# Patient Record
Sex: FEMALE | Race: WHITE | Employment: UNEMPLOYED | URBAN - METROPOLITAN AREA
[De-identification: names, ages, dates, MRNs, and addresses within clinical notes are randomized per-mention and may not be internally consistent; named-entity substitution may affect disease eponyms.]

---

## 2018-08-23 ENCOUNTER — HOSPITAL ENCOUNTER (INPATIENT)
Age: 33
LOS: 1 days | Discharge: PSYCHIATRIC OR SUBSTANCE USE FACILITY | DRG: 773 | End: 2018-08-24
Attending: EMERGENCY MEDICINE | Admitting: INTERNAL MEDICINE
Payer: MEDICAID

## 2018-08-23 DIAGNOSIS — R45.851 SUICIDAL IDEATION: ICD-10-CM

## 2018-08-23 DIAGNOSIS — F10.20 ALCOHOLISM (HCC): ICD-10-CM

## 2018-08-23 DIAGNOSIS — F10.929 ACUTE ALCOHOLIC INTOXICATION WITH COMPLICATION (HCC): Primary | ICD-10-CM

## 2018-08-23 LAB
ACETAMINOPHEN LEVEL: <5 MCG/ML (ref 10–30)
ALBUMIN SERPL-MCNC: 4.4 G/DL (ref 3.5–5.2)
ALP BLD-CCNC: 128 U/L (ref 35–104)
ALT SERPL-CCNC: 76 U/L (ref 0–32)
AMPHETAMINE SCREEN, URINE: NOT DETECTED
ANION GAP SERPL CALCULATED.3IONS-SCNC: 17 MMOL/L (ref 7–16)
AST SERPL-CCNC: 167 U/L (ref 0–31)
BARBITURATE SCREEN URINE: NOT DETECTED
BASOPHILS ABSOLUTE: 0.07 E9/L (ref 0–0.2)
BASOPHILS RELATIVE PERCENT: 1.1 % (ref 0–2)
BENZODIAZEPINE SCREEN, URINE: NOT DETECTED
BILIRUB SERPL-MCNC: 0.4 MG/DL (ref 0–1.2)
BILIRUBIN URINE: NEGATIVE
BLOOD, URINE: NEGATIVE
BUN BLDV-MCNC: 2 MG/DL (ref 6–20)
CALCIUM SERPL-MCNC: 8.5 MG/DL (ref 8.6–10.2)
CANNABINOID SCREEN URINE: POSITIVE
CHLORIDE BLD-SCNC: 103 MMOL/L (ref 98–107)
CLARITY: CLEAR
CO2: 26 MMOL/L (ref 22–29)
COCAINE METABOLITE SCREEN URINE: NOT DETECTED
COLOR: YELLOW
CREAT SERPL-MCNC: 0.5 MG/DL (ref 0.5–1)
EKG ATRIAL RATE: 111 BPM
EKG P AXIS: 75 DEGREES
EKG P-R INTERVAL: 136 MS
EKG Q-T INTERVAL: 346 MS
EKG QRS DURATION: 70 MS
EKG QTC CALCULATION (BAZETT): 470 MS
EKG R AXIS: 86 DEGREES
EKG T AXIS: 72 DEGREES
EKG VENTRICULAR RATE: 111 BPM
EOSINOPHILS ABSOLUTE: 0.12 E9/L (ref 0.05–0.5)
EOSINOPHILS RELATIVE PERCENT: 1.8 % (ref 0–6)
ETHANOL: 388 MG/DL (ref 0–0.08)
GFR AFRICAN AMERICAN: >60
GFR NON-AFRICAN AMERICAN: >60 ML/MIN/1.73
GLUCOSE BLD-MCNC: 112 MG/DL (ref 74–109)
GLUCOSE URINE: NEGATIVE MG/DL
HCT VFR BLD CALC: 45.6 % (ref 34–48)
HEMOGLOBIN: 15.9 G/DL (ref 11.5–15.5)
IMMATURE GRANULOCYTES #: 0.03 E9/L
IMMATURE GRANULOCYTES %: 0.5 % (ref 0–5)
KETONES, URINE: NEGATIVE MG/DL
LEUKOCYTE ESTERASE, URINE: NEGATIVE
LYMPHOCYTES ABSOLUTE: 2.61 E9/L (ref 1.5–4)
LYMPHOCYTES RELATIVE PERCENT: 39.7 % (ref 20–42)
MCH RBC QN AUTO: 36.3 PG (ref 26–35)
MCHC RBC AUTO-ENTMCNC: 34.9 % (ref 32–34.5)
MCV RBC AUTO: 104.1 FL (ref 80–99.9)
METHADONE SCREEN, URINE: NOT DETECTED
MONOCYTES ABSOLUTE: 0.68 E9/L (ref 0.1–0.95)
MONOCYTES RELATIVE PERCENT: 10.4 % (ref 2–12)
NEUTROPHILS ABSOLUTE: 3.06 E9/L (ref 1.8–7.3)
NEUTROPHILS RELATIVE PERCENT: 46.5 % (ref 43–80)
NITRITE, URINE: NEGATIVE
OPIATE SCREEN URINE: NOT DETECTED
PDW BLD-RTO: 11.6 FL (ref 11.5–15)
PH UA: 5.5 (ref 5–9)
PHENCYCLIDINE SCREEN URINE: NOT DETECTED
PLATELET # BLD: 187 E9/L (ref 130–450)
PMV BLD AUTO: 9.8 FL (ref 7–12)
POTASSIUM SERPL-SCNC: 3.5 MMOL/L (ref 3.5–5)
PROPOXYPHENE SCREEN: NOT DETECTED
PROTEIN UA: NEGATIVE MG/DL
RBC # BLD: 4.38 E12/L (ref 3.5–5.5)
SALICYLATE, SERUM: <0.3 MG/DL (ref 0–30)
SODIUM BLD-SCNC: 146 MMOL/L (ref 132–146)
SPECIFIC GRAVITY UA: 1.01 (ref 1–1.03)
T4 FREE: 0.84 NG/DL (ref 0.93–1.7)
TOTAL PROTEIN: 7.9 G/DL (ref 6.4–8.3)
TRICYCLIC ANTIDEPRESSANTS SCREEN SERUM: NEGATIVE NG/ML
TSH SERPL DL<=0.05 MIU/L-ACNC: 1.08 UIU/ML (ref 0.27–4.2)
UROBILINOGEN, URINE: 0.2 E.U./DL
WBC # BLD: 6.6 E9/L (ref 4.5–11.5)

## 2018-08-23 PROCEDURE — 2060000000 HC ICU INTERMEDIATE R&B

## 2018-08-23 PROCEDURE — 6360000002 HC RX W HCPCS: Performed by: INTERNAL MEDICINE

## 2018-08-23 PROCEDURE — 2580000003 HC RX 258: Performed by: INTERNAL MEDICINE

## 2018-08-23 PROCEDURE — 6370000000 HC RX 637 (ALT 250 FOR IP): Performed by: INTERNAL MEDICINE

## 2018-08-23 PROCEDURE — 6360000002 HC RX W HCPCS: Performed by: EMERGENCY MEDICINE

## 2018-08-23 PROCEDURE — 84443 ASSAY THYROID STIM HORMONE: CPT

## 2018-08-23 PROCEDURE — 93005 ELECTROCARDIOGRAM TRACING: CPT | Performed by: STUDENT IN AN ORGANIZED HEALTH CARE EDUCATION/TRAINING PROGRAM

## 2018-08-23 PROCEDURE — 2500000003 HC RX 250 WO HCPCS: Performed by: INTERNAL MEDICINE

## 2018-08-23 PROCEDURE — 81003 URINALYSIS AUTO W/O SCOPE: CPT

## 2018-08-23 PROCEDURE — 36415 COLL VENOUS BLD VENIPUNCTURE: CPT

## 2018-08-23 PROCEDURE — 85025 COMPLETE CBC W/AUTO DIFF WBC: CPT

## 2018-08-23 PROCEDURE — G0480 DRUG TEST DEF 1-7 CLASSES: HCPCS

## 2018-08-23 PROCEDURE — 80307 DRUG TEST PRSMV CHEM ANLYZR: CPT

## 2018-08-23 PROCEDURE — 84439 ASSAY OF FREE THYROXINE: CPT

## 2018-08-23 PROCEDURE — 80053 COMPREHEN METABOLIC PANEL: CPT

## 2018-08-23 PROCEDURE — 99285 EMERGENCY DEPT VISIT HI MDM: CPT

## 2018-08-23 PROCEDURE — 96374 THER/PROPH/DIAG INJ IV PUSH: CPT

## 2018-08-23 PROCEDURE — 2580000003 HC RX 258: Performed by: STUDENT IN AN ORGANIZED HEALTH CARE EDUCATION/TRAINING PROGRAM

## 2018-08-23 RX ORDER — LORAZEPAM 1 MG/1
4 TABLET ORAL
Status: DISCONTINUED | OUTPATIENT
Start: 2018-08-23 | End: 2018-08-24 | Stop reason: HOSPADM

## 2018-08-23 RX ORDER — LORAZEPAM 1 MG/1
2 TABLET ORAL
Status: DISCONTINUED | OUTPATIENT
Start: 2018-08-23 | End: 2018-08-24 | Stop reason: HOSPADM

## 2018-08-23 RX ORDER — LORAZEPAM 1 MG/1
1 TABLET ORAL
Status: DISCONTINUED | OUTPATIENT
Start: 2018-08-23 | End: 2018-08-24

## 2018-08-23 RX ORDER — LORAZEPAM 2 MG/ML
4 INJECTION INTRAMUSCULAR
Status: DISCONTINUED | OUTPATIENT
Start: 2018-08-23 | End: 2018-08-24 | Stop reason: HOSPADM

## 2018-08-23 RX ORDER — SODIUM CHLORIDE 0.9 % (FLUSH) 0.9 %
10 SYRINGE (ML) INJECTION PRN
Status: DISCONTINUED | OUTPATIENT
Start: 2018-08-23 | End: 2018-08-24

## 2018-08-23 RX ORDER — LORAZEPAM 2 MG/ML
1 INJECTION INTRAMUSCULAR
Status: DISCONTINUED | OUTPATIENT
Start: 2018-08-23 | End: 2018-08-24 | Stop reason: HOSPADM

## 2018-08-23 RX ORDER — SODIUM CHLORIDE 0.9 % (FLUSH) 0.9 %
10 SYRINGE (ML) INJECTION EVERY 12 HOURS SCHEDULED
Status: DISCONTINUED | OUTPATIENT
Start: 2018-08-23 | End: 2018-08-24 | Stop reason: HOSPADM

## 2018-08-23 RX ORDER — ONDANSETRON 2 MG/ML
4 INJECTION INTRAMUSCULAR; INTRAVENOUS EVERY 6 HOURS PRN
Status: DISCONTINUED | OUTPATIENT
Start: 2018-08-23 | End: 2018-08-24 | Stop reason: HOSPADM

## 2018-08-23 RX ORDER — FOLIC ACID 1 MG/1
1 TABLET ORAL DAILY
Status: DISCONTINUED | OUTPATIENT
Start: 2018-08-24 | End: 2018-08-24 | Stop reason: HOSPADM

## 2018-08-23 RX ORDER — LORAZEPAM 1 MG/1
2 TABLET ORAL
Status: DISCONTINUED | OUTPATIENT
Start: 2018-08-23 | End: 2018-08-24

## 2018-08-23 RX ORDER — LORAZEPAM 2 MG/ML
2 INJECTION INTRAMUSCULAR
Status: DISCONTINUED | OUTPATIENT
Start: 2018-08-23 | End: 2018-08-24

## 2018-08-23 RX ORDER — LORAZEPAM 2 MG/ML
3 INJECTION INTRAMUSCULAR
Status: DISCONTINUED | OUTPATIENT
Start: 2018-08-23 | End: 2018-08-24 | Stop reason: HOSPADM

## 2018-08-23 RX ORDER — LORAZEPAM 1 MG/1
3 TABLET ORAL
Status: DISCONTINUED | OUTPATIENT
Start: 2018-08-23 | End: 2018-08-24 | Stop reason: HOSPADM

## 2018-08-23 RX ORDER — LORAZEPAM 2 MG/ML
3 INJECTION INTRAMUSCULAR
Status: DISCONTINUED | OUTPATIENT
Start: 2018-08-23 | End: 2018-08-24

## 2018-08-23 RX ORDER — THIAMINE MONONITRATE (VIT B1) 100 MG
100 TABLET ORAL DAILY
Status: DISCONTINUED | OUTPATIENT
Start: 2018-08-24 | End: 2018-08-24 | Stop reason: HOSPADM

## 2018-08-23 RX ORDER — SODIUM CHLORIDE 0.9 % (FLUSH) 0.9 %
10 SYRINGE (ML) INJECTION PRN
Status: DISCONTINUED | OUTPATIENT
Start: 2018-08-23 | End: 2018-08-24 | Stop reason: HOSPADM

## 2018-08-23 RX ORDER — LORAZEPAM 2 MG/ML
2 INJECTION INTRAMUSCULAR
Status: DISCONTINUED | OUTPATIENT
Start: 2018-08-23 | End: 2018-08-24 | Stop reason: HOSPADM

## 2018-08-23 RX ORDER — LORAZEPAM 1 MG/1
4 TABLET ORAL
Status: DISCONTINUED | OUTPATIENT
Start: 2018-08-23 | End: 2018-08-24

## 2018-08-23 RX ORDER — SODIUM CHLORIDE 0.9 % (FLUSH) 0.9 %
10 SYRINGE (ML) INJECTION EVERY 12 HOURS SCHEDULED
Status: DISCONTINUED | OUTPATIENT
Start: 2018-08-23 | End: 2018-08-24

## 2018-08-23 RX ORDER — LORAZEPAM 2 MG/ML
1 INJECTION INTRAMUSCULAR ONCE
Status: DISCONTINUED | OUTPATIENT
Start: 2018-08-23 | End: 2018-08-23

## 2018-08-23 RX ORDER — LORAZEPAM 1 MG/1
1 TABLET ORAL
Status: DISCONTINUED | OUTPATIENT
Start: 2018-08-23 | End: 2018-08-24 | Stop reason: HOSPADM

## 2018-08-23 RX ORDER — 0.9 % SODIUM CHLORIDE 0.9 %
1000 INTRAVENOUS SOLUTION INTRAVENOUS ONCE
Status: COMPLETED | OUTPATIENT
Start: 2018-08-23 | End: 2018-08-23

## 2018-08-23 RX ORDER — LORAZEPAM 2 MG/ML
1 INJECTION INTRAMUSCULAR
Status: DISCONTINUED | OUTPATIENT
Start: 2018-08-23 | End: 2018-08-24

## 2018-08-23 RX ORDER — LORAZEPAM 1 MG/1
3 TABLET ORAL
Status: DISCONTINUED | OUTPATIENT
Start: 2018-08-23 | End: 2018-08-24

## 2018-08-23 RX ORDER — LORAZEPAM 2 MG/ML
4 INJECTION INTRAMUSCULAR
Status: DISCONTINUED | OUTPATIENT
Start: 2018-08-23 | End: 2018-08-24

## 2018-08-23 RX ADMIN — SODIUM CHLORIDE 1000 ML: 9 INJECTION, SOLUTION INTRAVENOUS at 15:17

## 2018-08-23 RX ADMIN — LORAZEPAM 4 MG: 1 TABLET ORAL at 18:51

## 2018-08-23 RX ADMIN — Medication 10 ML: at 19:27

## 2018-08-23 RX ADMIN — LORAZEPAM 4 MG: 1 TABLET ORAL at 22:09

## 2018-08-23 RX ADMIN — LORAZEPAM 2 MG: 2 INJECTION INTRAMUSCULAR; INTRAVENOUS at 16:31

## 2018-08-23 RX ADMIN — FOLIC ACID: 5 INJECTION, SOLUTION INTRAMUSCULAR; INTRAVENOUS; SUBCUTANEOUS at 19:27

## 2018-08-23 RX ADMIN — ENOXAPARIN SODIUM 40 MG: 100 INJECTION SUBCUTANEOUS at 18:51

## 2018-08-23 ASSESSMENT — ENCOUNTER SYMPTOMS
COLOR CHANGE: 0
ABDOMINAL PAIN: 0
BACK PAIN: 0
NAUSEA: 0
COUGH: 0
VOMITING: 0
DIARRHEA: 0
SORE THROAT: 0
SHORTNESS OF BREATH: 0

## 2018-08-23 ASSESSMENT — PAIN SCALES - GENERAL: PAINLEVEL_OUTOF10: 0

## 2018-08-23 NOTE — ED PROVIDER NOTES
Negative for immunocompromised state. Neurological: Negative for dizziness, syncope, weakness, light-headedness, numbness and headaches. Hematological: Negative for adenopathy. Does not bruise/bleed easily. Psychiatric/Behavioral: Negative. Physical Exam   Constitutional: She is oriented to person, place, and time. She appears well-developed. No distress. Appears undernourished   HENT:   Head: Normocephalic and atraumatic. Right Ear: External ear normal.   Left Ear: External ear normal.   Nose: Nose normal.   Mouth/Throat: Oropharynx is clear and moist. No oropharyngeal exudate. Eyes: Pupils are equal, round, and reactive to light. Conjunctivae and EOM are normal. Right eye exhibits no discharge. Left eye exhibits no discharge. No scleral icterus. Neck: Normal range of motion. Neck supple. No JVD present. No tracheal deviation present. No thyromegaly present. Cardiovascular: Regular rhythm, S1 normal, S2 normal, normal heart sounds and intact distal pulses. Tachycardia present. Exam reveals no gallop, no S3, no S4, no distant heart sounds and no friction rub. No murmur heard. Pulses:       Radial pulses are 2+ on the right side, and 2+ on the left side. Pulmonary/Chest: Effort normal and breath sounds normal. No stridor. No respiratory distress. She has no wheezes. She has no rales. She exhibits no tenderness. Abdominal: Soft. Bowel sounds are normal. She exhibits no distension and no mass. There is no tenderness. There is no rebound and no guarding. Musculoskeletal: Normal range of motion. She exhibits no edema, tenderness or deformity. Lymphadenopathy:     She has no cervical adenopathy. Neurological: She is alert and oriented to person, place, and time. Skin: Skin is warm and dry. No rash noted. She is not diaphoretic. No erythema. No pallor. Psychiatric: She has a normal mood and affect.  Her behavior is normal. Judgment and thought content normal.   Nursing note and 8.3 g/dL    Alb 4.4 3.5 - 5.2 g/dL    Total Bilirubin 0.4 0.0 - 1.2 mg/dL    Alkaline Phosphatase 128 (H) 35 - 104 U/L    ALT 76 (H) 0 - 32 U/L     (H) 0 - 31 U/L   Serum Drug Screen   Result Value Ref Range    Ethanol Lvl 388 (HH) mg/dL    Acetaminophen Level <5.0 (L) 10.0 - 83.4 mcg/mL    Salicylate, Serum <7.9 0.0 - 30.0 mg/dL    TCA Scrn NEGATIVE Cutoff:300 ng/mL   Urine Drug Screen   Result Value Ref Range    Amphetamine Screen, Urine NOT DETECTED Negative <1000 ng/mL    Barbiturate Screen, Ur NOT DETECTED Negative < 200 ng/mL    Benzodiazepine Screen, Urine NOT DETECTED Negative < 200 ng/mL    Cannabinoid Scrn, Ur POSITIVE (A) Negative < 50ng/mL    Cocaine Metabolite Screen, Urine NOT DETECTED Negative < 300 ng/mL    Opiate Scrn, Ur NOT DETECTED Negative < 300ng/mL    PCP Screen, Urine NOT DETECTED Negative < 25 ng/mL    Methadone Screen, Urine NOT DETECTED Negative <300 ng/mL    Propoxyphene Scrn, Ur NOT DETECTED Negative <300 ng/mL   TSH without Reflex   Result Value Ref Range    TSH 1.080 0.270 - 4.200 uIU/mL   T4, FREE   Result Value Ref Range    T4 Free 0.84 (L) 0.93 - 1.70 ng/dL   Urinalysis   Result Value Ref Range    Color, UA Yellow Straw/Yellow    Clarity, UA Clear Clear    Glucose, Ur Negative Negative mg/dL    Bilirubin Urine Negative Negative    Ketones, Urine Negative Negative mg/dL    Specific Gravity, UA 1.010 1.005 - 1.030    Blood, Urine Negative Negative    pH, UA 5.5 5.0 - 9.0    Protein, UA Negative Negative mg/dL    Urobilinogen, Urine 0.2 <2.0 E.U./dL    Nitrite, Urine Negative Negative    Leukocyte Esterase, Urine Negative Negative       RADIOLOGY:  No orders to display           ------------------------- NURSING NOTES AND VITALS REVIEWED ---------------------------  Date / Time Roomed:  8/23/2018  2:06 PM  ED Bed Assignment:  GABBIE F/HF    The nursing notes within the ED encounter and vital signs as below have been reviewed.      Patient Vitals for the past 24 hrs:   BP Temp Temp

## 2018-08-23 NOTE — ED NOTES
[facility] CONFIRMS PATIENT ON CARDIAC MONITOR     [facility]  08/23/18 [id number]

## 2018-08-24 ENCOUNTER — HOSPITAL ENCOUNTER (INPATIENT)
Age: 33
LOS: 4 days | Discharge: HOME | DRG: 751 | End: 2018-08-28
Attending: PSYCHIATRY & NEUROLOGY | Admitting: PSYCHIATRY & NEUROLOGY
Payer: MEDICAID

## 2018-08-24 VITALS
BODY MASS INDEX: 15.95 KG/M2 | TEMPERATURE: 97.7 F | HEIGHT: 63 IN | DIASTOLIC BLOOD PRESSURE: 80 MMHG | OXYGEN SATURATION: 97 % | RESPIRATION RATE: 16 BRPM | WEIGHT: 90 LBS | HEART RATE: 98 BPM | SYSTOLIC BLOOD PRESSURE: 120 MMHG

## 2018-08-24 DIAGNOSIS — F33.2 SEVERE EPISODE OF RECURRENT MAJOR DEPRESSIVE DISORDER, WITHOUT PSYCHOTIC FEATURES (HCC): Primary | ICD-10-CM

## 2018-08-24 PROBLEM — R45.851 DEPRESSION WITH SUICIDAL IDEATION: Status: ACTIVE | Noted: 2018-08-24

## 2018-08-24 PROBLEM — F32.A DEPRESSION WITH SUICIDAL IDEATION: Status: ACTIVE | Noted: 2018-08-24

## 2018-08-24 PROBLEM — F10.10 ALCOHOL ABUSE: Chronic | Status: ACTIVE | Noted: 2018-08-24

## 2018-08-24 PROBLEM — E43 PROTEIN-CALORIE MALNUTRITION, SEVERE (HCC): Chronic | Status: ACTIVE | Noted: 2018-08-24

## 2018-08-24 LAB
ALBUMIN SERPL-MCNC: 3.3 G/DL (ref 3.5–5.2)
ALP BLD-CCNC: 97 U/L (ref 35–104)
ALT SERPL-CCNC: 57 U/L (ref 0–32)
ANION GAP SERPL CALCULATED.3IONS-SCNC: 17 MMOL/L (ref 7–16)
AST SERPL-CCNC: 170 U/L (ref 0–31)
BASOPHILS ABSOLUTE: 0.05 E9/L (ref 0–0.2)
BASOPHILS RELATIVE PERCENT: 0.6 % (ref 0–2)
BILIRUB SERPL-MCNC: 0.6 MG/DL (ref 0–1.2)
BUN BLDV-MCNC: <2 MG/DL (ref 6–20)
CALCIUM SERPL-MCNC: 7.8 MG/DL (ref 8.6–10.2)
CHLORIDE BLD-SCNC: 101 MMOL/L (ref 98–107)
CO2: 22 MMOL/L (ref 22–29)
CREAT SERPL-MCNC: 0.4 MG/DL (ref 0.5–1)
EOSINOPHILS ABSOLUTE: 0.09 E9/L (ref 0.05–0.5)
EOSINOPHILS RELATIVE PERCENT: 1.1 % (ref 0–6)
ETHANOL: <10 MG/DL (ref 0–0.08)
GFR AFRICAN AMERICAN: >60
GFR NON-AFRICAN AMERICAN: >60 ML/MIN/1.73
GLUCOSE BLD-MCNC: 89 MG/DL (ref 74–109)
HCT VFR BLD CALC: 37.4 % (ref 34–48)
HEMOGLOBIN: 13 G/DL (ref 11.5–15.5)
IMMATURE GRANULOCYTES #: 0.02 E9/L
IMMATURE GRANULOCYTES %: 0.3 % (ref 0–5)
LYMPHOCYTES ABSOLUTE: 2.56 E9/L (ref 1.5–4)
LYMPHOCYTES RELATIVE PERCENT: 32.5 % (ref 20–42)
MAGNESIUM: 2.1 MG/DL (ref 1.6–2.6)
MCH RBC QN AUTO: 36 PG (ref 26–35)
MCHC RBC AUTO-ENTMCNC: 34.8 % (ref 32–34.5)
MCV RBC AUTO: 103.6 FL (ref 80–99.9)
MONOCYTES ABSOLUTE: 0.96 E9/L (ref 0.1–0.95)
MONOCYTES RELATIVE PERCENT: 12.2 % (ref 2–12)
NEUTROPHILS ABSOLUTE: 4.19 E9/L (ref 1.8–7.3)
NEUTROPHILS RELATIVE PERCENT: 53.3 % (ref 43–80)
PDW BLD-RTO: 11.3 FL (ref 11.5–15)
PLATELET # BLD: 142 E9/L (ref 130–450)
PMV BLD AUTO: 9.9 FL (ref 7–12)
POTASSIUM SERPL-SCNC: 3.6 MMOL/L (ref 3.5–5)
RBC # BLD: 3.61 E12/L (ref 3.5–5.5)
SODIUM BLD-SCNC: 140 MMOL/L (ref 132–146)
TOTAL PROTEIN: 6.2 G/DL (ref 6.4–8.3)
WBC # BLD: 7.9 E9/L (ref 4.5–11.5)

## 2018-08-24 PROCEDURE — 1240000000 HC EMOTIONAL WELLNESS R&B

## 2018-08-24 PROCEDURE — 6370000000 HC RX 637 (ALT 250 FOR IP): Performed by: INTERNAL MEDICINE

## 2018-08-24 PROCEDURE — 99253 IP/OBS CNSLTJ NEW/EST LOW 45: CPT | Performed by: PSYCHIATRY & NEUROLOGY

## 2018-08-24 PROCEDURE — 80053 COMPREHEN METABOLIC PANEL: CPT

## 2018-08-24 PROCEDURE — 83735 ASSAY OF MAGNESIUM: CPT

## 2018-08-24 PROCEDURE — 6360000002 HC RX W HCPCS: Performed by: INTERNAL MEDICINE

## 2018-08-24 PROCEDURE — 2580000003 HC RX 258: Performed by: INTERNAL MEDICINE

## 2018-08-24 PROCEDURE — 6370000000 HC RX 637 (ALT 250 FOR IP): Performed by: EMERGENCY MEDICINE

## 2018-08-24 PROCEDURE — 2580000003 HC RX 258: Performed by: EMERGENCY MEDICINE

## 2018-08-24 PROCEDURE — 85025 COMPLETE CBC W/AUTO DIFF WBC: CPT

## 2018-08-24 PROCEDURE — 36415 COLL VENOUS BLD VENIPUNCTURE: CPT

## 2018-08-24 PROCEDURE — 6360000002 HC RX W HCPCS: Performed by: EMERGENCY MEDICINE

## 2018-08-24 PROCEDURE — G0480 DRUG TEST DEF 1-7 CLASSES: HCPCS

## 2018-08-24 RX ORDER — SODIUM CHLORIDE 0.9 % (FLUSH) 0.9 %
10 SYRINGE (ML) INJECTION PRN
Status: CANCELLED | OUTPATIENT
Start: 2018-08-24

## 2018-08-24 RX ORDER — LORAZEPAM 1 MG/1
3 TABLET ORAL
Status: CANCELLED | OUTPATIENT
Start: 2018-08-24

## 2018-08-24 RX ORDER — LORAZEPAM 2 MG/ML
2 INJECTION INTRAMUSCULAR
Status: CANCELLED | OUTPATIENT
Start: 2018-08-24

## 2018-08-24 RX ORDER — KETOROLAC TROMETHAMINE 30 MG/ML
15 INJECTION, SOLUTION INTRAMUSCULAR; INTRAVENOUS ONCE
Status: COMPLETED | OUTPATIENT
Start: 2018-08-24 | End: 2018-08-24

## 2018-08-24 RX ORDER — LORAZEPAM 2 MG/ML
1 INJECTION INTRAMUSCULAR
Status: CANCELLED | OUTPATIENT
Start: 2018-08-24

## 2018-08-24 RX ORDER — SODIUM CHLORIDE 0.9 % (FLUSH) 0.9 %
10 SYRINGE (ML) INJECTION EVERY 12 HOURS SCHEDULED
Status: CANCELLED | OUTPATIENT
Start: 2018-08-24

## 2018-08-24 RX ORDER — IBUPROFEN 400 MG/1
400 TABLET ORAL EVERY 6 HOURS PRN
Status: CANCELLED | OUTPATIENT
Start: 2018-08-24

## 2018-08-24 RX ORDER — SUCRALFATE 1 G/1
1 TABLET ORAL EVERY 6 HOURS SCHEDULED
Status: CANCELLED | OUTPATIENT
Start: 2018-08-24

## 2018-08-24 RX ORDER — TRAZODONE HYDROCHLORIDE 50 MG/1
25 TABLET ORAL NIGHTLY
Status: DISCONTINUED | OUTPATIENT
Start: 2018-08-24 | End: 2018-08-24 | Stop reason: HOSPADM

## 2018-08-24 RX ORDER — LORAZEPAM 2 MG/ML
1 INJECTION INTRAMUSCULAR
Status: DISCONTINUED | OUTPATIENT
Start: 2018-08-24 | End: 2018-08-27

## 2018-08-24 RX ORDER — ONDANSETRON 4 MG/1
4 TABLET, ORALLY DISINTEGRATING ORAL EVERY 8 HOURS PRN
Status: DISCONTINUED | OUTPATIENT
Start: 2018-08-24 | End: 2018-08-28 | Stop reason: HOSPADM

## 2018-08-24 RX ORDER — LORAZEPAM 2 MG/ML
3 INJECTION INTRAMUSCULAR
Status: DISCONTINUED | OUTPATIENT
Start: 2018-08-24 | End: 2018-08-27

## 2018-08-24 RX ORDER — DEXTROSE AND SODIUM CHLORIDE 5; .9 G/100ML; G/100ML
INJECTION, SOLUTION INTRAVENOUS CONTINUOUS
Status: DISCONTINUED | OUTPATIENT
Start: 2018-08-24 | End: 2018-08-28 | Stop reason: HOSPADM

## 2018-08-24 RX ORDER — LORAZEPAM 1 MG/1
3 TABLET ORAL
Status: DISCONTINUED | OUTPATIENT
Start: 2018-08-24 | End: 2018-08-27

## 2018-08-24 RX ORDER — TRAZODONE HYDROCHLORIDE 50 MG/1
25 TABLET ORAL NIGHTLY
Status: CANCELLED | OUTPATIENT
Start: 2018-08-24

## 2018-08-24 RX ORDER — IBUPROFEN 400 MG/1
400 TABLET ORAL EVERY 6 HOURS PRN
Status: DISCONTINUED | OUTPATIENT
Start: 2018-08-24 | End: 2018-08-24 | Stop reason: HOSPADM

## 2018-08-24 RX ORDER — LORAZEPAM 2 MG/ML
3 INJECTION INTRAMUSCULAR
Status: CANCELLED | OUTPATIENT
Start: 2018-08-24

## 2018-08-24 RX ORDER — LORAZEPAM 2 MG/ML
4 INJECTION INTRAMUSCULAR
Status: CANCELLED | OUTPATIENT
Start: 2018-08-24

## 2018-08-24 RX ORDER — LORAZEPAM 1 MG/1
2 TABLET ORAL
Status: DISCONTINUED | OUTPATIENT
Start: 2018-08-24 | End: 2018-08-27

## 2018-08-24 RX ORDER — ONDANSETRON 4 MG/1
4 TABLET, ORALLY DISINTEGRATING ORAL EVERY 8 HOURS PRN
Status: DISCONTINUED | OUTPATIENT
Start: 2018-08-24 | End: 2018-08-24 | Stop reason: HOSPADM

## 2018-08-24 RX ORDER — PANTOPRAZOLE SODIUM 40 MG/1
40 TABLET, DELAYED RELEASE ORAL
Status: DISCONTINUED | OUTPATIENT
Start: 2018-08-25 | End: 2018-08-24 | Stop reason: HOSPADM

## 2018-08-24 RX ORDER — LORAZEPAM 1 MG/1
2 TABLET ORAL
Status: CANCELLED | OUTPATIENT
Start: 2018-08-24

## 2018-08-24 RX ORDER — ONDANSETRON 4 MG/1
4 TABLET, ORALLY DISINTEGRATING ORAL EVERY 8 HOURS PRN
Status: CANCELLED | OUTPATIENT
Start: 2018-08-24

## 2018-08-24 RX ORDER — NICOTINE 21 MG/24HR
1 PATCH, TRANSDERMAL 24 HOURS TRANSDERMAL DAILY
Status: CANCELLED | OUTPATIENT
Start: 2018-08-24

## 2018-08-24 RX ORDER — PANTOPRAZOLE SODIUM 40 MG/1
40 TABLET, DELAYED RELEASE ORAL
Status: DISCONTINUED | OUTPATIENT
Start: 2018-08-25 | End: 2018-08-28 | Stop reason: HOSPADM

## 2018-08-24 RX ORDER — LORAZEPAM 1 MG/1
4 TABLET ORAL
Status: DISCONTINUED | OUTPATIENT
Start: 2018-08-24 | End: 2018-08-27

## 2018-08-24 RX ORDER — FOLIC ACID 1 MG/1
1 TABLET ORAL DAILY
Status: CANCELLED | OUTPATIENT
Start: 2018-08-25

## 2018-08-24 RX ORDER — PANTOPRAZOLE SODIUM 40 MG/1
40 TABLET, DELAYED RELEASE ORAL
Status: CANCELLED | OUTPATIENT
Start: 2018-08-25

## 2018-08-24 RX ORDER — SODIUM CHLORIDE 0.9 % (FLUSH) 0.9 %
10 SYRINGE (ML) INJECTION PRN
Status: DISCONTINUED | OUTPATIENT
Start: 2018-08-24 | End: 2018-08-28 | Stop reason: HOSPADM

## 2018-08-24 RX ORDER — SODIUM CHLORIDE 0.9 % (FLUSH) 0.9 %
10 SYRINGE (ML) INJECTION EVERY 12 HOURS SCHEDULED
Status: DISCONTINUED | OUTPATIENT
Start: 2018-08-24 | End: 2018-08-28 | Stop reason: HOSPADM

## 2018-08-24 RX ORDER — LORAZEPAM 1 MG/1
1 TABLET ORAL
Status: DISCONTINUED | OUTPATIENT
Start: 2018-08-24 | End: 2018-08-27

## 2018-08-24 RX ORDER — SUCRALFATE 1 G/1
1 TABLET ORAL EVERY 6 HOURS SCHEDULED
Status: DISCONTINUED | OUTPATIENT
Start: 2018-08-25 | End: 2018-08-28 | Stop reason: HOSPADM

## 2018-08-24 RX ORDER — NICOTINE 21 MG/24HR
1 PATCH, TRANSDERMAL 24 HOURS TRANSDERMAL DAILY
Status: DISCONTINUED | OUTPATIENT
Start: 2018-08-25 | End: 2018-08-28 | Stop reason: HOSPADM

## 2018-08-24 RX ORDER — SUCRALFATE 1 G/1
1 TABLET ORAL EVERY 6 HOURS SCHEDULED
Status: DISCONTINUED | OUTPATIENT
Start: 2018-08-24 | End: 2018-08-24 | Stop reason: HOSPADM

## 2018-08-24 RX ORDER — LORAZEPAM 1 MG/1
1 TABLET ORAL
Status: CANCELLED | OUTPATIENT
Start: 2018-08-24

## 2018-08-24 RX ORDER — FOLIC ACID 1 MG/1
1 TABLET ORAL DAILY
Status: DISCONTINUED | OUTPATIENT
Start: 2018-08-25 | End: 2018-08-28 | Stop reason: HOSPADM

## 2018-08-24 RX ORDER — DEXTROSE AND SODIUM CHLORIDE 5; .9 G/100ML; G/100ML
INJECTION, SOLUTION INTRAVENOUS CONTINUOUS
Status: CANCELLED | OUTPATIENT
Start: 2018-08-24

## 2018-08-24 RX ORDER — NICOTINE 21 MG/24HR
1 PATCH, TRANSDERMAL 24 HOURS TRANSDERMAL DAILY
Status: DISCONTINUED | OUTPATIENT
Start: 2018-08-24 | End: 2018-08-24 | Stop reason: HOSPADM

## 2018-08-24 RX ORDER — LORAZEPAM 1 MG/1
4 TABLET ORAL
Status: CANCELLED | OUTPATIENT
Start: 2018-08-24

## 2018-08-24 RX ORDER — THIAMINE MONONITRATE (VIT B1) 100 MG
100 TABLET ORAL DAILY
Status: CANCELLED | OUTPATIENT
Start: 2018-08-25

## 2018-08-24 RX ORDER — IBUPROFEN 400 MG/1
400 TABLET ORAL EVERY 6 HOURS PRN
Status: DISCONTINUED | OUTPATIENT
Start: 2018-08-24 | End: 2018-08-28 | Stop reason: HOSPADM

## 2018-08-24 RX ORDER — LORAZEPAM 2 MG/ML
2 INJECTION INTRAMUSCULAR
Status: DISCONTINUED | OUTPATIENT
Start: 2018-08-24 | End: 2018-08-27

## 2018-08-24 RX ORDER — DEXTROSE AND SODIUM CHLORIDE 5; .9 G/100ML; G/100ML
INJECTION, SOLUTION INTRAVENOUS CONTINUOUS
Status: DISCONTINUED | OUTPATIENT
Start: 2018-08-24 | End: 2018-08-24 | Stop reason: HOSPADM

## 2018-08-24 RX ORDER — LORAZEPAM 2 MG/ML
4 INJECTION INTRAMUSCULAR
Status: DISCONTINUED | OUTPATIENT
Start: 2018-08-24 | End: 2018-08-27

## 2018-08-24 RX ORDER — TRAZODONE HYDROCHLORIDE 50 MG/1
25 TABLET ORAL NIGHTLY
Status: DISCONTINUED | OUTPATIENT
Start: 2018-08-24 | End: 2018-08-28 | Stop reason: HOSPADM

## 2018-08-24 RX ORDER — THIAMINE MONONITRATE (VIT B1) 100 MG
100 TABLET ORAL DAILY
Status: DISCONTINUED | OUTPATIENT
Start: 2018-08-25 | End: 2018-08-28 | Stop reason: HOSPADM

## 2018-08-24 RX ADMIN — ONDANSETRON 4 MG: 2 SOLUTION INTRAMUSCULAR; INTRAVENOUS at 07:54

## 2018-08-24 RX ADMIN — LORAZEPAM 2 MG: 1 TABLET ORAL at 16:26

## 2018-08-24 RX ADMIN — LORAZEPAM 3 MG: 1 TABLET ORAL at 00:32

## 2018-08-24 RX ADMIN — LORAZEPAM 3 MG: 2 INJECTION INTRAMUSCULAR; INTRAVENOUS at 12:40

## 2018-08-24 RX ADMIN — ENOXAPARIN SODIUM 40 MG: 100 INJECTION SUBCUTANEOUS at 08:44

## 2018-08-24 RX ADMIN — LORAZEPAM 2 MG: 1 TABLET ORAL at 07:54

## 2018-08-24 RX ADMIN — KETOROLAC TROMETHAMINE 15 MG: 30 INJECTION, SOLUTION INTRAMUSCULAR at 10:34

## 2018-08-24 RX ADMIN — IBUPROFEN 400 MG: 400 TABLET, FILM COATED ORAL at 21:44

## 2018-08-24 RX ADMIN — SUCRALFATE 1 G: 1 TABLET ORAL at 18:26

## 2018-08-24 RX ADMIN — ONDANSETRON 4 MG: 2 SOLUTION INTRAMUSCULAR; INTRAVENOUS at 12:40

## 2018-08-24 RX ADMIN — Medication 100 MG: at 08:44

## 2018-08-24 RX ADMIN — Medication 10 ML: at 07:54

## 2018-08-24 RX ADMIN — TRAZODONE HYDROCHLORIDE 25 MG: 50 TABLET ORAL at 21:45

## 2018-08-24 RX ADMIN — SUCRALFATE 1 G: 1 TABLET ORAL at 12:31

## 2018-08-24 RX ADMIN — LORAZEPAM 1 MG: 1 TABLET ORAL at 21:45

## 2018-08-24 RX ADMIN — LORAZEPAM 2 MG: 1 TABLET ORAL at 14:21

## 2018-08-24 RX ADMIN — LORAZEPAM 2 MG: 1 TABLET ORAL at 17:38

## 2018-08-24 RX ADMIN — FOLIC ACID 1 MG: 1 TABLET ORAL at 08:44

## 2018-08-24 RX ADMIN — DEXTROSE AND SODIUM CHLORIDE: 5; 900 INJECTION, SOLUTION INTRAVENOUS at 10:43

## 2018-08-24 RX ADMIN — Medication 10 ML: at 10:35

## 2018-08-24 RX ADMIN — Medication 10 ML: at 08:44

## 2018-08-24 ASSESSMENT — PAIN SCALES - GENERAL
PAINLEVEL_OUTOF10: 0
PAINLEVEL_OUTOF10: 9
PAINLEVEL_OUTOF10: 9

## 2018-08-24 ASSESSMENT — PAIN DESCRIPTION - FREQUENCY
FREQUENCY: CONTINUOUS
FREQUENCY: CONTINUOUS

## 2018-08-24 ASSESSMENT — PAIN DESCRIPTION - PAIN TYPE
TYPE: CHRONIC PAIN
TYPE: ACUTE PAIN

## 2018-08-24 ASSESSMENT — SLEEP AND FATIGUE QUESTIONNAIRES
RESTFUL SLEEP: NO
SLEEP PATTERN: DIFFICULTY ARISING;RESTLESSNESS
DO YOU USE A SLEEP AID: YES
DIFFICULTY STAYING ASLEEP: NO
DIFFICULTY ARISING: NO
DIFFICULTY FALLING ASLEEP: NO
AVERAGE NUMBER OF SLEEP HOURS: 3
DO YOU HAVE DIFFICULTY SLEEPING: YES

## 2018-08-24 ASSESSMENT — PAIN DESCRIPTION - PROGRESSION: CLINICAL_PROGRESSION: NOT CHANGED

## 2018-08-24 ASSESSMENT — LIFESTYLE VARIABLES: HISTORY_ALCOHOL_USE: YES

## 2018-08-24 ASSESSMENT — PAIN DESCRIPTION - LOCATION
LOCATION: HEAD
LOCATION: BACK

## 2018-08-24 ASSESSMENT — PAIN DESCRIPTION - DESCRIPTORS
DESCRIPTORS: ACHING
DESCRIPTORS: ACHING;DISCOMFORT

## 2018-08-24 ASSESSMENT — PAIN DESCRIPTION - ONSET: ONSET: ON-GOING

## 2018-08-24 ASSESSMENT — PAIN DESCRIPTION - ORIENTATION: ORIENTATION: LOWER

## 2018-08-24 NOTE — PLAN OF CARE
Problem: Falls - Risk of:  Goal: Will remain free from falls  Will remain free from falls   Outcome: Met This Shift      Problem: Violence - Risk of, Self/Other-Directed:  Goal: Knowledge of developmental care interventions  Absence of violence   Outcome: Not Met This Shift      Problem: Suicide risk  Goal: Provide patient with safe environment  Provide patient with safe environment   Outcome: Met This Shift      Problem: Risk of Harm:  Goal: Ability to remain free from injury will improve  Ability to remain free from injury will improve   Outcome: Met This Shift

## 2018-08-24 NOTE — ED NOTES
Per [facility] AN AFFILIATE OF [facility] NAE Corona, patient is accepted to 7[facility] by Dr. Frank Gonzalez. Waiting for room assignment. Faxed voluntary form to [facility]. ELOY will contact unit with room number & when to call N2N.      KO Valenzuela, LSW  08/24/18 [id number]

## 2018-08-24 NOTE — H&P
History:   Non contributory for this illness. REVIEW OF SYSTEMS:  As above in the HPI, otherwise negative    PHYSICAL EXAM:    Vitals:  BP (!) 122/90   Pulse 85   Temp 98.3 °F (36.8 °C) (Oral)   Resp 16   Ht 5' 3\" (1.6 m)   Wt 90 lb (40.8 kg)   SpO2 97%   BMI 15.94 kg/m²     General:  Awake, alert, oriented X 3. Well developed, thin and under nourished, well groomed. No apparent distress. HEENT:  Normocephalic, atraumatic. No scleral icterus. No conjunctival injection. Normal lips, teeth, and gums. No nasal discharge. No pharyngeal erythema or exudate. Neck:  Supple  Heart:  RRR, no murmurs, gallops, or rubs  Lungs:  CTA bilaterally, bilat symmetrical expansion, no wheeze, rales, or rhonchi  Abdomen:   Bowel sounds present, soft, non distended, mild mid abdominal discomfort on palpation, no masses, no organomegaly, no peritoneal signs  Extremities:  No clubbing, cyanosis, or edema  Skin:  Warm and dry, no open lesions or rash  Neuro:  Cranial nerves 2-12 intact, no focal deficits  Breast: deferred  Rectal: deferred  Genitalia:  deferred    LABS:  CBC:   Lab Results   Component Value Date    WBC 7.9 08/24/2018    RBC 3.61 08/24/2018    HGB 13.0 08/24/2018    HCT 37.4 08/24/2018    .6 08/24/2018    MCH 36.0 08/24/2018    MCHC 34.8 08/24/2018    RDW 11.3 08/24/2018     08/24/2018    MPV 9.9 08/24/2018     BMP:    Lab Results   Component Value Date     08/24/2018    K 3.6 08/24/2018     08/24/2018    CO2 22 08/24/2018    BUN <2 08/24/2018    LABALBU 3.3 08/24/2018    CREATININE 0.4 08/24/2018    CALCIUM 7.8 08/24/2018    GFRAA >60 08/24/2018    LABGLOM >60 08/24/2018    GLUCOSE 89 08/24/2018     Hepatic Function Panel:    Lab Results   Component Value Date    ALKPHOS 97 08/24/2018    ALT 57 08/24/2018     08/24/2018    PROT 6.2 08/24/2018    BILITOT 0.6 08/24/2018    LABALBU 3.3 08/24/2018     U/A:    Lab Results   Component Value Date    COLORU Yellow 08/23/2018 PROTEINU Negative 08/23/2018    PHUR 5.5 08/23/2018    CLARITYU Clear 08/23/2018    SPECGRAV 1.010 08/23/2018    LEUKOCYTESUR Negative 08/23/2018    UROBILINOGEN 0.2 08/23/2018    BILIRUBINUR Negative 08/23/2018    BLOODU Negative 08/23/2018    GLUCOSEU Negative 08/23/2018       Urine drug screen positive for marijuana only  Monitor-sinus      ASSESSMENT:      Patient Active Problem List   Diagnosis    Major depressive disorder, recurrent episode, severe     Alcohol abuse, continuous-no present signs for withdrawal    Tetrahydrocannabinol (THC) use disorder, mild, abuse    Tobacco abuse, current    Generalized abdominal pain-suspect alcohol induced gastritis    Protein-calorie malnutrition, severe         PLAN:    1) admit to monitored bed  2) hydrate with IV fluids  3) CIWA protocol to prevent withdrawals  4) thiamine/folic acid supplementation  5) dietary nutritional supplements  6) PPI and carafate to treat gastritisi  7) zofran prn nausea  8) nicotine patch for tobacco cessation  9) trazodone for sleep  10) psychiatry consult  11) ok to discharge to psychiatry once bed available/approved  12) the patient is expected to stay 2 or more midnights to evaluate and treat her alcohol abuse      Please note that over 50 minutes was spent in evaluating the patient, review of records and results, discussion with staff/family, etc.    Sophia Shirley MD  10:30 AM  8/24/2018

## 2018-08-25 PROCEDURE — 1240000000 HC EMOTIONAL WELLNESS R&B

## 2018-08-25 PROCEDURE — 99221 1ST HOSP IP/OBS SF/LOW 40: CPT | Performed by: PSYCHIATRY & NEUROLOGY

## 2018-08-25 PROCEDURE — 6370000000 HC RX 637 (ALT 250 FOR IP): Performed by: INTERNAL MEDICINE

## 2018-08-25 PROCEDURE — 6370000000 HC RX 637 (ALT 250 FOR IP): Performed by: NURSE PRACTITIONER

## 2018-08-25 RX ORDER — PAROXETINE 10 MG/1
10 TABLET, FILM COATED ORAL DAILY
Status: DISCONTINUED | OUTPATIENT
Start: 2018-08-25 | End: 2018-08-27

## 2018-08-25 RX ADMIN — Medication 100 MG: at 09:13

## 2018-08-25 RX ADMIN — SUCRALFATE 1 G: 1 TABLET ORAL at 17:33

## 2018-08-25 RX ADMIN — SUCRALFATE 1 G: 1 TABLET ORAL at 12:55

## 2018-08-25 RX ADMIN — PAROXETINE HYDROCHLORIDE HEMIHYDRATE 10 MG: 10 TABLET, FILM COATED ORAL at 12:56

## 2018-08-25 RX ADMIN — PANTOPRAZOLE SODIUM 40 MG: 40 TABLET, DELAYED RELEASE ORAL at 06:18

## 2018-08-25 RX ADMIN — SUCRALFATE 1 G: 1 TABLET ORAL at 06:18

## 2018-08-25 RX ADMIN — TRAZODONE HYDROCHLORIDE 25 MG: 50 TABLET ORAL at 21:54

## 2018-08-25 RX ADMIN — Medication 1 MG: at 09:13

## 2018-08-25 ASSESSMENT — PAIN SCALES - GENERAL: PAINLEVEL_OUTOF10: 0

## 2018-08-25 NOTE — PLAN OF CARE
[facility]  Initial Interdisciplinary Treatment Plan NOTE    Review Date & Time: 08/25/18    Patient was not in treatment team    Admission Type:   Admission Type: Voluntary    Reason for admission:  Reason for Admission: \"Sick and tired of being sick and tired. \"      Estimated Length of Stay Update:  3-5 days  Estimated Discharge Date Update: 08/30/18    PATIENT STRENGTHS:  Patient Strengths Strengths: Medication Compliance, Positive Support  Patient Strengths and Limitations:Limitations: Difficulty problem solving/relies on others to help solve problems, Multiple barriers to leisure interests  Addictive Behavior:Addictive Behavior  In the past 3 months, have you felt or has someone told you that you have a problem with:  : None  Do you have a history of Chemical Use?: No  Do you have a history of Alcohol Use?: Yes  Do you have a history of Street Drug Abuse?: Yes  Histroy of Prescripton Drug Abuse?: No  Medical Problems:  Past Medical History:   Diagnosis Date    Anemia     ETOH abuse        EDUCATION:   Learner Progress Toward Treatment Goals: Reviewed results and recommendations of this team, Reviewed group plan and strategies, Reviewed signs, symptoms and risk of self harm and violent behavior and Reviewed goals and plan of care    Method: Individual    Outcome: Verbalized understanding and Demonstrated Understanding    PATIENT GOALS: no goal    PLAN/TREATMENT RECOMMENDATIONS UPDATE: Offer and encourage    GOALS UPDATE:   Time frame for Short-Term Goals: one week    Ed Stovall RN

## 2018-08-25 NOTE — PLAN OF CARE
Problem: Nutrition  Goal: Optimal nutrition therapy  Outcome: Ongoing  Nutrition Problem: Severe malnutrition, in context of chronic illness  Intervention: Food and/or Nutrient Delivery: Continue current diet, Modify current ONS (modify to QID to provide 1 additional ONS daily )  Nutritional Goals: consume 50% or more of most meals/ONS

## 2018-08-25 NOTE — H&P
attempt: [x]Denies            [] yes  [] OD  [] Cutting  [] Hanging  [] Gun  [] Other    Previous psych medications:  [x] Was prescribed               [] Currently Taking       [] Never taken medications      PAST MEDICAL HISTORY:       Diagnosis Date    Anemia     ETOH abuse        FAMILY PSYCHIATRIC HISTORY:  No family history on file. [x] Endorses    [x] Father  [x] Mother  [] Sibling [] [address] Parent      [] Depression  [] Anxiety  [] Bipolar  [] Psychosis  [x]  Alcoholic      [] Denies       SOCIAL HISTORY:     1. Living Situation:[x] Private Residence [] Homeless [] [address]             [] Assisted Living [] [address]  [] Shelter [] Other   2. Employment:  [] Yes  [x] No  3. Legal History: [] No Arrest [x] Arrest  [] Theft  []  Assault  [x] Substances   4. History of Trama/ Abuse: [x] Denies  [] Emotional [] Physical [] Sexual   5. Spirituality: [x] Spiritual [] Not Spiritual   6. Substance Abuse: [] Denies  [x] Drug of choice      [] Amphetamines [x] Marijuana [] Cocaine      [] Opioids  [x] Alcohol  [] Benzodiazepines  For further SH review SW note. Risk Assessment:  1. Risk Factors:   [x] Depression  [x] Anxiety  [] Psychosis   [x] Suicidal/Homicidal Thoughts [] Suicide Attempt [] Substance Abuse     2. Protective Factors: [x] Controlled Environment         [] Supportive Family []         [] Yarsani Support     3. Level of Risk: [] Mild [] Moderate [x] Severe      Strengths & Weaknesses:    1. Strengths: [x] Ability to communicate feelings     [] Independent ADL's     [] Supportive Family    [] Current Health Status     2.  Weaknesses: [] Emotional          [] Motivational     MEDICATIONS: Current Facility-Administered Medications: enoxaparin (LOVENOX) injection 40 mg, 40 mg, Subcutaneous, Daily  folic acid (FOLVITE) tablet 1 mg, 1 mg, Oral, Daily  sodium chloride flush 0.9 % injection 10 mL, 10 mL, Intravenous, PRN  vitamin B-1 (THIAMINE) tablet 100 mg, 100 mg, Oral, Daily  LORazepam (ATIVAN) tablet 1 mg, 1 mg, Oral, Q1H PRN **OR** LORazepam (ATIVAN) injection 1 mg, 1 mg, Intravenous, Q1H PRN **OR** LORazepam (ATIVAN) tablet 2 mg, 2 mg, Oral, Q1H PRN **OR** LORazepam (ATIVAN) injection 2 mg, 2 mg, Intravenous, Q1H PRN **OR** LORazepam (ATIVAN) tablet 3 mg, 3 mg, Oral, Q1H PRN **OR** LORazepam (ATIVAN) injection 3 mg, 3 mg, Intravenous, Q1H PRN **OR** LORazepam (ATIVAN) tablet 4 mg, 4 mg, Oral, Q1H PRN **OR** LORazepam (ATIVAN) injection 4 mg, 4 mg, Intravenous, Q1H PRN  sodium chloride flush 0.9 % injection 10 mL, 10 mL, Intravenous, 2 times per day  dextrose 5 % and 0.9 % sodium chloride infusion, , Intravenous, Continuous  ibuprofen (ADVIL;MOTRIN) tablet 400 mg, 400 mg, Oral, Q6H PRN  nicotine (NICODERM CQ) 14 MG/24HR 1 patch, 1 patch, Transdermal, Daily  ondansetron (ZOFRAN-ODT) disintegrating tablet 4 mg, 4 mg, Oral, Q8H PRN  pantoprazole (PROTONIX) tablet 40 mg, 40 mg, Oral, QAM AC  sucralfate (CARAFATE) tablet 1 g, 1 g, Oral, 4 times per day  traZODone (DESYREL) tablet 25 mg, 25 mg, Oral, Nightly    Medical Review of Systems:     All other than marked systmes have been reviewed and are all negative. Constitutional Symptoms: []  fever []  Chills  Skin Symptoms: [] rash []  Pruritus   Eye Symptoms: [] Vision unchanged []  recent vision problems[] blurred vision   Respiratory Symptoms:[] shortness of breath [] cough  Cardiovascular Symptoms:  [] chest pain   [] palpitations   Gastrointestinal Symptoms: []  abdominal pain []  nausea []  vomiting []  diarrhea  Genitourinary Symptoms: []  dysuria  []  hematuria   Musculoskeletal Symptoms: []  back pain []  muscle pain []  joint pain  Neurologic Symptoms: []  headache []  dizziness  Hematolymphoid Symptoms: [] Adenopathy [] Bruises   [] Schimosis       VITALS: /75   Pulse 81   Temp 98.7 °F (37.1 °C) (Oral)   Resp 16   Ht 5' 3\" (1.6 m)   Wt 100 lb (45.4 kg)   BMI 17.71 kg/m²     ALLERGIES: Patient has no known allergies. Physical Examination:    Head:  [x] Atraumatic:  [] normocephalic  Skin and Mucosa       [] Moist [] Dry [] Pale [x] Normal   Neck: [x] Thyroid [x] Palpable    [] Not palpable []  venus distention [] adenopathy   Chest: [x] Clear [] Rhonchi  [] Wheezing   CV: [] S1 [] S2 [x] No murmer   Abdomen:  [x] Soft   [] Tender  [] Viceromegaly   Extremities:  [x] No Edema   [] Edema    Cranial Nerves Examination:    CN II: [x] Pupils are reactive to light [] Pupils are non reactive to light  CN III, IV, VI:[x] No eye deviation  [x] No diplopia or ptosis   CN V: [x] Facial Sensation is intact  [] Facial Sensation is not intact   CN IIIV:  [x] Hearing is normal to rubbing fingers   CN IX, X:  [x] Normal gag reflex and phonation   CN XI: [x] Shoulder shrug and neck rotation is normal  CNXII: [x] Tongue is midline no deviation or atrophy       For further PE refer to ED note    MENTAL STATUS EXAM:       Mental Status Examination:    Cognition:      [x] Alert  [x] Awake  [x] Oriented  [x] Person  [x] Place [x] Time      [] drowsy  [] tired  [] lethargic  [] distractable  []     Attention/Concentration:   [x] Attentive  [] Distracted        Memory Recent and Remote: [] Intact   [] Impaired [] Partially Impaired     Language: [] Able to recognize and name objects          [] Unable to recognize and name Objects    Fund of Knowledge:  [] Poor [x]  Fair  [] Good    Speech: [x] Normal  [] Soft  [] Slow  [] Fast [] Pressured            [] Loud [] Dysarthria  [] Incoherent       Appearance: [] Well Groomed  [x] Casual Dressed  [] Unkept  [] Disheveled          [] Normal weight[] Thin  [] Overweight  [] Obese           Attitude: [] Positive  [] Hostile  [] Demanding  [] Guarded  [] Defensive         [x] Cooperative  []  Uncooperative      Behavior:  [x] Normal Gait  [] Walks with Assistance  [] Meryl Chair    [] Walks with Rachael Booze  [] In Hospital Bed  [] Sitting in Chair    Muscle-Skeletal:  [x] Normal Muscle Tone [] Muscle Atrophy

## 2018-08-25 NOTE — PROGRESS NOTES
Nutrition Assessment    Type and Reason for Visit: Initial, Positive Nutrition Screen    Nutrition Recommendations: Continue current diet as ordered. Will order ONS for QID to help promote optimal po intake. Will continue to monitor patient. Note that patient has reported continued nausea, emesis and diarrhea that has caused her poor oral intake and weight loss. Discussed simple tips to help maintain po intake during periods of nausea. Malnutrition Assessment:  · Malnutrition Status: Meets the criteria for severe malnutrition  · Context: Social or environmental circumstances  · Findings of the 6 clinical characteristics of malnutrition (Minimum of 2 out of 6 clinical characteristics is required to make the diagnosis of moderate or severe Protein Calorie Malnutrition based on AND/ASPEN Guidelines):  1. Energy Intake-Less than or equal to 50%, greater than or equal to 3 months    2. Weight Loss-Unable to assess (subjective 20# wt loss x 3 months- unable to verify d/t no actual wts per EMR ),    3. Fat Loss-Severe subcutaneous fat loss, Orbital, Triceps  4. Muscle Loss-Severe muscle mass loss, Temples (temporalis muscle), Clavicles (pectoralis and deltoids), Calf (gastrocnemius)  5. Fluid Accumulation-No significant fluid accumulation,    6.  Strength-Not measured    Nutrition Diagnosis:   · Problem: Severe malnutrition, in context of chronic illness  · Etiology: related to Nausea, Diarrhea, Vomiting     Signs and symptoms:  as evidenced by Diet history of poor intake, Intake 0-25%, Weight loss, Severe muscle loss, Severe loss of subcutaneous fat, Nausea, Vomiting, Diarrhea    Nutrition Assessment:  · Subjective Assessment: pt while seen on PICU reports to me that she has frequent emesis and water diarrhea and states that intake is poor d/t this continuing issue.  Subjective 20# wt loss   · Nutrition-Focused Physical Findings: +I/Os, abd WNL, no edema noted, abd pain/N/V PTA and continuing ,hx of ETOH abuse   · Wound Type: None  · Current Nutrition Therapies:  · Oral Diet Orders: General   · Oral Diet intake: 1-25%  · Oral Nutrition Supplement (ONS) Orders: Standard High Calorie Oral Supplement  · Anthropometric Measures:  · Ht: 5' 3\" (160 cm)   · Current Body Wt: 100 lb (45.4 kg) (8/24 - uto updated wt 2/2 pt oob during visist )  · Usual Body Wt:  (no recent wts per # in 2013 )  · % Weight Change: pt reports 20# wt loss x 3 months; unable to verify d/t no actual wts per EMR ,     · Ideal Body Wt: 115 lb (52.2 kg), % Ideal Body 88%   · BMI Classification: BMI <18.5 Underweight  · Comparative Standards (Estimated Nutrition Needs):  · Estimated Daily Total Kcal: 6182-3599  · Estimated Daily Protein (g): 55-65    Nutrition Risk Level: High    Nutrition Interventions:   Continue current diet, Modify current ONS (modify to QID to provide 1 additional ONS daily )  Continued Inpatient Monitoring, Coordination of Care    Nutrition Evaluation:   · Evaluation: Goals set   · Goals: consume 50% or more of most meals/ONS     · Monitoring: Meal Intake, Supplement Intake, Diet Tolerance, Skin Integrity, Fluid Balance, Weight, Comparative Standards, Pertinent Labs, Chewing/Swallowing, Mental Status/Confusion, Nausea or Vomiting, Diarrhea    See Adult Nutrition Doc Flowsheet for more detail.      Electronically signed by Mahi Suarez RD, RAHUL on 8/25/18 at 9:09 AM    Contact Number: [phone]

## 2018-08-26 PROCEDURE — 99231 SBSQ HOSP IP/OBS SF/LOW 25: CPT | Performed by: PSYCHIATRY & NEUROLOGY

## 2018-08-26 PROCEDURE — 6370000000 HC RX 637 (ALT 250 FOR IP): Performed by: INTERNAL MEDICINE

## 2018-08-26 PROCEDURE — 1240000000 HC EMOTIONAL WELLNESS R&B

## 2018-08-26 PROCEDURE — 6370000000 HC RX 637 (ALT 250 FOR IP): Performed by: NURSE PRACTITIONER

## 2018-08-26 RX ADMIN — PANTOPRAZOLE SODIUM 40 MG: 40 TABLET, DELAYED RELEASE ORAL at 07:24

## 2018-08-26 RX ADMIN — PAROXETINE HYDROCHLORIDE HEMIHYDRATE 10 MG: 10 TABLET, FILM COATED ORAL at 09:42

## 2018-08-26 RX ADMIN — SUCRALFATE 1 G: 1 TABLET ORAL at 16:48

## 2018-08-26 RX ADMIN — Medication 100 MG: at 09:42

## 2018-08-26 RX ADMIN — Medication 1 MG: at 09:42

## 2018-08-26 RX ADMIN — IBUPROFEN 400 MG: 400 TABLET, FILM COATED ORAL at 16:48

## 2018-08-26 RX ADMIN — TRAZODONE HYDROCHLORIDE 25 MG: 50 TABLET ORAL at 20:41

## 2018-08-26 RX ADMIN — LORAZEPAM 1 MG: 1 TABLET ORAL at 20:40

## 2018-08-26 RX ADMIN — SUCRALFATE 1 G: 1 TABLET ORAL at 07:24

## 2018-08-26 ASSESSMENT — SLEEP AND FATIGUE QUESTIONNAIRES
RESTFUL SLEEP: NO
DO YOU HAVE DIFFICULTY SLEEPING: YES
SLEEP PATTERN: DIFFICULTY ARISING;RESTLESSNESS
DIFFICULTY ARISING: NO
DO YOU USE A SLEEP AID: YES
DIFFICULTY STAYING ASLEEP: NO
DIFFICULTY FALLING ASLEEP: NO
AVERAGE NUMBER OF SLEEP HOURS: 3

## 2018-08-26 ASSESSMENT — PAIN SCALES - GENERAL: PAINLEVEL_OUTOF10: 9

## 2018-08-26 ASSESSMENT — LIFESTYLE VARIABLES: HISTORY_ALCOHOL_USE: YES

## 2018-08-26 NOTE — PLAN OF CARE
Problem: Depressive Behavior With or Without Suicide Precautions:  Goal: Able to verbalize acceptance of life and situations over which he or she has no control  Able to verbalize acceptance of life and situations over which he or she has no control   Outcome: Ongoing    Goal: Able to verbalize and/or display a decrease in depressive symptoms  Able to verbalize and/or display a decrease in depressive symptoms   Outcome: Ongoing    Goal: Ability to disclose and discuss suicidal ideas will improve  Ability to disclose and discuss suicidal ideas will improve   Outcome: Met This Shift    Goal: Patient specific goal  Patient specific goal   Outcome: Met This Shift      Comments: Patient presently denies suicidal, homicidal thoughts, or hallucinations. Patient states \" I do not need to be here. \" Patient remains seclusive to self and room after visitation from father. Attended evening group. Compliant with medications. Will continue to monitor and support throughout remainder of shift.

## 2018-08-26 NOTE — PROGRESS NOTES
Group Therapy Note    Date: 8/26/2018  Start Time: 10:00 am  End Time:  11:00 am  Number of Participants: 15    Type of Group: Community Meeting/Wellness    Wellness [address] [place] [address] Name:  [employer] Number:  Importance of Support    Patient's Goal:  Patient will be aware of treatment team and identify daily goal.  Will recognize importance of support and safety during wellness recovery. Notes:  Identified daily goal as \" Improve my patience\". Interacted pleasantly with peers. Tearful in sharing \"need to leave now\" and concern for care of her father. Status After Intervention:  Improved    Participation Level:  Active Listener and Interactive    Participation Quality: Appropriate, Attentive and Sharing      Speech:  normal      Thought Process/Content: Logical      Affective Functioning: Congruent      Mood: euthymic      Level of consciousness:  Alert and Attentive      Response to Learning: Capable of insight, Able to change behavior and Progressing to goal      Endings: None Reported    Modes of Intervention: Education, Support, Socialization and Exploration      Discipline Responsible: Psychoeducational Specialist      Signature:  Jorge Herrmann, [address]

## 2018-08-26 NOTE — PROGRESS NOTES
DATE OF SERVICE:     8/26/2018    Caitlin Best seen today for the purpose of continuation of care. Nursing, social work reports, laboratory studies and vital signs are reviewed. Patient chief complaint today is:             [x] Depression      [x] Anxiety        [] Psychosis         [] Suicidal/Homicidal                         [] Delusions           [] Aggression          Subjective: Today patient states that \"I am scared to be here\". \"Being in here is not good for me and is making me feel worse\". Patient is tearful and endorses anxiety as constant and severe. Denies SI, HI, AVH. Sleep:  [] Good [x] Fair  [] Poor  Appetite:  [] Good [x] Fair  [] Poor    Depression:  [] Mild [x] Moderate [] Severe                [x] Constant [] Sporadic     Anxiety: [] Mild [] Moderate [x] Severe    [x] Constant [] Sporadic     Delusions: [] Mild [] Moderate [] Severe     [] Constant [] Sporadic     [] Paranoid [] Somatic [] Grandiose     Hallucinations: [] Mild [] Moderate [] Severe     [] Constant [] Sporadic    [] Auditory  [] Visual [] Tactile       Suicidal: [] Constant [] Sporadic  Homicidal: [] Constant [] Sporadic    Unscheduled Medications     [] Patient Receiving Emergency Medications \" Chemical Restraint\"   [] Requesting PRN medications for anxiety    Medical Review of Systems:     All other than marked systmes have been reviewed and are all negative.     Constitutional Symptoms: []  fever []  Chills  Skin Symptoms: [] rash []  Pruritus   Eye Symptoms: [] Vision unchanged []  recent vision problems[] blurred vision   Respiratory Symptoms:[] shortness of breath [] cough  Cardiovascular Symptoms:  [] chest pain   [] palpitations   Gastrointestinal Symptoms: []  abdominal pain []  nausea []  vomiting []  diarrhea  Genitourinary Symptoms: []  dysuria  []  hematuria   Musculoskeletal Symptoms: []  back pain []  muscle pain []  joint pain  Neurologic Symptoms: []  headache []  dizziness  Hematolymphoid Symptoms: [] Adenopathy [] Bruises   [] Schimosis       Psychiatric Review of systems  Delusions:  [x] Denies [] Endorses   Withdrawals:  [x] Denies [] Endorses    Hallucinations: [x] Denies [] Endorses    Extra Pyramidal Symptoms: [x] Denies [] Endorses      /69   Pulse 74   Temp 98.5 °F (36.9 °C) (Temporal)   Resp 14   Ht 5' 3\" (1.6 m)   Wt 100 lb (45.4 kg)   BMI 17.71 kg/m²     Mental Status Examination:    Cognition:      [x] Alert  [x] Awake  [x] Oriented  [x] Person  [x] Place [x] Time      [] drowsy  [] tired  [] lethargic  [] distractable  [] Other     Attention/Concentration:   [x] Attentive  [] Distracted        Memory Recent and Remote: [] Intact   [] Impaired [] Partially Impaired     Language: [] Able to recognize and name objects          [] Unable to recognize and name Objects    Fund of Knowledge:  [] Poor [x]  Fair  [] Good    Speech: [x] Normal  [] Soft  [] Slow  [] Fast [] Pressured            [] Loud [] Dysarthria  [] Incoherent    Appearance: [] Well Groomed  [x] Casual Dressed  [] Unkept  [] Disheveled          [] Normal weight[] Thin  [] Overweight  [] Obese           Attitude: [] Positive  [] Hostile  [] Demanding  [] Guarded  [] Defensive         [x] Cooperative  []  Uncooperative      Behavior:  [x] Normal Gait  [] Walks with Assistance  [] Meryl Chair    [] Walks with Alexys Ask  [] In Hospital Bed  [] Sitting in Chair    Muscle-Skeletal:  [x] Normal Muscle Tone [] Muscle Atrophy       [] Abnormal Muscle Movement     Eye Contact:  [x] Good eye contact  [] Intermittent Eye Contact  [] Poor Eye Contact     Mood: [x] Depressed  [x] Anxious  [] Irritated  [] Euthymic   [] Angry [x] Restless    Affect:  [x] Congruent  [] Incongruent  [] Labile  [] Constricted  [] Flat  [] Bizarre     Thought Process and Association:  [] Logical [] Illogical       [x] Linear and Goal Directed  [] Tangential  [] Circumstantial     Thought Content:  [x] Denies [] Endorses [] Suicidal [] Homicidal  []

## 2018-08-26 NOTE — PLAN OF CARE
Problem: Depressive Behavior With or Without Suicide Precautions:  Goal: Absence of self-harm  Absence of self-harm   Outcome: Ongoing  Pt has been up and on the unit. Appears sad but brightens. Cooperative with assessment. She denies any S/I, A/V or homicidal thoughts. Requests to be discharged and has a plan to continue with her out patient therapy. States she misses her family. She has attended group and does approach for needs. States that her support system is her dad. I will continue to monitor pt and provide a safe and secure environment.

## 2018-08-27 LAB
ALBUMIN SERPL-MCNC: 4.1 G/DL (ref 3.5–5.2)
ALP BLD-CCNC: 88 U/L (ref 35–104)
ALT SERPL-CCNC: 36 U/L (ref 0–32)
ANION GAP SERPL CALCULATED.3IONS-SCNC: 15 MMOL/L (ref 7–16)
AST SERPL-CCNC: 40 U/L (ref 0–31)
BILIRUB SERPL-MCNC: 0.5 MG/DL (ref 0–1.2)
BUN BLDV-MCNC: 3 MG/DL (ref 6–20)
CALCIUM SERPL-MCNC: 9.1 MG/DL (ref 8.6–10.2)
CANNABINOIDS CONF, URINE: 450 NG/ML
CHLORIDE BLD-SCNC: 101 MMOL/L (ref 98–107)
CO2: 24 MMOL/L (ref 22–29)
CREAT SERPL-MCNC: 0.5 MG/DL (ref 0.5–1)
GFR AFRICAN AMERICAN: >60
GFR NON-AFRICAN AMERICAN: >60 ML/MIN/1.73
GLUCOSE BLD-MCNC: 104 MG/DL (ref 74–109)
POTASSIUM SERPL-SCNC: 3.7 MMOL/L (ref 3.5–5)
SODIUM BLD-SCNC: 140 MMOL/L (ref 132–146)
TOTAL PROTEIN: 7.3 G/DL (ref 6.4–8.3)

## 2018-08-27 PROCEDURE — 36415 COLL VENOUS BLD VENIPUNCTURE: CPT

## 2018-08-27 PROCEDURE — 99232 SBSQ HOSP IP/OBS MODERATE 35: CPT | Performed by: PSYCHIATRY & NEUROLOGY

## 2018-08-27 PROCEDURE — 6370000000 HC RX 637 (ALT 250 FOR IP): Performed by: NURSE PRACTITIONER

## 2018-08-27 PROCEDURE — 6370000000 HC RX 637 (ALT 250 FOR IP): Performed by: PSYCHIATRY & NEUROLOGY

## 2018-08-27 PROCEDURE — 80053 COMPREHEN METABOLIC PANEL: CPT

## 2018-08-27 PROCEDURE — 1240000000 HC EMOTIONAL WELLNESS R&B

## 2018-08-27 PROCEDURE — 6370000000 HC RX 637 (ALT 250 FOR IP): Performed by: INTERNAL MEDICINE

## 2018-08-27 RX ORDER — GABAPENTIN 300 MG/1
300 CAPSULE ORAL 3 TIMES DAILY
Status: DISCONTINUED | OUTPATIENT
Start: 2018-08-27 | End: 2018-08-28 | Stop reason: HOSPADM

## 2018-08-27 RX ORDER — LORAZEPAM 1 MG/1
3 TABLET ORAL
Status: DISCONTINUED | OUTPATIENT
Start: 2018-08-27 | End: 2018-08-28 | Stop reason: HOSPADM

## 2018-08-27 RX ORDER — LORAZEPAM 2 MG/ML
1 INJECTION INTRAMUSCULAR
Status: DISCONTINUED | OUTPATIENT
Start: 2018-08-27 | End: 2018-08-28 | Stop reason: HOSPADM

## 2018-08-27 RX ORDER — LORAZEPAM 2 MG/ML
3 INJECTION INTRAMUSCULAR
Status: DISCONTINUED | OUTPATIENT
Start: 2018-08-27 | End: 2018-08-28 | Stop reason: HOSPADM

## 2018-08-27 RX ORDER — PAROXETINE HYDROCHLORIDE 20 MG/1
20 TABLET, FILM COATED ORAL DAILY
Status: DISCONTINUED | OUTPATIENT
Start: 2018-08-28 | End: 2018-08-28 | Stop reason: HOSPADM

## 2018-08-27 RX ORDER — LORAZEPAM 1 MG/1
2 TABLET ORAL
Status: DISCONTINUED | OUTPATIENT
Start: 2018-08-27 | End: 2018-08-28 | Stop reason: HOSPADM

## 2018-08-27 RX ORDER — LORAZEPAM 1 MG/1
1 TABLET ORAL
Status: DISCONTINUED | OUTPATIENT
Start: 2018-08-27 | End: 2018-08-28 | Stop reason: HOSPADM

## 2018-08-27 RX ORDER — LORAZEPAM 2 MG/ML
2 INJECTION INTRAMUSCULAR
Status: DISCONTINUED | OUTPATIENT
Start: 2018-08-27 | End: 2018-08-28 | Stop reason: HOSPADM

## 2018-08-27 RX ORDER — LORAZEPAM 2 MG/ML
4 INJECTION INTRAMUSCULAR
Status: DISCONTINUED | OUTPATIENT
Start: 2018-08-27 | End: 2018-08-28 | Stop reason: HOSPADM

## 2018-08-27 RX ORDER — LORAZEPAM 1 MG/1
4 TABLET ORAL
Status: DISCONTINUED | OUTPATIENT
Start: 2018-08-27 | End: 2018-08-28 | Stop reason: HOSPADM

## 2018-08-27 RX ADMIN — PAROXETINE HYDROCHLORIDE HEMIHYDRATE 10 MG: 10 TABLET, FILM COATED ORAL at 08:47

## 2018-08-27 RX ADMIN — SUCRALFATE 1 G: 1 TABLET ORAL at 06:23

## 2018-08-27 RX ADMIN — TRAZODONE HYDROCHLORIDE 25 MG: 50 TABLET ORAL at 20:31

## 2018-08-27 RX ADMIN — Medication 100 MG: at 08:47

## 2018-08-27 RX ADMIN — Medication 1 MG: at 08:47

## 2018-08-27 RX ADMIN — GABAPENTIN 300 MG: 300 CAPSULE ORAL at 14:42

## 2018-08-27 RX ADMIN — GABAPENTIN 300 MG: 300 CAPSULE ORAL at 20:31

## 2018-08-27 RX ADMIN — SUCRALFATE 1 G: 1 TABLET ORAL at 16:29

## 2018-08-27 RX ADMIN — SUCRALFATE 1 G: 1 TABLET ORAL at 12:21

## 2018-08-27 RX ADMIN — LORAZEPAM 1 MG: 1 TABLET ORAL at 09:39

## 2018-08-27 RX ADMIN — PANTOPRAZOLE SODIUM 40 MG: 40 TABLET, DELAYED RELEASE ORAL at 06:23

## 2018-08-27 ASSESSMENT — PAIN SCALES - GENERAL: PAINLEVEL_OUTOF10: 0

## 2018-08-27 NOTE — PROGRESS NOTES
Group Therapy Note     Date: 8/27/2018  Start Time: 1110  End Time:  1200  Number of Participants: 7     Type of Group: Psychotherapy     Wellness Binder Information  Module Name:  n/a  Session Number:  n/a     Patient's Goal: To increase social interaction and relationships with others. Notes:  Pt was active and verbal during group and was able to identify ways to improve interaction with her mother. Status After Intervention:  Improved    Participation Level:  Active Listener and Interactive    Participation Quality: Appropriate, Attentive, Sharing and Supportive      Speech:  normal      Thought Process/Content: Logical      Affective Functioning: Congruent      Mood: euthymic      Level of consciousness:  Alert, Oriented x4 and Attentive      Response to Learning: Able to verbalize current knowledge/experience and Able to retain information      Endings: None Reported    Modes of Intervention: Support and Socialization      Discipline Responsible: /Counselor      Signature:  Bianca Arana MSW, LSW

## 2018-08-27 NOTE — PROGRESS NOTES
DATE OF SERVICE:     8/27/2018    Shaheed Britt seen today for the purpose of continuation of care. Nursing, social work reports, laboratory studies and vital signs are reviewed. Patient chief complaint today is:             [x] Depression      [x] Anxiety        [] Psychosis         [] Suicidal/Homicidal                         [] Delusions           [] Aggression          Subjective: Today patient states that \"I am doing better, I just drank too much as a coping mechanism to deal with the deaths of those I lost.\"  Patient denies SI, HI, or AVH. Patient is pleasant and cooperative and is taking medications as ordered. Sleep:  [] Good [x] Fair  [] Poor  Appetite:  [] Good [x] Fair  [] Poor    Depression:  [] Mild [] Moderate [x] Severe                [x] Constant [] Sporadic     Anxiety: [] Mild [] Moderate [x] Severe    [x] Constant [] Sporadic     Delusions: [] Mild [] Moderate [] Severe     [] Constant [] Sporadic     [] Paranoid [] Somatic [] Grandiose     Hallucinations: [] Mild [] Moderate [] Severe     [] Constant [] Sporadic    [] Auditory  [] Visual [] Tactile       Suicidal: [] Constant [] Sporadic  Homicidal: [] Constant [] Sporadic    Unscheduled Medications     [] Patient Receiving Emergency Medications \" Chemical Restraint\"   [] Requesting PRN medications for anxiety    Medical Review of Systems:     All other than marked systmes have been reviewed and are all negative.     Constitutional Symptoms: []  fever []  Chills  Skin Symptoms: [] rash []  Pruritus   Eye Symptoms: [] Vision unchanged []  recent vision problems[] blurred vision   Respiratory Symptoms:[] shortness of breath [] cough  Cardiovascular Symptoms:  [] chest pain   [] palpitations   Gastrointestinal Symptoms: []  abdominal pain []  nausea []  vomiting []  diarrhea  Genitourinary Symptoms: []  dysuria  []  hematuria   Musculoskeletal Symptoms: []  back pain []  muscle pain []  joint pain  Neurologic Symptoms: []  headache []  dizziness  Hematolymphoid Symptoms: [] Adenopathy [] Bruises   [] Schimosis       Psychiatric Review of systems  Delusions:  [] Denies [] Endorses   Withdrawals:  [] Denies [] Endorses    Hallucinations: [] Denies [] Endorses    Extra Pyramidal Symptoms: [] Denies [] Endorses      /77   Pulse 85   Temp 98.3 °F (36.8 °C) (Oral)   Resp 14   Ht 5' 3\" (1.6 m)   Wt 100 lb (45.4 kg)   SpO2 100%   BMI 17.71 kg/m²     Mental Status Examination:    Cognition:      [x] Alert  [x] Awake  [x] Oriented  [x] Person  [x] Place [x] Time      [] drowsy  [] tired  [] lethargic  [] distractable  [] Other     Attention/Concentration:   [x] Attentive  [] Distracted        Memory Recent and Remote: [x] Intact   [] Impaired [] Partially Impaired     Language: [] Able to recognize and name objects          [] Unable to recognize and name Objects    Fund of Knowledge:  [] Poor []  Fair  [] Good    Speech: [] Normal  [] Soft  [] Slow  [] Fast [] Pressured            [x] Loud [] Dysarthria  [] Incoherent    Appearance: [] Well Groomed  [] Casual Dressed  [] Unkept  [x] Disheveled          [] Normal weight[] Thin  [] Overweight  [] Obese           Attitude: [] Positive  [] Hostile  [] Demanding  [] Guarded  [] Defensive         [x] Cooperative  []  Uncooperative      Behavior:  [x] Normal Gait  [] Walks with Assistance  [] Meryl Chair    [] Walks with Alexys Ask  [] In Hospital Bed  [] Sitting in Chair    Muscle-Skeletal:  [x] Normal Muscle Tone [] Muscle Atrophy       [] Abnormal Muscle Movement     Eye Contact:  [x] Good eye contact  [] Intermittent Eye Contact  [] Poor Eye Contact     Mood: [x] Depressed  [x] Anxious  [] Irritated  [] Euthymic   [] Angry [] Restless    Affect:  [x] Congruent  [] Incongruent  [] Labile  [] Constricted  [] Flat  [] Bizarre     Thought Process and Association:  [] Logical [] Illogical       [x] Linear and Goal Directed  [] Tangential  [] Circumstantial     Thought Content:  [x] Denies [] Endorses [] Suicidal [] Homicidal  [] Delusional      [] Paranoid  [] Somatic  [] Grandiose    Perception: [x]  None  [] Auditory   [] Visual  [] tactile   [] olfactory  [] Illusions         Insight: [] Intact  [] Fair  [x] Limited    Judgement:  [] Intact  [] Fair  [x] Limited      Assessment/Plan:        Patient Active Problem List   Diagnosis Code    Acute alcoholic intoxication with complication ([facility] F67.365    Tetrahydrocannabinol (THC) use disorder, mild, abuse F12.10    Major depressive disorder, recurrent episode, severe ([facility] F33.2    Alcohol abuse F10.10    [facility], [facility] E43    Depression with suicidal ideation F32.9, R43.823         Plan:    []  Patient is refusing medications  [] Improving as expected   [x] Not improving as expected Will start Neurontin 300 mg TID and increase Paxil to 20 mg   [] Worsening    []  At Baseline     Patient with elevated LFT's from 08/24/18. Will repeat CMP to re-check.     Reason for more than one antipsychotic:  [x] N/A  [] 3 failed monotherapy(drugs tried):  [] Cross over to a new antipsychotic  [] Taper to monotherapy from polypharmacy  [] Augmentation of Clozapine therapy due to treatment resistance to single therapy      Signed:  Gabrielle Little  8/27/2018  2:07 PM

## 2018-08-27 NOTE — PROGRESS NOTES
Group Therapy Note    Date: 8/27/2018  Start Time: 110  End Time:  200  Number of Participants: 9    Type of Group: Cognitive Skills    Wellness Binder Information  Module Name:  Fight or flight, learning to relax. Session Number:  na    Patient's Goal: patient will be able to id different types of relaxation using senses. Will participate in meditation exercise. Notes: engaged in group, able to share appropriately, able to complete meditation exercise. Status After Intervention:  Improved    Participation Level:  Active Listener and Interactive    Participation Quality: Appropriate, Attentive and Sharing      Speech:  normal      Thought Process/Content: Logical      Affective Functioning: Congruent      Mood: euthymic      Level of consciousness:  Alert, Oriented x4 and Attentive      Response to Learning: Able to verbalize/acknowledge new learning, Able to retain information and Progressing to goal      Endings: None Reported    Modes of Intervention: Education, Support, Socialization, Exploration, Activity and Media      Discipline Responsible: Psychoeducational Specialist      Signature:  Shahriar Ramon

## 2018-08-27 NOTE — PROGRESS NOTES
Patient attended community meeting/morning stretch. Patient identified goal for the day as:  \"Forgive past and embrace future\"

## 2018-08-28 VITALS
TEMPERATURE: 98.4 F | RESPIRATION RATE: 14 BRPM | DIASTOLIC BLOOD PRESSURE: 68 MMHG | HEIGHT: 63 IN | WEIGHT: 100 LBS | SYSTOLIC BLOOD PRESSURE: 102 MMHG | OXYGEN SATURATION: 100 % | HEART RATE: 76 BPM | BODY MASS INDEX: 17.72 KG/M2

## 2018-08-28 PROCEDURE — 6370000000 HC RX 637 (ALT 250 FOR IP): Performed by: PSYCHIATRY & NEUROLOGY

## 2018-08-28 PROCEDURE — 99238 HOSP IP/OBS DSCHRG MGMT 30/<: CPT | Performed by: PSYCHIATRY & NEUROLOGY

## 2018-08-28 PROCEDURE — 6370000000 HC RX 637 (ALT 250 FOR IP): Performed by: INTERNAL MEDICINE

## 2018-08-28 RX ORDER — TRAZODONE HYDROCHLORIDE 50 MG/1
25 TABLET ORAL NIGHTLY
Qty: 15 TABLET | Refills: 0 | Status: SHIPPED | OUTPATIENT
Start: 2018-08-28 | End: 2019-09-11

## 2018-08-28 RX ORDER — GABAPENTIN 300 MG/1
300 CAPSULE ORAL 3 TIMES DAILY
Qty: 21 CAPSULE | Refills: 0 | Status: ON HOLD | OUTPATIENT
Start: 2018-08-28 | End: 2018-10-07 | Stop reason: HOSPADM

## 2018-08-28 RX ORDER — PAROXETINE HYDROCHLORIDE 20 MG/1
20 TABLET, FILM COATED ORAL DAILY
Qty: 30 TABLET | Refills: 0 | Status: ON HOLD | OUTPATIENT
Start: 2018-08-29 | End: 2018-10-07 | Stop reason: HOSPADM

## 2018-08-28 RX ORDER — LANOLIN ALCOHOL/MO/W.PET/CERES
100 CREAM (GRAM) TOPICAL DAILY
Qty: 30 TABLET | Refills: 0 | Status: SHIPPED | OUTPATIENT
Start: 2018-08-29 | End: 2020-11-24

## 2018-08-28 RX ORDER — NICOTINE 21 MG/24HR
1 PATCH, TRANSDERMAL 24 HOURS TRANSDERMAL DAILY
Qty: 30 PATCH | Refills: 0 | Status: ON HOLD | OUTPATIENT
Start: 2018-08-29 | End: 2018-10-07 | Stop reason: HOSPADM

## 2018-08-28 RX ADMIN — GABAPENTIN 300 MG: 300 CAPSULE ORAL at 13:51

## 2018-08-28 RX ADMIN — Medication 100 MG: at 09:41

## 2018-08-28 RX ADMIN — Medication 1 MG: at 09:42

## 2018-08-28 RX ADMIN — GABAPENTIN 300 MG: 300 CAPSULE ORAL at 09:41

## 2018-08-28 RX ADMIN — PAROXETINE HYDROCHLORIDE HEMIHYDRATE 20 MG: 20 TABLET, FILM COATED ORAL at 09:41

## 2018-08-28 RX ADMIN — IBUPROFEN 400 MG: 400 TABLET, FILM COATED ORAL at 09:40

## 2018-08-28 RX ADMIN — SUCRALFATE 1 G: 1 TABLET ORAL at 13:50

## 2018-08-28 RX ADMIN — SUCRALFATE 1 G: 1 TABLET ORAL at 06:33

## 2018-08-28 RX ADMIN — SUCRALFATE 1 G: 1 TABLET ORAL at 02:09

## 2018-08-28 RX ADMIN — PANTOPRAZOLE SODIUM 40 MG: 40 TABLET, DELAYED RELEASE ORAL at 06:32

## 2018-08-28 ASSESSMENT — PAIN SCALES - GENERAL
PAINLEVEL_OUTOF10: 4
PAINLEVEL_OUTOF10: 0

## 2018-08-28 NOTE — PROGRESS NOTES
Patient declined to attend community meeting.  Patient identified goal for the day as: \"Stay positive\"

## 2018-08-28 NOTE — PROGRESS NOTES
Pt discharged with followings belongings:   Dentures: None  Vision - Corrective Lenses: None  Hearing Aid: None  Jewelry: None  Body Piercings Removed: No  Clothing: Sweater, Socks, Undergarments (Comment), Pants, Shirt (3 CREW NECK,3 BRA, 5 UNDERROOS,4 PAIR SOCKS, 2 SWEATPANTS, 5 TEES)  Were All Patient Medications Collected?: Not Applicable  Other Valuables: None   Valuables sent home with patient including cellphone. Valuables retrieved from safe, Security envelope number:  [id number] and returned to patient. Patient left department with Departure Mode: With parents via Mobility at Departure: Ambulatory, discharged to Discharged to: Private Residence. Patient education on aftercare instructions:  yes  Patient verbalize understanding of AVS:  yes. Given suicide prevention handout . Discharged in stable condition.   Status EXAM upon discharge:  Status and Exam  Normal: Yes  Facial Expression: Brightened  Affect: Appropriate, Congruent  Level of Consciousness: Alert  Mood:Normal: Yes  Mood: Depressed, Anxious  Motor Activity:Normal: Yes  Interview Behavior: Cooperative  Preception: [place] to Person, Azucena Little to Time, [place] to Place, [place] to Situation  Attention:Normal: Yes  Attention: Distractible  Thought Processes: Perseveration  Thought Content:Normal: Yes  Thought Content: Preoccupations  Hallucinations: None  Delusions: No  Memory:Normal: Yes  Memory: Poor Recent  Insight and Judgment: No  Insight and Judgment: Other(See comment) (improved)  Present Suicidal Ideation: No  Present Homicidal Ideation: No    Marilu Hernandez RN

## 2018-08-28 NOTE — DISCHARGE SUMMARY
Physician Discharge Summary      Physical Examination   VS/Measurements:     /68   Pulse 76   Temp 98.4 °F (36.9 °C) (Oral)   Resp 14   Ht 5' 3\" (1.6 m)   Wt 100 lb (45.4 kg)   SpO2 100%   BMI 17.71 kg/m²         Discharge Medications:              Sharlene El   Home Medication Instructions [id number]    Printed on:08/28/18 5329   Medication Information                      folic acid (FOLVITE) 1 MG tablet  Take 1 tablet by mouth daily. gabapentin (NEURONTIN) 300 MG capsule  Take 1 capsule by mouth 3 times daily for 7 days. .             MedroxyPROGESTERone Acetate (DEPO-PROVERA IM)  Inject  into the muscle See Admin Instructions.  Every 3 months              nicotine (NICODERM CQ) 14 MG/24HR  Place 1 patch onto the skin daily             PARoxetine (PAXIL) 20 MG tablet  Take 1 tablet by mouth daily             traZODone (DESYREL) 50 MG tablet  Take 0.5 tablets by mouth nightly             vitamin B-1 100 MG tablet  Take 1 tablet by mouth daily                     Psychiatric: Mental Status Examination:    Cognition:      [x] Alert  [x] Awake  [x] Oriented  [x] Person  [x] Place [x] Time    [] drowsy  [] tired  [] lethargic  [] distractable       Attention/Concentration:   [x] Attentive  [] Distracted        Memory Recent and Remote: [x] Intact   [] Impaired [] Partially Impaired     Language: [] Able to recognize and name objects          [] Unable to recognize and name Objects    Fund of Knowledge:  [] Poor []  Fair  [] Good    Speech: [x] Normal  [] Soft  [] Slow  [] Fast [] Pressured            [] Loud [] Dysarthria  [] Incoherent       Appearance: [] Well Groomed  [x] Casual Dressed  [] Unkept  [] Disheveled          [] Normal weight[] Thin  [] Overweight  [] Obese           Attitude: [] Positive  [] Hostile  [] Demanding  [] Guarded  [] Defensive         [x] Cooperative  []  Uncooperative      Behavior:  [x] Normal Gait  [] Walks with Assistance  [] Bari Gonzales    [] Florentin with Vargas Lua  [] In Hospital Bed  [] Sitting in Chair    Muscle-Skeletal:  [x] Normal Muscle Tone [] Muscle Atrophy       [] Abnormal Muscle Movement     Eye Contact:  [x] Good eye contact  [] Intermittent Eye Contact  [] Poor Eye Contact     Mood: [] Depressed  [] Anxious  [] Irritated  [x] Euthymic   [] Angry [] Restless    Affect:  [x] Congruent  [] Incongruent  [] Labile  [] Constricted  [] Flat  [] Bizarre     Thought Process and Association:  [] Logical [] Illogical       [x] Linear and Goal Directed  [] Tangential  [] Circumstantial     Thought Content:  [x] Denies [] Endorses [] Suicidal [] Homicidal  [] Delusional      [] Paranoid  [] Somatic  [] Grandiose    Perception: [x]  None  [] Auditory   [] Visual  [] tactile   [] olfactory  [] Illusions         Insight: [] Intact  [x] Fair  [] Limited    Judgement:  [] Intact  [x] Fair  [] Limited    Hospital Course:   Admit Date: 8/24/2018     Discharge Date: 8/28/2018  Admitted from:  [x]  Emergency Room  []  Home  []  Another facility   []  [facility]     Admitting diagnosis:   Patient Active Problem List   Diagnosis    Acute alcoholic intoxication with complication ([facility]    Tetrahydrocannabinol (THC) use disorder, mild, abuse    Major depressive disorder, recurrent episode, severe ([facility]    Alcohol abuse    Protein-calorie malnutrition, severe ([facility]    Depression with suicidal ideation      Length of stay:  4 days              Lauren Ballard was admitted in Psychiatric unit  from medical unit with depression and suicidal ideation. Patient was treated  with the above . Patient responded well to the treatment.      Discharge Summary Plan:     Discharge Status:    [x] Improved [] Unchanged    [] Worse       Discharge instructions given:  [x] Patient    [] Family [] Other         Discharge disposition:  [x] Home [] Step Down unit  [] Group Home []  [facility]                                                    [] [facility]    [] AMA  [] Other           Prescriptions: Continue same medications, review with patient.        Reason for more than one antipsychotic:  [x] N/A  [] 3 failed monotherapy(drugs tried):  [] Cross over to a new antipsychotic  [] Taper to monotherapy from polypharmacy  [] Augmentation of Clozapine therapy due to treatment resistance to single therapy      Diagnosis:        Patient Active Problem List   Diagnosis Code    Acute alcoholic intoxication with complication ([facility]) G26.248    Tetrahydrocannabinol (THC) use disorder, mild, abuse F12.10    Major depressive disorder, recurrent episode, severe ([facility] F33.2    Alcohol abuse F10.10    Protein-calorie malnutrition, severe ([facility] E43    Depression with suicidal ideation F32.9, R45.851       Education and Follow-up:  Counseled:  [x] Patient     [] Family    [] Guardian      Signed:   Syeda Ignacio   8/28/2018   12:37 PM

## 2018-08-28 NOTE — PROGRESS NOTES
Group Therapy Note    Date: 8/28/2018  Start Time: 110  End Time:  155  Number of Participants: 6    Type of Group: Recovery    Wellness Binder Information  Module Name:  How to move on from past and forgive self  Session Number:      Patient's Goal:  Pt will be able to identify some steps to move on and forgive self. Pt will be able to identify affirmation they would like to work on. Notes:  Pt active in class discussion. Pt chose affirmation to work on. Status After Intervention:  Improved    Participation Level:  Active Listener and Interactive    Participation Quality: Appropriate, Attentive, Sharing and Supportive      Speech:  normal      Thought Process/Content: Logical      Affective Functioning: Blunted      Mood: depressed      Level of consciousness:  Alert, Oriented x4 and Attentive      Response to Learning: Able to verbalize current knowledge/experience, Able to verbalize/acknowledge new learning and Progressing to goal      Endings: None Reported    Modes of Intervention: Education, Support, Socialization and Problem-solving      Discipline Responsible: /Counselor

## 2018-10-02 ENCOUNTER — HOSPITAL ENCOUNTER (INPATIENT)
Age: 33
LOS: 2 days | Discharge: PSYCHIATRIC OR SUBSTANCE USE FACILITY | End: 2018-10-04
Attending: INTERNAL MEDICINE | Admitting: INTERNAL MEDICINE
Payer: MEDICAID

## 2018-10-02 DIAGNOSIS — F10.929 ACUTE ALCOHOLIC INTOXICATION WITH COMPLICATION (HCC): Primary | ICD-10-CM

## 2018-10-02 DIAGNOSIS — E87.6 HYPOKALEMIA: ICD-10-CM

## 2018-10-02 DIAGNOSIS — F32.A DEPRESSION, UNSPECIFIED DEPRESSION TYPE: ICD-10-CM

## 2018-10-02 PROBLEM — F10.939 ALCOHOL WITHDRAWAL, WITH UNSPECIFIED COMPLICATION (HCC): Status: ACTIVE | Noted: 2018-10-02

## 2018-10-02 LAB
ACETAMINOPHEN LEVEL: <5 MCG/ML (ref 10–30)
ALBUMIN SERPL-MCNC: 4.1 G/DL (ref 3.5–5.2)
ALP BLD-CCNC: 66 U/L (ref 35–104)
ALT SERPL-CCNC: 28 U/L (ref 0–32)
AMPHETAMINE SCREEN, URINE: NOT DETECTED
ANION GAP SERPL CALCULATED.3IONS-SCNC: 17 MMOL/L (ref 7–16)
AST SERPL-CCNC: 61 U/L (ref 0–31)
BARBITURATE SCREEN URINE: NOT DETECTED
BASOPHILS ABSOLUTE: 0.05 E9/L (ref 0–0.2)
BASOPHILS RELATIVE PERCENT: 0.6 % (ref 0–2)
BENZODIAZEPINE SCREEN, URINE: NOT DETECTED
BILIRUB SERPL-MCNC: 0.3 MG/DL (ref 0–1.2)
BILIRUBIN URINE: NEGATIVE
BLOOD, URINE: NEGATIVE
BUN BLDV-MCNC: 2 MG/DL (ref 6–20)
CALCIUM SERPL-MCNC: 8.7 MG/DL (ref 8.6–10.2)
CANNABINOID SCREEN URINE: POSITIVE
CHLORIDE BLD-SCNC: 100 MMOL/L (ref 98–107)
CHP ED QC CHECK: YES
CLARITY: CLEAR
CO2: 22 MMOL/L (ref 22–29)
COCAINE METABOLITE SCREEN URINE: NOT DETECTED
COLOR: YELLOW
CREAT SERPL-MCNC: 0.5 MG/DL (ref 0.5–1)
EOSINOPHILS ABSOLUTE: 0.06 E9/L (ref 0.05–0.5)
EOSINOPHILS RELATIVE PERCENT: 0.7 % (ref 0–6)
ETHANOL: 328 MG/DL (ref 0–0.08)
GFR AFRICAN AMERICAN: >60
GFR NON-AFRICAN AMERICAN: >60 ML/MIN/1.73
GLUCOSE BLD-MCNC: 98 MG/DL (ref 74–109)
GLUCOSE URINE: NEGATIVE MG/DL
HCT VFR BLD CALC: 37.7 % (ref 34–48)
HEMOGLOBIN: 13.2 G/DL (ref 11.5–15.5)
IMMATURE GRANULOCYTES #: 0.03 E9/L
IMMATURE GRANULOCYTES %: 0.3 % (ref 0–5)
KETONES, URINE: NEGATIVE MG/DL
LEUKOCYTE ESTERASE, URINE: NEGATIVE
LYMPHOCYTES ABSOLUTE: 4.01 E9/L (ref 1.5–4)
LYMPHOCYTES RELATIVE PERCENT: 45.6 % (ref 20–42)
MCH RBC QN AUTO: 36.6 PG (ref 26–35)
MCHC RBC AUTO-ENTMCNC: 35 % (ref 32–34.5)
MCV RBC AUTO: 104.4 FL (ref 80–99.9)
METHADONE SCREEN, URINE: NOT DETECTED
MONOCYTES ABSOLUTE: 0.83 E9/L (ref 0.1–0.95)
MONOCYTES RELATIVE PERCENT: 9.4 % (ref 2–12)
NEUTROPHILS ABSOLUTE: 3.82 E9/L (ref 1.8–7.3)
NEUTROPHILS RELATIVE PERCENT: 43.4 % (ref 43–80)
NITRITE, URINE: NEGATIVE
OPIATE SCREEN URINE: NOT DETECTED
PDW BLD-RTO: 11.5 FL (ref 11.5–15)
PH UA: 6 (ref 5–9)
PHENCYCLIDINE SCREEN URINE: NOT DETECTED
PLATELET # BLD: 209 E9/L (ref 130–450)
PMV BLD AUTO: 9.7 FL (ref 7–12)
POTASSIUM SERPL-SCNC: 3.1 MMOL/L (ref 3.5–5)
PREGNANCY TEST URINE, POC: NEGATIVE
PROPOXYPHENE SCREEN: NOT DETECTED
PROTEIN UA: NEGATIVE MG/DL
RBC # BLD: 3.61 E12/L (ref 3.5–5.5)
SALICYLATE, SERUM: <0.3 MG/DL (ref 0–30)
SODIUM BLD-SCNC: 139 MMOL/L (ref 132–146)
SPECIFIC GRAVITY UA: <=1.005 (ref 1–1.03)
TOTAL PROTEIN: 7.1 G/DL (ref 6.4–8.3)
TRICYCLIC ANTIDEPRESSANTS SCREEN SERUM: NEGATIVE NG/ML
UROBILINOGEN, URINE: 0.2 E.U./DL
WBC # BLD: 8.8 E9/L (ref 4.5–11.5)

## 2018-10-02 PROCEDURE — 85025 COMPLETE CBC W/AUTO DIFF WBC: CPT

## 2018-10-02 PROCEDURE — 81003 URINALYSIS AUTO W/O SCOPE: CPT

## 2018-10-02 PROCEDURE — 80053 COMPREHEN METABOLIC PANEL: CPT

## 2018-10-02 PROCEDURE — 2060000000 HC ICU INTERMEDIATE R&B

## 2018-10-02 PROCEDURE — 36415 COLL VENOUS BLD VENIPUNCTURE: CPT

## 2018-10-02 PROCEDURE — 6370000000 HC RX 637 (ALT 250 FOR IP): Performed by: NURSE PRACTITIONER

## 2018-10-02 PROCEDURE — 80307 DRUG TEST PRSMV CHEM ANLYZR: CPT

## 2018-10-02 PROCEDURE — 2580000003 HC RX 258: Performed by: NURSE PRACTITIONER

## 2018-10-02 PROCEDURE — 99285 EMERGENCY DEPT VISIT HI MDM: CPT

## 2018-10-02 PROCEDURE — 93005 ELECTROCARDIOGRAM TRACING: CPT | Performed by: NURSE PRACTITIONER

## 2018-10-02 PROCEDURE — 6360000002 HC RX W HCPCS: Performed by: NURSE PRACTITIONER

## 2018-10-02 PROCEDURE — 94761 N-INVAS EAR/PLS OXIMETRY MLT: CPT

## 2018-10-02 PROCEDURE — 2500000003 HC RX 250 WO HCPCS: Performed by: NURSE PRACTITIONER

## 2018-10-02 PROCEDURE — G0480 DRUG TEST DEF 1-7 CLASSES: HCPCS

## 2018-10-02 RX ORDER — 0.9 % SODIUM CHLORIDE 0.9 %
1000 INTRAVENOUS SOLUTION INTRAVENOUS ONCE
Status: COMPLETED | OUTPATIENT
Start: 2018-10-02 | End: 2018-10-03

## 2018-10-02 RX ORDER — LORAZEPAM 2 MG/ML
1 INJECTION INTRAMUSCULAR ONCE
Status: COMPLETED | OUTPATIENT
Start: 2018-10-02 | End: 2018-10-02

## 2018-10-02 RX ORDER — POTASSIUM CHLORIDE 20 MEQ/1
40 TABLET, EXTENDED RELEASE ORAL ONCE
Status: COMPLETED | OUTPATIENT
Start: 2018-10-02 | End: 2018-10-02

## 2018-10-02 RX ADMIN — POTASSIUM CHLORIDE 40 MEQ: 20 TABLET, EXTENDED RELEASE ORAL at 23:41

## 2018-10-02 RX ADMIN — FOLIC ACID: 5 INJECTION, SOLUTION INTRAMUSCULAR; INTRAVENOUS; SUBCUTANEOUS at 23:43

## 2018-10-02 RX ADMIN — LORAZEPAM 1 MG: 2 INJECTION INTRAMUSCULAR; INTRAVENOUS at 23:42

## 2018-10-02 RX ADMIN — SODIUM CHLORIDE 1000 ML: 9 INJECTION, SOLUTION INTRAVENOUS at 23:41

## 2018-10-03 PROBLEM — F33.2 MAJOR DEPRESSIVE DISORDER, RECURRENT EPISODE, SEVERE (HCC): Chronic | Status: ACTIVE | Noted: 2018-08-24

## 2018-10-03 PROBLEM — R45.851 DEPRESSION WITH SUICIDAL IDEATION: Chronic | Status: RESOLVED | Noted: 2018-08-24 | Resolved: 2018-10-03

## 2018-10-03 PROBLEM — F32.A DEPRESSION WITH SUICIDAL IDEATION: Chronic | Status: RESOLVED | Noted: 2018-08-24 | Resolved: 2018-10-03

## 2018-10-03 PROBLEM — F32.A DEPRESSION WITH SUICIDAL IDEATION: Chronic | Status: ACTIVE | Noted: 2018-08-24

## 2018-10-03 PROBLEM — R45.851 DEPRESSION WITH SUICIDAL IDEATION: Chronic | Status: ACTIVE | Noted: 2018-08-24

## 2018-10-03 PROCEDURE — 2500000003 HC RX 250 WO HCPCS: Performed by: INTERNAL MEDICINE

## 2018-10-03 PROCEDURE — 2580000003 HC RX 258: Performed by: NURSE PRACTITIONER

## 2018-10-03 PROCEDURE — 6360000002 HC RX W HCPCS: Performed by: INTERNAL MEDICINE

## 2018-10-03 PROCEDURE — 2060000000 HC ICU INTERMEDIATE R&B

## 2018-10-03 PROCEDURE — 6370000000 HC RX 637 (ALT 250 FOR IP): Performed by: INTERNAL MEDICINE

## 2018-10-03 PROCEDURE — 99253 IP/OBS CNSLTJ NEW/EST LOW 45: CPT | Performed by: PSYCHIATRY & NEUROLOGY

## 2018-10-03 RX ORDER — LORAZEPAM 2 MG/ML
2 INJECTION INTRAMUSCULAR
Status: DISCONTINUED | OUTPATIENT
Start: 2018-10-03 | End: 2018-10-04 | Stop reason: HOSPADM

## 2018-10-03 RX ORDER — LORAZEPAM 1 MG/1
1 TABLET ORAL
Status: DISCONTINUED | OUTPATIENT
Start: 2018-10-03 | End: 2018-10-04 | Stop reason: HOSPADM

## 2018-10-03 RX ORDER — THIAMINE MONONITRATE (VIT B1) 100 MG
100 TABLET ORAL DAILY
Status: DISCONTINUED | OUTPATIENT
Start: 2018-10-03 | End: 2018-10-04 | Stop reason: HOSPADM

## 2018-10-03 RX ORDER — PAROXETINE HYDROCHLORIDE 20 MG/1
20 TABLET, FILM COATED ORAL DAILY
Status: DISCONTINUED | OUTPATIENT
Start: 2018-10-03 | End: 2018-10-04 | Stop reason: HOSPADM

## 2018-10-03 RX ORDER — FOLIC ACID 1 MG/1
1 TABLET ORAL DAILY
Status: DISCONTINUED | OUTPATIENT
Start: 2018-10-03 | End: 2018-10-04 | Stop reason: HOSPADM

## 2018-10-03 RX ORDER — SODIUM CHLORIDE 0.9 % (FLUSH) 0.9 %
10 SYRINGE (ML) INJECTION EVERY 12 HOURS SCHEDULED
Status: DISCONTINUED | OUTPATIENT
Start: 2018-10-03 | End: 2018-10-04 | Stop reason: HOSPADM

## 2018-10-03 RX ORDER — LORAZEPAM 1 MG/1
2 TABLET ORAL
Status: DISCONTINUED | OUTPATIENT
Start: 2018-10-03 | End: 2018-10-04 | Stop reason: HOSPADM

## 2018-10-03 RX ORDER — SODIUM CHLORIDE 0.9 % (FLUSH) 0.9 %
10 SYRINGE (ML) INJECTION PRN
Status: DISCONTINUED | OUTPATIENT
Start: 2018-10-03 | End: 2018-10-04 | Stop reason: HOSPADM

## 2018-10-03 RX ORDER — LOPERAMIDE HYDROCHLORIDE 2 MG/1
2 CAPSULE ORAL 4 TIMES DAILY PRN
Status: DISCONTINUED | OUTPATIENT
Start: 2018-10-03 | End: 2018-10-04 | Stop reason: HOSPADM

## 2018-10-03 RX ORDER — LORAZEPAM 2 MG/ML
4 INJECTION INTRAMUSCULAR
Status: DISCONTINUED | OUTPATIENT
Start: 2018-10-03 | End: 2018-10-04 | Stop reason: HOSPADM

## 2018-10-03 RX ORDER — KETOROLAC TROMETHAMINE 30 MG/ML
15 INJECTION, SOLUTION INTRAMUSCULAR; INTRAVENOUS EVERY 6 HOURS PRN
Status: DISCONTINUED | OUTPATIENT
Start: 2018-10-03 | End: 2018-10-04 | Stop reason: HOSPADM

## 2018-10-03 RX ORDER — NICOTINE 21 MG/24HR
1 PATCH, TRANSDERMAL 24 HOURS TRANSDERMAL DAILY
Status: DISCONTINUED | OUTPATIENT
Start: 2018-10-03 | End: 2018-10-04 | Stop reason: HOSPADM

## 2018-10-03 RX ORDER — LORAZEPAM 2 MG/ML
3 INJECTION INTRAMUSCULAR
Status: DISCONTINUED | OUTPATIENT
Start: 2018-10-03 | End: 2018-10-04 | Stop reason: HOSPADM

## 2018-10-03 RX ORDER — PANTOPRAZOLE SODIUM 40 MG/1
40 TABLET, DELAYED RELEASE ORAL
Status: DISCONTINUED | OUTPATIENT
Start: 2018-10-03 | End: 2018-10-04 | Stop reason: HOSPADM

## 2018-10-03 RX ORDER — DEXTROSE, SODIUM CHLORIDE, AND POTASSIUM CHLORIDE 5; .45; .15 G/100ML; G/100ML; G/100ML
INJECTION INTRAVENOUS CONTINUOUS
Status: DISCONTINUED | OUTPATIENT
Start: 2018-10-03 | End: 2018-10-04

## 2018-10-03 RX ORDER — LORAZEPAM 1 MG/1
4 TABLET ORAL
Status: DISCONTINUED | OUTPATIENT
Start: 2018-10-03 | End: 2018-10-04 | Stop reason: HOSPADM

## 2018-10-03 RX ORDER — LORAZEPAM 2 MG/ML
1 INJECTION INTRAMUSCULAR
Status: DISCONTINUED | OUTPATIENT
Start: 2018-10-03 | End: 2018-10-04 | Stop reason: HOSPADM

## 2018-10-03 RX ORDER — TRAZODONE HYDROCHLORIDE 50 MG/1
25 TABLET ORAL NIGHTLY
Status: DISCONTINUED | OUTPATIENT
Start: 2018-10-03 | End: 2018-10-04 | Stop reason: HOSPADM

## 2018-10-03 RX ORDER — LORAZEPAM 1 MG/1
3 TABLET ORAL
Status: DISCONTINUED | OUTPATIENT
Start: 2018-10-03 | End: 2018-10-04 | Stop reason: HOSPADM

## 2018-10-03 RX ORDER — IBUPROFEN 400 MG/1
400 TABLET ORAL EVERY 6 HOURS PRN
Status: DISCONTINUED | OUTPATIENT
Start: 2018-10-03 | End: 2018-10-04

## 2018-10-03 RX ORDER — ACETAMINOPHEN 325 MG/1
650 TABLET ORAL EVERY 4 HOURS PRN
Status: DISCONTINUED | OUTPATIENT
Start: 2018-10-03 | End: 2018-10-03

## 2018-10-03 RX ORDER — ONDANSETRON 2 MG/ML
4 INJECTION INTRAMUSCULAR; INTRAVENOUS EVERY 6 HOURS PRN
Status: DISCONTINUED | OUTPATIENT
Start: 2018-10-03 | End: 2018-10-04 | Stop reason: HOSPADM

## 2018-10-03 RX ORDER — CETIRIZINE HYDROCHLORIDE 10 MG/1
5 TABLET ORAL DAILY
Status: DISCONTINUED | OUTPATIENT
Start: 2018-10-03 | End: 2018-10-04

## 2018-10-03 RX ADMIN — ENOXAPARIN SODIUM 40 MG: 40 INJECTION SUBCUTANEOUS at 09:41

## 2018-10-03 RX ADMIN — Medication 100 MG: at 09:40

## 2018-10-03 RX ADMIN — DEXTROSE, SODIUM CHLORIDE, AND POTASSIUM CHLORIDE: 5; .45; .15 INJECTION INTRAVENOUS at 20:52

## 2018-10-03 RX ADMIN — LORAZEPAM 2 MG: 1 TABLET ORAL at 20:52

## 2018-10-03 RX ADMIN — CETIRIZINE HYDROCHLORIDE 5 MG: 10 TABLET, FILM COATED ORAL at 10:32

## 2018-10-03 RX ADMIN — Medication 10 ML: at 16:08

## 2018-10-03 RX ADMIN — KETOROLAC TROMETHAMINE 15 MG: 30 INJECTION, SOLUTION INTRAMUSCULAR at 09:43

## 2018-10-03 RX ADMIN — LORAZEPAM 3 MG: 1 TABLET ORAL at 11:19

## 2018-10-03 RX ADMIN — PAROXETINE HYDROCHLORIDE 20 MG: 20 TABLET, FILM COATED ORAL at 09:40

## 2018-10-03 RX ADMIN — Medication 10 ML: at 09:40

## 2018-10-03 RX ADMIN — PANTOPRAZOLE SODIUM 40 MG: 40 TABLET, DELAYED RELEASE ORAL at 09:46

## 2018-10-03 RX ADMIN — FOLIC ACID 1 MG: 1 TABLET ORAL at 09:48

## 2018-10-03 RX ADMIN — LORAZEPAM 4 MG: 1 TABLET ORAL at 06:55

## 2018-10-03 RX ADMIN — LOPERAMIDE HYDROCHLORIDE 2 MG: 2 CAPSULE ORAL at 10:32

## 2018-10-03 RX ADMIN — ONDANSETRON HYDROCHLORIDE 4 MG: 2 SOLUTION INTRAMUSCULAR; INTRAVENOUS at 11:19

## 2018-10-03 RX ADMIN — KETOROLAC TROMETHAMINE 15 MG: 30 INJECTION, SOLUTION INTRAMUSCULAR at 16:08

## 2018-10-03 RX ADMIN — DEXTROSE, SODIUM CHLORIDE, AND POTASSIUM CHLORIDE: 5; .45; .15 INJECTION INTRAVENOUS at 11:03

## 2018-10-03 RX ADMIN — TRAZODONE HYDROCHLORIDE 25 MG: 50 TABLET ORAL at 20:54

## 2018-10-03 ASSESSMENT — PAIN DESCRIPTION - LOCATION
LOCATION: ABDOMEN;JAW

## 2018-10-03 ASSESSMENT — PAIN SCALES - GENERAL
PAINLEVEL_OUTOF10: 9
PAINLEVEL_OUTOF10: 8
PAINLEVEL_OUTOF10: 6
PAINLEVEL_OUTOF10: 8
PAINLEVEL_OUTOF10: 9
PAINLEVEL_OUTOF10: 8

## 2018-10-03 ASSESSMENT — PAIN DESCRIPTION - DESCRIPTORS
DESCRIPTORS: ACHING;CONSTANT;DISCOMFORT
DESCRIPTORS: ACHING;DISCOMFORT
DESCRIPTORS: STABBING;THROBBING;DISCOMFORT
DESCRIPTORS: ACHING;DISCOMFORT
DESCRIPTORS: ACHING;DISCOMFORT
DESCRIPTORS: STABBING;THROBBING;DISCOMFORT

## 2018-10-03 ASSESSMENT — PAIN DESCRIPTION - ONSET
ONSET: ON-GOING

## 2018-10-03 ASSESSMENT — PAIN DESCRIPTION - ORIENTATION
ORIENTATION: RIGHT;UPPER;LEFT
ORIENTATION: RIGHT;UPPER;LEFT

## 2018-10-03 ASSESSMENT — PAIN DESCRIPTION - PAIN TYPE
TYPE: ACUTE PAIN

## 2018-10-03 ASSESSMENT — PAIN DESCRIPTION - FREQUENCY
FREQUENCY: CONTINUOUS

## 2018-10-03 NOTE — H&P
10/02/2018    K 3.1 10/02/2018     10/02/2018    CO2 22 10/02/2018    BUN 2 10/02/2018    LABALBU 4.1 10/02/2018    CREATININE 0.5 10/02/2018    CALCIUM 8.7 10/02/2018    GFRAA >60 10/02/2018    LABGLOM >60 10/02/2018    GLUCOSE 98 10/02/2018     U/A:    Lab Results   Component Value Date    COLORU Yellow 10/02/2018    PROTEINU Negative 10/02/2018    PHUR 6.0 10/02/2018    CLARITYU Clear 10/02/2018    SPECGRAV <=1.005 10/02/2018    LEUKOCYTESUR Negative 10/02/2018    UROBILINOGEN 0.2 10/02/2018    BILIRUBINUR Negative 10/02/2018    BLOODU Negative 10/02/2018    GLUCOSEU Negative 10/02/2018       Urine pregnancy-negative  Urine drug screen reviewed  Serum drug screen reviewed      ASSESSMENT:      Patient Active Problem List   Diagnosis    Alcohol abuse with withdrawal    Tetrahydrocannabinol (THC) use disorder, mild     Major depressive disorder, recurrent episode, severe      Hypokalemia     Protein-calorie malnutrition, severe         PLAN:    1) admit to monitored bed  2) institute CIWA protocol for withdrawal  3) replace electrolytes  4) antiemetics prn  5) toradol prn pain  6) PPI for possible gastritis related to alcohol use  7) psychiatry consult  8) ? inpatient psychiatry admission and further treatment-defer to psychiatry  9) the patient is expected to stay 2 or more midnights to evaluate and treat her alcohol withdrawal      Please note that over 50 minutes was spent in evaluating the patient, review of records and results, discussion with staff/family, etc.    Eusebia Ryan MD  9:15 AM  10/3/2018

## 2018-10-03 NOTE — CONSULTS
PSYCHIATRIC EVALUATION  (Consult)     CHIEF COMPLAINT:   [x] Mood Problems [x] Anxiety Problems [] Psychosis                    [] Suicidal/Homicidal   [] Aggression  [x] Other    HISTORY OF PRESENT Grabiel Rhodes  is a 27 y.o. female who has a previous psychiatric history of bipolar and presents for admission with blake . Symptoms onset was 1 month ago and is becoming severe for the last 3 days.  This presentation associates with 4 days without sleep, decreased appetite, racing, disorganized thoughts, . The complaint has been persistent, constant and severe, and usually worsened by alcohol abuse. Precipitating factors: Soco Kwok reports the death of 10 close friends in a 2 month period due to heroin use, she also lost her grandmother to illness. She reports she attempted to follow up with her outpatient provider following her last inpatient admission however they had her scheduled with her counselor and she was unable to refill her prescriptions and started drinking at least 10 beers a day again. SHe has been unable to sleep with racing thoughts and began to withdrawal so she came to the emergency room.    Precipitating Factors:     [] Family Stress   [] Recent loss/grief Stress   [x] Health Stress   [] Relationship Stress    [] Legal Stress   [x] Environmental Stress    [] Occupational Stress   [x] Financial Stress   [x] Substance Abuse [] Other      PAST PSYCHIATRIC HISTORY:   History of psychiatric Hospitalization:    [] Denies    [x] yes  [] Days ago     [x]  Weeks Ago    [] Months ago  [] Years ago              [x] [facility]  [] [address] [place] [address]  [] Other:        [x] Once  [] More than once    Outpatient treatment:  [] Elza Saucedo  [] [facility]  [] [employer]              [] [facility]  [] Lia Piña      [] [facility] [] [facility]      [] [facility] [] CSN  [] [facility] [x]  [facility]              [x] currently  [] in the past  [] Non-Compliant    [] Denies    Previous suicide attempt: Incoherent       Appearance: [] Well Groomed  [] Casual Dressed  [] Unkept  [x] Disheveled          [] Normal weight[x] Thin  [] Overweight  [] Obese           Attitude: [] Positive  [] Hostile  [] Demanding  [] Guarded  [] Defensive         [x] Cooperative  []  Uncooperative      Behavior:  [] Normal Gait  [] Walks with Assistance  [] Meryl Chair    [] Walks with Sudie Rupesh  [x] In Hospital Bed  [] Sitting in Chair    Muscle-Skeletal:  [x] Normal Muscle Tone [] Muscle Atrophy       [] Abnormal Muscle Movement     Eye Contact:  [] Good eye contact  [x] Intermittent Eye Contact  [] Poor Eye Contact     Mood: [x] Depressed  [x] Anxious  [x] Irritated  [] Euthymic   [] Angry [x] Restless    Affect:  [] Congruent  [] Incongruent  [x] Labile  [] Constricted  [] Flat  [x] Bizarre     Thought Process and Association:  [] Logical [] Illogical       [] Linear and Goal Directed  [x] Tangential  [x] Circumstantial     Thought Content:  [] Denies [] Endorses [] Suicidal [] Homicidal  [] Delusional      [] Paranoid  [] Somatic  [x] Grandiose    Perception: [x]  None  [] Auditory   [] Visual  [] tactile   [] olfactory  [] Illusions         Insight: [] Intact  [] Fair  [x] Limited    Judgement:  [] Intact  [] Fair  [x] Limited        ASSESSMENT  Patient Active Problem List   Diagnosis    Acute alcoholic intoxication with complication (HCC)    Tetrahydrocannabinol (THC) use disorder, mild, abuse    Major depressive disorder, recurrent episode, severe (HCC)    Alcohol abuse    Protein-calorie malnutrition, severe ([address]    Depression with suicidal ideation    Alcohol withdrawal, with unspecified complication ([facility] [facility]   MDD      Recommendations and plan of treatment: Patient is actively presenting with severe psychiatric symptoms. Will benefit from inpatient hospitalization. Please transfer to psych when medically clear.       Met with patient and discussed the risks and benefits associated with treatment and the patient expressed

## 2018-10-03 NOTE — ED PROVIDER NOTES
Department of Emergency Medicine  ED Provider Note  Admit Date: 10/2/2018  Room: 21/21        ED Physician   10/2/18  10:24 PM    HPI: Marlen Godoy 26 y.o. female presents with a complaint of feeling anxious, manic, depressed and return of drinking alcohol beginning weeks ago. Complaint has been constant and became more severe today which is what prompted the visit. Denies Suicidal ideation. Denies Homicidal ideation. Not applicable specific plan. Patient presents to emergency department with return of anxiety feeling very manic as well as depressed, reports being admitted to behavior health at the end of August and was discharged with prescriptions she reports that she ran out of the medication almost 2 weeks ago and has not followed up with anyone since. States that she did see her counselor but the counselor informed her that she was not able to write her prescriptions and she has not followed up with anyone since then. Patient is admitting to alcohol use, drinking a 12 pack of beer a day. She also appears very manic and reports that she has also had an increase in depression due to the return of drinking. Patient has tried outpatient drug rehab without success. She reports that she initially did not take her admission serious with psychiatry and realized that she does need to take medication because she actually felt better when she was on the meds. She denies any other drug use except marijuana. Does report that she drinks daily, 12 pack of beer a day. Patient denies any suicidal homicidal ideations also denies any AV hallucinations. Patient reports that she is starting to feel as if she is going through withdrawal, feeling extremely anxious with some mild tremors. Speech noted to be hyper with hyperactive body movements. She reports drinking heavily for the last year. She drinks about 12-twelve ounce beers daily and a half of a fifth of liquor.   She has developed recurrent abdominal pain,

## 2018-10-04 ENCOUNTER — HOSPITAL ENCOUNTER (INPATIENT)
Age: 33
LOS: 3 days | Discharge: HOME | DRG: 751 | End: 2018-10-07
Attending: PSYCHIATRY & NEUROLOGY | Admitting: PSYCHIATRY & NEUROLOGY
Payer: MEDICAID

## 2018-10-04 VITALS
WEIGHT: 101 LBS | HEART RATE: 104 BPM | SYSTOLIC BLOOD PRESSURE: 157 MMHG | DIASTOLIC BLOOD PRESSURE: 90 MMHG | RESPIRATION RATE: 16 BRPM | OXYGEN SATURATION: 100 % | HEIGHT: 63 IN | BODY MASS INDEX: 17.89 KG/M2 | TEMPERATURE: 97.9 F

## 2018-10-04 PROBLEM — F10.939 ALCOHOL WITHDRAWAL, WITH UNSPECIFIED COMPLICATION (HCC): Status: RESOLVED | Noted: 2018-10-02 | Resolved: 2018-10-04

## 2018-10-04 PROBLEM — F31.10 BIPOLAR AFFECTIVE DISORDER, MANIC (HCC): Status: ACTIVE | Noted: 2018-10-04

## 2018-10-04 LAB
ANION GAP SERPL CALCULATED.3IONS-SCNC: 13 MMOL/L (ref 7–16)
BUN BLDV-MCNC: <2 MG/DL (ref 6–20)
CALCIUM SERPL-MCNC: 8 MG/DL (ref 8.6–10.2)
CHLORIDE BLD-SCNC: 104 MMOL/L (ref 98–107)
CO2: 22 MMOL/L (ref 22–29)
CREAT SERPL-MCNC: 0.5 MG/DL (ref 0.5–1)
GFR AFRICAN AMERICAN: >60
GFR NON-AFRICAN AMERICAN: >60 ML/MIN/1.73
GLUCOSE BLD-MCNC: 103 MG/DL (ref 74–109)
MAGNESIUM: 1.8 MG/DL (ref 1.6–2.6)
PHOSPHORUS: 4.3 MG/DL (ref 2.5–4.5)
POTASSIUM SERPL-SCNC: 3.5 MMOL/L (ref 3.5–5)
SODIUM BLD-SCNC: 139 MMOL/L (ref 132–146)

## 2018-10-04 PROCEDURE — 6370000000 HC RX 637 (ALT 250 FOR IP): Performed by: INTERNAL MEDICINE

## 2018-10-04 PROCEDURE — 1240000000 HC EMOTIONAL WELLNESS R&B

## 2018-10-04 PROCEDURE — 84100 ASSAY OF PHOSPHORUS: CPT

## 2018-10-04 PROCEDURE — 6360000002 HC RX W HCPCS: Performed by: INTERNAL MEDICINE

## 2018-10-04 PROCEDURE — 2580000003 HC RX 258: Performed by: NURSE PRACTITIONER

## 2018-10-04 PROCEDURE — 36415 COLL VENOUS BLD VENIPUNCTURE: CPT

## 2018-10-04 PROCEDURE — 6370000000 HC RX 637 (ALT 250 FOR IP): Performed by: NURSE PRACTITIONER

## 2018-10-04 PROCEDURE — 83735 ASSAY OF MAGNESIUM: CPT

## 2018-10-04 PROCEDURE — 2500000003 HC RX 250 WO HCPCS: Performed by: INTERNAL MEDICINE

## 2018-10-04 PROCEDURE — 80048 BASIC METABOLIC PNL TOTAL CA: CPT

## 2018-10-04 RX ORDER — TRAZODONE HYDROCHLORIDE 50 MG/1
50 TABLET ORAL NIGHTLY PRN
Status: DISCONTINUED | OUTPATIENT
Start: 2018-10-04 | End: 2018-10-07 | Stop reason: HOSPADM

## 2018-10-04 RX ORDER — PAROXETINE HYDROCHLORIDE 20 MG/1
20 TABLET, FILM COATED ORAL DAILY
Status: CANCELLED | OUTPATIENT
Start: 2018-10-05

## 2018-10-04 RX ORDER — NICOTINE 21 MG/24HR
1 PATCH, TRANSDERMAL 24 HOURS TRANSDERMAL DAILY
Status: DISCONTINUED | OUTPATIENT
Start: 2018-10-05 | End: 2018-10-07 | Stop reason: HOSPADM

## 2018-10-04 RX ORDER — PANTOPRAZOLE SODIUM 40 MG/1
40 TABLET, DELAYED RELEASE ORAL
Status: CANCELLED | OUTPATIENT
Start: 2018-10-05

## 2018-10-04 RX ORDER — FOLIC ACID 1 MG/1
1 TABLET ORAL DAILY
Status: CANCELLED | OUTPATIENT
Start: 2018-10-05

## 2018-10-04 RX ORDER — PAROXETINE HYDROCHLORIDE 20 MG/1
20 TABLET, FILM COATED ORAL DAILY
Status: DISCONTINUED | OUTPATIENT
Start: 2018-10-05 | End: 2018-10-07 | Stop reason: HOSPADM

## 2018-10-04 RX ORDER — HALOPERIDOL 5 MG/ML
10 INJECTION INTRAMUSCULAR EVERY 6 HOURS PRN
Status: DISCONTINUED | OUTPATIENT
Start: 2018-10-04 | End: 2018-10-07 | Stop reason: HOSPADM

## 2018-10-04 RX ORDER — LOPERAMIDE HYDROCHLORIDE 2 MG/1
2 CAPSULE ORAL 4 TIMES DAILY PRN
Status: DISCONTINUED | OUTPATIENT
Start: 2018-10-04 | End: 2018-10-07 | Stop reason: HOSPADM

## 2018-10-04 RX ORDER — TRAZODONE HYDROCHLORIDE 50 MG/1
25 TABLET ORAL NIGHTLY
Status: DISCONTINUED | OUTPATIENT
Start: 2018-10-04 | End: 2018-10-07 | Stop reason: HOSPADM

## 2018-10-04 RX ORDER — PANTOPRAZOLE SODIUM 40 MG/1
40 TABLET, DELAYED RELEASE ORAL
Status: DISCONTINUED | OUTPATIENT
Start: 2018-10-05 | End: 2018-10-07 | Stop reason: HOSPADM

## 2018-10-04 RX ORDER — ACETAMINOPHEN 325 MG/1
650 TABLET ORAL EVERY 4 HOURS PRN
Status: DISCONTINUED | OUTPATIENT
Start: 2018-10-04 | End: 2018-10-07 | Stop reason: HOSPADM

## 2018-10-04 RX ORDER — IBUPROFEN 800 MG/1
800 TABLET ORAL
Status: CANCELLED | OUTPATIENT
Start: 2018-10-04 | End: 2018-10-09

## 2018-10-04 RX ORDER — LORAZEPAM 1 MG/1
3 TABLET ORAL
Status: DISCONTINUED | OUTPATIENT
Start: 2018-10-04 | End: 2018-10-07 | Stop reason: HOSPADM

## 2018-10-04 RX ORDER — LORAZEPAM 1 MG/1
1 TABLET ORAL
Status: DISCONTINUED | OUTPATIENT
Start: 2018-10-04 | End: 2018-10-07 | Stop reason: HOSPADM

## 2018-10-04 RX ORDER — MULTIVITAMIN WITH FOLIC ACID 400 MCG
1 TABLET ORAL DAILY
Status: DISCONTINUED | OUTPATIENT
Start: 2018-10-04 | End: 2018-10-07 | Stop reason: HOSPADM

## 2018-10-04 RX ORDER — THIAMINE MONONITRATE (VIT B1) 100 MG
100 TABLET ORAL DAILY
Status: CANCELLED | OUTPATIENT
Start: 2018-10-05

## 2018-10-04 RX ORDER — LORAZEPAM 2 MG/ML
3 INJECTION INTRAMUSCULAR
Status: DISCONTINUED | OUTPATIENT
Start: 2018-10-04 | End: 2018-10-07 | Stop reason: HOSPADM

## 2018-10-04 RX ORDER — TRAZODONE HYDROCHLORIDE 50 MG/1
25 TABLET ORAL NIGHTLY
Status: CANCELLED | OUTPATIENT
Start: 2018-10-04

## 2018-10-04 RX ORDER — LORAZEPAM 2 MG/ML
2 INJECTION INTRAMUSCULAR
Status: DISCONTINUED | OUTPATIENT
Start: 2018-10-04 | End: 2018-10-07 | Stop reason: HOSPADM

## 2018-10-04 RX ORDER — IBUPROFEN 800 MG/1
800 TABLET ORAL
Status: DISCONTINUED | OUTPATIENT
Start: 2018-10-04 | End: 2018-10-04 | Stop reason: HOSPADM

## 2018-10-04 RX ORDER — HYDROXYZINE PAMOATE 50 MG/1
50 CAPSULE ORAL EVERY 6 HOURS PRN
Status: DISCONTINUED | OUTPATIENT
Start: 2018-10-04 | End: 2018-10-07 | Stop reason: HOSPADM

## 2018-10-04 RX ORDER — LORAZEPAM 2 MG/ML
4 INJECTION INTRAMUSCULAR
Status: DISCONTINUED | OUTPATIENT
Start: 2018-10-04 | End: 2018-10-07 | Stop reason: HOSPADM

## 2018-10-04 RX ORDER — MAGNESIUM HYDROXIDE/ALUMINUM HYDROXICE/SIMETHICONE 120; 1200; 1200 MG/30ML; MG/30ML; MG/30ML
30 SUSPENSION ORAL PRN
Status: DISCONTINUED | OUTPATIENT
Start: 2018-10-04 | End: 2018-10-07 | Stop reason: HOSPADM

## 2018-10-04 RX ORDER — LORAZEPAM 2 MG/ML
1 INJECTION INTRAMUSCULAR
Status: DISCONTINUED | OUTPATIENT
Start: 2018-10-04 | End: 2018-10-07 | Stop reason: HOSPADM

## 2018-10-04 RX ORDER — NICOTINE 21 MG/24HR
1 PATCH, TRANSDERMAL 24 HOURS TRANSDERMAL DAILY
Status: CANCELLED | OUTPATIENT
Start: 2018-10-05

## 2018-10-04 RX ORDER — THIAMINE MONONITRATE (VIT B1) 100 MG
100 TABLET ORAL DAILY
Status: DISCONTINUED | OUTPATIENT
Start: 2018-10-05 | End: 2018-10-07 | Stop reason: HOSPADM

## 2018-10-04 RX ORDER — LORAZEPAM 1 MG/1
4 TABLET ORAL
Status: DISCONTINUED | OUTPATIENT
Start: 2018-10-04 | End: 2018-10-07 | Stop reason: HOSPADM

## 2018-10-04 RX ORDER — FOLIC ACID 1 MG/1
1 TABLET ORAL DAILY
Status: DISCONTINUED | OUTPATIENT
Start: 2018-10-05 | End: 2018-10-07 | Stop reason: HOSPADM

## 2018-10-04 RX ORDER — BENZTROPINE MESYLATE 1 MG/ML
2 INJECTION INTRAMUSCULAR; INTRAVENOUS 2 TIMES DAILY PRN
Status: DISCONTINUED | OUTPATIENT
Start: 2018-10-04 | End: 2018-10-07 | Stop reason: HOSPADM

## 2018-10-04 RX ORDER — ONDANSETRON 2 MG/ML
4 INJECTION INTRAMUSCULAR; INTRAVENOUS EVERY 6 HOURS PRN
Status: DISCONTINUED | OUTPATIENT
Start: 2018-10-04 | End: 2018-10-07 | Stop reason: HOSPADM

## 2018-10-04 RX ORDER — LOPERAMIDE HYDROCHLORIDE 2 MG/1
2 CAPSULE ORAL 4 TIMES DAILY PRN
Status: CANCELLED | OUTPATIENT
Start: 2018-10-04

## 2018-10-04 RX ORDER — IBUPROFEN 400 MG/1
800 TABLET ORAL
Status: DISCONTINUED | OUTPATIENT
Start: 2018-10-05 | End: 2018-10-07 | Stop reason: HOSPADM

## 2018-10-04 RX ORDER — OLANZAPINE 10 MG/1
10 TABLET ORAL
Status: ACTIVE | OUTPATIENT
Start: 2018-10-04 | End: 2018-10-04

## 2018-10-04 RX ORDER — LORAZEPAM 1 MG/1
2 TABLET ORAL
Status: DISCONTINUED | OUTPATIENT
Start: 2018-10-04 | End: 2018-10-07 | Stop reason: HOSPADM

## 2018-10-04 RX ADMIN — LORAZEPAM 3 MG: 1 TABLET ORAL at 10:30

## 2018-10-04 RX ADMIN — IBUPROFEN 800 MG: 800 TABLET ORAL at 12:24

## 2018-10-04 RX ADMIN — Medication 10 ML: at 06:14

## 2018-10-04 RX ADMIN — LORAZEPAM 2 MG: 2 INJECTION INTRAMUSCULAR; INTRAVENOUS at 15:15

## 2018-10-04 RX ADMIN — Medication 10 ML: at 15:16

## 2018-10-04 RX ADMIN — TRAZODONE HYDROCHLORIDE 25 MG: 50 TABLET ORAL at 20:29

## 2018-10-04 RX ADMIN — PAROXETINE HYDROCHLORIDE 20 MG: 20 TABLET, FILM COATED ORAL at 10:18

## 2018-10-04 RX ADMIN — Medication 10 ML: at 10:18

## 2018-10-04 RX ADMIN — HYDROXYZINE PAMOATE 50 MG: 50 CAPSULE ORAL at 20:29

## 2018-10-04 RX ADMIN — DEXTROSE, SODIUM CHLORIDE, AND POTASSIUM CHLORIDE: 5; .45; .15 INJECTION INTRAVENOUS at 06:19

## 2018-10-04 RX ADMIN — KETOROLAC TROMETHAMINE 15 MG: 30 INJECTION, SOLUTION INTRAMUSCULAR at 15:14

## 2018-10-04 RX ADMIN — PANTOPRAZOLE SODIUM 40 MG: 40 TABLET, DELAYED RELEASE ORAL at 06:13

## 2018-10-04 RX ADMIN — MULTIVITAMIN TABLET 1 TABLET: TABLET at 20:29

## 2018-10-04 RX ADMIN — KETOROLAC TROMETHAMINE 15 MG: 30 INJECTION, SOLUTION INTRAMUSCULAR at 06:14

## 2018-10-04 RX ADMIN — LORAZEPAM 2 MG: 2 INJECTION INTRAMUSCULAR; INTRAVENOUS at 12:25

## 2018-10-04 RX ADMIN — Medication 100 MG: at 10:18

## 2018-10-04 RX ADMIN — FOLIC ACID 1 MG: 1 TABLET ORAL at 10:19

## 2018-10-04 RX ADMIN — ENOXAPARIN SODIUM 40 MG: 40 INJECTION SUBCUTANEOUS at 10:18

## 2018-10-04 RX ADMIN — LORAZEPAM 3 MG: 1 TABLET ORAL at 06:13

## 2018-10-04 ASSESSMENT — PAIN DESCRIPTION - LOCATION
LOCATION: ABDOMEN;JAW
LOCATION: HEAD;JAW
LOCATION: ABDOMEN;JAW

## 2018-10-04 ASSESSMENT — PAIN DESCRIPTION - DESCRIPTORS
DESCRIPTORS: ACHING;CONSTANT;DISCOMFORT
DESCRIPTORS: STABBING;THROBBING;DISCOMFORT
DESCRIPTORS: PRESSURE
DESCRIPTORS: ACHING;DISCOMFORT;CONSTANT

## 2018-10-04 ASSESSMENT — PAIN SCALES - GENERAL
PAINLEVEL_OUTOF10: 8
PAINLEVEL_OUTOF10: 8
PAINLEVEL_OUTOF10: 7
PAINLEVEL_OUTOF10: 8
PAINLEVEL_OUTOF10: 9
PAINLEVEL_OUTOF10: 8
PAINLEVEL_OUTOF10: 8

## 2018-10-04 ASSESSMENT — PAIN DESCRIPTION - ORIENTATION
ORIENTATION: RIGHT;UPPER;LEFT
ORIENTATION: RIGHT;UPPER;LEFT
ORIENTATION: RIGHT
ORIENTATION: RIGHT;UPPER;LEFT

## 2018-10-04 ASSESSMENT — SLEEP AND FATIGUE QUESTIONNAIRES
DIFFICULTY FALLING ASLEEP: YES
DIFFICULTY ARISING: YES
DIFFICULTY STAYING ASLEEP: YES
DO YOU HAVE DIFFICULTY SLEEPING: YES
AVERAGE NUMBER OF SLEEP HOURS: 4
RESTFUL SLEEP: NO
DO YOU USE A SLEEP AID: YES

## 2018-10-04 ASSESSMENT — PAIN DESCRIPTION - FREQUENCY
FREQUENCY: CONTINUOUS

## 2018-10-04 ASSESSMENT — PAIN DESCRIPTION - ONSET
ONSET: ON-GOING

## 2018-10-04 ASSESSMENT — PAIN DESCRIPTION - PAIN TYPE
TYPE: ACUTE PAIN

## 2018-10-04 ASSESSMENT — PAIN DESCRIPTION - PROGRESSION
CLINICAL_PROGRESSION: GRADUALLY IMPROVING
CLINICAL_PROGRESSION: NOT CHANGED
CLINICAL_PROGRESSION: GRADUALLY IMPROVING

## 2018-10-04 ASSESSMENT — LIFESTYLE VARIABLES: HISTORY_ALCOHOL_USE: YES

## 2018-10-04 ASSESSMENT — PATIENT HEALTH QUESTIONNAIRE - PHQ9: SUM OF ALL RESPONSES TO PHQ QUESTIONS 1-9: 14

## 2018-10-04 NOTE — LETTER
[facility]  Phone: [phone]    Dr. Sammy Mclean MD      October 7, 2018     Patient: Ghulam Cline   YOB: 1985   Date of Visit: 10/4/2018       To Whom it May Concern:    Rochelle Raphael was admitted to [address] [place] [address] on 10/4/2018 and discharge 10/7/2018. Rochelle Raphael is able to may return to work on 10/08/2018 with no work restrictions. Rianavincenzo Shazia will continue outpatient services on an as needed basis. If you have any questions or concerns, please don't hesitate to call [phone].     Sincerely,         Josette Marie MSW, LSW

## 2018-10-05 LAB
CHOLESTEROL, TOTAL: 147 MG/DL (ref 0–199)
EKG ATRIAL RATE: 128 BPM
EKG P AXIS: 80 DEGREES
EKG P-R INTERVAL: 132 MS
EKG Q-T INTERVAL: 320 MS
EKG QRS DURATION: 80 MS
EKG QTC CALCULATION (BAZETT): 467 MS
EKG R AXIS: 92 DEGREES
EKG T AXIS: 69 DEGREES
EKG VENTRICULAR RATE: 128 BPM
HBA1C MFR BLD: 4.2 % (ref 4–5.6)
HDLC SERPL-MCNC: 70 MG/DL
LDL CHOLESTEROL CALCULATED: 49 MG/DL (ref 0–99)
TRIGL SERPL-MCNC: 140 MG/DL (ref 0–149)
VLDLC SERPL CALC-MCNC: 28 MG/DL

## 2018-10-05 PROCEDURE — 80061 LIPID PANEL: CPT

## 2018-10-05 PROCEDURE — 1240000000 HC EMOTIONAL WELLNESS R&B

## 2018-10-05 PROCEDURE — 6370000000 HC RX 637 (ALT 250 FOR IP): Performed by: NURSE PRACTITIONER

## 2018-10-05 PROCEDURE — 6370000000 HC RX 637 (ALT 250 FOR IP): Performed by: INTERNAL MEDICINE

## 2018-10-05 PROCEDURE — 83036 HEMOGLOBIN GLYCOSYLATED A1C: CPT

## 2018-10-05 PROCEDURE — 36415 COLL VENOUS BLD VENIPUNCTURE: CPT

## 2018-10-05 PROCEDURE — 99222 1ST HOSP IP/OBS MODERATE 55: CPT | Performed by: PSYCHIATRY & NEUROLOGY

## 2018-10-05 RX ADMIN — THIAMINE HCL TAB 100 MG 100 MG: 100 TAB at 09:16

## 2018-10-05 RX ADMIN — HYDROXYZINE PAMOATE 50 MG: 50 CAPSULE ORAL at 21:12

## 2018-10-05 RX ADMIN — TRAZODONE HYDROCHLORIDE 25 MG: 50 TABLET ORAL at 21:12

## 2018-10-05 RX ADMIN — IBUPROFEN 800 MG: 400 TABLET ORAL at 12:40

## 2018-10-05 RX ADMIN — IBUPROFEN 800 MG: 400 TABLET ORAL at 09:17

## 2018-10-05 RX ADMIN — MULTIVITAMIN TABLET 1 TABLET: TABLET at 09:17

## 2018-10-05 RX ADMIN — LORAZEPAM 1 MG: 1 TABLET ORAL at 21:12

## 2018-10-05 RX ADMIN — PAROXETINE HYDROCHLORIDE 20 MG: 20 TABLET, FILM COATED ORAL at 09:17

## 2018-10-05 RX ADMIN — IBUPROFEN 800 MG: 400 TABLET ORAL at 17:36

## 2018-10-05 RX ADMIN — FOLIC ACID 1 MG: 1 TABLET ORAL at 09:16

## 2018-10-05 RX ADMIN — LORAZEPAM 1 MG: 1 TABLET ORAL at 09:17

## 2018-10-05 RX ADMIN — PANTOPRAZOLE SODIUM 40 MG: 40 TABLET, DELAYED RELEASE ORAL at 07:21

## 2018-10-05 ASSESSMENT — PAIN SCALES - GENERAL
PAINLEVEL_OUTOF10: 9
PAINLEVEL_OUTOF10: 8
PAINLEVEL_OUTOF10: 4

## 2018-10-05 NOTE — H&P
counseling center                                          [x] currently  [] in the past  [] Non-Compliant    [] Denies     Previous suicide attempt: [x]Denies                                [] yes  [] OD  [] Cutting  [] Hanging  [] Gun  [] Other     Previous psych medications:  [x] Was prescribed                                                   [] Currently Taking                                                   [] Never taken medications        PAST MEDICAL HISTORY:   Past Medical History             Diagnosis Date    Anemia      ETOH abuse              FAMILY PSYCHIATRIC HISTORY:  Family History   History reviewed. No pertinent family history.            [] Denies       [x] Endorses               [x] Father                [] Depression  [] Anxiety  [] Bipolar  [] Psychosis  [x]  Alcohol abuse                  [x] Mother               [] Depression  [] Anxiety  [] Bipolar  [] Psychosis  [x]  Alcohol abuse                   [] Sibling               [] Depression  [] Anxiety  [] Bipolar  [] Psychosis  []  Other                  [] Grandparent               [] Depression  [] Anxiety  [] Bipolar  [] Psychosis  []  Other        SOCIAL HISTORY:      1. Living Situation:[x] Private Residence [] Homeless [] [address]                                   [] Assisted Living [] [address]  [] Shelter [] Other   2. Employment:  [x] Unemployed  [] Employed  [] Disabled  [] Retired   1. Legal History: [] No Arrest [x] Arrest  [] Theft  []  Assault  [x] Substances   4. History of Trama/ Abuse: [x] Denies  [] Emotional [] Physical [] Sexual   5. Spirituality: [x] Spiritual [] Not Spiritual   6. Substance Abuse: [] Denies  [x] Drug of choice                                       [] Amphetamines [] Marijuana [] Cocaine                                       [] Opioids  [x] Alcohol  [] Benzodiazepines      For further SH review SW note.     Risk Assessment:  1.  Risk Factors:   [x] Depression  [x] Anxiety  [] Psychosis   [] Hearing is normal to rubbing fingers   CN IX, X:  [] Normal gag reflex and phonation   CN XI: [] Shoulder shrug and neck rotation is normal  CNXII: [] Tongue is midline no deviation or atrophy       For further PE refer to ED note    MENTAL STATUS EXAM:         Mental Status Examination:     Cognition:       [x] Alert  [x] Awake  [x] Oriented  [x] Person  [x] Place [x] Time       [] drowsy  [] tired  [] lethargic  [] distractable  []      Attention/Concentration:   [x] Attentive  [] Distracted         Memory Recent and Remote: [x] Intact   [] Impaired [] Partially Impaired      Language: [] Able to recognize and name objects                         [] Unable to recognize and name [address] [place] [address] Knowledge:  [] Poor []  Fair  [] Good     Speech: [] Normal  [] Soft  [] Slow  [] Fast [] Pressured                                    [x] Loud [] Dysarthria  [] Incoherent        Appearance: [] Well Groomed  [] Casual Dressed  [] Unkept  [x] Disheveled                         [] Normal weight[x] Thin  [] Overweight  [] Obese           Attitude: [] Positive  [] Hostile  [] Demanding  [] Guarded  [] Defensive                    [x] Cooperative  []  Uncooperative       Behavior:  [] Normal Gait  [] Walks with Assistance  [] Meryl Chair               [] Walks with Patricia Wilkes  [x] In Hospital Bed  [] Sitting in Chair     Muscle-Skeletal:  [x] Normal Muscle Tone [] Muscle Atrophy                                        [] Abnormal Muscle Movement      Eye Contact:   [] Good eye contact  [x] Intermittent Eye Contact  [] Poor Eye Contact      Mood: [x] Depressed  [x] Anxious  [x] Irritated  [] Euthymic   [] Angry [x] Restless     Affect:  [] Congruent  [] Incongruent  [x] Labile  [] Constricted  [] Flat  [x] Bizarre      Thought Process and Association:  [] Logical [] Illogical                                        [] Linear and Goal Directed  [x] Tangential  [x] Circumstantial      Thought Content:  [] Denies [] Endorses []

## 2018-10-05 NOTE — PROGRESS NOTES
`[facility]  Admission Note   Pt arrived on unit via wheelchair from medical floor. Is pleasant and cooperative but affect is sad with depressed mood. Denies any suicidal ideations but admits to feeling depressed. Denies any withdrawal symptoms relating to ETOH. No hand tremors present and appetite is good. Admission Type:   Admission Type: Voluntary    Reason for admission:  Reason for Admission: \"I lost 3 people close to me in 2 weeks and I don't deal with death well. I'm feeling depressed. \"    PATIENT STRENGTHS:  Strengths: Positive Support, Social Skills, Connection to output provider, No significant Physical Illness, Motivated, Medication Compliance, Communication    Patient Strengths and Limitations:  Limitations: Inappropriate/potentially harmful leisure interests, Tendency to isolate self    Addictive Behavior:   Addictive Behavior  In the past 3 months, have you felt or has someone told you that you have a problem with:  : Eating (too much/too little), Other(Comments) (ETOH)  Do you have a history of Chemical Use?: No  Do you have a history of Alcohol Use?: Yes  Do you have a history of Street Drug Abuse?: Yes (Marijuana)  Histroy of Prescripton Drug Abuse?: No    Medical Problems:   Past Medical History:   Diagnosis Date    Anemia     ETOH abuse        Status EXAM:  Status and Exam  Normal: No  Facial Expression: Sad, Worried  Affect: Congruent  Level of Consciousness: Alert  Mood:Normal: No  Mood: Depressed, Sad  Motor Activity:Normal: Yes  Interview Behavior: Cooperative  Preception: [place] to Person, Radha Rocker to Time, [place] to Place, [place] to Situation  Attention:Normal: Yes  Thought Processes: Other(See comment) (organized)  Thought Content:Normal: Yes  Hallucinations: None  Delusions: No  Memory:Normal: No  Memory: Poor Recent  Insight and Judgment: No  Insight and Judgment: Poor Judgment  Present Suicidal Ideation: No  Present Homicidal Ideation: No    Tobacco Screening:  Practical Counseling, on admission, jona X, if applicable and completed (first 3 are required if patient doesn't refuse): (x )  Recognizing danger situations (included triggers and roadblocks)                    ( x)  Coping skills (new ways to manage stress, exercise, relaxation techniques, changing routine, distraction)                                                           (x )  Basic information about quitting (benefits of quitting, techniques in how to quit, available resources  ( ) Referral for counseling faxed to [facility]                                           ( ) Patient refused counseling  ( ) Patient has not smoked in the last 30 days    Metabolic Screening:    No results found for: LABA1C    No results for input(s): CHOL, TRIG, HDL, LDLCALC, LABVLDL in the last 72 hours. Body mass index is 17.78 kg/m². BP Readings from Last 2 Encounters:   10/04/18 (!) 130/90   10/04/18 (!) 157/90           Pt admitted with followings belongings:  Clothing: Pants, Shirt, Sweater, Footwear  Other Valuables: Purse, Cell phone     Valuables sent home with no one. Valuables placed in safe in security envelope, number:  . Patient's home medications were none brought in. Patient oriented to surroundings and program expectations and copy of patient rights given. Received admission packet:  yes. Consents reviewed, signed yes. Refused no. Patient verbalize understanding:  yes.     Patient education on precautions: yes                  Connie Muñoz RN

## 2018-10-05 NOTE — PLAN OF CARE
[facility]  Initial Interdisciplinary Treatment Plan NOTE    Review Date & Time: 10/5/18 1200    Patient was in treatment team    Admission Type:   Admission Type: Voluntary    Reason for admission:  Reason for Admission: \"I lost 3 people close to me in 2 weeks and I don't deal with death well. I'm feeling depressed. \"      Estimated Length of Stay Update:  3-5 days  Estimated Discharge Date Update: 5-7 days    PATIENT STRENGTHS:  Patient Strengths Strengths: Positive Support, Social Skills, Connection to output provider, No significant Physical Illness, Motivated, Medication Compliance, Communication  Patient Strengths and Limitations:Limitations: Tendency to isolate self, Inappropriate/potentially harmful leisure interests, Hopeless about future  Addictive Behavior:Addictive Behavior  In the past 3 months, have you felt or has someone told you that you have a problem with:  : Eating (too much/too little), Other(Comments) (ETOH)  Do you have a history of Chemical Use?: No  Do you have a history of Alcohol Use?: Yes  Do you have a history of Street Drug Abuse?: Yes (Marijuana)  Histroy of Prescripton Drug Abuse?: No  Medical Problems:  Past Medical History:   Diagnosis Date    Anemia     ETOH abuse        EDUCATION:   Learner Progress Toward Treatment Goals: Reviewed results and recommendations of this team    Method: Small group    Outcome: Demonstrated Understanding    PATIENT GOALS: \"find some peace\"    PLAN/TREATMENT RECOMMENDATIONS UPDATE:day 3    GOALS UPDATE:   Time frame for Short-Term Goals: 3-5 days    Zohra Ferrara RN

## 2018-10-05 NOTE — PROGRESS NOTES
Actively engaged and enjoyed afternoon leisure activity.   Aware of evening changes and treatment team.

## 2018-10-06 PROCEDURE — 6370000000 HC RX 637 (ALT 250 FOR IP): Performed by: NURSE PRACTITIONER

## 2018-10-06 PROCEDURE — 6370000000 HC RX 637 (ALT 250 FOR IP): Performed by: INTERNAL MEDICINE

## 2018-10-06 PROCEDURE — 1240000000 HC EMOTIONAL WELLNESS R&B

## 2018-10-06 PROCEDURE — 99231 SBSQ HOSP IP/OBS SF/LOW 25: CPT | Performed by: PSYCHIATRY & NEUROLOGY

## 2018-10-06 RX ADMIN — IBUPROFEN 800 MG: 400 TABLET ORAL at 11:50

## 2018-10-06 RX ADMIN — IBUPROFEN 800 MG: 400 TABLET ORAL at 08:33

## 2018-10-06 RX ADMIN — TRAZODONE HYDROCHLORIDE 50 MG: 50 TABLET ORAL at 21:20

## 2018-10-06 RX ADMIN — MULTIVITAMIN TABLET 1 TABLET: TABLET at 08:33

## 2018-10-06 RX ADMIN — FOLIC ACID 1 MG: 1 TABLET ORAL at 08:33

## 2018-10-06 RX ADMIN — IBUPROFEN 800 MG: 400 TABLET ORAL at 16:21

## 2018-10-06 RX ADMIN — HYDROXYZINE PAMOATE 50 MG: 50 CAPSULE ORAL at 21:20

## 2018-10-06 RX ADMIN — PANTOPRAZOLE SODIUM 40 MG: 40 TABLET, DELAYED RELEASE ORAL at 08:34

## 2018-10-06 RX ADMIN — LORAZEPAM 2 MG: 1 TABLET ORAL at 09:43

## 2018-10-06 RX ADMIN — PAROXETINE HYDROCHLORIDE 20 MG: 20 TABLET, FILM COATED ORAL at 08:33

## 2018-10-06 RX ADMIN — THIAMINE HCL TAB 100 MG 100 MG: 100 TAB at 08:33

## 2018-10-06 RX ADMIN — LORAZEPAM 2 MG: 1 TABLET ORAL at 21:20

## 2018-10-06 ASSESSMENT — PAIN SCALES - GENERAL
PAINLEVEL_OUTOF10: 4
PAINLEVEL_OUTOF10: 5
PAINLEVEL_OUTOF10: 9

## 2018-10-07 VITALS
HEART RATE: 73 BPM | HEIGHT: 63 IN | OXYGEN SATURATION: 100 % | TEMPERATURE: 98 F | SYSTOLIC BLOOD PRESSURE: 95 MMHG | DIASTOLIC BLOOD PRESSURE: 69 MMHG | WEIGHT: 100.38 LBS | BODY MASS INDEX: 17.79 KG/M2 | RESPIRATION RATE: 16 BRPM

## 2018-10-07 LAB — CANNABINOIDS CONF, URINE: 257 NG/ML

## 2018-10-07 PROCEDURE — 99238 HOSP IP/OBS DSCHRG MGMT 30/<: CPT | Performed by: PSYCHIATRY & NEUROLOGY

## 2018-10-07 PROCEDURE — 6370000000 HC RX 637 (ALT 250 FOR IP): Performed by: NURSE PRACTITIONER

## 2018-10-07 PROCEDURE — 6370000000 HC RX 637 (ALT 250 FOR IP): Performed by: INTERNAL MEDICINE

## 2018-10-07 RX ORDER — PAROXETINE HYDROCHLORIDE 20 MG/1
20 TABLET, FILM COATED ORAL DAILY
Qty: 30 TABLET | Refills: 0 | Status: SHIPPED | OUTPATIENT
Start: 2018-10-08 | End: 2018-10-07

## 2018-10-07 RX ORDER — NICOTINE 21 MG/24HR
1 PATCH, TRANSDERMAL 24 HOURS TRANSDERMAL DAILY
Qty: 30 PATCH | Refills: 0 | Status: ON HOLD | OUTPATIENT
Start: 2018-10-08 | End: 2018-10-29 | Stop reason: HOSPADM

## 2018-10-07 RX ORDER — PAROXETINE HYDROCHLORIDE 20 MG/1
20 TABLET, FILM COATED ORAL DAILY
Qty: 30 TABLET | Refills: 0 | Status: ON HOLD | OUTPATIENT
Start: 2018-10-08 | End: 2018-10-29 | Stop reason: HOSPADM

## 2018-10-07 RX ORDER — NICOTINE 21 MG/24HR
1 PATCH, TRANSDERMAL 24 HOURS TRANSDERMAL DAILY
Qty: 30 PATCH | Refills: 0 | Status: SHIPPED | OUTPATIENT
Start: 2018-10-08 | End: 2018-10-07

## 2018-10-07 RX ADMIN — PANTOPRAZOLE SODIUM 40 MG: 40 TABLET, DELAYED RELEASE ORAL at 08:33

## 2018-10-07 RX ADMIN — MULTIVITAMIN TABLET 1 TABLET: TABLET at 08:32

## 2018-10-07 RX ADMIN — LORAZEPAM 1 MG: 1 TABLET ORAL at 09:54

## 2018-10-07 RX ADMIN — PAROXETINE HYDROCHLORIDE 20 MG: 20 TABLET, FILM COATED ORAL at 08:32

## 2018-10-07 RX ADMIN — IBUPROFEN 800 MG: 400 TABLET ORAL at 11:41

## 2018-10-07 RX ADMIN — THIAMINE HCL TAB 100 MG 100 MG: 100 TAB at 08:32

## 2018-10-07 RX ADMIN — IBUPROFEN 800 MG: 400 TABLET ORAL at 08:33

## 2018-10-07 RX ADMIN — FOLIC ACID 1 MG: 1 TABLET ORAL at 08:32

## 2018-10-07 ASSESSMENT — PAIN SCALES - GENERAL
PAINLEVEL_OUTOF10: 8
PAINLEVEL_OUTOF10: 0
PAINLEVEL_OUTOF10: 4

## 2018-10-07 NOTE — DISCHARGE SUMMARY
Group Home []  [facility]                                                    [] [facility]    [] AMA  [] Other           Prescriptions: Continue same medications, review with patient.        Reason for more than one antipsychotic:  [x] N/A  [] 3 failed monotherapy(drugs tried):  [] Cross over to a new antipsychotic  [] Taper to monotherapy from polypharmacy  [] Augmentation of Clozapine therapy due to treatment resistance to single therapy      Diagnosis:        Patient Active Problem List   Diagnosis Code    Acute alcoholic intoxication with complication (HCC) E58.307    Tetrahydrocannabinol (THC) use disorder, mild, abuse F12.10    Major depressive disorder, recurrent episode, severe ([facility] F33.2    Alcohol abuse F10.10    Protein-calorie malnutrition, severe ([address] E43    Bipolar affective disorder, manic ([facility] [address] [facility] F31.10       Education and Follow-up:  Counseled:  [x] Patient     [] Family    [] Guardian      Cele Feliz   10/7/2018   10:29 AM

## 2018-10-23 ENCOUNTER — HOSPITAL ENCOUNTER (INPATIENT)
Age: 33
LOS: 5 days | Discharge: HOME | DRG: 753 | End: 2018-10-29
Attending: EMERGENCY MEDICINE | Admitting: PSYCHIATRY & NEUROLOGY
Payer: MEDICAID

## 2018-10-23 DIAGNOSIS — I47.1 PAROXYSMAL SUPRAVENTRICULAR TACHYCARDIA (HCC): Primary | ICD-10-CM

## 2018-10-23 DIAGNOSIS — Z76.89 ENCOUNTER FOR PSYCHIATRIC ASSESSMENT: ICD-10-CM

## 2018-10-23 LAB
ACETAMINOPHEN LEVEL: <5 MCG/ML (ref 10–30)
ALBUMIN SERPL-MCNC: 4.2 G/DL (ref 3.5–5.2)
ALP BLD-CCNC: 58 U/L (ref 35–104)
ALT SERPL-CCNC: 8 U/L (ref 0–32)
AMPHETAMINE SCREEN, URINE: NOT DETECTED
ANION GAP SERPL CALCULATED.3IONS-SCNC: 22 MMOL/L (ref 7–16)
AST SERPL-CCNC: 18 U/L (ref 0–31)
BACTERIA: ABNORMAL /HPF
BARBITURATE SCREEN URINE: POSITIVE
BASOPHILS ABSOLUTE: 0.05 E9/L (ref 0–0.2)
BASOPHILS RELATIVE PERCENT: 0.4 % (ref 0–2)
BENZODIAZEPINE SCREEN, URINE: NOT DETECTED
BILIRUB SERPL-MCNC: 0.3 MG/DL (ref 0–1.2)
BILIRUBIN URINE: NEGATIVE
BLOOD, URINE: ABNORMAL
BUN BLDV-MCNC: <2 MG/DL (ref 6–20)
CALCIUM SERPL-MCNC: 8.5 MG/DL (ref 8.6–10.2)
CANNABINOID SCREEN URINE: NOT DETECTED
CHLORIDE BLD-SCNC: 98 MMOL/L (ref 98–107)
CLARITY: CLEAR
CO2: 15 MMOL/L (ref 22–29)
COCAINE METABOLITE SCREEN URINE: NOT DETECTED
COLOR: YELLOW
CREAT SERPL-MCNC: 0.5 MG/DL (ref 0.5–1)
EOSINOPHILS ABSOLUTE: 0.04 E9/L (ref 0.05–0.5)
EOSINOPHILS RELATIVE PERCENT: 0.3 % (ref 0–6)
EPITHELIAL CELLS, UA: ABNORMAL /HPF
ETHANOL: 87 MG/DL (ref 0–0.08)
GFR AFRICAN AMERICAN: >60
GFR NON-AFRICAN AMERICAN: >60 ML/MIN/1.73
GLUCOSE BLD-MCNC: 74 MG/DL (ref 74–109)
GLUCOSE URINE: NEGATIVE MG/DL
HCT VFR BLD CALC: 37.9 % (ref 34–48)
HEMOGLOBIN: 13.2 G/DL (ref 11.5–15.5)
IMMATURE GRANULOCYTES #: 0.07 E9/L
IMMATURE GRANULOCYTES %: 0.6 % (ref 0–5)
KETONES, URINE: ABNORMAL MG/DL
LACTIC ACID: 4 MMOL/L (ref 0.5–2.2)
LEUKOCYTE ESTERASE, URINE: NEGATIVE
LYMPHOCYTES ABSOLUTE: 2.67 E9/L (ref 1.5–4)
LYMPHOCYTES RELATIVE PERCENT: 23.4 % (ref 20–42)
MCH RBC QN AUTO: 35.9 PG (ref 26–35)
MCHC RBC AUTO-ENTMCNC: 34.8 % (ref 32–34.5)
MCV RBC AUTO: 103 FL (ref 80–99.9)
METHADONE SCREEN, URINE: NOT DETECTED
MONOCYTES ABSOLUTE: 0.85 E9/L (ref 0.1–0.95)
MONOCYTES RELATIVE PERCENT: 7.4 % (ref 2–12)
NEUTROPHILS ABSOLUTE: 7.75 E9/L (ref 1.8–7.3)
NEUTROPHILS RELATIVE PERCENT: 67.9 % (ref 43–80)
NITRITE, URINE: NEGATIVE
OPIATE SCREEN URINE: NOT DETECTED
PDW BLD-RTO: 11.1 FL (ref 11.5–15)
PH UA: 6 (ref 5–9)
PHENCYCLIDINE SCREEN URINE: NOT DETECTED
PLATELET # BLD: 326 E9/L (ref 130–450)
PMV BLD AUTO: 9.4 FL (ref 7–12)
POTASSIUM SERPL-SCNC: 3.6 MMOL/L (ref 3.5–5)
PROPOXYPHENE SCREEN: NOT DETECTED
PROTEIN UA: NEGATIVE MG/DL
RBC # BLD: 3.68 E12/L (ref 3.5–5.5)
RBC UA: ABNORMAL /HPF (ref 0–2)
SALICYLATE, SERUM: <0.3 MG/DL (ref 0–30)
SODIUM BLD-SCNC: 135 MMOL/L (ref 132–146)
SPECIFIC GRAVITY UA: <=1.005 (ref 1–1.03)
TOTAL PROTEIN: 7.4 G/DL (ref 6.4–8.3)
TRICYCLIC ANTIDEPRESSANTS SCREEN SERUM: NEGATIVE NG/ML
TROPONIN: <0.01 NG/ML (ref 0–0.03)
TSH SERPL DL<=0.05 MIU/L-ACNC: 1.73 UIU/ML (ref 0.27–4.2)
UROBILINOGEN, URINE: 0.2 E.U./DL
WBC # BLD: 11.4 E9/L (ref 4.5–11.5)
WBC UA: ABNORMAL /HPF (ref 0–5)

## 2018-10-23 PROCEDURE — 81001 URINALYSIS AUTO W/SCOPE: CPT

## 2018-10-23 PROCEDURE — 93005 ELECTROCARDIOGRAM TRACING: CPT | Performed by: EMERGENCY MEDICINE

## 2018-10-23 PROCEDURE — 80307 DRUG TEST PRSMV CHEM ANLYZR: CPT

## 2018-10-23 PROCEDURE — 2580000003 HC RX 258: Performed by: EMERGENCY MEDICINE

## 2018-10-23 PROCEDURE — 93005 ELECTROCARDIOGRAM TRACING: CPT | Performed by: NURSE PRACTITIONER

## 2018-10-23 PROCEDURE — 84443 ASSAY THYROID STIM HORMONE: CPT

## 2018-10-23 PROCEDURE — 36415 COLL VENOUS BLD VENIPUNCTURE: CPT

## 2018-10-23 PROCEDURE — 99284 EMERGENCY DEPT VISIT MOD MDM: CPT

## 2018-10-23 PROCEDURE — 83605 ASSAY OF LACTIC ACID: CPT

## 2018-10-23 PROCEDURE — 80053 COMPREHEN METABOLIC PANEL: CPT

## 2018-10-23 PROCEDURE — 81025 URINE PREGNANCY TEST: CPT

## 2018-10-23 PROCEDURE — G0480 DRUG TEST DEF 1-7 CLASSES: HCPCS

## 2018-10-23 PROCEDURE — 84484 ASSAY OF TROPONIN QUANT: CPT

## 2018-10-23 PROCEDURE — 85025 COMPLETE CBC W/AUTO DIFF WBC: CPT

## 2018-10-23 PROCEDURE — 6360000002 HC RX W HCPCS

## 2018-10-23 PROCEDURE — 6360000002 HC RX W HCPCS: Performed by: STUDENT IN AN ORGANIZED HEALTH CARE EDUCATION/TRAINING PROGRAM

## 2018-10-23 RX ORDER — 0.9 % SODIUM CHLORIDE 0.9 %
1000 INTRAVENOUS SOLUTION INTRAVENOUS ONCE
Status: COMPLETED | OUTPATIENT
Start: 2018-10-23 | End: 2018-10-24

## 2018-10-23 RX ORDER — ADENOSINE 3 MG/ML
INJECTION, SOLUTION INTRAVENOUS
Status: COMPLETED
Start: 2018-10-23 | End: 2018-10-23

## 2018-10-23 RX ORDER — 0.9 % SODIUM CHLORIDE 0.9 %
1000 INTRAVENOUS SOLUTION INTRAVENOUS ONCE
Status: DISCONTINUED | OUTPATIENT
Start: 2018-10-23 | End: 2018-10-23

## 2018-10-23 RX ORDER — 0.9 % SODIUM CHLORIDE 0.9 %
1000 INTRAVENOUS SOLUTION INTRAVENOUS ONCE
Status: COMPLETED | OUTPATIENT
Start: 2018-10-23 | End: 2018-10-23

## 2018-10-23 RX ORDER — LORAZEPAM 2 MG/ML
1 INJECTION INTRAMUSCULAR ONCE
Status: COMPLETED | OUTPATIENT
Start: 2018-10-23 | End: 2018-10-23

## 2018-10-23 RX ADMIN — ADENOSINE 6 MG: 3 INJECTION, SOLUTION INTRAVENOUS at 19:33

## 2018-10-23 RX ADMIN — SODIUM CHLORIDE 1000 ML: 9 INJECTION, SOLUTION INTRAVENOUS at 22:32

## 2018-10-23 RX ADMIN — SODIUM CHLORIDE 1000 ML: 9 INJECTION, SOLUTION INTRAVENOUS at 21:44

## 2018-10-23 RX ADMIN — LORAZEPAM 1 MG: 2 INJECTION INTRAMUSCULAR; INTRAVENOUS at 21:10

## 2018-10-23 ASSESSMENT — PAIN DESCRIPTION - LOCATION: LOCATION: ABDOMEN

## 2018-10-23 ASSESSMENT — PAIN SCALES - GENERAL: PAINLEVEL_OUTOF10: 10

## 2018-10-23 ASSESSMENT — PAIN DESCRIPTION - PAIN TYPE: TYPE: ACUTE PAIN

## 2018-10-23 ASSESSMENT — PATIENT HEALTH QUESTIONNAIRE - PHQ9: SUM OF ALL RESPONSES TO PHQ QUESTIONS 1-9: 13

## 2018-10-23 NOTE — ED PROVIDER NOTES
complaint palpitations and anxiety. Patient requesting psych evaluation. Patient found to be in SVT upon arrival. Patient given adenosine and converted to normal sinus rhythm. The patient did have labs and imaging which are reviewed. Patient medically cleared for psychiatric treatment. Patient denies any suicidal or homicidal ideation. Patient is medically cleared. Please note that the withdrawal or failure to initiate urgent interventions for this patient would likely result in a life threatening deterioration or permanent disability. Accordingly this patient received 40 minutes of critical care time, excluding separately billable procedures. My findings/plan: The primary encounter diagnosis was Paroxysmal supraventricular tachycardia ([address] A diagnosis of Encounter for psychiatric assessment was also pertinent to this visit.   New Prescriptions    No medications on file     [facility], [facility], DO  Resident  10/24/18 [facility], DO  10/24/18 [place] [address], DO  10/24/18 1214

## 2018-10-24 PROBLEM — F32.A DEPRESSION WITH SUICIDAL IDEATION: Status: ACTIVE | Noted: 2018-10-24

## 2018-10-24 PROBLEM — F31.4 BIPOLAR DISORDER WITH SEVERE DEPRESSION (HCC): Status: ACTIVE | Noted: 2018-10-24

## 2018-10-24 PROBLEM — R45.851 DEPRESSION WITH SUICIDAL IDEATION: Status: ACTIVE | Noted: 2018-10-24

## 2018-10-24 LAB
HCG(URINE) PREGNANCY TEST: NEGATIVE
LACTIC ACID: 1.1 MMOL/L (ref 0.5–2.2)
VALPROIC ACID LEVEL: 65 MCG/ML (ref 50–100)

## 2018-10-24 PROCEDURE — 1240000000 HC EMOTIONAL WELLNESS R&B

## 2018-10-24 PROCEDURE — 36415 COLL VENOUS BLD VENIPUNCTURE: CPT

## 2018-10-24 PROCEDURE — 6370000000 HC RX 637 (ALT 250 FOR IP): Performed by: EMERGENCY MEDICINE

## 2018-10-24 PROCEDURE — 83605 ASSAY OF LACTIC ACID: CPT

## 2018-10-24 PROCEDURE — 6370000000 HC RX 637 (ALT 250 FOR IP): Performed by: NURSE PRACTITIONER

## 2018-10-24 PROCEDURE — 80164 ASSAY DIPROPYLACETIC ACD TOT: CPT

## 2018-10-24 RX ORDER — LORAZEPAM 1 MG/1
2 TABLET ORAL
Status: DISCONTINUED | OUTPATIENT
Start: 2018-10-24 | End: 2018-10-29 | Stop reason: HOSPADM

## 2018-10-24 RX ORDER — OLANZAPINE 10 MG/1
10 TABLET ORAL
Status: ACTIVE | OUTPATIENT
Start: 2018-10-24 | End: 2018-10-24

## 2018-10-24 RX ORDER — PAROXETINE HYDROCHLORIDE 20 MG/1
20 TABLET, FILM COATED ORAL DAILY
Status: DISCONTINUED | OUTPATIENT
Start: 2018-10-24 | End: 2018-10-29 | Stop reason: HOSPADM

## 2018-10-24 RX ORDER — MULTIVITAMIN WITH FOLIC ACID 400 MCG
1 TABLET ORAL DAILY
Status: DISCONTINUED | OUTPATIENT
Start: 2018-10-24 | End: 2018-10-29 | Stop reason: HOSPADM

## 2018-10-24 RX ORDER — HYDROXYZINE PAMOATE 50 MG/1
50 CAPSULE ORAL EVERY 6 HOURS PRN
Status: DISCONTINUED | OUTPATIENT
Start: 2018-10-24 | End: 2018-10-29 | Stop reason: HOSPADM

## 2018-10-24 RX ORDER — LORAZEPAM 2 MG/ML
3 INJECTION INTRAMUSCULAR
Status: DISCONTINUED | OUTPATIENT
Start: 2018-10-24 | End: 2018-10-29 | Stop reason: HOSPADM

## 2018-10-24 RX ORDER — MAGNESIUM HYDROXIDE/ALUMINUM HYDROXICE/SIMETHICONE 120; 1200; 1200 MG/30ML; MG/30ML; MG/30ML
30 SUSPENSION ORAL PRN
Status: DISCONTINUED | OUTPATIENT
Start: 2018-10-24 | End: 2018-10-29 | Stop reason: HOSPADM

## 2018-10-24 RX ORDER — LORAZEPAM 1 MG/1
3 TABLET ORAL
Status: DISCONTINUED | OUTPATIENT
Start: 2018-10-24 | End: 2018-10-29 | Stop reason: HOSPADM

## 2018-10-24 RX ORDER — LORAZEPAM 2 MG/ML
1 INJECTION INTRAMUSCULAR
Status: DISCONTINUED | OUTPATIENT
Start: 2018-10-24 | End: 2018-10-29 | Stop reason: HOSPADM

## 2018-10-24 RX ORDER — NICOTINE 21 MG/24HR
1 PATCH, TRANSDERMAL 24 HOURS TRANSDERMAL DAILY
Status: DISCONTINUED | OUTPATIENT
Start: 2018-10-24 | End: 2018-10-29 | Stop reason: HOSPADM

## 2018-10-24 RX ORDER — ACETAMINOPHEN 325 MG/1
650 TABLET ORAL EVERY 4 HOURS PRN
Status: DISCONTINUED | OUTPATIENT
Start: 2018-10-24 | End: 2018-10-29 | Stop reason: HOSPADM

## 2018-10-24 RX ORDER — DIVALPROEX SODIUM 500 MG/1
500 TABLET, EXTENDED RELEASE ORAL DAILY
Status: DISCONTINUED | OUTPATIENT
Start: 2018-10-24 | End: 2018-10-25

## 2018-10-24 RX ORDER — TRAZODONE HYDROCHLORIDE 50 MG/1
50 TABLET ORAL NIGHTLY PRN
Status: DISCONTINUED | OUTPATIENT
Start: 2018-10-24 | End: 2018-10-28

## 2018-10-24 RX ORDER — HALOPERIDOL 5 MG/ML
10 INJECTION INTRAMUSCULAR EVERY 6 HOURS PRN
Status: DISCONTINUED | OUTPATIENT
Start: 2018-10-24 | End: 2018-10-29 | Stop reason: HOSPADM

## 2018-10-24 RX ORDER — FOLIC ACID 1 MG/1
1 TABLET ORAL DAILY
Status: DISCONTINUED | OUTPATIENT
Start: 2018-10-24 | End: 2018-10-29 | Stop reason: HOSPADM

## 2018-10-24 RX ORDER — DIVALPROEX SODIUM 500 MG/1
TABLET, EXTENDED RELEASE ORAL
Refills: 0 | Status: ON HOLD | COMMUNITY
Start: 2018-10-16 | End: 2018-10-29 | Stop reason: HOSPADM

## 2018-10-24 RX ORDER — LORAZEPAM 1 MG/1
4 TABLET ORAL
Status: DISCONTINUED | OUTPATIENT
Start: 2018-10-24 | End: 2018-10-29 | Stop reason: HOSPADM

## 2018-10-24 RX ORDER — LORAZEPAM 1 MG/1
1 TABLET ORAL
Status: DISCONTINUED | OUTPATIENT
Start: 2018-10-24 | End: 2018-10-29 | Stop reason: HOSPADM

## 2018-10-24 RX ORDER — KETOROLAC TROMETHAMINE 30 MG/ML
30 INJECTION, SOLUTION INTRAMUSCULAR; INTRAVENOUS ONCE
Status: ACTIVE | OUTPATIENT
Start: 2018-10-24 | End: 2018-10-29

## 2018-10-24 RX ORDER — PHENOBARBITAL 32.4 MG/1
97.2 TABLET ORAL ONCE
Status: COMPLETED | OUTPATIENT
Start: 2018-10-24 | End: 2018-10-24

## 2018-10-24 RX ORDER — BENZTROPINE MESYLATE 1 MG/ML
2 INJECTION INTRAMUSCULAR; INTRAVENOUS 2 TIMES DAILY PRN
Status: DISCONTINUED | OUTPATIENT
Start: 2018-10-24 | End: 2018-10-29 | Stop reason: HOSPADM

## 2018-10-24 RX ORDER — LORAZEPAM 2 MG/ML
2 INJECTION INTRAMUSCULAR
Status: DISCONTINUED | OUTPATIENT
Start: 2018-10-24 | End: 2018-10-29 | Stop reason: HOSPADM

## 2018-10-24 RX ORDER — THIAMINE MONONITRATE (VIT B1) 100 MG
100 TABLET ORAL DAILY
Status: DISCONTINUED | OUTPATIENT
Start: 2018-10-24 | End: 2018-10-29 | Stop reason: HOSPADM

## 2018-10-24 RX ORDER — LORAZEPAM 2 MG/ML
4 INJECTION INTRAMUSCULAR
Status: DISCONTINUED | OUTPATIENT
Start: 2018-10-24 | End: 2018-10-29 | Stop reason: HOSPADM

## 2018-10-24 RX ADMIN — Medication 100 MG: at 16:56

## 2018-10-24 RX ADMIN — PHENOBARBITAL 97.2 MG: 32.4 TABLET ORAL at 12:53

## 2018-10-24 RX ADMIN — FOLIC ACID 1 MG: 1 TABLET ORAL at 16:56

## 2018-10-24 RX ADMIN — ACETAMINOPHEN 650 MG: 325 TABLET, FILM COATED ORAL at 20:36

## 2018-10-24 RX ADMIN — DIVALPROEX SODIUM 500 MG: 500 TABLET, EXTENDED RELEASE ORAL at 16:56

## 2018-10-24 RX ADMIN — PAROXETINE HYDROCHLORIDE 20 MG: 20 TABLET, FILM COATED ORAL at 16:56

## 2018-10-24 RX ADMIN — MULTIVITAMIN TABLET 1 TABLET: TABLET at 16:56

## 2018-10-24 RX ADMIN — TRAZODONE HYDROCHLORIDE 50 MG: 50 TABLET ORAL at 20:36

## 2018-10-24 ASSESSMENT — SLEEP AND FATIGUE QUESTIONNAIRES
DIFFICULTY FALLING ASLEEP: YES
DO YOU HAVE DIFFICULTY SLEEPING: YES
DO YOU USE A SLEEP AID: YES
AVERAGE NUMBER OF SLEEP HOURS: 4
DIFFICULTY STAYING ASLEEP: YES
SLEEP PATTERN: DIFFICULTY FALLING ASLEEP;RESTLESSNESS;NIGHTMARES/TERRORS;EARLY AWAKENING
DIFFICULTY ARISING: YES
RESTFUL SLEEP: NO

## 2018-10-24 ASSESSMENT — ENCOUNTER SYMPTOMS
SHORTNESS OF BREATH: 0
COUGH: 0
SINUS PRESSURE: 0
TROUBLE SWALLOWING: 0
NAUSEA: 0
VOMITING: 0
CONSTIPATION: 0
ABDOMINAL PAIN: 0
BACK PAIN: 0
BLOOD IN STOOL: 0
SORE THROAT: 0
RHINORRHEA: 0
WHEEZING: 0
EYE REDNESS: 0
PHOTOPHOBIA: 0
CHEST TIGHTNESS: 1
ABDOMINAL DISTENTION: 0
DIARRHEA: 0
EYE PAIN: 0

## 2018-10-24 ASSESSMENT — PAIN DESCRIPTION - PAIN TYPE: TYPE: ACUTE PAIN

## 2018-10-24 ASSESSMENT — PAIN SCALES - GENERAL
PAINLEVEL_OUTOF10: 9
PAINLEVEL_OUTOF10: 8

## 2018-10-24 ASSESSMENT — PATIENT HEALTH QUESTIONNAIRE - PHQ9: SUM OF ALL RESPONSES TO PHQ QUESTIONS 1-9: 24

## 2018-10-24 ASSESSMENT — LIFESTYLE VARIABLES: HISTORY_ALCOHOL_USE: YES

## 2018-10-24 ASSESSMENT — PAIN DESCRIPTION - LOCATION: LOCATION: ABDOMEN;JAW

## 2018-10-24 NOTE — ED NOTES
Attempted to call dr Heather Jack for admission informed this RN to call back in 10 minutes     Gita Piña RN  10/24/18 [address]  10/24/18 [id number]

## 2018-10-24 NOTE — ED NOTES
Pt and her belongings transferred to Section G. Report given to ANE.      Maxime Dukes RN  10/24/18 0241

## 2018-10-25 PROCEDURE — 1240000000 HC EMOTIONAL WELLNESS R&B

## 2018-10-25 PROCEDURE — 99222 1ST HOSP IP/OBS MODERATE 55: CPT | Performed by: PSYCHIATRY & NEUROLOGY

## 2018-10-25 PROCEDURE — 6370000000 HC RX 637 (ALT 250 FOR IP): Performed by: NURSE PRACTITIONER

## 2018-10-25 RX ORDER — DISULFIRAM 250 MG/1
250 TABLET ORAL DAILY
Status: DISCONTINUED | OUTPATIENT
Start: 2018-10-25 | End: 2018-10-29 | Stop reason: HOSPADM

## 2018-10-25 RX ORDER — DIVALPROEX SODIUM 500 MG/1
1000 TABLET, EXTENDED RELEASE ORAL DAILY
Status: DISCONTINUED | OUTPATIENT
Start: 2018-10-26 | End: 2018-10-29 | Stop reason: HOSPADM

## 2018-10-25 RX ADMIN — HYDROXYZINE PAMOATE 50 MG: 50 CAPSULE ORAL at 20:19

## 2018-10-25 RX ADMIN — LORAZEPAM 2 MG: 1 TABLET ORAL at 09:00

## 2018-10-25 RX ADMIN — MULTIVITAMIN TABLET 1 TABLET: TABLET at 09:01

## 2018-10-25 RX ADMIN — FOLIC ACID 1 MG: 1 TABLET ORAL at 09:00

## 2018-10-25 RX ADMIN — DIVALPROEX SODIUM 500 MG: 500 TABLET, EXTENDED RELEASE ORAL at 09:01

## 2018-10-25 RX ADMIN — PAROXETINE HYDROCHLORIDE 20 MG: 20 TABLET, FILM COATED ORAL at 09:01

## 2018-10-25 RX ADMIN — TRAZODONE HYDROCHLORIDE 50 MG: 50 TABLET ORAL at 20:18

## 2018-10-25 RX ADMIN — Medication 100 MG: at 09:01

## 2018-10-25 ASSESSMENT — SLEEP AND FATIGUE QUESTIONNAIRES
SLEEP PATTERN: DIFFICULTY FALLING ASLEEP
RESTFUL SLEEP: NO
AVERAGE NUMBER OF SLEEP HOURS: 3
DIFFICULTY STAYING ASLEEP: YES
DIFFICULTY FALLING ASLEEP: YES
DO YOU HAVE DIFFICULTY SLEEPING: YES
DO YOU USE A SLEEP AID: YES
DIFFICULTY ARISING: YES

## 2018-10-25 ASSESSMENT — PATIENT HEALTH QUESTIONNAIRE - PHQ9: SUM OF ALL RESPONSES TO PHQ QUESTIONS 1-9: 8

## 2018-10-25 ASSESSMENT — LIFESTYLE VARIABLES: HISTORY_ALCOHOL_USE: YES

## 2018-10-25 NOTE — H&P
[] Never taken medications        PAST MEDICAL HISTORY:   Past Medical History             Diagnosis Date    Anemia      ETOH abuse              FAMILY PSYCHIATRIC HISTORY:  Family History   History reviewed. No pertinent family history.            [] Denies       [x] Endorses               [x] Father                [] Depression  [] Anxiety  [] Bipolar  [] Psychosis  [x]  Alcohol abuse                  [x] Mother               [] Depression  [] Anxiety  [] Bipolar  [] Psychosis  [x]  Alcohol abuse                   [] Sibling               [] Depression  [] Anxiety  [] Bipolar  [] Psychosis  []  Other                  [] Grandparent               [] Depression  [] Anxiety  [] Bipolar  [] Psychosis  []  Other        SOCIAL HISTORY:      1. Living Situation:[x] Private Residence [] Homeless [] [address]                                   [] Assisted Living [] [address]  [] Shelter [] Other   2. Employment:  [x] Unemployed  [] Employed  [] Disabled  [] Retired   1. Legal History: [] No Arrest [x] Arrest  [] Theft  []  Assault  [x] Substances   4. History of Trama/ Abuse: [x] Denies  [] Emotional [] Physical [] Sexual   5. Spirituality: [x] Spiritual [] Not Spiritual   6. Substance Abuse: [] Denies  [x] Drug of choice                                       [] Amphetamines [] Marijuana [] Cocaine                                       [] Opioids  [x] Alcohol  [] Benzodiazepines      For further SH review SW note.     Risk Assessment:  1. Risk Factors:   [x] Depression  [x] Anxiety  [] Psychosis   [x] Suicidal/Homicidal Thoughts [] Suicide Attempt [x] Substance Abuse      2. Protective Factors: [x] Controlled Environment                                          [] Supportive Family []                                          [] Catholic Support      3.  Level of Risk: [] Mild [] Moderate [x] Severe       Strengths & Weaknesses:     1. Strengths: [x] Ability to communicate feelings [] Vision unchanged []  recent vision problems[] blurred vision   Respiratory Symptoms:[] shortness of breath [] cough  Cardiovascular Symptoms:  [] chest pain   [] palpitations   Gastrointestinal Symptoms: []  abdominal pain []  nausea []  vomiting []  diarrhea  Genitourinary Symptoms: []  dysuria  []  hematuria   Musculoskeletal Symptoms: []  back pain []  muscle pain []  joint pain  Neurologic Symptoms: []  headache []  dizziness  Hematolymphoid Symptoms: [] Adenopathy [] Bruises   [] Schimosis       VITALS: /73   Pulse 69   Temp 98.1 °F (36.7 °C) (Oral)   Resp 14   Ht 5' 3\" (1.6 m)   Wt 100 lb (45.4 kg)   SpO2 98%   BMI 17.71 kg/m²     ALLERGIES: Patient has no known allergies.             Physical Examination:    Head:  [x] Atraumatic:  [x] normocephalic  Skin and Mucosa       [] Moist [] Dry [] Pale [x] Normal   Neck: [x] Thyroid [] Palpable    [x] Not palpable []  venus distention [] adenopathy   Chest: [x] Clear [] Rhonchi  [] Wheezing   CV: [x] S1 [x] S2 [x] No murmer   Abdomen:  [x] Soft   [] Tender  [] Viceromegaly   Extremities:  [x] No Edema   [] Edema    Cranial Nerves Examination:    CN II: [x] Pupils are reactive to light [] Pupils are non reactive to light  CN III, IV, VI:[x] No eye deviation  [x] No diplopia or ptosis   CN V: [x] Facial Sensation is intact  [] Facial Sensation is not intact   CN IIIV:  [x] Hearing is normal to rubbing fingers   CN IX, X:  [x] Normal gag reflex and phonation   CN XI: [x] Shoulder shrug and neck rotation is normal  CNXII: [x] Tongue is midline no deviation or atrophy       For further PE refer to ED note    MENTAL STATUS EXAM:         Mental Status Examination:     Cognition:       [x] Alert  [x] Awake  [x] Oriented  [x] Person  [x] Place [x] Time       [] drowsy  [] tired  [] lethargic  [] distractable  []      Attention/Concentration:   [x] Attentive  [] Distracted         Memory Recent and Remote: [x] Intact   [] Impaired [] Partially Impaired malnutrition, severe ([address]    Bipolar affective disorder, manic ([address]    Depression with suicidal ideation    Bipolar disorder with severe depression ([address]     Recommendations and plan of treatment:  1- admit to inpatient unit  2- Unit milleiu   3- Medication Management I discussed risk benefits and side effects of medications. Patient is aware and willing to comply with treatment. 4- Group therapy and one on one. 5- Routine precautions    Met with patient and discussed the risks and benefits associated with treatment and the patient expressed understanding. We also discussed alternatives such as Disulferam.      Signed:  Tano Medina  10/25/2018  9:23 AM    I saw and examined the patient and I agree with the above documentation.

## 2018-10-26 LAB
EKG ATRIAL RATE: 108 BPM
EKG ATRIAL RATE: 178 BPM
EKG P AXIS: 73 DEGREES
EKG P-R INTERVAL: 134 MS
EKG Q-T INTERVAL: 258 MS
EKG Q-T INTERVAL: 342 MS
EKG QRS DURATION: 68 MS
EKG QRS DURATION: 74 MS
EKG QTC CALCULATION (BAZETT): 444 MS
EKG QTC CALCULATION (BAZETT): 458 MS
EKG R AXIS: 171 DEGREES
EKG R AXIS: 85 DEGREES
EKG T AXIS: 64 DEGREES
EKG T AXIS: 70 DEGREES
EKG VENTRICULAR RATE: 108 BPM
EKG VENTRICULAR RATE: 178 BPM

## 2018-10-26 PROCEDURE — 6370000000 HC RX 637 (ALT 250 FOR IP): Performed by: NURSE PRACTITIONER

## 2018-10-26 PROCEDURE — 6370000000 HC RX 637 (ALT 250 FOR IP): Performed by: PSYCHIATRY & NEUROLOGY

## 2018-10-26 PROCEDURE — 1240000000 HC EMOTIONAL WELLNESS R&B

## 2018-10-26 PROCEDURE — 99232 SBSQ HOSP IP/OBS MODERATE 35: CPT | Performed by: PSYCHIATRY & NEUROLOGY

## 2018-10-26 RX ORDER — QUETIAPINE FUMARATE 25 MG/1
50 TABLET, FILM COATED ORAL 2 TIMES DAILY
Status: DISCONTINUED | OUTPATIENT
Start: 2018-10-26 | End: 2018-10-26

## 2018-10-26 RX ORDER — NALTREXONE HYDROCHLORIDE 50 MG/1
50 TABLET, FILM COATED ORAL DAILY
Status: DISCONTINUED | OUTPATIENT
Start: 2018-10-26 | End: 2018-10-29 | Stop reason: HOSPADM

## 2018-10-26 RX ORDER — QUETIAPINE FUMARATE 25 MG/1
50 TABLET, FILM COATED ORAL NIGHTLY
Status: DISCONTINUED | OUTPATIENT
Start: 2018-10-26 | End: 2018-10-28

## 2018-10-26 RX ADMIN — DIVALPROEX SODIUM 1000 MG: 500 TABLET, EXTENDED RELEASE ORAL at 08:33

## 2018-10-26 RX ADMIN — DISULFIRAM 250 MG: 250 TABLET ORAL at 13:04

## 2018-10-26 RX ADMIN — HYDROXYZINE PAMOATE 50 MG: 50 CAPSULE ORAL at 08:34

## 2018-10-26 RX ADMIN — QUETIAPINE FUMARATE 50 MG: 25 TABLET ORAL at 21:11

## 2018-10-26 RX ADMIN — Medication 100 MG: at 08:33

## 2018-10-26 RX ADMIN — PAROXETINE HYDROCHLORIDE 20 MG: 20 TABLET, FILM COATED ORAL at 08:33

## 2018-10-26 RX ADMIN — FOLIC ACID 1 MG: 1 TABLET ORAL at 08:33

## 2018-10-26 RX ADMIN — HYDROXYZINE PAMOATE 50 MG: 50 CAPSULE ORAL at 21:11

## 2018-10-26 RX ADMIN — TRAZODONE HYDROCHLORIDE 50 MG: 50 TABLET ORAL at 21:11

## 2018-10-26 RX ADMIN — MULTIVITAMIN TABLET 1 TABLET: TABLET at 08:33

## 2018-10-26 RX ADMIN — NALTREXONE HYDROCHLORIDE 50 MG: 50 TABLET, FILM COATED ORAL at 16:16

## 2018-10-26 ASSESSMENT — PAIN SCALES - GENERAL: PAINLEVEL_OUTOF10: 0

## 2018-10-26 NOTE — PLAN OF CARE
Problem: Altered Mood, Depressive Behavior:  Goal: Able to verbalize and/or display a decrease in depressive symptoms  Able to verbalize and/or display a decrease in depressive symptoms   Outcome: Met This Shift    Goal: Ability to disclose and discuss suicidal ideas will improve  Ability to disclose and discuss suicidal ideas will improve   Outcome: Met This Shift

## 2018-10-27 PROCEDURE — 1240000000 HC EMOTIONAL WELLNESS R&B

## 2018-10-27 PROCEDURE — 6370000000 HC RX 637 (ALT 250 FOR IP): Performed by: PSYCHIATRY & NEUROLOGY

## 2018-10-27 PROCEDURE — 99231 SBSQ HOSP IP/OBS SF/LOW 25: CPT | Performed by: PSYCHIATRY & NEUROLOGY

## 2018-10-27 PROCEDURE — 6370000000 HC RX 637 (ALT 250 FOR IP): Performed by: NURSE PRACTITIONER

## 2018-10-27 RX ADMIN — QUETIAPINE FUMARATE 50 MG: 25 TABLET ORAL at 20:37

## 2018-10-27 RX ADMIN — NALTREXONE HYDROCHLORIDE 50 MG: 50 TABLET, FILM COATED ORAL at 08:51

## 2018-10-27 RX ADMIN — HYDROXYZINE PAMOATE 50 MG: 50 CAPSULE ORAL at 20:37

## 2018-10-27 RX ADMIN — Medication 100 MG: at 08:52

## 2018-10-27 RX ADMIN — MULTIVITAMIN TABLET 1 TABLET: TABLET at 08:52

## 2018-10-27 RX ADMIN — DIVALPROEX SODIUM 1000 MG: 500 TABLET, EXTENDED RELEASE ORAL at 08:52

## 2018-10-27 RX ADMIN — PAROXETINE HYDROCHLORIDE 20 MG: 20 TABLET, FILM COATED ORAL at 08:52

## 2018-10-27 RX ADMIN — DISULFIRAM 250 MG: 250 TABLET ORAL at 08:52

## 2018-10-27 RX ADMIN — FOLIC ACID 1 MG: 1 TABLET ORAL at 08:52

## 2018-10-27 RX ADMIN — TRAZODONE HYDROCHLORIDE 50 MG: 50 TABLET ORAL at 20:38

## 2018-10-27 ASSESSMENT — PAIN - FUNCTIONAL ASSESSMENT: PAIN_FUNCTIONAL_ASSESSMENT: 0-10

## 2018-10-28 PROCEDURE — 6370000000 HC RX 637 (ALT 250 FOR IP): Performed by: PSYCHIATRY & NEUROLOGY

## 2018-10-28 PROCEDURE — 99231 SBSQ HOSP IP/OBS SF/LOW 25: CPT | Performed by: PSYCHIATRY & NEUROLOGY

## 2018-10-28 PROCEDURE — 6370000000 HC RX 637 (ALT 250 FOR IP): Performed by: NURSE PRACTITIONER

## 2018-10-28 PROCEDURE — 1240000000 HC EMOTIONAL WELLNESS R&B

## 2018-10-28 RX ORDER — QUETIAPINE FUMARATE 100 MG/1
100 TABLET, FILM COATED ORAL NIGHTLY
Status: DISCONTINUED | OUTPATIENT
Start: 2018-10-28 | End: 2018-10-29 | Stop reason: HOSPADM

## 2018-10-28 RX ADMIN — QUETIAPINE FUMARATE 100 MG: 100 TABLET ORAL at 21:20

## 2018-10-28 RX ADMIN — MULTIVITAMIN TABLET 1 TABLET: TABLET at 09:05

## 2018-10-28 RX ADMIN — PAROXETINE HYDROCHLORIDE 20 MG: 20 TABLET, FILM COATED ORAL at 09:05

## 2018-10-28 RX ADMIN — NALTREXONE HYDROCHLORIDE 50 MG: 50 TABLET, FILM COATED ORAL at 09:04

## 2018-10-28 RX ADMIN — Medication 100 MG: at 09:05

## 2018-10-28 RX ADMIN — FOLIC ACID 1 MG: 1 TABLET ORAL at 09:05

## 2018-10-28 RX ADMIN — DIVALPROEX SODIUM 1000 MG: 500 TABLET, EXTENDED RELEASE ORAL at 09:05

## 2018-10-28 RX ADMIN — HYDROXYZINE PAMOATE 50 MG: 50 CAPSULE ORAL at 18:34

## 2018-10-28 RX ADMIN — DISULFIRAM 250 MG: 250 TABLET ORAL at 09:05

## 2018-10-28 ASSESSMENT — PAIN SCALES - GENERAL
PAINLEVEL_OUTOF10: 2
PAINLEVEL_OUTOF10: 0

## 2018-10-29 VITALS
BODY MASS INDEX: 17.72 KG/M2 | OXYGEN SATURATION: 99 % | HEIGHT: 63 IN | TEMPERATURE: 98 F | RESPIRATION RATE: 14 BRPM | WEIGHT: 100 LBS | SYSTOLIC BLOOD PRESSURE: 95 MMHG | DIASTOLIC BLOOD PRESSURE: 51 MMHG | HEART RATE: 110 BPM

## 2018-10-29 PROCEDURE — 6370000000 HC RX 637 (ALT 250 FOR IP): Performed by: NURSE PRACTITIONER

## 2018-10-29 PROCEDURE — 99238 HOSP IP/OBS DSCHRG MGMT 30/<: CPT | Performed by: PSYCHIATRY & NEUROLOGY

## 2018-10-29 PROCEDURE — 6370000000 HC RX 637 (ALT 250 FOR IP): Performed by: PSYCHIATRY & NEUROLOGY

## 2018-10-29 RX ORDER — QUETIAPINE FUMARATE 100 MG/1
100 TABLET, FILM COATED ORAL NIGHTLY
Qty: 60 TABLET | Refills: 0 | Status: SHIPPED | OUTPATIENT
Start: 2018-10-29 | End: 2019-09-11

## 2018-10-29 RX ORDER — NICOTINE 21 MG/24HR
1 PATCH, TRANSDERMAL 24 HOURS TRANSDERMAL DAILY
Qty: 30 PATCH | Refills: 0 | Status: SHIPPED | OUTPATIENT
Start: 2018-10-29 | End: 2019-09-11

## 2018-10-29 RX ORDER — PAROXETINE HYDROCHLORIDE 20 MG/1
20 TABLET, FILM COATED ORAL DAILY
Qty: 30 TABLET | Refills: 0 | Status: SHIPPED | OUTPATIENT
Start: 2018-10-29 | End: 2019-09-11

## 2018-10-29 RX ORDER — NALTREXONE HYDROCHLORIDE 50 MG/1
50 TABLET, FILM COATED ORAL DAILY
Qty: 30 TABLET | Refills: 0 | Status: SHIPPED | OUTPATIENT
Start: 2018-10-29 | End: 2019-09-11

## 2018-10-29 RX ORDER — DISULFIRAM 250 MG/1
250 TABLET ORAL DAILY
Qty: 30 TABLET | Refills: 0 | Status: SHIPPED | OUTPATIENT
Start: 2018-10-29 | End: 2019-09-11

## 2018-10-29 RX ORDER — DIVALPROEX SODIUM 500 MG/1
1000 TABLET, EXTENDED RELEASE ORAL DAILY
Qty: 30 TABLET | Refills: 0 | Status: SHIPPED | OUTPATIENT
Start: 2018-10-29 | End: 2019-09-11

## 2018-10-29 RX ADMIN — Medication 100 MG: at 10:41

## 2018-10-29 RX ADMIN — DISULFIRAM 250 MG: 250 TABLET ORAL at 10:39

## 2018-10-29 RX ADMIN — DIVALPROEX SODIUM 1000 MG: 500 TABLET, EXTENDED RELEASE ORAL at 10:41

## 2018-10-29 RX ADMIN — MULTIVITAMIN TABLET 1 TABLET: TABLET at 10:40

## 2018-10-29 RX ADMIN — PAROXETINE HYDROCHLORIDE 20 MG: 20 TABLET, FILM COATED ORAL at 10:41

## 2018-10-29 RX ADMIN — HYDROXYZINE PAMOATE 50 MG: 50 CAPSULE ORAL at 00:27

## 2018-10-29 RX ADMIN — NALTREXONE HYDROCHLORIDE 50 MG: 50 TABLET, FILM COATED ORAL at 10:40

## 2018-10-29 RX ADMIN — FOLIC ACID 1 MG: 1 TABLET ORAL at 10:40

## 2018-10-29 NOTE — PROGRESS NOTES
DATE OF SERVICE:     10/26/2018    Rhys Kumar seen today for the purpose of continuation of care. Nursing, social work reports, laboratory studies and vital signs are reviewed. Patient chief complaint today is:             [x] Depression      [x] Anxiety        [] Psychosis         [] Suicidal/Homicidal                         [] Delusions           [] Aggression          Subjective: Today patient states that \"I just want something to help me from craving alcohol. I am so anxious and I don't get ativan all the time. \"  Patient is on CIWA protocol, is anxious and depressed. Patient state SI are improving. Sleep:  [] Good [] Fair  [x] Poor  Appetite:  [] Good [x] Fair  [] Poor    Depression:  [] Mild [] Moderate [x] Severe                [x] Constant [] Sporadic     Anxiety: [] Mild [] Moderate [x] Severe    [x] Constant [] Sporadic     Delusions: [] Mild [] Moderate [] Severe     [] Constant [] Sporadic     [] Paranoid [] Somatic [] Grandiose     Hallucinations: [] Mild [] Moderate [] Severe     [] Constant [] Sporadic    [] Auditory  [] Visual [] Tactile       Suicidal: [] Constant [] Sporadic  Homicidal: [] Constant [] Sporadic    Unscheduled Medications     [] Patient Receiving Emergency Medications \" Chemical Restraint\"   [x] Requesting PRN medications for anxiety    Medical Review of Systems:     All other than marked systmes have been reviewed and are all negative.     Constitutional Symptoms: []  fever []  Chills  Skin Symptoms: [] rash []  Pruritus   Eye Symptoms: [] Vision unchanged []  recent vision problems[] blurred vision   Respiratory Symptoms:[] shortness of breath [] cough  Cardiovascular Symptoms:  [] chest pain   [] palpitations   Gastrointestinal Symptoms: []  abdominal pain []  nausea []  vomiting []  diarrhea  Genitourinary Symptoms: []  dysuria  []  hematuria   Musculoskeletal Symptoms: []  back pain []  muscle pain []  joint pain  Neurologic Symptoms: []  headache []
Group Therapy Note    Date: 10/25/2018  Start Time: 2:30  End Time: 3:15  Number of Participants: 8    Type of Group: Psychoeducation    Wellness Binder Information  Module Name:  ILENE  Session Number:  NA    Patient's Goal: To improve communication skills by learning techniques aimed to improve interpersonal styles including assertiveness. Notes: Pt was an active participant in psycho-educational group focusing on improving interpersonal skills by learning techniques to be assertive v/s aggressive/passive in interpersonal interactions. Pt shated with other group members and provided supportive feedback and support. Status After Intervention:  Improved    Participation Level:  Active Listener    Participation Quality: Appropriate      Speech:  normal      Thought Process/Content: Logical      Affective Functioning: Congruent      Mood: euthymic      Level of consciousness:  Alert, Oriented x4 and Attentive      Response to Learning: Able to verbalize current knowledge/experience, Able to verbalize/acknowledge new learning and Progressing to goal      Endings: None Reported    Modes of Intervention: Education      Discipline Responsible: /Counselor      Signature:  KO Allen, MYAH
In order to ensure appropriate transition and discharge planning is in place, the following documents have been transmitted to [address] [place] [address], as the new outpatient provider:     The d/c diagnosis was transmitted to the next care provider   The reason for hospitalization was transmitted to the next care provider   The d/c medications (dosage and indication) were transmitted to the next care provider    The continuing care plan was transmitted to the next care provider
Patient is out and social at times with other patients. She is no behavioral issues on the unit. She denies any suicidal ideations and is hopeful for her future.
Patient reported that she was feeling better this a.m. No withdrawal symptoms. She is out on the unit and social with others. Pleasant and cooperative. She denies any depression/ no suicidal ideations.
Patient resting in bed with eyes closed. No PRN's needed at this time. No s/sx of distress or discomfort. Observing closely.
`[facility]  Admission Note    Patient reports to the unit due to uncontrolled alcoholism which has contributed to high levels of anxiety and depression. Patient reports she needs to get her meds in check in order to control her anxiety and depression. Patient reports that she cannot continue down this path or she will die. Patient reports to having trouble concentrating and remembering and her memory is no where near what is was. Patient reports to a strong support system with her father. Patient notes that she uses marijuana in order to help with her anxiety. Patient is in good control during admission. Will monitor closely. Admission Type:   Admission Type: Voluntary    Reason for admission:  Reason for Admission:  \"If I dont get ahold of my alcholism, anxiety and depression it will kill me\"    PATIENT STRENGTHS:  Strengths: Positive Support, Social Skills, Connection to output provider, No significant Physical Illness, Motivated, Medication Compliance, Communication    Patient Strengths and Limitations:  Limitations: Tendency to isolate self, Inappropriate/potentially harmful leisure interests    Addictive Behavior:   Addictive Behavior  In the past 3 months, have you felt or has someone told you that you have a problem with:  : Excessive Fluid intake  Do you have a history of Chemical Use?: No  Do you have a history of Alcohol Use?: Yes  Do you have a history of Street Drug Abuse?: Yes (rl [place] Drug Abuse?: No    Medical Problems:   Past Medical History:   Diagnosis Date    Anemia     ETOH abuse        Status EXAM:  Status and Exam  Normal: Yes  Facial Expression: Worried, Sad  Affect: Appropriate  Level of Consciousness: Alert  Mood:Normal: No  Mood: Anxious, Depressed, Sad  Motor Activity:Normal: Yes  Motor Activity: Decreased  Interview Behavior: Cooperative  Preception: [place] to Person, [place] to Time, [place] to Place, [place] to
[] Adenopathy [] Bruises   [] Schimosis       Psychiatric Review of systems  Delusions:  [] Denies [] Endorses   Withdrawals:  [] Denies [] Endorses    Hallucinations: [] Denies [] Endorses    Extra Pyramidal Symptoms: [] Denies [] Endorses      BP 95/65   Pulse 73   Temp 96.9 °F (36.1 °C) (Temporal)   Resp 16   Ht 5' 3\" (1.6 m)   Wt 100 lb (45.4 kg)   SpO2 100%   BMI 17.71 kg/m²     Mental Status Examination:    Cognition:      [x] Alert  [x] Awake  [x] Oriented  [x] Person  [x] Place [x] Time      [] drowsy  [] tired  [] lethargic  [] distractable  [] Other     Attention/Concentration:   [x] Attentive  [] Distracted        Memory Recent and Remote: [x] Intact   [] Impaired [] Partially Impaired     Language: [] Able to recognize and name objects          [] Unable to recognize and name Objects    Fund of Knowledge:  [] Poor []  Fair  [] Good    Speech: [] Normal  [] Soft  [] Slow  [x] Fast [] Pressured            [] Loud [] Dysarthria  [] Incoherent    Appearance: [] Well Groomed  [x] Casual Dressed  [] Unkept  [] Disheveled          [] Normal weight[] Thin  [] Overweight  [] Obese           Attitude: [] Positive  [] Hostile  [x] Demanding  [] Guarded  [] Defensive         [x] Cooperative  []  Uncooperative      Behavior:  [x] Normal Gait  [] Walks with Assistance  [] Meryl Chair    [] Walks with Harp Evangelina  [] In Hospital Bed  [] Sitting in Chair    Muscle-Skeletal:  [x] Normal Muscle Tone [] Muscle Atrophy       [] Abnormal Muscle Movement     Eye Contact:  [x] Good eye contact  [] Intermittent Eye Contact  [] Poor Eye Contact     Mood: [x] Depressed  [x] Anxious  [x] Irritated  [] Euthymic   [] Angry [x] Restless    Affect:  [] Congruent  [] Incongruent  [x] Labile  [] Constricted  [] Flat  [] Bizarre     Thought Process and Association:  [] Logical [] Illogical       [] Linear and Goal Directed  [] Tangential  [x] Circumstantial     Thought Content:  [x] Denies [] Endorses [] Suicidal [] Homicidal  []

## 2018-10-29 NOTE — DISCHARGE SUMMARY
Physician Discharge Summary      Physical Examination   VS/Measurements:     BP (!) 95/51   Pulse 110   Temp 98 °F (36.7 °C) (Oral)   Resp 14   Ht 5' 3\" (1.6 m)   Wt 100 lb (45.4 kg)   SpO2 99%   BMI 17.71 kg/m²         Discharge Medications:                    Medication List      START taking these medications    disulfiram 250 MG tablet  Commonly known as:  ANTABUSE  Take 1 tablet by mouth daily     naltrexone 50 MG tablet  Commonly known as:  DEPADE  Take 1 tablet by mouth daily     QUEtiapine 100 MG tablet  Commonly known as:  SEROQUEL  Take 1 tablet by mouth nightly        CHANGE how you take these medications    divalproex 500 MG extended release tablet  Commonly known as:  DEPAKOTE ER  Take 2 tablets by mouth daily  What changed:  See the new instructions. CONTINUE taking these medications    DEPO-PROVERA IM     folic acid 1 MG tablet  Commonly known as:  FOLVITE  Take 1 tablet by mouth daily.      nicotine 14 MG/24HR  Commonly known as:  [address] [facility] 1 patch onto the skin daily     PARoxetine 20 MG tablet  Commonly known as:  PAXIL  Take 1 tablet by mouth daily     thiamine 100 MG tablet  Take 1 tablet by mouth daily     traZODone 50 MG tablet  Commonly known as:  DESYREL  Take 0.5 tablets by mouth nightly           Where to Get Your Medications      These medications were sent to [address] [place] [address] Drive  [address] [id number]    Phone:  [phone]   · disulfiram 250 MG tablet  · divalproex 500 MG extended release tablet  · naltrexone 50 MG tablet  · nicotine 14 MG/24HR  · PARoxetine 20 MG tablet  · QUEtiapine 100 MG tablet          Psychiatric: Mental Status Examination:    Cognition:      [x] Alert  [x] Awake  [x] Oriented  [x] Person  [x] Place [x] Time    [] drowsy  [] tired  [] lethargic  [] distractable       Attention/Concentration:   [] Attentive  [] Distracted        Memory

## 2018-10-29 NOTE — CARE COORDINATION
SW placed call to [address] [place] [address] to have [facility] established. Sheldahl states she will make note of this in pt's chart.

## 2018-10-30 LAB
AMOBARBITAL URINE QUANT: <50 NG/ML
BUTALBITAL URINE QUANT: <50 NG/ML
PENTOBARBITAL URINE QUANT: <50 NG/ML
PHENOBARBITAL URINE QUANT: 1728 NG/ML
SECOBARBITAL URINE QUANT: <50 NG/ML

## 2019-01-29 ENCOUNTER — APPOINTMENT (OUTPATIENT)
Dept: CT IMAGING | Age: 34
End: 2019-01-29
Payer: MEDICAID

## 2019-01-29 ENCOUNTER — HOSPITAL ENCOUNTER (EMERGENCY)
Age: 34
Discharge: HOME | End: 2019-01-29
Attending: EMERGENCY MEDICINE
Payer: MEDICAID

## 2019-01-29 VITALS
SYSTOLIC BLOOD PRESSURE: 92 MMHG | OXYGEN SATURATION: 97 % | WEIGHT: 101 LBS | DIASTOLIC BLOOD PRESSURE: 61 MMHG | BODY MASS INDEX: 17.89 KG/M2 | HEIGHT: 63 IN | RESPIRATION RATE: 14 BRPM | TEMPERATURE: 98.3 F | HEART RATE: 96 BPM

## 2019-01-29 DIAGNOSIS — S09.90XA INJURY OF HEAD, INITIAL ENCOUNTER: ICD-10-CM

## 2019-01-29 DIAGNOSIS — M50.90 CERVICAL DISC DISORDER: ICD-10-CM

## 2019-01-29 DIAGNOSIS — S01.81XA FACIAL LACERATION, INITIAL ENCOUNTER: Primary | ICD-10-CM

## 2019-01-29 PROCEDURE — 70450 CT HEAD/BRAIN W/O DYE: CPT

## 2019-01-29 PROCEDURE — 70486 CT MAXILLOFACIAL W/O DYE: CPT

## 2019-01-29 PROCEDURE — 12011 RPR F/E/E/N/L/M 2.5 CM/<: CPT

## 2019-01-29 PROCEDURE — 6370000000 HC RX 637 (ALT 250 FOR IP): Performed by: EMERGENCY MEDICINE

## 2019-01-29 PROCEDURE — 72125 CT NECK SPINE W/O DYE: CPT

## 2019-01-29 PROCEDURE — 99283 EMERGENCY DEPT VISIT LOW MDM: CPT

## 2019-01-29 RX ORDER — CHLORZOXAZONE 500 MG/1
500 TABLET ORAL 3 TIMES DAILY PRN
Qty: 30 TABLET | Refills: 0 | Status: SHIPPED | OUTPATIENT
Start: 2019-01-29 | End: 2019-02-08

## 2019-01-29 RX ORDER — OXYCODONE HYDROCHLORIDE AND ACETAMINOPHEN 5; 325 MG/1; MG/1
1 TABLET ORAL ONCE
Status: COMPLETED | OUTPATIENT
Start: 2019-01-29 | End: 2019-01-29

## 2019-01-29 RX ORDER — ACETAMINOPHEN 500 MG
1000 TABLET ORAL ONCE
Status: COMPLETED | OUTPATIENT
Start: 2019-01-29 | End: 2019-01-29

## 2019-01-29 RX ORDER — PREDNISONE 10 MG/1
20 TABLET ORAL DAILY
Qty: 10 TABLET | Refills: 0 | Status: SHIPPED | OUTPATIENT
Start: 2019-01-29 | End: 2019-02-03

## 2019-01-29 RX ORDER — OXYCODONE HYDROCHLORIDE AND ACETAMINOPHEN 5; 325 MG/1; MG/1
1 TABLET ORAL EVERY 6 HOURS PRN
Qty: 6 TABLET | Refills: 0 | Status: SHIPPED | OUTPATIENT
Start: 2019-01-29 | End: 2019-01-31

## 2019-01-29 RX ADMIN — ACETAMINOPHEN 1000 MG: 500 TABLET ORAL at 14:04

## 2019-01-29 RX ADMIN — OXYCODONE AND ACETAMINOPHEN 1 TABLET: 5; 325 TABLET ORAL at 15:40

## 2019-01-29 ASSESSMENT — PAIN DESCRIPTION - LOCATION
LOCATION: HEAD
LOCATION: HEAD

## 2019-01-29 ASSESSMENT — PAIN SCALES - GENERAL
PAINLEVEL_OUTOF10: 5
PAINLEVEL_OUTOF10: 10

## 2019-01-29 ASSESSMENT — PAIN DESCRIPTION - ORIENTATION: ORIENTATION: RIGHT;ANTERIOR

## 2019-01-29 ASSESSMENT — PAIN DESCRIPTION - PROGRESSION: CLINICAL_PROGRESSION: GRADUALLY IMPROVING

## 2019-01-29 ASSESSMENT — PAIN DESCRIPTION - FREQUENCY: FREQUENCY: CONTINUOUS

## 2019-01-29 ASSESSMENT — PAIN DESCRIPTION - DESCRIPTORS: DESCRIPTORS: SHARP

## 2019-01-29 ASSESSMENT — PAIN DESCRIPTION - PAIN TYPE
TYPE: ACUTE PAIN
TYPE: ACUTE PAIN

## 2019-06-24 ENCOUNTER — OFFICE VISIT (OUTPATIENT)
Dept: FAMILY MEDICINE CLINIC | Age: 34
End: 2019-06-24
Payer: MEDICAID

## 2019-06-24 VITALS
BODY MASS INDEX: 15.59 KG/M2 | RESPIRATION RATE: 16 BRPM | TEMPERATURE: 98 F | HEART RATE: 105 BPM | DIASTOLIC BLOOD PRESSURE: 70 MMHG | WEIGHT: 88 LBS | OXYGEN SATURATION: 99 % | SYSTOLIC BLOOD PRESSURE: 114 MMHG | HEIGHT: 63 IN

## 2019-06-24 DIAGNOSIS — R11.0 NAUSEA: ICD-10-CM

## 2019-06-24 DIAGNOSIS — F32.A DEPRESSION, UNSPECIFIED DEPRESSION TYPE: Primary | ICD-10-CM

## 2019-06-24 DIAGNOSIS — F41.9 ANXIETY: ICD-10-CM

## 2019-06-24 PROCEDURE — 99214 OFFICE O/P EST MOD 30 MIN: CPT | Performed by: PHYSICIAN ASSISTANT

## 2019-06-24 RX ORDER — ONDANSETRON 4 MG/1
4 TABLET, ORALLY DISINTEGRATING ORAL 3 TIMES DAILY PRN
Qty: 21 TABLET | Refills: 0 | Status: SHIPPED | OUTPATIENT
Start: 2019-06-24 | End: 2019-09-11

## 2019-06-24 RX ORDER — HYDROXYZINE PAMOATE 25 MG/1
25 CAPSULE ORAL 3 TIMES DAILY PRN
Qty: 15 CAPSULE | Refills: 0 | Status: SHIPPED | OUTPATIENT
Start: 2019-06-24 | End: 2019-09-11

## 2019-06-25 ENCOUNTER — TELEPHONE (OUTPATIENT)
Dept: FAMILY MEDICINE CLINIC | Age: 34
End: 2019-06-25

## 2019-08-06 ENCOUNTER — APPOINTMENT (OUTPATIENT)
Dept: GENERAL RADIOLOGY | Age: 34
End: 2019-08-06
Payer: MEDICAID

## 2019-08-06 ENCOUNTER — HOSPITAL ENCOUNTER (EMERGENCY)
Age: 34
Discharge: HOME | End: 2019-08-07
Attending: EMERGENCY MEDICINE
Payer: MEDICAID

## 2019-08-06 VITALS
OXYGEN SATURATION: 98 % | SYSTOLIC BLOOD PRESSURE: 116 MMHG | TEMPERATURE: 98.4 F | HEART RATE: 117 BPM | DIASTOLIC BLOOD PRESSURE: 81 MMHG | RESPIRATION RATE: 18 BRPM

## 2019-08-06 DIAGNOSIS — F10.220 ALCOHOL DEPENDENCE WITH UNCOMPLICATED INTOXICATION (HCC): Primary | ICD-10-CM
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
## 2019-08-06 LAB
ALBUMIN SERPL-MCNC: 4.4 G/DL (ref 3.5–5.2)
ALP BLD-CCNC: 119 U/L (ref 35–104)
ALT SERPL-CCNC: 58 U/L (ref 0–32)
ANION GAP SERPL CALCULATED.3IONS-SCNC: 21 MMOL/L (ref 7–16)
AST SERPL-CCNC: 157 U/L (ref 0–31)
BASOPHILS ABSOLUTE: 0.07 E9/L (ref 0–0.2)
BASOPHILS RELATIVE PERCENT: 0.9 % (ref 0–2)
BILIRUB SERPL-MCNC: 0.5 MG/DL (ref 0–1.2)
BILIRUBIN URINE: NEGATIVE
BLOOD, URINE: NEGATIVE
BUN BLDV-MCNC: <2 MG/DL (ref 6–20)
CALCIUM SERPL-MCNC: 8.8 MG/DL (ref 8.6–10.2)
CHLORIDE BLD-SCNC: 99 MMOL/L (ref 98–107)
CLARITY: CLEAR
CO2: 18 MMOL/L (ref 22–29)
COLOR: YELLOW
CREAT SERPL-MCNC: 0.5 MG/DL (ref 0.5–1)
EOSINOPHILS ABSOLUTE: 0.03 E9/L (ref 0.05–0.5)
EOSINOPHILS RELATIVE PERCENT: 0.4 % (ref 0–6)
ETHANOL: 407 MG/DL (ref 0–0.08)
GFR AFRICAN AMERICAN: >60
GFR NON-AFRICAN AMERICAN: >60 ML/MIN/1.73
GLUCOSE BLD-MCNC: 90 MG/DL (ref 74–99)
GLUCOSE URINE: NEGATIVE MG/DL
HCG(URINE) PREGNANCY TEST: NEGATIVE
HCT VFR BLD CALC: 40.6 % (ref 34–48)
HEMOGLOBIN: 14.4 G/DL (ref 11.5–15.5)
IMMATURE GRANULOCYTES #: 0.03 E9/L
IMMATURE GRANULOCYTES %: 0.4 % (ref 0–5)
KETONES, URINE: NEGATIVE MG/DL
LACTIC ACID: 2.8 MMOL/L (ref 0.5–2.2)
LEUKOCYTE ESTERASE, URINE: NEGATIVE
LIPASE: 142 U/L (ref 13–60)
LYMPHOCYTES ABSOLUTE: 3.06 E9/L (ref 1.5–4)
LYMPHOCYTES RELATIVE PERCENT: 40.7 % (ref 20–42)
MAGNESIUM: 2.1 MG/DL (ref 1.6–2.6)
MCH RBC QN AUTO: 37.1 PG (ref 26–35)
MCHC RBC AUTO-ENTMCNC: 35.5 % (ref 32–34.5)
MCV RBC AUTO: 104.6 FL (ref 80–99.9)
MONOCYTES ABSOLUTE: 0.81 E9/L (ref 0.1–0.95)
MONOCYTES RELATIVE PERCENT: 10.8 % (ref 2–12)
NEUTROPHILS ABSOLUTE: 3.52 E9/L (ref 1.8–7.3)
NEUTROPHILS RELATIVE PERCENT: 46.8 % (ref 43–80)
NITRITE, URINE: NEGATIVE
PDW BLD-RTO: 11.5 FL (ref 11.5–15)
PH UA: 5 (ref 5–9)
PLATELET # BLD: 238 E9/L (ref 130–450)
PMV BLD AUTO: 9.9 FL (ref 7–12)
POTASSIUM REFLEX MAGNESIUM: 3.4 MMOL/L (ref 3.5–5)
PROTEIN UA: NEGATIVE MG/DL
RBC # BLD: 3.88 E12/L (ref 3.5–5.5)
SODIUM BLD-SCNC: 138 MMOL/L (ref 132–146)
SPECIFIC GRAVITY UA: <=1.005 (ref 1–1.03)
TOTAL PROTEIN: 7.9 G/DL (ref 6.4–8.3)
TROPONIN: <0.01 NG/ML (ref 0–0.03)
UROBILINOGEN, URINE: 0.2 E.U./DL
WBC # BLD: 7.5 E9/L (ref 4.5–11.5)

## 2019-08-06 PROCEDURE — 71045 X-RAY EXAM CHEST 1 VIEW: CPT

## 2019-08-06 PROCEDURE — 85025 COMPLETE CBC W/AUTO DIFF WBC: CPT

## 2019-08-06 PROCEDURE — 81025 URINE PREGNANCY TEST: CPT

## 2019-08-06 PROCEDURE — 6360000002 HC RX W HCPCS: Performed by: EMERGENCY MEDICINE

## 2019-08-06 PROCEDURE — G0480 DRUG TEST DEF 1-7 CLASSES: HCPCS

## 2019-08-06 PROCEDURE — 83605 ASSAY OF LACTIC ACID: CPT

## 2019-08-06 PROCEDURE — 83690 ASSAY OF LIPASE: CPT

## 2019-08-06 PROCEDURE — 2580000003 HC RX 258: Performed by: EMERGENCY MEDICINE

## 2019-08-06 PROCEDURE — 93005 ELECTROCARDIOGRAM TRACING: CPT | Performed by: EMERGENCY MEDICINE

## 2019-08-06 PROCEDURE — 96375 TX/PRO/DX INJ NEW DRUG ADDON: CPT

## 2019-08-06 PROCEDURE — 80053 COMPREHEN METABOLIC PANEL: CPT

## 2019-08-06 PROCEDURE — 2500000003 HC RX 250 WO HCPCS: Performed by: EMERGENCY MEDICINE

## 2019-08-06 PROCEDURE — 84484 ASSAY OF TROPONIN QUANT: CPT

## 2019-08-06 PROCEDURE — 99285 EMERGENCY DEPT VISIT HI MDM: CPT

## 2019-08-06 PROCEDURE — 96374 THER/PROPH/DIAG INJ IV PUSH: CPT

## 2019-08-06 PROCEDURE — 83735 ASSAY OF MAGNESIUM: CPT

## 2019-08-06 PROCEDURE — 81003 URINALYSIS AUTO W/O SCOPE: CPT

## 2019-08-06 RX ORDER — 0.9 % SODIUM CHLORIDE 0.9 %
1000 INTRAVENOUS SOLUTION INTRAVENOUS ONCE
Status: COMPLETED | OUTPATIENT
Start: 2019-08-06 | End: 2019-08-06

## 2019-08-06 RX ORDER — THIAMINE HYDROCHLORIDE 100 MG/ML
100 INJECTION, SOLUTION INTRAMUSCULAR; INTRAVENOUS ONCE
Status: COMPLETED | OUTPATIENT
Start: 2019-08-06 | End: 2019-08-06

## 2019-08-06 RX ORDER — LORAZEPAM 2 MG/ML
2 INJECTION INTRAMUSCULAR ONCE
Status: COMPLETED | OUTPATIENT
Start: 2019-08-06 | End: 2019-08-06

## 2019-08-06 RX ORDER — FOLIC ACID 5 MG/ML
1 INJECTION, SOLUTION INTRAMUSCULAR; INTRAVENOUS; SUBCUTANEOUS DAILY
Status: DISCONTINUED | OUTPATIENT
Start: 2019-08-06 | End: 2019-08-07 | Stop reason: HOSPADM

## 2019-08-06 RX ADMIN — LORAZEPAM 2 MG: 2 INJECTION INTRAMUSCULAR; INTRAVENOUS at 19:59

## 2019-08-06 RX ADMIN — FOLIC ACID 1 MG: 5 INJECTION, SOLUTION INTRAMUSCULAR; INTRAVENOUS; SUBCUTANEOUS at 20:56

## 2019-08-06 RX ADMIN — SODIUM CHLORIDE 1000 ML: 9 INJECTION, SOLUTION INTRAVENOUS at 19:59

## 2019-08-06 RX ADMIN — THIAMINE HYDROCHLORIDE 100 MG: 100 INJECTION, SOLUTION INTRAMUSCULAR; INTRAVENOUS at 19:59

## 2019-08-06 ASSESSMENT — ENCOUNTER SYMPTOMS
ABDOMINAL DISTENTION: 0
CHEST TIGHTNESS: 0
ABDOMINAL PAIN: 1
RHINORRHEA: 0
PHOTOPHOBIA: 0
COLOR CHANGE: 0
EYE PAIN: 0
EYE REDNESS: 0
NAUSEA: 0
BLOOD IN STOOL: 0
CONSTIPATION: 0
SHORTNESS OF BREATH: 0
DIARRHEA: 0
FACIAL SWELLING: 0
COUGH: 0

## 2019-08-06 ASSESSMENT — PAIN DESCRIPTION - LOCATION: LOCATION: ABDOMEN

## 2019-08-06 ASSESSMENT — PAIN SCALES - GENERAL: PAINLEVEL_OUTOF10: 7

## 2019-08-06 ASSESSMENT — PAIN DESCRIPTION - DESCRIPTORS: DESCRIPTORS: CRAMPING

## 2019-08-06 ASSESSMENT — PAIN DESCRIPTION - PAIN TYPE: TYPE: ACUTE PAIN

## 2019-08-06 ASSESSMENT — PAIN DESCRIPTION - FREQUENCY: FREQUENCY: INTERMITTENT

## 2019-08-07 LAB
EKG ATRIAL RATE: 116 BPM
EKG P AXIS: 81 DEGREES
EKG P-R INTERVAL: 150 MS
EKG Q-T INTERVAL: 326 MS
EKG QRS DURATION: 60 MS
EKG QTC CALCULATION (BAZETT): 453 MS
EKG R AXIS: 93 DEGREES
EKG T AXIS: 84 DEGREES
EKG VENTRICULAR RATE: 116 BPM

## 2019-08-07 NOTE — ED NOTES
Patient upset at discharge because she wanted prescriptions to go home with for anxiety and sleep. She stated that we did not do anything for her here and she was going somewhere else. Doctor explained that we did not detox here, like she wanted.      Tommy De Oliveira RN  08/07/19 [id number]

## 2019-08-07 NOTE — ED PROVIDER NOTES
Patient is a 58-year-old female presenting to the ED with alcohol intoxication patient states that she last drank alcohol and immediate before entering the emergency room. Patient states that she has had weight loss in the last month and has had vague epigastric pain during this duration. Patient states that she drinks alcohol daily at least every 4 hours. Alcohol Intoxication   Similar prior episodes: yes    Severity:  Moderate  Onset quality:  Gradual  Duration:  30 days  Timing:  Intermittent  Progression:  Waxing and waning  Chronicity:  Chronic  Suspected agents:  Alcohol  Associated symptoms: abdominal pain (Diffuse epigastric pain)    Associated symptoms: no headaches, no nausea, no palpitations and no shortness of breath    Risk factors: addiction treatment        Review of Systems   Constitutional: Negative for activity change, chills, diaphoresis, fatigue and fever. HENT: Negative for congestion, facial swelling, hearing loss and rhinorrhea. Eyes: Negative for photophobia, pain and redness. Respiratory: Negative for cough, chest tightness and shortness of breath. Cardiovascular: Negative for chest pain, palpitations and leg swelling. Gastrointestinal: Positive for abdominal pain (Diffuse epigastric pain). Negative for abdominal distention, blood in stool, constipation, diarrhea and nausea. Genitourinary: Negative for difficulty urinating, dysuria, frequency and hematuria. Musculoskeletal: Negative for arthralgias, joint swelling and myalgias. Skin: Negative for color change, pallor and rash. Neurological: Negative for light-headedness, numbness and headaches. Hematological: Negative for adenopathy. Physical Exam   Constitutional: She is oriented to person, place, and time. She appears well-developed and well-nourished. HENT:   Head: Normocephalic and atraumatic.    Right Ear: External ear normal.   Left Ear: External ear normal.   Nose: Nose normal.   Mouth/Throat:

## 2019-08-16 ENCOUNTER — TELEPHONE (OUTPATIENT)
Dept: OTHER | Facility: CLINIC | Age: 34
End: 2019-08-16

## 2019-08-16 NOTE — TELEPHONE ENCOUNTER
Ana Smiley, from [facility] central scheduling, calls Behavioral Health line and states Sascha Mendiola is on the other line and needs information on alcohol addiction and/or mental health. Call soft transferred to Behavioral Access nurse, spoke to Mega Gray .

## 2019-09-11 ENCOUNTER — OFFICE VISIT (OUTPATIENT)
Dept: FAMILY MEDICINE CLINIC | Age: 34
End: 2019-09-11
Payer: MEDICAID

## 2019-09-11 VITALS
HEIGHT: 63 IN | TEMPERATURE: 97.8 F | OXYGEN SATURATION: 99 % | BODY MASS INDEX: 15.86 KG/M2 | DIASTOLIC BLOOD PRESSURE: 60 MMHG | WEIGHT: 89.5 LBS | SYSTOLIC BLOOD PRESSURE: 120 MMHG | HEART RATE: 100 BPM

## 2019-09-11 DIAGNOSIS — F10.10 ALCOHOL ABUSE: Primary | ICD-10-CM

## 2019-09-11 PROCEDURE — 99213 OFFICE O/P EST LOW 20 MIN: CPT | Performed by: FAMILY MEDICINE

## 2019-09-11 RX ORDER — ONDANSETRON HYDROCHLORIDE 8 MG/1
8 TABLET, FILM COATED ORAL EVERY 8 HOURS PRN
Qty: 90 TABLET | Refills: 0 | Status: SHIPPED
Start: 2019-09-11 | End: 2020-11-24

## 2019-09-11 RX ORDER — MV/FA/DHA/EPA/FISH OIL/SAW/GNK 400MCG-200
COMBINATION PACKAGE (EA) ORAL
Refills: 0 | COMMUNITY
Start: 2019-07-10 | End: 2021-05-20 | Stop reason: ALTCHOICE

## 2019-09-11 RX ORDER — ASPIRIN 325 MG/1
TABLET, FILM COATED ORAL
Refills: 0 | COMMUNITY
Start: 2019-07-13 | End: 2020-11-24

## 2019-09-11 ASSESSMENT — ENCOUNTER SYMPTOMS
CONSTIPATION: 0
NAUSEA: 0
SINUS PAIN: 0
VOMITING: 0
CHEST TIGHTNESS: 0
BACK PAIN: 0
COUGH: 0
RHINORRHEA: 1
SINUS PRESSURE: 1
DIARRHEA: 1
EYE PAIN: 0
SHORTNESS OF BREATH: 0
WHEEZING: 0
ABDOMINAL PAIN: 0
TROUBLE SWALLOWING: 0
SORE THROAT: 1

## 2019-09-12 ENCOUNTER — HOSPITAL ENCOUNTER (EMERGENCY)
Age: 34
Discharge: HOME | End: 2019-09-13
Attending: EMERGENCY MEDICINE
Payer: MEDICAID

## 2019-09-12 DIAGNOSIS — F10.930 ALCOHOL WITHDRAWAL SYNDROME WITHOUT COMPLICATION (HCC): Primary | ICD-10-CM

## 2019-09-12 LAB
ACETAMINOPHEN LEVEL: <5 MCG/ML (ref 10–30)
AMPHETAMINE SCREEN, URINE: NOT DETECTED
ANION GAP SERPL CALCULATED.3IONS-SCNC: 16 MMOL/L (ref 7–16)
BARBITURATE SCREEN URINE: NOT DETECTED
BASOPHILS ABSOLUTE: 0.04 E9/L (ref 0–0.2)
BASOPHILS RELATIVE PERCENT: 1.1 % (ref 0–2)
BENZODIAZEPINE SCREEN, URINE: NOT DETECTED
BILIRUBIN URINE: NEGATIVE
BLOOD, URINE: NEGATIVE
BUN BLDV-MCNC: 2 MG/DL (ref 6–20)
CALCIUM SERPL-MCNC: 8.3 MG/DL (ref 8.6–10.2)
CANNABINOID SCREEN URINE: NOT DETECTED
CHLORIDE BLD-SCNC: 98 MMOL/L (ref 98–107)
CLARITY: CLEAR
CO2: 24 MMOL/L (ref 22–29)
COCAINE METABOLITE SCREEN URINE: NOT DETECTED
COLOR: YELLOW
CREAT SERPL-MCNC: 0.6 MG/DL (ref 0.5–1)
EOSINOPHILS ABSOLUTE: 0.03 E9/L (ref 0.05–0.5)
EOSINOPHILS RELATIVE PERCENT: 0.8 % (ref 0–6)
ETHANOL: 283 MG/DL (ref 0–0.08)
GFR AFRICAN AMERICAN: >60
GFR NON-AFRICAN AMERICAN: >60 ML/MIN/1.73
GLUCOSE BLD-MCNC: 97 MG/DL (ref 74–99)
GLUCOSE URINE: NEGATIVE MG/DL
HCG, URINE, POC: NEGATIVE
HCT VFR BLD CALC: 45.4 % (ref 34–48)
HEMOGLOBIN: 15 G/DL (ref 11.5–15.5)
IMMATURE GRANULOCYTES #: 0.01 E9/L
IMMATURE GRANULOCYTES %: 0.3 % (ref 0–5)
KETONES, URINE: NEGATIVE MG/DL
LEUKOCYTE ESTERASE, URINE: NEGATIVE
LYMPHOCYTES ABSOLUTE: 1.74 E9/L (ref 1.5–4)
LYMPHOCYTES RELATIVE PERCENT: 46.6 % (ref 20–42)
Lab: NORMAL
Lab: NORMAL
MCH RBC QN AUTO: 35.7 PG (ref 26–35)
MCHC RBC AUTO-ENTMCNC: 33 % (ref 32–34.5)
MCV RBC AUTO: 108.1 FL (ref 80–99.9)
METHADONE SCREEN, URINE: NOT DETECTED
MONOCYTES ABSOLUTE: 0.51 E9/L (ref 0.1–0.95)
MONOCYTES RELATIVE PERCENT: 13.7 % (ref 2–12)
NEGATIVE QC PASS/FAIL: NORMAL
NEUTROPHILS ABSOLUTE: 1.4 E9/L (ref 1.8–7.3)
NEUTROPHILS RELATIVE PERCENT: 37.5 % (ref 43–80)
NITRITE, URINE: NEGATIVE
OPIATE SCREEN URINE: NOT DETECTED
PDW BLD-RTO: 10.7 FL (ref 11.5–15)
PH UA: 5.5 (ref 5–9)
PHENCYCLIDINE SCREEN URINE: NOT DETECTED
PLATELET # BLD: 122 E9/L (ref 130–450)
PMV BLD AUTO: 10.6 FL (ref 7–12)
POSITIVE QC PASS/FAIL: NORMAL
POTASSIUM SERPL-SCNC: 3.2 MMOL/L (ref 3.5–5)
PROPOXYPHENE SCREEN: NOT DETECTED
PROTEIN UA: NEGATIVE MG/DL
RBC # BLD: 4.2 E12/L (ref 3.5–5.5)
SALICYLATE, SERUM: <0.3 MG/DL (ref 0–30)
SODIUM BLD-SCNC: 138 MMOL/L (ref 132–146)
SPECIFIC GRAVITY UA: <=1.005 (ref 1–1.03)
TRICYCLIC ANTIDEPRESSANTS SCREEN SERUM: NEGATIVE NG/ML
UROBILINOGEN, URINE: 0.2 E.U./DL
WBC # BLD: 3.7 E9/L (ref 4.5–11.5)

## 2019-09-12 PROCEDURE — 99284 EMERGENCY DEPT VISIT MOD MDM: CPT

## 2019-09-12 PROCEDURE — 36415 COLL VENOUS BLD VENIPUNCTURE: CPT

## 2019-09-12 PROCEDURE — 85025 COMPLETE CBC W/AUTO DIFF WBC: CPT

## 2019-09-12 PROCEDURE — 81003 URINALYSIS AUTO W/O SCOPE: CPT

## 2019-09-12 PROCEDURE — 80048 BASIC METABOLIC PNL TOTAL CA: CPT

## 2019-09-12 PROCEDURE — 80307 DRUG TEST PRSMV CHEM ANLYZR: CPT

## 2019-09-12 PROCEDURE — G0480 DRUG TEST DEF 1-7 CLASSES: HCPCS

## 2019-09-12 PROCEDURE — 84443 ASSAY THYROID STIM HORMONE: CPT

## 2019-09-12 RX ORDER — THIAMINE HYDROCHLORIDE 100 MG/ML
100 INJECTION, SOLUTION INTRAMUSCULAR; INTRAVENOUS DAILY
Status: DISCONTINUED | OUTPATIENT
Start: 2019-09-13 | End: 2019-09-13 | Stop reason: HOSPADM

## 2019-09-12 RX ORDER — LORAZEPAM 2 MG/ML
0.5 INJECTION INTRAMUSCULAR ONCE
Status: COMPLETED | OUTPATIENT
Start: 2019-09-12 | End: 2019-09-13

## 2019-09-12 RX ORDER — FOLIC ACID 1 MG/1
1 TABLET ORAL DAILY
Status: DISCONTINUED | OUTPATIENT
Start: 2019-09-13 | End: 2019-09-13 | Stop reason: HOSPADM

## 2019-09-12 RX ORDER — SODIUM CHLORIDE, SODIUM LACTATE, POTASSIUM CHLORIDE, CALCIUM CHLORIDE 600; 310; 30; 20 MG/100ML; MG/100ML; MG/100ML; MG/100ML
1000 INJECTION, SOLUTION INTRAVENOUS ONCE
Status: COMPLETED | OUTPATIENT
Start: 2019-09-12 | End: 2019-09-13

## 2019-09-13 VITALS
DIASTOLIC BLOOD PRESSURE: 79 MMHG | HEART RATE: 91 BPM | SYSTOLIC BLOOD PRESSURE: 137 MMHG | OXYGEN SATURATION: 99 % | TEMPERATURE: 98.1 F | RESPIRATION RATE: 17 BRPM

## 2019-09-13 LAB — TSH SERPL DL<=0.05 MIU/L-ACNC: 5.49 UIU/ML (ref 0.27–4.2)

## 2019-09-13 PROCEDURE — 6360000002 HC RX W HCPCS: Performed by: EMERGENCY MEDICINE

## 2019-09-13 PROCEDURE — 96366 THER/PROPH/DIAG IV INF ADDON: CPT

## 2019-09-13 PROCEDURE — 2580000003 HC RX 258: Performed by: EMERGENCY MEDICINE

## 2019-09-13 PROCEDURE — 96365 THER/PROPH/DIAG IV INF INIT: CPT

## 2019-09-13 PROCEDURE — 96375 TX/PRO/DX INJ NEW DRUG ADDON: CPT

## 2019-09-13 RX ORDER — CHLORDIAZEPOXIDE HYDROCHLORIDE 25 MG/1
50 CAPSULE, GELATIN COATED ORAL 3 TIMES DAILY PRN
Qty: 60 CAPSULE | Refills: 3 | Status: SHIPPED | OUTPATIENT
Start: 2019-09-13 | End: 2019-09-23

## 2019-09-13 RX ADMIN — SODIUM CHLORIDE, POTASSIUM CHLORIDE, SODIUM LACTATE AND CALCIUM CHLORIDE 1000 ML: 600; 310; 30; 20 INJECTION, SOLUTION INTRAVENOUS at 00:10

## 2019-09-13 RX ADMIN — LORAZEPAM 0.5 MG: 2 INJECTION INTRAMUSCULAR; INTRAVENOUS at 00:07

## 2019-09-13 NOTE — ED NOTES
Pt has no s/s of any DT's, or tremors. Alert and oriented x4, resps easy nonlabored, skin warm and dry. Arouses easily from sleep.  ambulates with steady gait. No c/o at this time.      Dianne [place], NAE  09/13/19 [id number]

## 2019-09-13 NOTE — ED PROVIDER NOTES
Department of Emergency Medicine   ED  Provider Note  Admit Date/RoomTime: 9/12/2019 10:53 PM  ED Room: MUNDO/MUNDO     History of Present Illness:  9/12/19, Time: 11:05 PM       Remi Khan is a 26 y.o. female presenting to the ED for alcohol problem, beginning 3 months ago. The complaint has been constant, moderate in severity, and worsened by nothing. Pt history provided by the patient. Patient reports a hx of alcoholism. She reports a period of sobriety prior to 3 months ago. She reports that she has been drinking daily for the past 3 months. Patient reports that she wishes to detox from alcohol. Patient reports having one beer today. She c/o nausea, diarrhea, anxiety, and insomnia. Patient denies CP, SOB, fever, chills, headache, dizziness, edema, syncope, hallucinations, or any other pertinent complaints. Review of Systems:   Pertinent positives and negatives are stated within HPI, all other systems reviewed and are negative.      --------------------------------------------- PAST HISTORY ---------------------------------------------  Past Medical History:  has a past medical history of Anemia and ETOH abuse. Past Surgical History:  has a past surgical history that includes Pelvic fracture surgery (Bilateral, 2000). Social History:  reports that she has been smoking cigarettes. She started smoking about 15 years ago. She has a 15.00 pack-year smoking history. She has quit using smokeless tobacco. She reports that she drinks about 70.0 standard drinks of alcohol per week. She reports that she has current or past drug history. Drug: Marijuana. Frequency: 1.00 time per week. Family History: family history is not on file. The patients home medications have been reviewed. Allergies: Patient has no known allergies.       ---------------------------------------------------PHYSICAL EXAM--------------------------------------    Constitutional/General: Alert and oriented x3, well appearing, non toxic in NAD  Head: Normocephalic and atraumatic  Eyes: PERRL, EOMI, conjunctiva normal, sclera non icteric  Mouth: Oropharynx clear, handling secretions, no trismus, no asymmetry of the posterior oropharynx or uvular edema. No erythema, no exudates. Neck: Supple, full ROM, non tender to palpation in the midline, no stridor, no crepitus, no meningeal signs  Respiratory: Lungs clear to auscultation bilaterally, no wheezes, rales, or rhonchi. Not in respiratory distress  Cardiovascular:  Regular rate. Regular rhythm. No murmurs, gallops, or rubs. 2+ distal pulses  Chest: No chest wall tenderness  GI:  Abdomen Soft, Non tender, Non distended. +BS. No rebound, guarding, or rigidity. No pulsatile masses. Musculoskeletal: Moves all extremities x 4. Warm and well perfused, no clubbing, cyanosis, or edema. Capillary refill <3 seconds  Integument: skin warm and dry. No rashes. No worry some skin lesions or ulcers. Lymphatic: no lymphadenopathy noted  Neurologic: GCS 15, CN 2-12 grossly intact, no focal deficits, symmetric strength 5/5 in the upper and lower extremities bilaterally  Psychiatric: Normal Affect    -------------------------------------------------- RESULTS -------------------------------------------------  I have personally reviewed all laboratory and imaging results for this patient. Results are listed below.      LABS:  Results for orders placed or performed during the hospital encounter of 09/12/19   CBC Auto Differential   Result Value Ref Range    WBC 3.7 (L) 4.5 - 11.5 E9/L    RBC 4.20 3.50 - 5.50 E12/L    Hemoglobin 15.0 11.5 - 15.5 g/dL    Hematocrit 45.4 34.0 - 48.0 %    .1 (H) 80.0 - 99.9 fL    MCH 35.7 (H) 26.0 - 35.0 pg    MCHC 33.0 32.0 - 34.5 %    RDW 10.7 (L) 11.5 - 15.0 fL    Platelets 710 (L) 967 - 450 E9/L    MPV 10.6 7.0 - 12.0 fL    Neutrophils % 37.5 (L) 43.0 - 80.0 %    Immature Granulocytes % 0.3 0.0 - 5.0 %    Lymphocytes % 46.6 (H) 20.0 - 42.0 %    Monocytes % 13.7 (H) 2.0 - pH, UA 5.5 5.0 - 9.0    Protein, UA Negative Negative mg/dL    Urobilinogen, Urine 0.2 <2.0 E.U./dL    Nitrite, Urine Negative Negative    Leukocyte Esterase, Urine Negative Negative   POC Pregnancy Urine Qual   Result Value Ref Range    HCG, Urine, POC Negative Negative    Lot Number [id number]     Positive QC Pass/Fail Pass     Negative QC Pass/Fail Pass        RADIOLOGY:  Interpreted by Radiologist.  No orders to display         ------------------------- NURSING NOTES AND VITALS REVIEWED ---------------------------  The nursing notes within the ED encounter and vital signs as below have been reviewed by myself. /79   Pulse 91   Temp 98.1 °F (36.7 °C)   Resp 17   SpO2 99%   Oxygen Saturation Interpretation: Normal    The patients available past medical records and past encounters were reviewed. ------------------------------ ED COURSE/MEDICAL DECISION MAKING----------------------  Medications   lactated ringers infusion 1,000 mL (0 mLs Intravenous Stopped 19)   LORazepam (ATIVAN) injection 0.5 mg (0.5 mg Intravenous Given 19)     Medical Decision Makin-year-old female presenting with she feels is early alcohol withdrawal.  She arrives well-appearing, slight tachycardia and elevation in her blood pressure. She was given IV fluids and a dose of Ativan. She is resting comfortably. Metabolic panel is within acceptable limits, no leukocytosis or anemia. Patient responded well to Ativan, comfortable with discharge home as she has follow-up with her counselor as well as a confirmed spot in rehab outpatient facility. Patient will be given a prescription for Librium with strict instruction to return for any changes in condition or new symptoms. Counseling: The emergency provider has spoken with the patient and discussed todays results, in addition to providing specific details for the plan of care and counseling regarding the diagnosis and prognosis.  Questions are

## 2019-09-18 ENCOUNTER — OFFICE VISIT (OUTPATIENT)
Dept: FAMILY MEDICINE CLINIC | Age: 34
End: 2019-09-18
Payer: MEDICAID

## 2019-09-18 VITALS
DIASTOLIC BLOOD PRESSURE: 80 MMHG | BODY MASS INDEX: 16 KG/M2 | SYSTOLIC BLOOD PRESSURE: 130 MMHG | TEMPERATURE: 98.3 F | OXYGEN SATURATION: 97 % | HEART RATE: 105 BPM | WEIGHT: 90.3 LBS

## 2019-09-18 DIAGNOSIS — J02.9 ACUTE PHARYNGITIS, UNSPECIFIED ETIOLOGY: ICD-10-CM

## 2019-09-18 DIAGNOSIS — R07.0 PAIN IN THROAT: Primary | ICD-10-CM

## 2019-09-18 LAB — S PYO AG THROAT QL: NORMAL

## 2019-09-18 PROCEDURE — 87880 STREP A ASSAY W/OPTIC: CPT | Performed by: PHYSICIAN ASSISTANT

## 2019-09-18 PROCEDURE — 99213 OFFICE O/P EST LOW 20 MIN: CPT | Performed by: PHYSICIAN ASSISTANT

## 2019-09-18 RX ORDER — AMOXICILLIN 875 MG/1
875 TABLET, COATED ORAL 2 TIMES DAILY
Qty: 20 TABLET | Refills: 0 | Status: SHIPPED | OUTPATIENT
Start: 2019-09-18 | End: 2019-09-28

## 2019-09-18 ASSESSMENT — ENCOUNTER SYMPTOMS
NAUSEA: 0
SHORTNESS OF BREATH: 0
VOMITING: 0
SORE THROAT: 1
BACK PAIN: 0
PHOTOPHOBIA: 0
DIARRHEA: 0
COUGH: 0
ABDOMINAL PAIN: 0

## 2020-05-21 ENCOUNTER — TELEPHONE (OUTPATIENT)
Dept: ADMINISTRATIVE | Age: 35
End: 2020-05-21

## 2020-05-21 NOTE — TELEPHONE ENCOUNTER
Patient doesn't understand [facility], she states she has bought 3 therom and all say temp is 76. She has very weak and has congestion,  Advised her several times to go to flu clinic she want to drive to [address] [place] [address], she said that vasquez told her she could not come there with symptoms.

## 2020-11-24 ENCOUNTER — OFFICE VISIT (OUTPATIENT)
Dept: PRIMARY CARE CLINIC | Age: 35
End: 2020-11-24
Payer: MEDICAID

## 2020-11-24 VITALS
DIASTOLIC BLOOD PRESSURE: 80 MMHG | SYSTOLIC BLOOD PRESSURE: 122 MMHG | TEMPERATURE: 98.9 F | HEIGHT: 63 IN | WEIGHT: 90 LBS | OXYGEN SATURATION: 98 % | HEART RATE: 114 BPM | RESPIRATION RATE: 20 BRPM | BODY MASS INDEX: 15.95 KG/M2

## 2020-11-24 DIAGNOSIS — Z20.822 ENCOUNTER FOR LABORATORY TESTING FOR COVID-19 VIRUS: ICD-10-CM

## 2020-11-24 LAB
Lab: NORMAL
QC PASS/FAIL: NORMAL
SARS-COV-2, POC: NORMAL

## 2020-11-24 PROCEDURE — 87426 SARSCOV CORONAVIRUS AG IA: CPT | Performed by: CLINICAL NURSE SPECIALIST

## 2020-11-24 PROCEDURE — 4004F PT TOBACCO SCREEN RCVD TLK: CPT | Performed by: CLINICAL NURSE SPECIALIST

## 2020-11-24 PROCEDURE — G8427 DOCREV CUR MEDS BY ELIG CLIN: HCPCS | Performed by: CLINICAL NURSE SPECIALIST

## 2020-11-24 PROCEDURE — G8484 FLU IMMUNIZE NO ADMIN: HCPCS | Performed by: CLINICAL NURSE SPECIALIST

## 2020-11-24 PROCEDURE — 99202 OFFICE O/P NEW SF 15 MIN: CPT | Performed by: CLINICAL NURSE SPECIALIST

## 2020-11-24 PROCEDURE — G8419 CALC BMI OUT NRM PARAM NOF/U: HCPCS | Performed by: CLINICAL NURSE SPECIALIST

## 2020-11-24 RX ORDER — DOXYCYCLINE HYCLATE 100 MG
100 TABLET ORAL 2 TIMES DAILY
Qty: 20 TABLET | Refills: 0 | Status: SHIPPED | OUTPATIENT
Start: 2020-11-24 | End: 2020-12-04

## 2020-11-24 RX ORDER — GUAIFENESIN 600 MG/1
600 TABLET, EXTENDED RELEASE ORAL 2 TIMES DAILY
Qty: 30 TABLET | Refills: 0 | Status: SHIPPED
Start: 2020-11-24 | End: 2020-12-07

## 2020-11-24 RX ORDER — METHYLPREDNISOLONE 4 MG/1
TABLET ORAL
Qty: 21 TABLET | Refills: 0 | Status: SHIPPED | OUTPATIENT
Start: 2020-11-24 | End: 2020-11-30

## 2020-11-24 RX ORDER — ALBUTEROL SULFATE 90 UG/1
2 AEROSOL, METERED RESPIRATORY (INHALATION) EVERY 4 HOURS PRN
Qty: 1 INHALER | Refills: 0 | Status: SHIPPED
Start: 2020-11-24 | End: 2021-05-20 | Stop reason: ALTCHOICE

## 2020-11-24 RX ORDER — ONDANSETRON 4 MG/1
4 TABLET, ORALLY DISINTEGRATING ORAL 3 TIMES DAILY PRN
Qty: 21 TABLET | Refills: 0 | Status: SHIPPED
Start: 2020-11-24 | End: 2021-05-20 | Stop reason: ALTCHOICE

## 2020-11-24 ASSESSMENT — ENCOUNTER SYMPTOMS
CHEST TIGHTNESS: 1
NAUSEA: 1
ABDOMINAL PAIN: 1
EYES NEGATIVE: 1
SHORTNESS OF BREATH: 1
ALLERGIC/IMMUNOLOGIC NEGATIVE: 1
DIARRHEA: 1
COUGH: 1
VOMITING: 1

## 2020-11-24 NOTE — PROGRESS NOTES
Subjective:      Patient ID: Deana Hayes is a 38 y.o. female. Patient presented to the [facility] with a chief complaint of fever, nausea, congestion, chills, diarrhea, emesis and fatigue. Patient stated that her fever was 101 last night. Patient also complains of a loss of taste and smell. Patient stated symptoms have been present for 4 days and has spent the last 4 days in bed. Patient stated that she is a smoker and has been expelling lots of green phlegm with the cough. Review of Systems   Constitutional: Positive for chills, fatigue and fever. HENT: Positive for congestion. Eyes: Negative. Respiratory: Positive for cough, chest tightness and shortness of breath. Cardiovascular: Negative. Gastrointestinal: Positive for abdominal pain, diarrhea, nausea and vomiting. Endocrine: Negative. Genitourinary: Negative. Musculoskeletal: Positive for myalgias. Skin: Negative. Allergic/Immunologic: Negative. Neurological: Negative. Hematological: Negative. Psychiatric/Behavioral: Negative. Objective:   Physical Exam  Vitals signs and nursing note reviewed. Constitutional:       General: She is not in acute distress. Appearance: Normal appearance. She is normal weight. She is not ill-appearing, toxic-appearing or diaphoretic. HENT:      Head: Normocephalic. Right Ear: Tympanic membrane and external ear normal. There is no impacted cerumen. Left Ear: Tympanic membrane and external ear normal. There is no impacted cerumen. Ears:      Comments: Erythema to bilateral ear canals     Nose: Nose normal. No congestion or rhinorrhea. Mouth/Throat:      Mouth: Mucous membranes are moist.      Pharynx: Oropharynx is clear. Posterior oropharyngeal erythema present. No oropharyngeal exudate. Eyes:      General: No scleral icterus. Right eye: No discharge. Left eye: No discharge.       Extraocular Movements: Extraocular movements intact. Conjunctiva/sclera: Conjunctivae normal.      Pupils: Pupils are equal, round, and reactive to light. Comments: Red injection to sclera of eyes   Neck:      Musculoskeletal: Normal range of motion and neck supple. No neck rigidity or muscular tenderness. Vascular: No carotid bruit. Cardiovascular:      Rate and Rhythm: Normal rate and regular rhythm. Pulses: Normal pulses. Heart sounds: Normal heart sounds. No murmur. No friction rub. No gallop. Pulmonary:      Effort: Pulmonary effort is normal. No respiratory distress. Breath sounds: Normal breath sounds. No stridor. No wheezing, rhonchi or rales. Comments: bronchitis  Chest:      Chest wall: No tenderness. Abdominal:      General: Abdomen is flat. Bowel sounds are normal. There is no distension. Palpations: Abdomen is soft. There is no mass. Tenderness: There is no abdominal tenderness. There is no right CVA tenderness, left CVA tenderness, guarding or rebound. Hernia: No hernia is present. Musculoskeletal: Normal range of motion. General: No swelling, tenderness, deformity or signs of injury. Right lower leg: No edema. Left lower leg: No edema. Lymphadenopathy:      Cervical: No cervical adenopathy. Skin:     General: Skin is warm and dry. Capillary Refill: Capillary refill takes less than 2 seconds. Coloration: Skin is not jaundiced or pale. Findings: No bruising, erythema, lesion or rash. Neurological:      General: No focal deficit present. Mental Status: She is alert and oriented to person, place, and time. Cranial Nerves: No cranial nerve deficit. Sensory: No sensory deficit. Motor: No weakness. Coordination: Coordination normal.      Gait: Gait normal.      Deep Tendon Reflexes: Reflexes normal.   Psychiatric:         Mood and Affect: Mood normal.         Behavior: Behavior normal.         Thought Content:  Thought content normal.         Judgment: Judgment normal.     /80   Pulse 114   Temp 98.9 °F (37.2 °C) (Oral)   Resp 20   Ht 5' 3\" (1.6 m)   Wt 90 lb (40.8 kg)   SpO2 98%   BMI 15.94 kg/m²       Assessment:       Diagnosis Orders   1. Encounter for laboratory testing for COVID-19 virus  POCT COVID-19, Antigen    COVID-19 Ambulatory   2. Bronchitis  ondansetron (ZOFRAN-ODT) 4 MG disintegrating tablet    doxycycline hyclate (VIBRA-TABS) 100 MG tablet    methylPREDNISolone (MEDROL DOSEPACK) 4 MG tablet    guaiFENesin (MUCINEX) 600 MG extended release tablet    albuterol sulfate  (90 Base) MCG/ACT inhaler           Plan:      Reviewed medication, allergies and past medical history. Rapid covid testing was negative. Patient was nasally swabbed for covid and was sent to the lab for analysis. Advised patient to quarantine until covid test result were available. Doxycycline, medrol dose pack, zofran, mucinex and albuterol inhaler escribed to the pharmacy. Advised patient to follow up with the flu clinic with any concerns. Go to the ER with any worsening of the patient's condition.         Nancy Farley, APRN - CNP

## 2020-11-26 LAB
SARS-COV-2: NOT DETECTED
SOURCE: NORMAL

## 2020-12-07 ENCOUNTER — OFFICE VISIT (OUTPATIENT)
Dept: PRIMARY CARE CLINIC | Age: 35
End: 2020-12-07
Payer: MEDICAID

## 2020-12-07 VITALS
WEIGHT: 90 LBS | OXYGEN SATURATION: 99 % | DIASTOLIC BLOOD PRESSURE: 72 MMHG | RESPIRATION RATE: 20 BRPM | TEMPERATURE: 98.3 F | HEIGHT: 63 IN | SYSTOLIC BLOOD PRESSURE: 118 MMHG | BODY MASS INDEX: 15.95 KG/M2 | HEART RATE: 110 BPM

## 2020-12-07 DIAGNOSIS — Z20.822 ENCOUNTER FOR LABORATORY TESTING FOR COVID-19 VIRUS: ICD-10-CM

## 2020-12-07 LAB
INFLUENZA A ANTIGEN, POC: NEGATIVE
INFLUENZA B ANTIGEN, POC: NEGATIVE
Lab: NORMAL
QC PASS/FAIL: NORMAL
S PYO AG THROAT QL: NORMAL
SARS-COV-2, POC: NORMAL

## 2020-12-07 PROCEDURE — 4004F PT TOBACCO SCREEN RCVD TLK: CPT | Performed by: NURSE PRACTITIONER

## 2020-12-07 PROCEDURE — 99213 OFFICE O/P EST LOW 20 MIN: CPT | Performed by: NURSE PRACTITIONER

## 2020-12-07 PROCEDURE — 87804 INFLUENZA ASSAY W/OPTIC: CPT | Performed by: NURSE PRACTITIONER

## 2020-12-07 PROCEDURE — 87880 STREP A ASSAY W/OPTIC: CPT | Performed by: NURSE PRACTITIONER

## 2020-12-07 PROCEDURE — G8419 CALC BMI OUT NRM PARAM NOF/U: HCPCS | Performed by: NURSE PRACTITIONER

## 2020-12-07 PROCEDURE — G8427 DOCREV CUR MEDS BY ELIG CLIN: HCPCS | Performed by: NURSE PRACTITIONER

## 2020-12-07 PROCEDURE — G8484 FLU IMMUNIZE NO ADMIN: HCPCS | Performed by: NURSE PRACTITIONER

## 2020-12-07 PROCEDURE — 87426 SARSCOV CORONAVIRUS AG IA: CPT | Performed by: NURSE PRACTITIONER

## 2020-12-07 RX ORDER — PREDNISONE 20 MG/1
20 TABLET ORAL 2 TIMES DAILY
Qty: 10 TABLET | Refills: 0 | Status: SHIPPED | OUTPATIENT
Start: 2020-12-07 | End: 2020-12-12

## 2020-12-07 RX ORDER — AZITHROMYCIN 250 MG/1
250 TABLET, FILM COATED ORAL SEE ADMIN INSTRUCTIONS
Qty: 6 TABLET | Refills: 0 | Status: SHIPPED | OUTPATIENT
Start: 2020-12-07 | End: 2020-12-12

## 2020-12-07 NOTE — PROGRESS NOTES
Chief Complaint   Diarrhea (sx 5 days); Covid Testing (loss of taste); Headache; Fever (last night 101 took tylenol); Shortness of Breath; Generalized Body Aches; Nausea; Nasal Congestion; and Drainage      History of Present Illness   Source of history provided by: patient. Gerhardt Le is a 38 y.o. old female who presents to the flu clinic for evaluation of fever, headache X 5 days. Associated symptoms include nasal congestion, rhinorrhea, sore throat, loss of taste and smell, chest congestion, shortness of breath, nausea, diarrhea,  . Since onset symptoms have been about the same. Patient has had known COVID exposure. Has taken zofran, albuterol inhaler, tylenol PM at home with some symptomatic relief. Reports that the dyspnea has been worsening and has cut down on her cigarette smoking. Denies any CP, conversational l dyspnea, LE edema, abdominal pain, vomiting, rash, or lethargy. Denies any hx of asthma, COPD, pneumonia. Has history of tobacco abuse. She was seen in the flu clinic on 11/24/2020 and was diagnosed with bronchitis. She was started on Zofran, doxycycline, Medrol Dosepak, and Mucinex with minimal relief. ROS   Pertinent positives and negatives are stated within HPI, all other systems reviewed and are negative. Past Medical History:  has a past medical history of Alcohol abuse, Anemia, Bipolar disorder current episode depressed ([address] Depression, ETOH abuse, Major depression, recurrent ([address] Mild tetrahydrocannabinol (THC) abuse, and Protein calorie malnutrition ([address] Surgical History:  has a past surgical history that includes Pelvic fracture surgery (Bilateral, 2000). Social History:  reports that she has been smoking cigarettes. She started smoking about 17 years ago. She has a 15.00 pack-year smoking history. She has quit using smokeless tobacco. She reports current alcohol use of about 70.0 standard drinks of alcohol per week. She reports current drug use.  Frequency: 1.00 time per week. Drug: Marijuana. Family History: family history is not on file. Allergies: Patient has no known allergies. Physical Exam      VS:  /72   Pulse 110   Temp 98.3 °F (36.8 °C) (Oral)   Resp 20   Ht 5' 3\" (1.6 m)   Wt 90 lb (40.8 kg)   SpO2 99%   BMI 15.94 kg/m²    Oxygen Saturation Interpretation: Normal.    Constitutional:  Alert, development consistent with age. NAD. Head:  NC/NT. Airway patent. No TTP over maxillary and frontal sinuses. Mouth: Posterior pharynx with moderately erythematous with petechiae present and clear postnasal drip. +2 tonsillar hypertrophy or exudate. Neck:  Normal ROM. Supple. Anterior cervical lymphadenopathy noted. Lungs: Rhonchi noted to the posterior lung bases. There are scattered expiratory wheezes throughout all lung fields, improves with cough. There is no rales or stridor present. No sternal or rib retractions, shortness of breath, tachypnea present. CV: Tachycardic rate and rhythm, normal heart sounds, without pathological murmurs, ectopy, gallops, or rubs. Skin:  Normal turgor. Warm, dry, without visible rash. Lymphatic: No lymphangitis or adenopathy noted. Neurological:  Oriented. Motor functions intact. Lab / Imaging Results   (All laboratory and radiology results have been personally reviewed by myself)  Labs:  Results for orders placed or performed in visit on 12/07/20   POCT COVID-19, Antigen   Result Value Ref Range    SARS-COV-2, POC Not-Detected Not Detected    Lot Number [id number]     QC Pass/Fail pass    POCT Influenza A/B Antigen ([facility] Ver[facility])   Result Value Ref Range    Inflenza A Ag negative     Influenza B Ag negative    POCT rapid strep A   Result Value Ref Range    Strep A Ag None Detected None Detected       Imaging: All Radiology results interpreted by Radiologist unless otherwise noted. No results found. Medical Decision Making   Pt non-toxic, in no apparent distress and stable at time of discharge. Assessment/Plan   Trinity Roberts was seen today for diarrhea, covid testing, headache, fever, shortness of breath, generalized body aches, nausea, nasal congestion and drainage. Diagnoses and all orders for this visit:    Encounter for laboratory testing for COVID-19 virus  -     POCT COVID-19, Antigen  -     COVID-19 Ambulatory; Future    Bronchitis  -     XR CHEST STANDARD (2 VW); Future  -     predniSONE (DELTASONE) 20 MG tablet; Take 1 tablet by mouth 2 times daily for 5 days  -     azithromycin (ZITHROMAX) 250 MG tablet; Take 1 tablet by mouth See Admin Instructions for 5 days 500mg on day 1 followed by 250mg on days 2 - 5    Sore throat    Fever, unspecified fever cause  -     POCT Influenza A/B Antigen ([facility] Ver[facility])  -     POCT rapid strep A        Prescription given to patient for a outpatient chest x-ray, will call with results. Rapid Covid testing in office is negative, rapid influenza a/B and rapid strep a were negative in office today. COVID-19 swab obtained and pending, will call with results once available. Advised cautionary self-quarantine at home in the interim. If testing is positive, pt should remain out of work for at least 10 days from the start of symptoms. Regardless of testing results, pt should also be fever free for 24 hours and symptoms should be improved overall prior to returning. Increase fluids and rest. Symptomatic relief discussed including Tylenol prn pain/fever. Schedule virtual f/u with PCP in 7-10 days if symptoms persist. ED sooner if symptoms worsen or change. ED immediately with high or refractory fever, progressive SOB, dyspnea, CP, calf pain/swelling, shaking chills, vomiting, abdominal pain, lethargy, flank pain, or decreased urinary output. Pt verbalizes understanding and is in agreement with plan of care. All questions answered. BASIL Perez NP    This visit was provided as a focused evaluation during the COVID -19 pandemic/national emergency.   A comprehensive

## 2020-12-09 LAB
SARS-COV-2: NOT DETECTED
SOURCE: NORMAL

## 2021-04-16 ENCOUNTER — TELEPHONE (OUTPATIENT)
Dept: ORTHOPEDIC SURGERY | Age: 36
End: 2021-04-16

## 2021-04-16 NOTE — TELEPHONE ENCOUNTER
Received a referral from Dr. Alexandria Lemus office regarding patients left humerus fracture -- after reviewing notes Dr. Kaye Ferris requested to see any imaging prior to seeing this patient after an Orthopaedic physician has already initiated the care. Left a message for the patient explaining the above. Office number was given to the patient to call back.

## 2021-04-20 NOTE — TELEPHONE ENCOUNTER
Spoke with patient- advised her that imaging needed to be reviewed before an appointment can be scheduled. Pt stated her father would drop off imaging to the [facility] tomorrow morning.

## 2021-04-29 ENCOUNTER — OFFICE VISIT (OUTPATIENT)
Dept: ORTHOPEDIC SURGERY | Age: 36
End: 2021-04-29
Payer: MEDICAID

## 2021-04-29 VITALS — RESPIRATION RATE: 18 BRPM | WEIGHT: 100 LBS | BODY MASS INDEX: 17.72 KG/M2 | HEIGHT: 63 IN

## 2021-04-29 DIAGNOSIS — S42.292A CLOSED FRACTURE OF HEAD OF LEFT HUMERUS, INITIAL ENCOUNTER: Primary | ICD-10-CM

## 2021-04-29 PROCEDURE — G8419 CALC BMI OUT NRM PARAM NOF/U: HCPCS | Performed by: ORTHOPAEDIC SURGERY

## 2021-04-29 PROCEDURE — 99204 OFFICE O/P NEW MOD 45 MIN: CPT | Performed by: ORTHOPAEDIC SURGERY

## 2021-04-29 PROCEDURE — 4004F PT TOBACCO SCREEN RCVD TLK: CPT | Performed by: ORTHOPAEDIC SURGERY

## 2021-04-29 PROCEDURE — G8427 DOCREV CUR MEDS BY ELIG CLIN: HCPCS | Performed by: ORTHOPAEDIC SURGERY

## 2021-04-29 RX ORDER — OXYCODONE HYDROCHLORIDE AND ACETAMINOPHEN 5; 325 MG/1; MG/1
1 TABLET ORAL EVERY 6 HOURS PRN
Qty: 20 TABLET | Refills: 0 | Status: SHIPPED | OUTPATIENT
Start: 2021-04-29 | End: 2021-05-04

## 2021-04-29 NOTE — PROGRESS NOTES
Department of Orthopedic Surgery  History and Physical      CHIEF COMPLAINT: Left arm pain    HISTORY OF PRESENT ILLNESS:                The patient is a 38 y.o. right-hand-dominant female who presents with left arm pain which occurred several weeks ago. Patient states she slipped on her driveway on a rainy day. She initially presented to [facility] emergency department where she was diagnosed with a proximal humerus fracture. She was initially sent to an orthopedic provider in [place] who did not take her insurance cell and additional referral was made for our office. Since her injury, patient has been wearing a sling for immobilization. She has been having significant pain and difficulty sleeping as a result. She does admit to some sensory deficits over her lateral arm and distally at the elbow. She also has some pain distally at the elbow and forearm. Patient is currently employed at [employer]. Past Medical History:        Diagnosis Date    Alcohol abuse     Anemia     Bipolar disorder current episode depressed ([address]     Depression     ETOH abuse     Major depression, recurrent (HCC)     Mild tetrahydrocannabinol (THC) abuse     chronic     Protein calorie malnutrition (HCC)     severe     Past Surgical History:        Procedure Laterality Date    PELVIC FRACTURE SURGERY Bilateral 2000     Current Medications:   No current facility-administered medications for this visit. Allergies:  Patient has no known allergies. Social History:   TOBACCO:   reports that she has been smoking cigarettes. She started smoking about 17 years ago. She has a 15.00 pack-year smoking history. She has quit using smokeless tobacco.  ETOH:   reports current alcohol use of about 70.0 standard drinks of alcohol per week. DRUGS:   reports current drug use. Frequency: 1.00 time per week. Drug: Marijuana. ACTIVITIES OF DAILY LIVING:    OCCUPATION:    Family History:   No family history on file.     REVIEW OF SYSTEMS: CONSTITUTIONAL:  negative  HEENT:  negative  RESPIRATORY:  negative  CARDIOVASCULAR:  negative  GASTROINTESTINAL:  negative  HEMATOLOGIC/LYMPHATIC:  negative  MUSCULOSKELETAL: Positive for left arm pain  NEUROLOGICAL: Positive for numbness  BEHAVIOR/PSYCH:  negative    PHYSICAL EXAM:    VITALS:  Resp 18   Ht 5' 3\" (1.6 m)   Wt 100 lb (45.4 kg)   BMI 17.71 kg/m²   CONSTITUTIONAL:  awake, alert, cooperative, no apparent distress, and appears stated age  EYES:  Lids and lashes normal, pupils equal, round and reactive to light, extra ocular muscles intact, sclera clear, conjunctiva normal  ENT:  Normocephalic, without obvious abnormality, atraumatic, sinuses nontender on palpation, external ears without lesions, oral pharynx with moist mucus membranes, tonsils without erythema or exudates, gums normal and good dentition. NECK:  Supple, symmetrical, trachea midline, no adenopathy, thyroid symmetric, not enlarged and no tenderness, skin normal  LUNGS:  CTA  CARDIOVASCULAR:  2+ radial pulses, extremities warm and well perfused  ABDOMEN:  NTTP  CHEST:  Atraumatic   GENITAL/URINARY:  deferred  NEUROLOGIC:  Awake, alert, oriented to name, place and time. Cranial nerves II-XII are grossly intact. Motor is 5 out of 5 bilaterally. Sensory is intact.  gait is normal.  MUSCULOSKELETAL:  Left upper extremity:  · Skin intact circumferentially  · No obvious deformity about the left shoulder. Ecchymosis noted about the lateral shoulder and distally about the elbow and forearm  · Significant tenderness to palpation globally about the left shoulder   · Unable to tolerate movement about the left shoulder due to pain.   Motion is limited distally about the elbow and wrist secondary to pain about the shoulder  · Compartments soft and compressible  · +AIN/PIN/Ulnar nerve function intact grossly  · Sensory deficit over the axillary nerve about the lateral deltoid  · +Radial pulse, Brisk Cap refill, hand warm and perfused  · Sensation intact to touch in radial/ulnar/median nerve distributions to hand        DATA:    CBC:   Lab Results   Component Value Date    WBC 3.7 09/12/2019    RBC 4.20 09/12/2019    HGB 15.0 09/12/2019    HCT 45.4 09/12/2019    .1 09/12/2019    MCH 35.7 09/12/2019    MCHC 33.0 09/12/2019    RDW 10.7 09/12/2019     09/12/2019    MPV 10.6 09/12/2019     PT/INR:    Lab Results   Component Value Date    PROTIME 10.1 05/04/2012    INR 0.9 05/04/2012       Radiology Review: X-rays were obtained in office today and reviewed with patient, also compared with previous radiographs from 4/14/2021 from outside source    2 views of the left shoulder including AP and scapular Y demonstrating proximal humerus fracture about the surgical neck, displaced, and varus angulation with medial translation of the humeral shaft. There is a posterior spike noted about the distal fragment. Poor bone quality noted. Radiologic impression: Displaced, angulated proximal humerus fracture about the surgical neck    IMPRESSION:  · Left proximal humerus fracture at the surgical neck, closed  · Left axillary nerve palsy  · Bipolar disorder  · Anxiety  · History of tobacco abuse    PLAN:  Patient presents today for initial evaluation of left proximal humerus fracture. She states the last 2 weeks have been very challenging in terms of pain control and quality of life. Based on the nature of her fracture pattern and number of other factors, we did discuss conservative versus surgical management of her injury. As patient's pain has been poorly managed at this point in addition to functional outcomes of surgical versus nonsurgical management, we will proceed with left proximal humerus open reduction internal fixation with bone graft. We did have a detailed discussion that due to patient's ongoing consumption of cigarettes, this poses significantly increased risk of complications including malunion, wound infection.   Patient understands and would like to proceed despite risks. Until surgery, we will provide patient with pain medication and a new sling for comfort. I explained the risks, benefits, alternatives and complications of surgery with the patient including but not limited to the risks of death, possible damage to nerves, vessels, or tendons, possible infection, possible nonunion, possible malunion, possible hardware failure, possible need for hardware removal, stiffness, as well as the possible need further surgery and unanticipated complications. The patient voiced understanding and all questions were answered. The patient elected to proceed with surgical intervention. I have seen and evaluated the patient and agree with the above assessment and plan on today's visit. I have performed the key components of the history and physical examination with significant findings of left displaced proximal humerus fracture and axillary nerve palsy. Both surgical and nonsurgical treatment options were reviewed with the patient. She reports persistent pain and dysfunction of the left shoulder. She like to proceed with surgical stabilization as her pain has been persistent and recalcitrant. She understands that this may or may not improve her axillary nerve palsy. . I concur with the findings and plan as documented.     Cheryl Gordon MD  4/29/2021

## 2021-05-03 ENCOUNTER — PREP FOR PROCEDURE (OUTPATIENT)
Dept: ORTHOPEDIC SURGERY | Age: 36
End: 2021-05-03

## 2021-05-03 RX ORDER — SODIUM CHLORIDE 0.9 % (FLUSH) 0.9 %
5-40 SYRINGE (ML) INJECTION EVERY 12 HOURS SCHEDULED
Status: CANCELLED | OUTPATIENT
Start: 2021-05-03

## 2021-05-03 RX ORDER — SODIUM CHLORIDE 0.9 % (FLUSH) 0.9 %
5-40 SYRINGE (ML) INJECTION PRN
Status: CANCELLED | OUTPATIENT
Start: 2021-05-03

## 2021-05-03 RX ORDER — SODIUM CHLORIDE 9 MG/ML
25 INJECTION, SOLUTION INTRAVENOUS PRN
Status: CANCELLED | OUTPATIENT
Start: 2021-05-03

## 2021-05-03 RX ORDER — SODIUM CHLORIDE 9 MG/ML
INJECTION, SOLUTION INTRAVENOUS CONTINUOUS
Status: CANCELLED | OUTPATIENT
Start: 2021-05-03

## 2021-05-06 ENCOUNTER — TELEPHONE (OUTPATIENT)
Dept: ADMINISTRATIVE | Age: 36
End: 2021-05-06

## 2021-05-07 DIAGNOSIS — S42.292A CLOSED FRACTURE OF HEAD OF LEFT HUMERUS, INITIAL ENCOUNTER: Primary | ICD-10-CM

## 2021-05-07 RX ORDER — OXYCODONE HYDROCHLORIDE AND ACETAMINOPHEN 5; 325 MG/1; MG/1
1 TABLET ORAL EVERY 6 HOURS PRN
Qty: 16 TABLET | Refills: 0 | Status: SHIPPED | OUTPATIENT
Start: 2021-05-07 | End: 2021-05-11

## 2021-05-07 NOTE — TELEPHONE ENCOUNTER
It has been at least 10 years since she has seen Dr. Cyndi Ordoñez. I unfortunately do not have room for her at this time due to the number of patients I have taken on in the last 6 months.
Pt called and stated she used to see Dr Gabriela Velazquez years ago. Would like to establish w/ Dr Dill Backers if possible.   Please advise pt at [phone]
Pt has scheduled w/ another pcp
No

## 2021-05-07 NOTE — TELEPHONE ENCOUNTER
Patient is requesting a medication refill, OARRS complete. Patient is having surgery on 5-12-21, 4 day supply of percocet 5-325 q6hr PRN ordered to hold her over until then. Patient was last given percocet 5-325 q6hr PRN on 4-29-21.

## 2021-05-10 RX ORDER — NICOTINE 21 MG/24HR
1 PATCH, TRANSDERMAL 24 HOURS TRANSDERMAL EVERY 24 HOURS
COMMUNITY
End: 2021-05-20 | Stop reason: ALTCHOICE

## 2021-05-10 RX ORDER — FERROUS SULFATE 325(65) MG
325 TABLET ORAL
COMMUNITY
End: 2021-05-20 | Stop reason: ALTCHOICE

## 2021-05-10 NOTE — PROGRESS NOTES
[facility] PRE-ADMISSION TESTING INSTRUCTIONS    The Preadmission Testing patient is instructed accordingly using the following criteria (check applicable):    ARRIVAL INSTRUCTIONS:  [x] Parking the day of Surgery is located in the Main Entrance lot. Upon entering the door, make an immediate right to the surgery reception desk    [x] Bring photo ID and insurance card    [] Bring in a copy of Living will or Durable Power of  papers. [x] Please be sure to arrange transportation to and from the hospital    [x] Please arrange for someone to be with you the remainder of the day due to having anesthesia      GENERAL INSTRUCTIONS:    [x] Nothing by mouth after midnight, including gum, candy, mints or water    [x] You may brush your teeth, but do not swallow any water    [x] Take medications as instructed with 1-2 oz of water    [x] Stop herbal supplements and vitamins 5 days prior to procedure    [] Follow preop dosing of blood thinners per physician instructions    [] Do not take insulin or oral diabetic medications    [] If diabetic and have low blood sugar or feel symptomatic, take 1-2oz apple juice or glucose tablets    [] Bring inhalers day of surgery    [] Bring C-PAP/ Bi-Pap day of surgery    [] Bring urine specimen day of surgery    [x]  Soap shower or bath AM of Surgery, no lotion, powders or creams to surgical site    [] Follow bowel prep as instructed per surgeon    [] No tobacco products within 24 hours of surgery     [x] No alcohol or illegal drug use within 24 hours of surgery.     [x] Jewelry, body piercing's, eyeglasses, contact lenses and dentures are not permitted into surgery (bring cases)      [x] Please do not wear any nail polish or make up on the day of surgery    [x] If not already done, you can expect a call from registration    [x] If surgeon requests a time change you will be notified the day prior to surgery    [x] If you receive a survey after surgery we

## 2021-05-12 ENCOUNTER — ANESTHESIA (OUTPATIENT)
Dept: OPERATING ROOM | Age: 36
End: 2021-05-12
Payer: MEDICAID

## 2021-05-12 ENCOUNTER — ANESTHESIA EVENT (OUTPATIENT)
Dept: OPERATING ROOM | Age: 36
End: 2021-05-12
Payer: MEDICAID

## 2021-05-12 ENCOUNTER — HOSPITAL ENCOUNTER (OUTPATIENT)
Dept: GENERAL RADIOLOGY | Age: 36
Setting detail: OUTPATIENT SURGERY
Discharge: HOME | End: 2021-05-14
Attending: ORTHOPAEDIC SURGERY
Payer: MEDICAID

## 2021-05-12 ENCOUNTER — HOSPITAL ENCOUNTER (OUTPATIENT)
Age: 36
Setting detail: OUTPATIENT SURGERY
Discharge: HOME | End: 2021-05-12
Attending: ORTHOPAEDIC SURGERY | Admitting: ORTHOPAEDIC SURGERY
Payer: MEDICAID

## 2021-05-12 VITALS
HEART RATE: 80 BPM | WEIGHT: 100 LBS | TEMPERATURE: 97.8 F | RESPIRATION RATE: 17 BRPM | SYSTOLIC BLOOD PRESSURE: 168 MMHG | OXYGEN SATURATION: 100 % | BODY MASS INDEX: 17.72 KG/M2 | HEIGHT: 63 IN | DIASTOLIC BLOOD PRESSURE: 74 MMHG

## 2021-05-12 VITALS
RESPIRATION RATE: 2 BRPM | DIASTOLIC BLOOD PRESSURE: 113 MMHG | SYSTOLIC BLOOD PRESSURE: 165 MMHG | OXYGEN SATURATION: 100 %

## 2021-05-12 DIAGNOSIS — R52 PAIN: ICD-10-CM

## 2021-05-12 DIAGNOSIS — S42.292A OTHER CLOSED DISPLACED FRACTURE OF PROXIMAL END OF LEFT HUMERUS, INITIAL ENCOUNTER: Primary | ICD-10-CM

## 2021-05-12 LAB
ALBUMIN SERPL-MCNC: 4 G/DL (ref 3.5–5.2)
ALP BLD-CCNC: 165 U/L (ref 35–104)
ALT SERPL-CCNC: 25 U/L (ref 0–32)
ANION GAP SERPL CALCULATED.3IONS-SCNC: 13 MMOL/L (ref 7–16)
AST SERPL-CCNC: 103 U/L (ref 0–31)
BILIRUB SERPL-MCNC: 0.8 MG/DL (ref 0–1.2)
BUN BLDV-MCNC: 3 MG/DL (ref 6–20)
CALCIUM SERPL-MCNC: 9.6 MG/DL (ref 8.6–10.2)
CHLORIDE BLD-SCNC: 96 MMOL/L (ref 98–107)
CO2: 26 MMOL/L (ref 22–29)
CREAT SERPL-MCNC: 0.5 MG/DL (ref 0.5–1)
GFR AFRICAN AMERICAN: >60
GFR NON-AFRICAN AMERICAN: >60 ML/MIN/1.73
GLUCOSE BLD-MCNC: 93 MG/DL (ref 74–99)
HCG, URINE, POC: NEGATIVE
HCT VFR BLD CALC: 46 % (ref 34–48)
HEMOGLOBIN: 15.5 G/DL (ref 11.5–15.5)
Lab: NORMAL
MCH RBC QN AUTO: 36.6 PG (ref 26–35)
MCHC RBC AUTO-ENTMCNC: 33.7 % (ref 32–34.5)
MCV RBC AUTO: 108.7 FL (ref 80–99.9)
NEGATIVE QC PASS/FAIL: NORMAL
PDW BLD-RTO: 13 FL (ref 11.5–15)
PLATELET # BLD: 292 E9/L (ref 130–450)
PMV BLD AUTO: 10.1 FL (ref 7–12)
POSITIVE QC PASS/FAIL: NORMAL
POTASSIUM SERPL-SCNC: 4.2 MMOL/L (ref 3.5–5)
RBC # BLD: 4.23 E12/L (ref 3.5–5.5)
SARS-COV-2, NAAT: NOT DETECTED
SODIUM BLD-SCNC: 135 MMOL/L (ref 132–146)
TOTAL PROTEIN: 8.3 G/DL (ref 6.4–8.3)
WBC # BLD: 8.4 E9/L (ref 4.5–11.5)

## 2021-05-12 PROCEDURE — 6370000000 HC RX 637 (ALT 250 FOR IP): Performed by: ANESTHESIOLOGY

## 2021-05-12 PROCEDURE — C9359 IMPLNT,BON VOID FILLER-PUTTY: HCPCS | Performed by: ORTHOPAEDIC SURGERY

## 2021-05-12 PROCEDURE — 3209999900 FLUORO FOR SURGICAL PROCEDURES

## 2021-05-12 PROCEDURE — 6360000002 HC RX W HCPCS: Performed by: ANESTHESIOLOGY

## 2021-05-12 PROCEDURE — 2580000003 HC RX 258: Performed by: NURSE ANESTHETIST, CERTIFIED REGISTERED

## 2021-05-12 PROCEDURE — 80053 COMPREHEN METABOLIC PANEL: CPT

## 2021-05-12 PROCEDURE — 3700000000 HC ANESTHESIA ATTENDED CARE: Performed by: ORTHOPAEDIC SURGERY

## 2021-05-12 PROCEDURE — 7100000010 HC PHASE II RECOVERY - FIRST 15 MIN: Performed by: ORTHOPAEDIC SURGERY

## 2021-05-12 PROCEDURE — 2500000003 HC RX 250 WO HCPCS: Performed by: ORTHOPAEDIC SURGERY

## 2021-05-12 PROCEDURE — 23615 OPTX PROX HUMRL FX W/INT FIX: CPT | Performed by: ORTHOPAEDIC SURGERY

## 2021-05-12 PROCEDURE — 6360000002 HC RX W HCPCS: Performed by: NURSE ANESTHETIST, CERTIFIED REGISTERED

## 2021-05-12 PROCEDURE — 2709999900 HC NON-CHARGEABLE SUPPLY: Performed by: ORTHOPAEDIC SURGERY

## 2021-05-12 PROCEDURE — 2500000003 HC RX 250 WO HCPCS

## 2021-05-12 PROCEDURE — 3700000001 HC ADD 15 MINUTES (ANESTHESIA): Performed by: ORTHOPAEDIC SURGERY

## 2021-05-12 PROCEDURE — C1713 ANCHOR/SCREW BN/BN,TIS/BN: HCPCS | Performed by: ORTHOPAEDIC SURGERY

## 2021-05-12 PROCEDURE — 6360000002 HC RX W HCPCS: Performed by: STUDENT IN AN ORGANIZED HEALTH CARE EDUCATION/TRAINING PROGRAM

## 2021-05-12 PROCEDURE — 7100000001 HC PACU RECOVERY - ADDTL 15 MIN: Performed by: ORTHOPAEDIC SURGERY

## 2021-05-12 PROCEDURE — 87635 SARS-COV-2 COVID-19 AMP PRB: CPT

## 2021-05-12 PROCEDURE — 7100000011 HC PHASE II RECOVERY - ADDTL 15 MIN: Performed by: ORTHOPAEDIC SURGERY

## 2021-05-12 PROCEDURE — 2500000003 HC RX 250 WO HCPCS: Performed by: NURSE ANESTHETIST, CERTIFIED REGISTERED

## 2021-05-12 PROCEDURE — L3650 SO 8 ABD RESTRAINT PRE OTS: HCPCS | Performed by: ORTHOPAEDIC SURGERY

## 2021-05-12 PROCEDURE — 3600000013 HC SURGERY LEVEL 3 ADDTL 15MIN: Performed by: ORTHOPAEDIC SURGERY

## 2021-05-12 PROCEDURE — 36415 COLL VENOUS BLD VENIPUNCTURE: CPT

## 2021-05-12 PROCEDURE — 3600000003 HC SURGERY LEVEL 3 BASE: Performed by: ORTHOPAEDIC SURGERY

## 2021-05-12 PROCEDURE — 2720000010 HC SURG SUPPLY STERILE: Performed by: ORTHOPAEDIC SURGERY

## 2021-05-12 PROCEDURE — 7100000000 HC PACU RECOVERY - FIRST 15 MIN: Performed by: ORTHOPAEDIC SURGERY

## 2021-05-12 PROCEDURE — 85027 COMPLETE CBC AUTOMATED: CPT

## 2021-05-12 DEVICE — DBX PUTTY, 2.5CC
Type: IMPLANTABLE DEVICE | Site: HUMERUS | Status: FUNCTIONAL
Brand: DBX®

## 2021-05-12 DEVICE — SCREW BNE L26MM DIA3.5MM CORT S STL ST NONCANNULATED LOK: Type: IMPLANTABLE DEVICE | Site: HUMERUS | Status: FUNCTIONAL

## 2021-05-12 DEVICE — SCREW BNE L40MM DIA3.5MM CORT S STL ST LOK FULL THRD: Type: IMPLANTABLE DEVICE | Site: HUMERUS | Status: FUNCTIONAL

## 2021-05-12 DEVICE — ISOLA SPINE SYSTEM ROD BENDER (FRENCH)
Type: IMPLANTABLE DEVICE | Site: HUMERUS | Status: FUNCTIONAL
Brand: ISOLA

## 2021-05-12 DEVICE — PLATE BNE L114MM 5 H STD PROX HUM S STL LOK COMPR FOR 3.5MM: Type: IMPLANTABLE DEVICE | Site: HUMERUS | Status: FUNCTIONAL

## 2021-05-12 DEVICE — SCREW BNE L50MM DIA3.5MM CORT S STL ST LOK FULL THRD: Type: IMPLANTABLE DEVICE | Site: HUMERUS | Status: FUNCTIONAL

## 2021-05-12 DEVICE — GRAFT BNE SUB 15ML 1.7-10MM CANC CHIP MORSELIZED FRZ DRY: Type: IMPLANTABLE DEVICE | Site: HUMERUS | Status: FUNCTIONAL

## 2021-05-12 DEVICE — SCREW BNE L45MM DIA3.5MM CORT S STL ST LOK FULL THRD: Type: IMPLANTABLE DEVICE | Site: HUMERUS | Status: FUNCTIONAL

## 2021-05-12 DEVICE — SCREW BNE L38MM DIA3.5MM CORT S STL ST LOK FULL THRD: Type: IMPLANTABLE DEVICE | Site: HUMERUS | Status: FUNCTIONAL

## 2021-05-12 RX ORDER — CEPHALEXIN 500 MG/1
500 CAPSULE ORAL 3 TIMES DAILY
Qty: 6 CAPSULE | Refills: 0 | Status: SHIPPED | OUTPATIENT
Start: 2021-05-12 | End: 2021-05-14

## 2021-05-12 RX ORDER — LABETALOL HYDROCHLORIDE 5 MG/ML
5 INJECTION, SOLUTION INTRAVENOUS ONCE
Status: DISCONTINUED | OUTPATIENT
Start: 2021-05-12 | End: 2021-05-12 | Stop reason: HOSPADM

## 2021-05-12 RX ORDER — NEOSTIGMINE METHYLSULFATE 1 MG/ML
INJECTION, SOLUTION INTRAVENOUS PRN
Status: DISCONTINUED | OUTPATIENT
Start: 2021-05-12 | End: 2021-05-12 | Stop reason: SDUPTHER

## 2021-05-12 RX ORDER — ACETAMINOPHEN 500 MG
500 TABLET ORAL ONCE
Status: DISCONTINUED | OUTPATIENT
Start: 2021-05-12 | End: 2021-05-12 | Stop reason: HOSPADM

## 2021-05-12 RX ORDER — SODIUM CHLORIDE 9 MG/ML
INJECTION, SOLUTION INTRAVENOUS CONTINUOUS PRN
Status: DISCONTINUED | OUTPATIENT
Start: 2021-05-12 | End: 2021-05-12 | Stop reason: SDUPTHER

## 2021-05-12 RX ORDER — OXYCODONE HYDROCHLORIDE AND ACETAMINOPHEN 5; 325 MG/1; MG/1
1 TABLET ORAL PRN
Status: DISCONTINUED | OUTPATIENT
Start: 2021-05-12 | End: 2021-05-12 | Stop reason: HOSPADM

## 2021-05-12 RX ORDER — PROPOFOL 10 MG/ML
INJECTION, EMULSION INTRAVENOUS PRN
Status: DISCONTINUED | OUTPATIENT
Start: 2021-05-12 | End: 2021-05-12 | Stop reason: SDUPTHER

## 2021-05-12 RX ORDER — OXYCODONE HYDROCHLORIDE AND ACETAMINOPHEN 5; 325 MG/1; MG/1
2 TABLET ORAL ONCE
Status: COMPLETED | OUTPATIENT
Start: 2021-05-12 | End: 2021-05-12

## 2021-05-12 RX ORDER — ONDANSETRON 2 MG/ML
INJECTION INTRAMUSCULAR; INTRAVENOUS PRN
Status: DISCONTINUED | OUTPATIENT
Start: 2021-05-12 | End: 2021-05-12 | Stop reason: SDUPTHER

## 2021-05-12 RX ORDER — GLYCOPYRROLATE 0.2 MG/ML
INJECTION INTRAMUSCULAR; INTRAVENOUS PRN
Status: DISCONTINUED | OUTPATIENT
Start: 2021-05-12 | End: 2021-05-12 | Stop reason: SDUPTHER

## 2021-05-12 RX ORDER — BUPIVACAINE HYDROCHLORIDE AND EPINEPHRINE 5; 5 MG/ML; UG/ML
INJECTION, SOLUTION EPIDURAL; INTRACAUDAL; PERINEURAL PRN
Status: DISCONTINUED | OUTPATIENT
Start: 2021-05-12 | End: 2021-05-12 | Stop reason: ALTCHOICE

## 2021-05-12 RX ORDER — MIDAZOLAM HYDROCHLORIDE 1 MG/ML
INJECTION INTRAMUSCULAR; INTRAVENOUS PRN
Status: DISCONTINUED | OUTPATIENT
Start: 2021-05-12 | End: 2021-05-12 | Stop reason: SDUPTHER

## 2021-05-12 RX ORDER — PROMETHAZINE HYDROCHLORIDE 25 MG/ML
6.25 INJECTION, SOLUTION INTRAMUSCULAR; INTRAVENOUS PRN
Status: DISCONTINUED | OUTPATIENT
Start: 2021-05-12 | End: 2021-05-12 | Stop reason: HOSPADM

## 2021-05-12 RX ORDER — ROCURONIUM BROMIDE 10 MG/ML
INJECTION, SOLUTION INTRAVENOUS PRN
Status: DISCONTINUED | OUTPATIENT
Start: 2021-05-12 | End: 2021-05-12 | Stop reason: SDUPTHER

## 2021-05-12 RX ORDER — ONDANSETRON 4 MG/1
4 TABLET, FILM COATED ORAL EVERY 8 HOURS PRN
Qty: 30 TABLET | Refills: 0 | Status: SHIPPED | OUTPATIENT
Start: 2021-05-12 | End: 2021-05-20 | Stop reason: ALTCHOICE

## 2021-05-12 RX ORDER — LABETALOL HYDROCHLORIDE 5 MG/ML
INJECTION, SOLUTION INTRAVENOUS
Status: COMPLETED
Start: 2021-05-12 | End: 2021-05-12

## 2021-05-12 RX ORDER — FENTANYL CITRATE 50 UG/ML
25 INJECTION, SOLUTION INTRAMUSCULAR; INTRAVENOUS EVERY 5 MIN PRN
Status: COMPLETED | OUTPATIENT
Start: 2021-05-12 | End: 2021-05-12

## 2021-05-12 RX ORDER — HYDROCODONE BITARTRATE AND ACETAMINOPHEN 5; 325 MG/1; MG/1
1 TABLET ORAL EVERY 6 HOURS PRN
Qty: 28 TABLET | Refills: 0 | Status: SHIPPED | OUTPATIENT
Start: 2021-05-12 | End: 2021-05-20 | Stop reason: SDUPTHER

## 2021-05-12 RX ORDER — MEPERIDINE HYDROCHLORIDE 25 MG/ML
12.5 INJECTION INTRAMUSCULAR; INTRAVENOUS; SUBCUTANEOUS EVERY 10 MIN PRN
Status: DISCONTINUED | OUTPATIENT
Start: 2021-05-12 | End: 2021-05-12 | Stop reason: HOSPADM

## 2021-05-12 RX ORDER — DEXAMETHASONE SODIUM PHOSPHATE 4 MG/ML
INJECTION, SOLUTION INTRA-ARTICULAR; INTRALESIONAL; INTRAMUSCULAR; INTRAVENOUS; SOFT TISSUE PRN
Status: DISCONTINUED | OUTPATIENT
Start: 2021-05-12 | End: 2021-05-12 | Stop reason: SDUPTHER

## 2021-05-12 RX ORDER — FENTANYL CITRATE 50 UG/ML
INJECTION, SOLUTION INTRAMUSCULAR; INTRAVENOUS PRN
Status: DISCONTINUED | OUTPATIENT
Start: 2021-05-12 | End: 2021-05-12 | Stop reason: SDUPTHER

## 2021-05-12 RX ORDER — DIPHENHYDRAMINE HYDROCHLORIDE 50 MG/ML
12.5 INJECTION INTRAMUSCULAR; INTRAVENOUS
Status: DISCONTINUED | OUTPATIENT
Start: 2021-05-12 | End: 2021-05-12 | Stop reason: HOSPADM

## 2021-05-12 RX ADMIN — FENTANYL CITRATE 25 MCG: 50 INJECTION, SOLUTION INTRAMUSCULAR; INTRAVENOUS at 15:29

## 2021-05-12 RX ADMIN — HYDROMORPHONE HYDROCHLORIDE 0.5 MG: 1 INJECTION, SOLUTION INTRAMUSCULAR; INTRAVENOUS; SUBCUTANEOUS at 16:11

## 2021-05-12 RX ADMIN — SODIUM CHLORIDE: 9 INJECTION, SOLUTION INTRAVENOUS at 13:02

## 2021-05-12 RX ADMIN — GLYCOPYRROLATE 0.6 MG: 0.2 INJECTION INTRAMUSCULAR; INTRAVENOUS at 14:52

## 2021-05-12 RX ADMIN — ONDANSETRON 4 MG: 2 INJECTION INTRAMUSCULAR; INTRAVENOUS at 13:21

## 2021-05-12 RX ADMIN — HYDROMORPHONE HYDROCHLORIDE 0.5 MG: 1 INJECTION, SOLUTION INTRAMUSCULAR; INTRAVENOUS; SUBCUTANEOUS at 15:55

## 2021-05-12 RX ADMIN — FENTANYL CITRATE 100 MCG: 50 INJECTION, SOLUTION INTRAMUSCULAR; INTRAVENOUS at 13:08

## 2021-05-12 RX ADMIN — PROPOFOL 150 MG: 10 INJECTION, EMULSION INTRAVENOUS at 13:08

## 2021-05-12 RX ADMIN — OXYCODONE HYDROCHLORIDE AND ACETAMINOPHEN 2 TABLET: 5; 325 TABLET ORAL at 17:36

## 2021-05-12 RX ADMIN — FENTANYL CITRATE 25 MCG: 50 INJECTION, SOLUTION INTRAMUSCULAR; INTRAVENOUS at 15:34

## 2021-05-12 RX ADMIN — FENTANYL CITRATE 25 MCG: 50 INJECTION, SOLUTION INTRAMUSCULAR; INTRAVENOUS at 15:43

## 2021-05-12 RX ADMIN — LABETALOL HYDROCHLORIDE 5 MG: 5 INJECTION INTRAVENOUS at 16:32

## 2021-05-12 RX ADMIN — ROCURONIUM BROMIDE 40 MG: 10 INJECTION, SOLUTION INTRAVENOUS at 13:08

## 2021-05-12 RX ADMIN — FENTANYL CITRATE 25 MCG: 50 INJECTION, SOLUTION INTRAMUSCULAR; INTRAVENOUS at 15:39

## 2021-05-12 RX ADMIN — Medication 3 MG: at 14:52

## 2021-05-12 RX ADMIN — FENTANYL CITRATE 100 MCG: 50 INJECTION, SOLUTION INTRAMUSCULAR; INTRAVENOUS at 13:41

## 2021-05-12 RX ADMIN — DEXAMETHASONE SODIUM PHOSPHATE 10 MG: 4 INJECTION, SOLUTION INTRAMUSCULAR; INTRAVENOUS at 13:21

## 2021-05-12 RX ADMIN — MIDAZOLAM 2 MG: 1 INJECTION INTRAMUSCULAR; INTRAVENOUS at 13:02

## 2021-05-12 RX ADMIN — Medication 2 G: at 13:02

## 2021-05-12 ASSESSMENT — PAIN SCALES - GENERAL
PAINLEVEL_OUTOF10: 8
PAINLEVEL_OUTOF10: 10
PAINLEVEL_OUTOF10: 7
PAINLEVEL_OUTOF10: 10
PAINLEVEL_OUTOF10: 9
PAINLEVEL_OUTOF10: 10
PAINLEVEL_OUTOF10: 6
PAINLEVEL_OUTOF10: 9
PAINLEVEL_OUTOF10: 10

## 2021-05-12 ASSESSMENT — PULMONARY FUNCTION TESTS
PIF_VALUE: 22
PIF_VALUE: 19
PIF_VALUE: 22
PIF_VALUE: 22
PIF_VALUE: 5
PIF_VALUE: 13
PIF_VALUE: 22
PIF_VALUE: 18
PIF_VALUE: 21
PIF_VALUE: 21
PIF_VALUE: 22
PIF_VALUE: 28
PIF_VALUE: 17
PIF_VALUE: 28
PIF_VALUE: 2
PIF_VALUE: 28
PIF_VALUE: 4
PIF_VALUE: 28
PIF_VALUE: 22
PIF_VALUE: 22
PIF_VALUE: 3
PIF_VALUE: 21
PIF_VALUE: 12
PIF_VALUE: 15
PIF_VALUE: 3
PIF_VALUE: 22
PIF_VALUE: 21
PIF_VALUE: 22
PIF_VALUE: 21
PIF_VALUE: 21
PIF_VALUE: 22
PIF_VALUE: 2
PIF_VALUE: 15
PIF_VALUE: 21
PIF_VALUE: 12
PIF_VALUE: 22
PIF_VALUE: 21
PIF_VALUE: 21
PIF_VALUE: 28
PIF_VALUE: 21
PIF_VALUE: 13
PIF_VALUE: 13
PIF_VALUE: 22
PIF_VALUE: 21
PIF_VALUE: 13
PIF_VALUE: 22
PIF_VALUE: 22
PIF_VALUE: 0
PIF_VALUE: 21
PIF_VALUE: 21
PIF_VALUE: 20
PIF_VALUE: 16
PIF_VALUE: 8
PIF_VALUE: 19
PIF_VALUE: 21
PIF_VALUE: 22
PIF_VALUE: 13
PIF_VALUE: 13
PIF_VALUE: 21
PIF_VALUE: 11

## 2021-05-12 ASSESSMENT — PAIN DESCRIPTION - LOCATION
LOCATION: SHOULDER

## 2021-05-12 ASSESSMENT — LIFESTYLE VARIABLES: SMOKING_STATUS: 1

## 2021-05-12 ASSESSMENT — PAIN DESCRIPTION - PAIN TYPE: TYPE: SURGICAL PAIN

## 2021-05-12 ASSESSMENT — PAIN DESCRIPTION - ORIENTATION: ORIENTATION: LEFT

## 2021-05-12 ASSESSMENT — PAIN DESCRIPTION - DESCRIPTORS: DESCRIPTORS: ACHING;SORE

## 2021-05-12 NOTE — ANESTHESIA PRE PROCEDURE
Department of Anesthesiology  Preprocedure Note       Name:  Radha Jimenez   Age:  38 y.o.  :  1985                                          MRN:  [id number]         Date:  2021      Surgeon: Jayson Pryor):  Neeru Camarena MD    Procedure: Procedure(s):  LEFT PROXIMAL HUMERUS OPEN REDUCTION INTERNAL FIXATION   +++SYNTHES+++ ([address])    Medications prior to admission:   Prior to Admission medications    Medication Sig Start Date End Date Taking? Authorizing Provider   ferrous sulfate (IRON 325) 325 (65 Fe) MG tablet Take 325 mg by mouth daily (with breakfast)   Yes Historical Provider, MD   nicotine (NICODERM CQ) 21 MG/24HR Place 1 patch onto the skin every 24 hours   Yes Historical Provider, MD   ibuprofen (ADVIL;MOTRIN) 800 MG tablet Take 800 mg by mouth every 6 hours as needed for Pain Last dose 21   Yes Historical Provider, MD   ondansetron (ZOFRAN-ODT) 4 MG disintegrating tablet Take 1 tablet by mouth 3 times daily as needed for Nausea or Vomiting 20  Yes BASIL Poole - CNP   albuterol sulfate  (90 Base) MCG/ACT inhaler Inhale 2 puffs into the lungs every 4 hours as needed for Wheezing 20  Yes BASIL Poole CNP   Multiple Vitamins-Minerals (RA CENTRAL-BELKYS WOMENS MATURE) TABS take 1 tablet by mouth once daily 7/10/19  Yes Historical Provider, MD       Current medications:    No current facility-administered medications for this encounter.         Allergies:  No Known Allergies    Problem List:    Patient Active Problem List   Diagnosis Code    Acute alcoholic intoxication with complication ([facility]) R17.187    Tetrahydrocannabinol (THC) use disorder, mild, abuse F12.10    Major depressive disorder, recurrent episode, severe ([facility]) F33.2    Alcohol abuse F10.10    Protein-calorie malnutrition, severe ([address] E43    Bipolar affective disorder, manic ([address] F31.10    Depression with suicidal ideation F32.9, R45.851    Bipolar disorder with severe depression ([address] F31.4       Past Medical History:        Diagnosis Date    Anemia     Bipolar disorder current episode depressed ([address]     Therapy    Depression     ETOH abuse     Humerus fracture 04/2021    left- For OR 5-12-21    Major depression, recurrent (HCC)     Therapy -stable per pat    Mild tetrahydrocannabinol (THC) abuse     chronic     Protein calorie malnutrition (HCC)     severe       Past Surgical History:        Procedure Laterality Date    PELVIC FRACTURE SURGERY Bilateral 2000    TONSILLECTOMY         Social History:    Social History     Tobacco Use    Smoking status: Current Every Day Smoker     Packs/day: 1.00     Years: 15.00     Pack years: 15.00     Types: Cigarettes     Start date: 10/3/2003    Smokeless tobacco: Former User   Substance Use Topics    Alcohol use: Yes     Alcohol/week: 70.0 standard drinks     Types: 70 Cans of beer per week     Comment: 12 packs                                Ready to quit: Not Answered  Counseling given: Not Answered      Vital Signs (Current):   Vitals:    05/10/21 1004   Weight: 100 lb (45.4 kg)   Height: 5' 3\" (1.6 m)                                              BP Readings from Last 3 Encounters:   12/07/20 118/72   11/24/20 122/80   09/18/19 130/80       NPO Status:                                                                                 BMI:   Wt Readings from Last 3 Encounters:   05/10/21 100 lb (45.4 kg)   04/29/21 100 lb (45.4 kg)   12/07/20 90 lb (40.8 kg)     Body mass index is 17.71 kg/m².     CBC:   Lab Results   Component Value Date    WBC 3.7 09/12/2019    RBC 4.20 09/12/2019    HGB 15.0 09/12/2019    HCT 45.4 09/12/2019    .1 09/12/2019    RDW 10.7 09/12/2019     09/12/2019       CMP:   Lab Results   Component Value Date     09/12/2019    K 3.2 09/12/2019    K 3.4 08/06/2019    CL 98 09/12/2019    CO2 24 09/12/2019    BUN 2 09/12/2019    CREATININE 0.6 09/12/2019    GFRAA >60 09/12/2019    LABGLOM >60 09/12/2019 GLUCOSE 97 09/12/2019    GLUCOSE 166 05/04/2012    PROT 7.9 08/06/2019    CALCIUM 8.3 09/12/2019    BILITOT 0.5 08/06/2019    ALKPHOS 119 08/06/2019     08/06/2019    ALT 58 08/06/2019       POC Tests: No results for input(s): POCGLU, POCNA, POCK, POCCL, POCBUN, POCHEMO, POCHCT in the last 72 hours. Coags:   Lab Results   Component Value Date    PROTIME 10.1 05/04/2012    INR 0.9 05/04/2012       HCG (If Applicable):   Lab Results   Component Value Date    PREGTESTUR NEGATIVE 08/06/2019        ABGs: No results found for: PHART, PO2ART, ISY5EZK, IPV6VWK, BEART, Q6QABJZR     Type & Screen (If Applicable):  No results found for: LABABO, LABRH    Drug/Infectious Status (If Applicable):  Lab Results   Component Value Date    HEPCAB NON REACT 05/03/2012       COVID-19 Screening (If Applicable):   Lab Results   Component Value Date    COVID19 Not Detected 05/12/2021    COVID19 Not Detected 12/07/2020           Anesthesia Evaluation  Patient summary reviewed and Nursing notes reviewed no history of anesthetic complications:   Airway: Mallampati: II  TM distance: >3 FB   Neck ROM: full  Mouth opening: > = 3 FB Dental: normal exam         Pulmonary: breath sounds clear to auscultation  (+) current smoker          Patient did not smoke on day of surgery. Cardiovascular:Negative CV ROS  Exercise tolerance: good (>4 METS),           Rhythm: regular  Rate: normal           Beta Blocker:  Not on Beta Blocker         Neuro/Psych:   (+) psychiatric history:depression/anxiety             GI/Hepatic/Renal: Neg GI/Hepatic/Renal ROS            Endo/Other:                      ROS comment: L ARM INJURY Abdominal:           Vascular: negative vascular ROS. Anesthesia Plan      general     ASA 3       Induction: intravenous. Anesthetic plan and risks discussed with patient and father.                       Shella Canavan, MD   5/12/2021

## 2021-05-12 NOTE — H&P
Department of Orthopedic Surgery  History and Physical        CHIEF COMPLAINT: Left arm pain     HISTORY OF PRESENT ILLNESS:                 The patient is a 38 y.o. right-hand-dominant female who presents with left arm pain which occurred several weeks ago. Patient states she slipped on her driveway on a rainy day. She initially presented to [facility] emergency department where she was diagnosed with a proximal humerus fracture. She was initially sent to an orthopedic provider in [place] who did not take her insurance cell and additional referral was made for our office. Since her injury, patient has been wearing a sling for immobilization. She has been having significant pain and difficulty sleeping as a result. She does admit to some sensory deficits over her lateral arm and distally at the elbow. She also has some pain distally at the elbow and forearm. Patient is currently employed at [employer].     Past Medical History:    Past Medical History             Diagnosis Date    Alcohol abuse      Anemia      Bipolar disorder current episode depressed ([facility] [address]      Depression      ETOH abuse      Major depression, recurrent (HCC)      Mild tetrahydrocannabinol (THC) abuse       chronic     Protein calorie malnutrition ([facility] [address]       severe         Past Surgical History:    Past Surgical History             Procedure Laterality Date    PELVIC FRACTURE SURGERY Bilateral 2000         Current Medications:   Current Hospital Medications   No current facility-administered medications for this visit. Allergies:  Patient has no known allergies.     Social History:   TOBACCO:   reports that she has been smoking cigarettes. She started smoking about 17 years ago. She has a 15.00 pack-year smoking history. She has quit using smokeless tobacco.  ETOH:   reports current alcohol use of about 70.0 standard drinks of alcohol per week. DRUGS:   reports current drug use. Frequency: 1.00 time per week.  Drug: grossly  · Sensory deficit over the axillary nerve about the lateral deltoid  · +Radial pulse, Brisk Cap refill, hand warm and perfused  · Sensation intact to touch in radial/ulnar/median nerve distributions to hand           DATA:    CBC:         Lab Results   Component Value Date     WBC 3.7 09/12/2019     RBC 4.20 09/12/2019     HGB 15.0 09/12/2019     HCT 45.4 09/12/2019     .1 09/12/2019     MCH 35.7 09/12/2019     MCHC 33.0 09/12/2019     RDW 10.7 09/12/2019      09/12/2019     MPV 10.6 09/12/2019      PT/INR:          Lab Results   Component Value Date     PROTIME 10.1 05/04/2012     INR 0.9 05/04/2012         Radiology Review: X-rays were obtained in office today and reviewed with patient, also compared with previous radiographs from 4/14/2021 from outside source     2 views of the left shoulder including AP and scapular Y demonstrating proximal humerus fracture about the surgical neck, displaced, and varus angulation with medial translation of the humeral shaft. There is a posterior spike noted about the distal fragment. Poor bone quality noted.     Radiologic impression: Displaced, angulated proximal humerus fracture about the surgical neck     IMPRESSION:  · Left proximal humerus fracture at the surgical neck, closed  · Left axillary nerve palsy  · Bipolar disorder  · Anxiety  · History of tobacco abuse     PLAN:  Patient presents today for initial evaluation of left proximal humerus fracture. She states the last 2 weeks have been very challenging in terms of pain control and quality of life. Based on the nature of her fracture pattern and number of other factors, we did discuss conservative versus surgical management of her injury. As patient's pain has been poorly managed at this point in addition to functional outcomes of surgical versus nonsurgical management, we will proceed with left proximal humerus open reduction internal fixation with bone graft.   We did have a detailed discussion that due to patient's ongoing consumption of cigarettes, this poses significantly increased risk of complications including malunion, wound infection. Patient understands and would like to proceed despite risks. Until surgery, we will provide patient with pain medication and a new sling for comfort.     I explained the risks, benefits, alternatives and complications of surgery with the patient including but not limited to the risks of death, possible damage to nerves, vessels, or tendons, possible infection, possible nonunion, possible malunion, possible hardware failure, possible need for hardware removal, stiffness, as well as the possible need further surgery and unanticipated complications. The patient voiced understanding and all questions were answered. The patient elected to proceed with surgical intervention.      I have seen and evaluated the patient and agree with the above assessment and plan on today's visit. I have performed the key components of the history and physical examination with significant findings of left displaced proximal humerus fracture and axillary nerve palsy. Both surgical and nonsurgical treatment options were reviewed with the patient. She reports persistent pain and dysfunction of the left shoulder. She like to proceed with surgical stabilization as her pain has been persistent and recalcitrant. She understands that this may or may not improve her axillary nerve palsy. . I concur with the findings and plan as documented.     The patient was counseled at length about the risks of maik Covid-19 during their perioperative period and any recovery window from their procedure. The patient was made aware that maik Covid-19  may worsen their prognosis for recovering from their procedure  and lend to a higher morbidity and/or mortality risk. All material risks, benefits, and reasonable alternatives including postponing the procedure were discussed.  The

## 2021-05-13 NOTE — OP NOTE
[id number] [address]                                OPERATIVE REPORT    PATIENT NAME: Homa Hassan                    :        1985  MED REC NO:   [id number]                            ROOM:  ACCOUNT NO:   [de-identified]                           ADMIT DATE: 2021  PROVIDER:     Sammie Hopkins MD    DATE OF PROCEDURE:  2021    PREOPERATIVE DIAGNOSIS:  Left closed subacute displaced proximal humerus  fracture. POSTOPERATIVE DIAGNOSIS:  Left closed subacute displaced proximal  humerus fracture. PROCEDURE PERFORMED:  Left proximal humerus fracture open reduction and  internal fixation of Synthes locking plates and screws augmented with 15  mL of cancellous graft and 2.5 mL of demineralized bone matrix. SURGEON:  Sammie Hopkins M.D. ANESTHESIA:  1. General.  2.  Local anesthetic by surgeon consisting of approximately 20 mL of  0.25% Marcaine with epinephrine. ASSISTANT:  Layla Marquez DO, Orthopedic Surgery Resident. ESTIMATED BLOOD LOSS:  Approximately 50 mL. FINDINGS:  1. Intraoperative fluoroscopy was used to confirm a closed displaced  left proximal humerus fracture. 2.  Status post open reduction and internal fixation of the proximal  humerus fracture taking down soft callus and augmented with bone graft,  there was stable fixation with plate and screw fixation. COMPLICATIONS:  None. DISPOSITION:  The patient remained stable throughout the procedure. OPERATIVE INDICATIONS:  The patient is a 70-year-old female with  persistent recalcitrant left proximal humerus fracture. She was seen in  the office in a subacute fashion and found to have persistent pain and  deformity and dysfunction of the left shoulder. Both continued  conservative care versus surgical intervention were explained.   The  risks, benefits, alternatives, and complications of both procedures were explained. After discussion, she wished to proceed with surgical  intervention. Risks of surgery included, but not limited to risk of  infection, damage to nerves, vessels, or tendons, failure to improve her  symptoms or worsening of symptoms, scar sensitivity, possible need for  hardware removal, malunion, nonunion, symptomatic hardware, loss of  range of motion, avascular necrosis as well as unforeseen complications. She understood and wished to proceed. DESCRIPTION OF PROCEDURE:  The patient was identified in the holding  area. The left shoulder was identified as the surgical site. She was  then seen by Anesthesia, taken to the operating room, placed supine on  the table, and underwent general anesthesia per Anesthesia Department. She was then placed in a well-padded beach-chair position and the left  shoulder was prepared and draped in the standard sterile fashion. Fluoroscopy was then brought in and used to confirm the left shoulder  fracture, which had significant varus deformity and displacement. Anterior deltopectoral approach was then undertaken after the patient  received perioperative antibiotics. A skin incision was made with a  10-blade followed by blunt dissection and identification of cephalic  vein, which was retracted. Deltopectoral interval was then developed. Soft callus was encountered and taken down anteriorly and laterally. The deltoid was mobilized from the scar tissue and elevated off the  lateral aspect of the shoulder to achieve laterality. The fracture was  then identified anterior medially, where this was significantly  displaced. The calcar was meticulously dissected out and mobilized to  allow reduction of the medial calcar of the humerus. The fibrous tissue  within the medullary canal was removed followed by copious irrigation  and 15 mL of cancellous graft was impacted into the humeral head, into  the shaft augmented with 2 mL of demineralized bone matrix. Lateral  plate was then selected and placed onto the humeral head just lateral to  the biceps tendon. This was secured with multiple K-wires. This was  confirmed to have good position and the height on AP x-ray. Therefore,  a nonlocking screw was inserted to lag the plate to the bone followed by  multiple locking screws within the head, taking care not to traverse the  fracture. The plate was then brought down laterally and confirmed in  both the AP and lateral planes to have good position and reduced the  varus deformity nicely. This was then secured laterally with multiple  nonlocking screws. Two calcar screws were then inserted across the  graft under fluoroscopic imaging. Finally, the screws were exchanged as  necessary after approach-withdrawal technique, which was then used to  confirm extraarticular placement as well as stable fixation of the  humeral fracture. The wound was thoroughly and copiously irrigated out. The deep tissues were reapproximated with 0 Vicryl suture followed by  2-0 Vicryl, 3-0 Monocryl, Dermabond, and an Aquacel dressing.         Jose Harris MD    D: 05/12/2021 16:07:22       T: 05/12/2021 16:14:54     [email or web address]  Job#: [id number]     Doc#: [id number]    CC:

## 2021-05-14 NOTE — ANESTHESIA POSTPROCEDURE EVALUATION
Department of Anesthesiology  Postprocedure Note    Patient: Rachael Aguayo  MRN: [id number]  YOB: 1985  Date of evaluation: 5/14/2021  Time:  1:50 PM     Procedure Summary     Date: 05/12/21 Room / Location: [facility] OR [address] [place] [address]    Anesthesia Start: 4805 Anesthesia Stop: 3378    Procedure: LEFT PROXIMAL HUMERUS OPEN REDUCTION INTERNAL FIXATION (Left Shoulder) Diagnosis: (CLOSED FRACTURE HEAD OF LEFT HUMERUS)    Surgeons: Alex Box MD Responsible Provider: Augustus Bliss MD    Anesthesia Type: general ASA Status: 3          Anesthesia Type: general    Briana Phase I: Briana Score: 10    Briana Phase II: Briana Score: 10    Last vitals: Reviewed and per EMR flowsheets.        Anesthesia Post Evaluation    Patient location during evaluation: PACU  Patient participation: complete - patient participated  Level of consciousness: awake and alert  Airway patency: patent  Nausea & Vomiting: no vomiting and no nausea  Complications: no  Cardiovascular status: blood pressure returned to baseline  Respiratory status: acceptable  Hydration status: euvolemic

## 2021-05-19 ENCOUNTER — TELEPHONE (OUTPATIENT)
Dept: ORTHOPEDIC SURGERY | Age: 36
End: 2021-05-19

## 2021-05-19 DIAGNOSIS — S42.292A CLOSED FRACTURE OF HEAD OF LEFT HUMERUS, INITIAL ENCOUNTER: Primary | ICD-10-CM

## 2021-05-20 ENCOUNTER — OFFICE VISIT (OUTPATIENT)
Dept: ORTHOPEDIC SURGERY | Age: 36
End: 2021-05-20

## 2021-05-20 ENCOUNTER — OFFICE VISIT (OUTPATIENT)
Dept: FAMILY MEDICINE CLINIC | Age: 36
End: 2021-05-20
Payer: MEDICAID

## 2021-05-20 VITALS — HEIGHT: 63 IN | WEIGHT: 95 LBS | RESPIRATION RATE: 20 BRPM | BODY MASS INDEX: 16.83 KG/M2

## 2021-05-20 VITALS
HEIGHT: 63 IN | BODY MASS INDEX: 17.54 KG/M2 | TEMPERATURE: 97.3 F | WEIGHT: 99 LBS | OXYGEN SATURATION: 98 % | HEART RATE: 96 BPM

## 2021-05-20 DIAGNOSIS — S42.292A OTHER CLOSED DISPLACED FRACTURE OF PROXIMAL END OF LEFT HUMERUS, INITIAL ENCOUNTER: ICD-10-CM

## 2021-05-20 DIAGNOSIS — S42.292A CLOSED FRACTURE OF HEAD OF LEFT HUMERUS, INITIAL ENCOUNTER: ICD-10-CM

## 2021-05-20 DIAGNOSIS — G47.00 INSOMNIA, UNSPECIFIED TYPE: Primary | ICD-10-CM

## 2021-05-20 DIAGNOSIS — Z00.00 PREVENTATIVE HEALTH CARE: ICD-10-CM

## 2021-05-20 PROCEDURE — 4004F PT TOBACCO SCREEN RCVD TLK: CPT | Performed by: FAMILY MEDICINE

## 2021-05-20 PROCEDURE — 99214 OFFICE O/P EST MOD 30 MIN: CPT | Performed by: FAMILY MEDICINE

## 2021-05-20 PROCEDURE — G8427 DOCREV CUR MEDS BY ELIG CLIN: HCPCS | Performed by: FAMILY MEDICINE

## 2021-05-20 PROCEDURE — 99024 POSTOP FOLLOW-UP VISIT: CPT | Performed by: ORTHOPAEDIC SURGERY

## 2021-05-20 PROCEDURE — G8419 CALC BMI OUT NRM PARAM NOF/U: HCPCS | Performed by: FAMILY MEDICINE

## 2021-05-20 RX ORDER — HYDROCODONE BITARTRATE AND ACETAMINOPHEN 5; 325 MG/1; MG/1
1 TABLET ORAL EVERY 6 HOURS PRN
Qty: 28 TABLET | Refills: 0 | Status: SHIPPED
Start: 2021-05-20 | End: 2021-05-27 | Stop reason: SDUPTHER

## 2021-05-20 RX ORDER — OXYCODONE HYDROCHLORIDE AND ACETAMINOPHEN 5; 325 MG/1; MG/1
1 TABLET ORAL EVERY 6 HOURS PRN
Qty: 16 TABLET | Refills: 0 | Status: CANCELLED | OUTPATIENT
Start: 2021-05-20 | End: 2021-05-24

## 2021-05-20 RX ORDER — TRAZODONE HYDROCHLORIDE 50 MG/1
50 TABLET ORAL NIGHTLY PRN
Qty: 30 TABLET | Refills: 1 | Status: ON HOLD
Start: 2021-05-20 | End: 2021-11-05 | Stop reason: HOSPADM

## 2021-05-20 RX ORDER — GABAPENTIN 300 MG/1
300 CAPSULE ORAL NIGHTLY
Qty: 30 CAPSULE | Refills: 0 | Status: ON HOLD
Start: 2021-05-20 | End: 2021-11-05 | Stop reason: HOSPADM

## 2021-05-20 ASSESSMENT — ENCOUNTER SYMPTOMS
SHORTNESS OF BREATH: 0
NAUSEA: 0
VOMITING: 0
WHEEZING: 0

## 2021-05-20 NOTE — PROGRESS NOTES
Sharla Fall presents to the office today for   Chief Complaint   Patient presents with   [facility] Established New Doctor     No PCP since Dr. Mckenna Victoria appt with Dr. Orman Lanes today  Had surgery on 5/12 after a fall on outstretched arm  Percocet BID    Tobacco Abuse  1 ppd x 15 years  May quit soon as she is going to run out    Very hard time sleeping with pain    GYN care through [facility]        Review of Systems   Constitutional: Negative for chills and fever. Respiratory: Negative for shortness of breath and wheezing. Cardiovascular: Negative for chest pain and palpitations. Gastrointestinal: Negative for nausea and vomiting. Genitourinary: Negative for dysuria, hematuria and urgency. Skin: Negative for rash. Neurological: Negative for dizziness and light-headedness. Pulse 96   Temp 97.3 °F (36.3 °C) (Temporal)   Ht 5' 3\" (1.6 m)   Wt 99 lb (44.9 kg)   SpO2 98%   BMI 17.54 kg/m²   Physical Exam  Constitutional:       Appearance: Normal appearance. HENT:      Head: Normocephalic and atraumatic. Eyes:      Extraocular Movements: Extraocular movements intact. Conjunctiva/sclera: Conjunctivae normal.   Cardiovascular:      Rate and Rhythm: Normal rate. Heart sounds: Normal heart sounds. Pulmonary:      Effort: Pulmonary effort is normal.      Breath sounds: Normal breath sounds. Skin:     General: Skin is warm. Neurological:      Mental Status: She is alert and oriented to person, place, and time.    Psychiatric:         Mood and Affect: Mood normal.         Behavior: Behavior normal.            Current Outpatient Medications:     traZODone (DESYREL) 50 MG tablet, Take 1 tablet by mouth nightly as needed for Sleep, Disp: 30 tablet, Rfl: 1     Past Medical History:   Diagnosis Date    Anemia     Bipolar disorder current episode depressed ([facility] [place] [address]     Therapy    Depression     ETOH abuse     Humerus fracture 04/2021    left- For OR 5-12-21    Major depression, recurrent (HCC)     Therapy -stable per pat    Mild tetrahydrocannabinol (THC) abuse     chronic     Protein calorie malnutrition ([facility] [address]     severe       Erin Ort was seen today for established new doctor. Diagnoses and all orders for this visit:    Insomnia, unspecified type  -     traZODone (DESYREL) 50 MG tablet; Take 1 tablet by mouth nightly as needed for Sleep    Preventative health care  -     CBC; Future  -     Comprehensive Metabolic Panel; Future  -     Lipid Panel; Future  -     TSH without Reflex;  Future  -     Vitamin B12; Future       Try Trazodone for sleep  Fasting labs soon  F/u with Dr. Jeffrey Carpenter MD

## 2021-05-20 NOTE — PROGRESS NOTES
8 days postop left proximal humerus open reduction fixation with bone grafting. She has been noncompliant with the postop recommendations. She has been using the shoulder for activities of daily living. Her biggest complaint is stiffness about the elbow. No new injuries reported. She does relate that she went to [facility] as her dressing became saturated and she was having increased pain and discomfort. She relates that they changed her dressing for her. She has not had her incision covered since then. Physical exam: Incision of the shoulder is healing nicely. No signs of active infection. Surrounding ecchymosis and bruising present. She actively abduct the shoulder to about 70 degrees. External rotation 10 degrees. She does have swelling distally around the elbow. She does have intact elbow flexion. Extension is limited to 60 degrees. Full pronation supination. Otherwise distally neurovascular intact. X-rays of the shoulder today in the office AP and scapular Y views as well as AP and lateral views of the left elbow were obtained. Elbow films negative for any acute fracture dislocations. Left shoulder demonstrates stable plate and screw fixation without evidence of loosening of the hardware. Impression office x-rays: Stable fixation left proximal humerus fracture. Negative for acute fracture dislocation of left elbow    Assessment: 8 days postop    Plan    Patient's noncompliance was explained to her. She was advised to be nonweightbearing to her upper extremity. Okay for gentle range of motion exercises only. Advised her to use her sling which she does not have with her at today's visit. Also explained to her range of motion exercises to the elbow to try to achieve elbow extension. Follow-up 4 weeks with new x-rays. At that time if she is interested in attending therapy.   Her ex-boyfriend is a physical therapist.    Informed consent was obtained for continued opioid treatment. Patient agrees to continue the current treatment and agrees the benefits of opioid treatment ( decreased pain, improved function) continue to outweigh the potential risks ( confusion, change in thinking ability, depression, dry mouth, nausea, constipation, vomiting, impaired coordination/balance, sleepiness, damage liver or kidneys, opioid-induced hyperalgesia, physical dependence, addiction, withdrawal, respiratory depression and even death).

## 2021-05-27 DIAGNOSIS — S42.292A OTHER CLOSED DISPLACED FRACTURE OF PROXIMAL END OF LEFT HUMERUS, INITIAL ENCOUNTER: ICD-10-CM

## 2021-05-27 RX ORDER — HYDROCODONE BITARTRATE AND ACETAMINOPHEN 5; 325 MG/1; MG/1
1 TABLET ORAL EVERY 6 HOURS PRN
Qty: 28 TABLET | Refills: 0 | Status: SHIPPED
Start: 2021-05-27 | End: 2021-06-04 | Stop reason: SDUPTHER

## 2021-06-04 DIAGNOSIS — S42.292A OTHER CLOSED DISPLACED FRACTURE OF PROXIMAL END OF LEFT HUMERUS, INITIAL ENCOUNTER: ICD-10-CM

## 2021-06-04 RX ORDER — HYDROCODONE BITARTRATE AND ACETAMINOPHEN 5; 325 MG/1; MG/1
1 TABLET ORAL EVERY 6 HOURS PRN
Qty: 28 TABLET | Refills: 0 | Status: SHIPPED
Start: 2021-06-04 | End: 2021-06-14 | Stop reason: SDUPTHER

## 2021-06-04 NOTE — TELEPHONE ENCOUNTER
Patient is requesting a medication refill, OARRS complete. Patient is 3 weeks out from left proximal humerus fracture ORIF. Patient was last seen on 5/20/21 and patients next appointment is 6/7/21. Patient was last given Norco 5-325mg q6 prn on 5/27/21.

## 2021-06-07 ENCOUNTER — OFFICE VISIT (OUTPATIENT)
Dept: ORTHOPEDIC SURGERY | Age: 36
End: 2021-06-07

## 2021-06-07 VITALS — BODY MASS INDEX: 17.72 KG/M2 | WEIGHT: 100 LBS | RESPIRATION RATE: 20 BRPM | HEIGHT: 63 IN

## 2021-06-07 DIAGNOSIS — S42.292A OTHER CLOSED DISPLACED FRACTURE OF PROXIMAL END OF LEFT HUMERUS, INITIAL ENCOUNTER: Primary | ICD-10-CM

## 2021-06-07 PROCEDURE — 99024 POSTOP FOLLOW-UP VISIT: CPT | Performed by: ORTHOPAEDIC SURGERY

## 2021-06-07 NOTE — PROGRESS NOTES
3 weeks postop left proximal humerus open to external fixation. Overall she is doing well. She felt a pop getting out of the bathtub. No other complaints. She still complaining of stiffness in the left elbow. His exam: Incision healing nicely. No signs of infection. Forward flexion 100 degrees. Abduction 90 degrees. External rotation 30 degrees. Elbow with a -25 degree extension lag. Full flexion. Otherwise neurovascular intact. X-rays in office today: AP and scapular Y views of the left shoulder demonstrate stable plate and screw fixation when compared to May 20 films. No interval displacement of the plate and screw fixation approximately wrist fracture. Calcifications present. Impression office x-rays: Stable fixation left proximal humerus fracture    Assessment 3 weeks postop    Plan    Patient like to attend therapy for elbow range of motion. May incorporate range of motion of the shoulder but no strengthening. Follow-up between 4 and 6 weeks.

## 2021-06-11 DIAGNOSIS — S42.292A OTHER CLOSED DISPLACED FRACTURE OF PROXIMAL END OF LEFT HUMERUS, INITIAL ENCOUNTER: ICD-10-CM

## 2021-06-11 NOTE — TELEPHONE ENCOUNTER
Patient is requesting a medication refill, OARRS complete. Patient is 1 month out from surgery of Left proximal humerus fracture ORIF. Patient was last seen on 6-7-21 and patients next appointment is 7-19-21. Patient was last given Norco 5-325 q6hr PRN on 6-4-21.

## 2021-06-14 RX ORDER — HYDROCODONE BITARTRATE AND ACETAMINOPHEN 5; 325 MG/1; MG/1
1 TABLET ORAL EVERY 6 HOURS PRN
Qty: 28 TABLET | Refills: 0 | Status: SHIPPED
Start: 2021-06-14 | End: 2021-06-23 | Stop reason: SDUPTHER

## 2021-06-22 DIAGNOSIS — S42.292A OTHER CLOSED DISPLACED FRACTURE OF PROXIMAL END OF LEFT HUMERUS, INITIAL ENCOUNTER: ICD-10-CM

## 2021-06-22 NOTE — TELEPHONE ENCOUNTER
Patient is requesting a medication refill, OARRS complete. Patient is 6 weeks out from surgery of Left proximal humerus fracture ORIF. Patient was last seen on 6-7-21 and patients next appointment is 7-19-21. Patient was last given Norco 5-325 q6hr PRN on 6-4-21.

## 2021-06-23 RX ORDER — HYDROCODONE BITARTRATE AND ACETAMINOPHEN 5; 325 MG/1; MG/1
1 TABLET ORAL EVERY 6 HOURS PRN
Qty: 28 TABLET | Refills: 0 | Status: SHIPPED | OUTPATIENT
Start: 2021-06-23 | End: 2021-06-30

## 2021-06-29 DIAGNOSIS — S42.292A OTHER CLOSED DISPLACED FRACTURE OF PROXIMAL END OF LEFT HUMERUS, INITIAL ENCOUNTER: Primary | ICD-10-CM

## 2021-06-29 RX ORDER — HYDROCODONE BITARTRATE AND ACETAMINOPHEN 5; 325 MG/1; MG/1
1 TABLET ORAL EVERY 6 HOURS PRN
Qty: 28 TABLET | Refills: 0 | Status: SHIPPED | OUTPATIENT
Start: 2021-06-29 | End: 2021-07-06

## 2021-06-29 NOTE — TELEPHONE ENCOUNTER
Patient is requesting a medication refill, OARRS complete. Patient is 7 weeks out from surgery of Left proximal humerus fracture ORIF.  Patient was last seen on 6-7-21 and patients next appointment is 7-19-21. Patient was last given [place] 5-325 q6hr CKE GP [id number].

## 2021-07-07 DIAGNOSIS — S42.292A OTHER CLOSED DISPLACED FRACTURE OF PROXIMAL END OF LEFT HUMERUS, INITIAL ENCOUNTER: Primary | ICD-10-CM

## 2021-07-07 RX ORDER — ACETAMINOPHEN AND CODEINE PHOSPHATE 300; 30 MG/1; MG/1
1 TABLET ORAL EVERY 4 HOURS PRN
Qty: 42 TABLET | Refills: 0 | Status: SHIPPED | OUTPATIENT
Start: 2021-07-07 | End: 2021-07-14

## 2021-07-15 DIAGNOSIS — S42.292A OTHER CLOSED DISPLACED FRACTURE OF PROXIMAL END OF LEFT HUMERUS, INITIAL ENCOUNTER: Primary | ICD-10-CM

## 2021-07-15 RX ORDER — HYDROCODONE BITARTRATE AND ACETAMINOPHEN 5; 325 MG/1; MG/1
1 TABLET ORAL EVERY 6 HOURS PRN
Qty: 28 TABLET | Refills: 0 | Status: SHIPPED | OUTPATIENT
Start: 2021-07-15 | End: 2021-07-22

## 2021-07-23 DIAGNOSIS — S42.292A OTHER CLOSED DISPLACED FRACTURE OF PROXIMAL END OF LEFT HUMERUS, INITIAL ENCOUNTER: Primary | ICD-10-CM

## 2021-07-23 RX ORDER — ACETAMINOPHEN AND CODEINE PHOSPHATE 300; 30 MG/1; MG/1
1 TABLET ORAL EVERY 4 HOURS PRN
Qty: 42 TABLET | Refills: 0 | Status: SHIPPED | OUTPATIENT
Start: 2021-07-23 | End: 2021-07-30

## 2021-07-23 NOTE — TELEPHONE ENCOUNTER
Patient is requesting a medication refill, OARRS complete. Patient is 10 weeks out from surgery of Left proximal humerus fracture ORIF. Patient was last seen on 6-7-21 and patients next appointment is 7-29-21. Patient was last given [place] q6hr PRN on 7-15-21. Tylenol #3 ordered.

## 2021-07-29 ENCOUNTER — OFFICE VISIT (OUTPATIENT)
Dept: ORTHOPEDIC SURGERY | Age: 36
End: 2021-07-29

## 2021-07-29 VITALS — WEIGHT: 100 LBS | RESPIRATION RATE: 22 BRPM | HEIGHT: 63 IN | BODY MASS INDEX: 17.72 KG/M2

## 2021-07-29 DIAGNOSIS — S42.292A OTHER CLOSED DISPLACED FRACTURE OF PROXIMAL END OF LEFT HUMERUS, INITIAL ENCOUNTER: Primary | ICD-10-CM

## 2021-07-29 DIAGNOSIS — R20.2 NUMBNESS AND TINGLING IN RIGHT HAND: ICD-10-CM

## 2021-07-29 DIAGNOSIS — R20.0 NUMBNESS AND TINGLING IN RIGHT HAND: ICD-10-CM

## 2021-07-29 PROCEDURE — 99024 POSTOP FOLLOW-UP VISIT: CPT | Performed by: ORTHOPAEDIC SURGERY

## 2021-07-29 NOTE — PROGRESS NOTES
Chief Complaint   Patient presents with    Post-Op Check     11 weeks out left proximal humerus open to external fixation. Allan Vilchis is a 38y.o. year old  who presents recent onset of right hand numbness and tingling. She reports her entire thumb and index finger go numb on her. She does not have any history of problems in the hand until recently. She reports this started around the same time of her left shoulder injury. She has not had any treatments. She now reports constant numbness and tingling in the thumb and index fingers. Positive nocturnal symptoms. Her left shoulder is improving. She is currently in therapy. She reports improving function of the shoulder. However she still has some tenderness over the anterior aspect of the left shoulder. Past Medical History:   Diagnosis Date    Anemia     Bipolar disorder current episode depressed ([address]     Therapy    Depression     ETOH abuse     Humerus fracture 04/2021    left- For OR 5-12-21    Major depression, recurrent (HCC)     Therapy -stable per pat    Mild tetrahydrocannabinol (THC) abuse     chronic     Protein calorie malnutrition ([facility] [address]     severe     Past Surgical History:   Procedure Laterality Date    HUMERUS FRACTURE SURGERY Left 5/12/2021    LEFT PROXIMAL HUMERUS OPEN REDUCTION INTERNAL FIXATION performed by Dorenda Bumpers, MD at [facility] Bilateral 2000    TONSILLECTOMY         Current Outpatient Medications:     acetaminophen-codeine (TYLENOL/CODEINE #3) 300-30 MG per tablet, Take 1 tablet by mouth every 4 hours as needed for Pain for up to 7 days. Intended supply: 7 days. Take lowest dose possible to manage pain, Disp: 42 tablet, Rfl: 0    traZODone (DESYREL) 50 MG tablet, Take 1 tablet by mouth nightly as needed for Sleep, Disp: 30 tablet, Rfl: 1    gabapentin (NEURONTIN) 300 MG capsule, Take 1 capsule by mouth nightly for 30 days.  Intended supply: 30 days, Disp: 30 capsule, Rfl: 0  No Known Allergies  Social History     Socioeconomic History    Marital status: Single     Spouse name: Not on file    Number of children: 0    Years of education: 15    Highest education level: Not on file   Occupational History    Not on file   Tobacco Use    Smoking status: Current Every Day Smoker     Packs/day: 1.00     Years: 15.00     Pack years: 15.00     Types: Cigarettes     Start date: 10/3/2003    Smokeless tobacco: Former User   Vaping Use    Vaping Use: Former   Substance and Sexual Activity    Alcohol use: Yes     Alcohol/week: 70.0 standard drinks     Types: 70 Cans of beer per week     Comment: 12 packs    Drug use: Yes     Frequency: 1.0 times per week     Types: Marijuana     Comment: daily    Sexual activity: Not on file   Other Topics Concern    Not on file   Social History Narrative    Not on file     Social Determinants of Health     Financial Resource Strain:     Difficulty of Paying Living Expenses:    Food Insecurity:     Worried About Running Out of Food in the Last Year:     Ran Out of Food in the Last Year:    Transportation Needs:     Lack of Transportation (Medical):  Lack of Transportation (Non-Medical):    Physical Activity:     Days of Exercise per Week:     Minutes of Exercise per Session:    Stress:     Feeling of Stress :    Social Connections:     Frequency of Communication with Friends and Family:     Frequency of Social Gatherings with Friends and Family:     Attends Islam Services:     Active Member of Clubs or Organizations:     Attends Club or Organization Meetings:     Marital Status:    Intimate Partner Violence:     Fear of Current or Ex-Partner:     Emotionally Abused:     Physically Abused:     Sexually Abused:      History reviewed. No pertinent family history. Skin: (-) rash,(-) psoriasis,(-) eczema, (-)skin cancer.    Musculoskeletal: Right hand numbness and tingling  Neurologic: (-) epilepsy, (-)seizures,(-) brain tumor,(-) TIA, (-)stroke, (-)headaches, (-)Parkinson disease,(-) memory loss, (-) LOC. Cardiovascular: (-) Chest pain, (-) swelling in legs/feet, (-) SOB, (-) cramping in legs/feet with walking. SUBJECTIVE:      Constitutional:    The patient is alert and oriented x 3, appears to be stated age and in no distress. Right upper extremity: Nontender of the cervical spine. Full neck flexion. Full neck extension. Negative Spurling's left and right. Negative limits. Nontender about the right shoulder and elbow. Negative Tinel's at the cubital carpal tunnels. Positive Phalen's maneuver. Negative triggering. Negative CMC grind test.  Negative atrophy. APB and intrinsic strength 5/5.  2+ radial pulse. Left upper extremity: Axillary nerve function improving. Scar mature. Positive tenderness over the proximal anterior shoulder region. Forward flexion 130 degrees. Abduction 90 degrees. Full internal and external rotation. Nontender about the elbow and wrist.  Radial,, median nerve sensation intact. 2+ radial pulse    Xrays: X-rays of the left shoulder AP scapular Y and axial views were obtained today and compared to images from June 7 as well as May 20 and April 29 of this year. There is maintained plate and screw fixation of the proximal humerus. Abundant callus formation and bridging callus present on the images. No changes in the orthopedic hardware alignment. Negative humeral head collapse. Impression office x-rays: Healing left proximal humerus fracture  Radiographic findings reviewed with patient      Impression:   Encounter Diagnoses   Name Primary?  Other closed displaced fracture of proximal end of left humerus, initial encounter Yes    Numbness and tingling in right hand    New onset right hand numbness and tingling  Healing left proximal humerus fracture nearly 3 months postop  Resolving left shoulder axillary nerve palsy  History of alcohol abuse  Depression    Plan:      Today's lines were explained the patient. Given the new onset of her numbness and tingling in the right upper extremity and persistent nature of her symptoms over the past 2 to 3 months recommended EMG nerve conduction study be completed. She will follow-up after this is completed. Guards her left shoulder she has no restrictions. Continue with therapy. May follow-up in 3 months regards to the shoulder with new x-rays.   Patient will follow-up after nerve test is completed right upper extremity

## 2021-08-04 DIAGNOSIS — R20.2 NUMBNESS AND TINGLING IN RIGHT HAND: ICD-10-CM

## 2021-08-04 DIAGNOSIS — S42.292A OTHER CLOSED DISPLACED FRACTURE OF PROXIMAL END OF LEFT HUMERUS, INITIAL ENCOUNTER: Primary | ICD-10-CM

## 2021-08-04 DIAGNOSIS — R20.0 NUMBNESS AND TINGLING IN RIGHT HAND: ICD-10-CM

## 2021-08-04 RX ORDER — IBUPROFEN 800 MG/1
800 TABLET ORAL 3 TIMES DAILY PRN
Qty: 90 TABLET | Refills: 0 | Status: ON HOLD
Start: 2021-08-04 | End: 2021-11-05 | Stop reason: HOSPADM

## 2021-08-04 NOTE — TELEPHONE ENCOUNTER
Patient requesting pain medication refill. Ibuprofen 800mg offered. Prescription sent to patients pharmacy.

## 2021-09-22 ENCOUNTER — NURSE TRIAGE (OUTPATIENT)
Dept: OTHER | Facility: CLINIC | Age: 36
End: 2021-09-22

## 2021-09-22 NOTE — TELEPHONE ENCOUNTER
[facility]. Brief description of triage:   Vomiting every day for the last four days, now she is dry heaving    Triage indicates for patient to go to the ED, patient is also being transferred to establish a follow up appointment with provider    Care advice provided, patient verbalizes understanding; denies any other questions or concerns; instructed to call back for any new or worsening symptoms. Writer provided warm transfer to [facility] at [facility] for appointment scheduling. Attention Provider: Thank you for allowing me to participate in the care of your patient. The patient was connected to triage in response to information provided to the [facility]. Please do not respond through this encounter as the response is not directed to a shared pool. Reason for Disposition   [1] SEVERE vomiting (e.g., 6 or more times/day) AND [2] present > 8 hours    Answer Assessment - Initial Assessment Questions  1. VOMITING SEVERITY: \"How many times have you vomited in the past 24 hours? \"      - MILD:  1 - 2 times/day     - MODERATE: 3 - 5 times/day, decreased oral intake without significant weight loss or symptoms of dehydration     - SEVERE: 6 or more times/day, vomits everything or nearly everything, with significant weight loss, symptoms of dehydration       Severe, 7, states that when the dry heaving starts her feet and hands go numb    2. ONSET: \"When did the vomiting begin? \"       Four days ago    3. FLUIDS: \"What fluids or food have you vomited up today? \" \"Have you been able to keep any fluids down? \"      Kept fluid down    4. ABDOMINAL PAIN: Grace Oven your having any abdominal pain? \" If yes : \"How bad is it and what does it feel like? \" (e.g., crampy, dull, intermittent, constant)       Yes, crampy, intermittent, no pain if there are no food    5. DIARRHEA: \"Is there any diarrhea? \" If so, ask: \"How many times today? \"       Yes, 3    6. CONTACTS: \"Is there anyone else in the family with the same symptoms? \"       No    7. CAUSE: \"What do you think is causing your vomiting? \"      Used to drink a lot    8. HYDRATION STATUS: \"Any signs of dehydration? \" (e.g., dry mouth [not only dry lips], too weak to stand) \"When did you last urinate? \"     No    9. OTHER SYMPTOMS: \"Do you have any other symptoms? \" (e.g., fever, headache, vertigo, vomiting blood or coffee grounds, recent head injury)      No, did have a shoulder surgery    10. PREGNANCY: \"Is there any chance you are pregnant? \" \"When was your last menstrual period? \"        No, depo shot, 3 years ago for her last period    Protocols used: VOMITING-ADULT-

## 2021-10-28 ENCOUNTER — HOSPITAL ENCOUNTER (OUTPATIENT)
Dept: ULTRASOUND IMAGING | Age: 36
Discharge: HOME | End: 2021-10-30
Payer: MEDICAID

## 2021-10-28 ENCOUNTER — HOSPITAL ENCOUNTER (OUTPATIENT)
Age: 36
Discharge: HOME | End: 2021-10-28
Payer: MEDICAID

## 2021-10-28 DIAGNOSIS — R10.84 ABDOMINAL PAIN, GENERALIZED: ICD-10-CM

## 2021-10-28 DIAGNOSIS — R94.5 ABNORMAL RESULTS OF LIVER FUNCTION STUDIES: ICD-10-CM

## 2021-10-28 DIAGNOSIS — R11.2 NAUSEA AND VOMITING, INTRACTABILITY OF VOMITING NOT SPECIFIED, UNSPECIFIED VOMITING TYPE: ICD-10-CM

## 2021-10-28 LAB
ALBUMIN SERPL-MCNC: 2.8 G/DL (ref 3.5–5.2)
ALP BLD-CCNC: 279 U/L (ref 35–104)
ALT SERPL-CCNC: 13 U/L (ref 0–32)
ANION GAP SERPL CALCULATED.3IONS-SCNC: 15 MMOL/L (ref 7–16)
APTT: 31 SEC (ref 24.5–35.1)
AST SERPL-CCNC: 65 U/L (ref 0–31)
BASOPHILIC STIPPLING: ABNORMAL
BASOPHILS ABSOLUTE: 0.29 E9/L (ref 0–0.2)
BASOPHILS RELATIVE PERCENT: 1.7 % (ref 0–2)
BILIRUB SERPL-MCNC: 2 MG/DL (ref 0–1.2)
BUN BLDV-MCNC: 3 MG/DL (ref 6–20)
C-REACTIVE PROTEIN: 1.1 MG/DL (ref 0–0.4)
CALCIUM SERPL-MCNC: 8.7 MG/DL (ref 8.6–10.2)
CHLORIDE BLD-SCNC: 88 MMOL/L (ref 98–107)
CO2: 28 MMOL/L (ref 22–29)
CREAT SERPL-MCNC: 0.4 MG/DL (ref 0.5–1)
EOSINOPHILS ABSOLUTE: 0 E9/L (ref 0.05–0.5)
EOSINOPHILS RELATIVE PERCENT: 0 % (ref 0–6)
FERRITIN: 1135 NG/ML
GFR AFRICAN AMERICAN: >60
GFR NON-AFRICAN AMERICAN: >60 ML/MIN/1.73
GLUCOSE BLD-MCNC: 117 MG/DL (ref 74–99)
HCT VFR BLD CALC: 33.4 % (ref 34–48)
HEMOGLOBIN: 11.9 G/DL (ref 11.5–15.5)
HOWELL-JOLLY BODIES: ABNORMAL
IGA: 805 MG/DL (ref 70–400)
INR BLD: 1.1
IRON: 125 MCG/DL (ref 37–145)
LYMPHOCYTES ABSOLUTE: 3.57 E9/L (ref 1.5–4)
LYMPHOCYTES RELATIVE PERCENT: 20.9 % (ref 20–42)
MCH RBC QN AUTO: 40.5 PG (ref 26–35)
MCHC RBC AUTO-ENTMCNC: 35.6 % (ref 32–34.5)
MCV RBC AUTO: 113.6 FL (ref 80–99.9)
MONOCYTES ABSOLUTE: 1.19 E9/L (ref 0.1–0.95)
MONOCYTES RELATIVE PERCENT: 7 % (ref 2–12)
NEUTROPHILS ABSOLUTE: 11.9 E9/L (ref 1.8–7.3)
NEUTROPHILS RELATIVE PERCENT: 70.4 % (ref 43–80)
NUCLEATED RED BLOOD CELLS: 0 /100 WBC
PAPPENHEIMER BODIES: ABNORMAL
PDW BLD-RTO: 14.5 FL (ref 11.5–15)
PLATELET # BLD: 299 E9/L (ref 130–450)
PMV BLD AUTO: 10.1 FL (ref 7–12)
POIKILOCYTES: ABNORMAL
POLYCHROMASIA: ABNORMAL
POTASSIUM SERPL-SCNC: 2.3 MMOL/L (ref 3.5–5)
PROTHROMBIN TIME: 11.7 SEC (ref 9.3–12.4)
RBC # BLD: 2.94 E12/L (ref 3.5–5.5)
SEDIMENTATION RATE, ERYTHROCYTE: 46 MM/HR (ref 0–20)
SODIUM BLD-SCNC: 131 MMOL/L (ref 132–146)
STOMATOCYTES: ABNORMAL
TARGET CELLS: ABNORMAL
TOTAL PROTEIN: 6.7 G/DL (ref 6.4–8.3)
TSH SERPL DL<=0.05 MIU/L-ACNC: 10.95 UIU/ML (ref 0.27–4.2)
VITAMIN B-12: 1308 PG/ML (ref 211–946)
WBC # BLD: 17 E9/L (ref 4.5–11.5)

## 2021-10-28 PROCEDURE — 82105 ALPHA-FETOPROTEIN SERUM: CPT

## 2021-10-28 PROCEDURE — 76705 ECHO EXAM OF ABDOMEN: CPT

## 2021-10-28 PROCEDURE — 86704 HEP B CORE ANTIBODY TOTAL: CPT

## 2021-10-28 PROCEDURE — 86140 C-REACTIVE PROTEIN: CPT

## 2021-10-28 PROCEDURE — 36415 COLL VENOUS BLD VENIPUNCTURE: CPT

## 2021-10-28 PROCEDURE — 86803 HEPATITIS C AB TEST: CPT

## 2021-10-28 PROCEDURE — 85730 THROMBOPLASTIN TIME PARTIAL: CPT

## 2021-10-28 PROCEDURE — 83540 ASSAY OF IRON: CPT

## 2021-10-28 PROCEDURE — 84443 ASSAY THYROID STIM HORMONE: CPT

## 2021-10-28 PROCEDURE — 80053 COMPREHEN METABOLIC PANEL: CPT

## 2021-10-28 PROCEDURE — 83516 IMMUNOASSAY NONANTIBODY: CPT

## 2021-10-28 PROCEDURE — 87340 HEPATITIS B SURFACE AG IA: CPT

## 2021-10-28 PROCEDURE — 85025 COMPLETE CBC W/AUTO DIFF WBC: CPT

## 2021-10-28 PROCEDURE — 86703 HIV-1/HIV-2 1 RESULT ANTBDY: CPT

## 2021-10-28 PROCEDURE — 85651 RBC SED RATE NONAUTOMATED: CPT

## 2021-10-28 PROCEDURE — 82607 VITAMIN B-12: CPT

## 2021-10-28 PROCEDURE — 86708 HEPATITIS A ANTIBODY: CPT

## 2021-10-28 PROCEDURE — 82784 ASSAY IGA/IGD/IGG/IGM EACH: CPT

## 2021-10-28 PROCEDURE — 82728 ASSAY OF FERRITIN: CPT

## 2021-10-28 PROCEDURE — 85610 PROTHROMBIN TIME: CPT

## 2021-10-29 LAB
HEPATITIS B SURFACE ANTIGEN INTERPRETATION: NORMAL
HEPATITIS C ANTIBODY INTERPRETATION: NORMAL
HIV-1 AND HIV-2 ANTIBODIES: NORMAL

## 2021-10-30 LAB — HEPATITIS B CORE TOTAL ANTIBODY: NONREACTIVE

## 2021-10-31 LAB
AFP-TUMOR MARKER: 4 NG/ML (ref 0–9)
HAV AB SERPL IA-ACNC: NEGATIVE

## 2021-11-01 ENCOUNTER — TELEPHONE (OUTPATIENT)
Dept: FAMILY MEDICINE CLINIC | Age: 36
End: 2021-11-01

## 2021-11-01 LAB
GLIADIN ANTIBODIES IGA: 5.9 U/ML
GLIADIN ANTIBODIES IGG: 1.7 U/ML
TISSUE TRANSGLUTAMINASE IGA: 2.5 U/ML

## 2021-11-01 NOTE — TELEPHONE ENCOUNTER
----- Message from Jodi Banerjee sent at 11/1/2021  1:24 PM EDT -----  Subject: Referral Request    QUESTIONS   Reason for referral request? General Surgery   Has the physician seen you for this condition before? No   Preferred Specialist (if applicable)? Rosalia Paredes  Do you already have an appointment scheduled? No  Additional Information for Provider? Patient is requesting referral for   General Surgery Seen 10/29/2021 @ [facility] ED and was told she needed [facility]   Bladder removal due to low potassium Please return patient call   ---------------------------------------------------------------------------  --------------  CALL BACK INFO  What is the best way for the office to contact you? OK to leave message on   voicemail  Preferred Call Back Phone Number?  [phone]

## 2021-11-01 NOTE — TELEPHONE ENCOUNTER
Spoke with pt. She reports her abdomen being swollen and uncomfortable. She complained of leg numbness. I advised ED and pt was in agreement with going to [facility] today.

## 2021-11-01 NOTE — TELEPHONE ENCOUNTER
Patient called and stated that over the weekend she was in [address] [place] [address] for low potassium. They were telling her she needed to have her gallbladder taken out immediatly. She has [employer] and she was not sure what surgeon will cover her. I instructed her to call her insurance to see who is covered. She also said this morning her abdomen is numb.

## 2021-11-01 NOTE — TELEPHONE ENCOUNTER
Did they keep her at the hospital? It looks like her CT showed pancreatitis which usually requires a stay in the hospital to treat. Recommend return to ER if in pain/numbness.

## 2021-11-02 ENCOUNTER — APPOINTMENT (OUTPATIENT)
Dept: ULTRASOUND IMAGING | Age: 36
DRG: 280 | End: 2021-11-02
Payer: MEDICAID

## 2021-11-02 ENCOUNTER — APPOINTMENT (OUTPATIENT)
Dept: GENERAL RADIOLOGY | Age: 36
DRG: 280 | End: 2021-11-02
Payer: MEDICAID

## 2021-11-02 ENCOUNTER — HOSPITAL ENCOUNTER (INPATIENT)
Age: 36
LOS: 3 days | Discharge: HOME | DRG: 280 | End: 2021-11-05
Attending: EMERGENCY MEDICINE | Admitting: INTERNAL MEDICINE
Payer: MEDICAID

## 2021-11-02 DIAGNOSIS — E87.20 LACTIC ACIDOSIS: ICD-10-CM

## 2021-11-02 DIAGNOSIS — K81.9 ACALCULOUS CHOLECYSTITIS: Primary | ICD-10-CM

## 2021-11-02 DIAGNOSIS — N30.01 ACUTE CYSTITIS WITH HEMATURIA: ICD-10-CM

## 2021-11-02 PROBLEM — K74.60 CIRRHOSIS (HCC): Status: ACTIVE | Noted: 2021-11-02

## 2021-11-02 LAB
ALBUMIN SERPL-MCNC: 2.6 G/DL (ref 3.5–5.2)
ALP BLD-CCNC: 246 U/L (ref 35–104)
ALT SERPL-CCNC: 15 U/L (ref 0–32)
AMMONIA: 30.5 UMOL/L (ref 11–51)
ANION GAP SERPL CALCULATED.3IONS-SCNC: 13 MMOL/L (ref 7–16)
ANISOCYTOSIS: ABNORMAL
APTT: 31.2 SEC (ref 24.5–35.1)
AST SERPL-CCNC: 63 U/L (ref 0–31)
BACTERIA: ABNORMAL /HPF
BASOPHILS ABSOLUTE: 0.14 E9/L (ref 0–0.2)
BASOPHILS RELATIVE PERCENT: 0.9 % (ref 0–2)
BILIRUB SERPL-MCNC: 1.3 MG/DL (ref 0–1.2)
BILIRUBIN URINE: ABNORMAL
BLOOD, URINE: ABNORMAL
BUN BLDV-MCNC: 2 MG/DL (ref 6–20)
CALCIUM SERPL-MCNC: 8.7 MG/DL (ref 8.6–10.2)
CHLORIDE BLD-SCNC: 96 MMOL/L (ref 98–107)
CLARITY: CLEAR
CO2: 25 MMOL/L (ref 22–29)
COLOR: YELLOW
CREAT SERPL-MCNC: 0.4 MG/DL (ref 0.5–1)
EOSINOPHILS ABSOLUTE: 0 E9/L (ref 0.05–0.5)
EOSINOPHILS RELATIVE PERCENT: 0 % (ref 0–6)
EPITHELIAL CELLS, UA: ABNORMAL /HPF
GFR AFRICAN AMERICAN: >60
GFR NON-AFRICAN AMERICAN: >60 ML/MIN/1.73
GLUCOSE BLD-MCNC: 108 MG/DL (ref 74–99)
GLUCOSE URINE: NEGATIVE MG/DL
HCG, URINE, POC: NEGATIVE
HCT VFR BLD CALC: 31.2 % (ref 34–48)
HEMOGLOBIN: 10.6 G/DL (ref 11.5–15.5)
HYPOCHROMIA: ABNORMAL
INR BLD: 1.1
KETONES, URINE: ABNORMAL MG/DL
LACTIC ACID: 4.5 MMOL/L (ref 0.5–2.2)
LEUKOCYTE ESTERASE, URINE: ABNORMAL
LIPASE: 33 U/L (ref 13–60)
LYMPHOCYTES ABSOLUTE: 2.58 E9/L (ref 1.5–4)
LYMPHOCYTES RELATIVE PERCENT: 16.4 % (ref 20–42)
Lab: NORMAL
MAGNESIUM: 1.9 MG/DL (ref 1.6–2.6)
MCH RBC QN AUTO: 40.5 PG (ref 26–35)
MCHC RBC AUTO-ENTMCNC: 34 % (ref 32–34.5)
MCV RBC AUTO: 119.1 FL (ref 80–99.9)
MONOCYTES ABSOLUTE: 1.45 E9/L (ref 0.1–0.95)
MONOCYTES RELATIVE PERCENT: 8.6 % (ref 2–12)
MUCUS: PRESENT /LPF
NEGATIVE QC PASS/FAIL: NORMAL
NEUTROPHILS ABSOLUTE: 11.91 E9/L (ref 1.8–7.3)
NEUTROPHILS RELATIVE PERCENT: 74.1 % (ref 43–80)
NITRITE, URINE: NEGATIVE
NUCLEATED RED BLOOD CELLS: 0 /100 WBC
PDW BLD-RTO: 14.9 FL (ref 11.5–15)
PH UA: 6 (ref 5–9)
PLATELET # BLD: 351 E9/L (ref 130–450)
PMV BLD AUTO: 9.7 FL (ref 7–12)
POIKILOCYTES: ABNORMAL
POLYCHROMASIA: ABNORMAL
POSITIVE QC PASS/FAIL: NORMAL
POTASSIUM SERPL-SCNC: 3.8 MMOL/L (ref 3.5–5)
PRO-BNP: 274 PG/ML (ref 0–125)
PROTEIN UA: ABNORMAL MG/DL
PROTHROMBIN TIME: 11.9 SEC (ref 9.3–12.4)
RBC # BLD: 2.62 E12/L (ref 3.5–5.5)
RBC UA: ABNORMAL /HPF (ref 0–2)
SARS-COV-2, NAAT: NOT DETECTED
SODIUM BLD-SCNC: 134 MMOL/L (ref 132–146)
SPECIFIC GRAVITY UA: 1.01 (ref 1–1.03)
STOMATOCYTES: ABNORMAL
TARGET CELLS: ABNORMAL
TOTAL PROTEIN: 6.4 G/DL (ref 6.4–8.3)
TOXIC GRANULATION: ABNORMAL
TROPONIN, HIGH SENSITIVITY: 11 NG/L (ref 0–9)
UROBILINOGEN, URINE: 0.2 E.U./DL
VACUOLATED NEUTROPHILS: ABNORMAL
WBC # BLD: 16.1 E9/L (ref 4.5–11.5)
WBC UA: ABNORMAL /HPF (ref 0–5)

## 2021-11-02 PROCEDURE — 85025 COMPLETE CBC W/AUTO DIFF WBC: CPT

## 2021-11-02 PROCEDURE — 85730 THROMBOPLASTIN TIME PARTIAL: CPT

## 2021-11-02 PROCEDURE — 2500000003 HC RX 250 WO HCPCS: Performed by: STUDENT IN AN ORGANIZED HEALTH CARE EDUCATION/TRAINING PROGRAM

## 2021-11-02 PROCEDURE — 71046 X-RAY EXAM CHEST 2 VIEWS: CPT

## 2021-11-02 PROCEDURE — 81001 URINALYSIS AUTO W/SCOPE: CPT

## 2021-11-02 PROCEDURE — 83880 ASSAY OF NATRIURETIC PEPTIDE: CPT

## 2021-11-02 PROCEDURE — 96375 TX/PRO/DX INJ NEW DRUG ADDON: CPT

## 2021-11-02 PROCEDURE — 84484 ASSAY OF TROPONIN QUANT: CPT

## 2021-11-02 PROCEDURE — 2580000003 HC RX 258: Performed by: STUDENT IN AN ORGANIZED HEALTH CARE EDUCATION/TRAINING PROGRAM

## 2021-11-02 PROCEDURE — 76705 ECHO EXAM OF ABDOMEN: CPT

## 2021-11-02 PROCEDURE — 83690 ASSAY OF LIPASE: CPT

## 2021-11-02 PROCEDURE — 6360000002 HC RX W HCPCS: Performed by: STUDENT IN AN ORGANIZED HEALTH CARE EDUCATION/TRAINING PROGRAM

## 2021-11-02 PROCEDURE — 96365 THER/PROPH/DIAG IV INF INIT: CPT

## 2021-11-02 PROCEDURE — 2580000003 HC RX 258: Performed by: INTERNAL MEDICINE

## 2021-11-02 PROCEDURE — 6360000002 HC RX W HCPCS

## 2021-11-02 PROCEDURE — 1200000000 HC SEMI PRIVATE

## 2021-11-02 PROCEDURE — 83735 ASSAY OF MAGNESIUM: CPT

## 2021-11-02 PROCEDURE — 6360000002 HC RX W HCPCS: Performed by: INTERNAL MEDICINE

## 2021-11-02 PROCEDURE — 99283 EMERGENCY DEPT VISIT LOW MDM: CPT

## 2021-11-02 PROCEDURE — 96361 HYDRATE IV INFUSION ADD-ON: CPT

## 2021-11-02 PROCEDURE — 93005 ELECTROCARDIOGRAM TRACING: CPT | Performed by: NURSE PRACTITIONER

## 2021-11-02 PROCEDURE — 99223 1ST HOSP IP/OBS HIGH 75: CPT | Performed by: INTERNAL MEDICINE

## 2021-11-02 PROCEDURE — 83605 ASSAY OF LACTIC ACID: CPT

## 2021-11-02 PROCEDURE — 85610 PROTHROMBIN TIME: CPT

## 2021-11-02 PROCEDURE — 80053 COMPREHEN METABOLIC PANEL: CPT

## 2021-11-02 PROCEDURE — 82140 ASSAY OF AMMONIA: CPT

## 2021-11-02 PROCEDURE — 87635 SARS-COV-2 COVID-19 AMP PRB: CPT

## 2021-11-02 RX ORDER — MORPHINE SULFATE 2 MG/ML
INJECTION, SOLUTION INTRAMUSCULAR; INTRAVENOUS
Status: COMPLETED
Start: 2021-11-02 | End: 2021-11-02

## 2021-11-02 RX ORDER — SODIUM CHLORIDE 9 MG/ML
25 INJECTION, SOLUTION INTRAVENOUS PRN
Status: DISCONTINUED | OUTPATIENT
Start: 2021-11-02 | End: 2021-11-05 | Stop reason: HOSPADM

## 2021-11-02 RX ORDER — LANOLIN ALCOHOL/MO/W.PET/CERES
6 CREAM (GRAM) TOPICAL NIGHTLY PRN
Status: DISCONTINUED | OUTPATIENT
Start: 2021-11-02 | End: 2021-11-05 | Stop reason: HOSPADM

## 2021-11-02 RX ORDER — 0.9 % SODIUM CHLORIDE 0.9 %
500 INTRAVENOUS SOLUTION INTRAVENOUS ONCE
Status: COMPLETED | OUTPATIENT
Start: 2021-11-02 | End: 2021-11-02

## 2021-11-02 RX ORDER — ONDANSETRON 4 MG/1
4 TABLET, ORALLY DISINTEGRATING ORAL EVERY 8 HOURS PRN
Status: DISCONTINUED | OUTPATIENT
Start: 2021-11-02 | End: 2021-11-05 | Stop reason: HOSPADM

## 2021-11-02 RX ORDER — ACETAMINOPHEN 325 MG/1
650 TABLET ORAL EVERY 6 HOURS PRN
Status: DISCONTINUED | OUTPATIENT
Start: 2021-11-02 | End: 2021-11-03 | Stop reason: SDUPTHER

## 2021-11-02 RX ORDER — SODIUM CHLORIDE 0.9 % (FLUSH) 0.9 %
5-40 SYRINGE (ML) INJECTION EVERY 12 HOURS SCHEDULED
Status: DISCONTINUED | OUTPATIENT
Start: 2021-11-02 | End: 2021-11-05 | Stop reason: HOSPADM

## 2021-11-02 RX ORDER — MORPHINE SULFATE 2 MG/ML
2 INJECTION, SOLUTION INTRAMUSCULAR; INTRAVENOUS
Status: DISCONTINUED | OUTPATIENT
Start: 2021-11-02 | End: 2021-11-05 | Stop reason: HOSPADM

## 2021-11-02 RX ORDER — SODIUM CHLORIDE 0.9 % (FLUSH) 0.9 %
5-40 SYRINGE (ML) INJECTION PRN
Status: DISCONTINUED | OUTPATIENT
Start: 2021-11-02 | End: 2021-11-05 | Stop reason: HOSPADM

## 2021-11-02 RX ORDER — MORPHINE SULFATE 2 MG/ML
4 INJECTION, SOLUTION INTRAMUSCULAR; INTRAVENOUS
Status: DISCONTINUED | OUTPATIENT
Start: 2021-11-02 | End: 2021-11-05 | Stop reason: HOSPADM

## 2021-11-02 RX ORDER — 0.9 % SODIUM CHLORIDE 0.9 %
1000 INTRAVENOUS SOLUTION INTRAVENOUS ONCE
Status: COMPLETED | OUTPATIENT
Start: 2021-11-02 | End: 2021-11-02

## 2021-11-02 RX ORDER — ACETAMINOPHEN 650 MG/1
650 SUPPOSITORY RECTAL EVERY 6 HOURS PRN
Status: DISCONTINUED | OUTPATIENT
Start: 2021-11-02 | End: 2021-11-03 | Stop reason: SDUPTHER

## 2021-11-02 RX ORDER — ONDANSETRON 2 MG/ML
4 INJECTION INTRAMUSCULAR; INTRAVENOUS ONCE
Status: COMPLETED | OUTPATIENT
Start: 2021-11-02 | End: 2021-11-02

## 2021-11-02 RX ORDER — POLYETHYLENE GLYCOL 3350 17 G/17G
17 POWDER, FOR SOLUTION ORAL DAILY PRN
Status: DISCONTINUED | OUTPATIENT
Start: 2021-11-02 | End: 2021-11-05 | Stop reason: HOSPADM

## 2021-11-02 RX ORDER — MORPHINE SULFATE 4 MG/ML
4 INJECTION, SOLUTION INTRAMUSCULAR; INTRAVENOUS ONCE
Status: DISCONTINUED | OUTPATIENT
Start: 2021-11-02 | End: 2021-11-05 | Stop reason: HOSPADM

## 2021-11-02 RX ORDER — SODIUM CHLORIDE 9 MG/ML
INJECTION, SOLUTION INTRAVENOUS CONTINUOUS
Status: DISCONTINUED | OUTPATIENT
Start: 2021-11-02 | End: 2021-11-05 | Stop reason: HOSPADM

## 2021-11-02 RX ORDER — ONDANSETRON 2 MG/ML
4 INJECTION INTRAMUSCULAR; INTRAVENOUS EVERY 6 HOURS PRN
Status: DISCONTINUED | OUTPATIENT
Start: 2021-11-02 | End: 2021-11-05 | Stop reason: HOSPADM

## 2021-11-02 RX ADMIN — SODIUM CHLORIDE 1000 ML: 9 INJECTION, SOLUTION INTRAVENOUS at 14:11

## 2021-11-02 RX ADMIN — MORPHINE SULFATE 2 MG: 2 INJECTION, SOLUTION INTRAMUSCULAR; INTRAVENOUS at 21:45

## 2021-11-02 RX ADMIN — ONDANSETRON 4 MG: 2 INJECTION INTRAMUSCULAR; INTRAVENOUS at 21:45

## 2021-11-02 RX ADMIN — WATER 2000 MG: 1 INJECTION INTRAMUSCULAR; INTRAVENOUS; SUBCUTANEOUS at 16:06

## 2021-11-02 RX ADMIN — ONDANSETRON 4 MG: 2 INJECTION INTRAMUSCULAR; INTRAVENOUS at 14:11

## 2021-11-02 RX ADMIN — SODIUM CHLORIDE 500 ML: 9 INJECTION, SOLUTION INTRAVENOUS at 22:43

## 2021-11-02 RX ADMIN — MORPHINE SULFATE 4 MG: 2 INJECTION, SOLUTION INTRAMUSCULAR; INTRAVENOUS at 14:11

## 2021-11-02 RX ADMIN — METRONIDAZOLE 500 MG: 500 INJECTION, SOLUTION INTRAVENOUS at 16:06

## 2021-11-02 RX ADMIN — SODIUM CHLORIDE 1000 ML: 9 INJECTION, SOLUTION INTRAVENOUS at 14:59

## 2021-11-02 ASSESSMENT — ENCOUNTER SYMPTOMS
ABDOMINAL PAIN: 1
COUGH: 0
SHORTNESS OF BREATH: 0
WHEEZING: 0
NAUSEA: 1
SINUS PRESSURE: 0
BACK PAIN: 0
DIARRHEA: 0
ABDOMINAL DISTENTION: 0
COLOR CHANGE: 0
VOMITING: 1
SORE THROAT: 0
CONSTIPATION: 0

## 2021-11-02 ASSESSMENT — PAIN SCALES - GENERAL
PAINLEVEL_OUTOF10: 7
PAINLEVEL_OUTOF10: 7
PAINLEVEL_OUTOF10: 10
PAINLEVEL_OUTOF10: 7
PAINLEVEL_OUTOF10: 8

## 2021-11-02 ASSESSMENT — PAIN DESCRIPTION - LOCATION
LOCATION: ABDOMEN
LOCATION: ABDOMEN;CHEST

## 2021-11-02 ASSESSMENT — PAIN DESCRIPTION - DESCRIPTORS: DESCRIPTORS: ACHING;DISCOMFORT;TIGHTNESS

## 2021-11-02 ASSESSMENT — PAIN DESCRIPTION - PAIN TYPE
TYPE: CHRONIC PAIN
TYPE: ACUTE PAIN

## 2021-11-02 NOTE — H&P
[facility] History and Physical    Admission Date  11/2/2021 12:12 PM  Chief Complaint Abd pain    Subjective  History of Present Illness  58L PMH alcoholic cirrhosis, marijuana abuse, and anemia presents to ED w 4d of RUQ abd pain. Had [address] 10/28 showed hepatopathy (diffuse steatosis vs cirrhosis) as well as GB wall thickening. CT next day showed peripancreatic edema, CBD @ 9mm, enlarged liver, GB w pericholecystic fluid. Seen at [facility] thereafter, dx w cholecystitis, left AMA as she did not want to wait in hopsital over the weekend. Returns to ED here today w continued abd pain, dec appetite, abd fullness, nubmness of the abdomen and apparently of the legs as well. Here pt tachy w , lactic acid 4.5, bili 1.3 w AST 63, alk phos 246. WBC was 16, hgb 10.6.  UA showed bacteria, leuk esterase. CXR showed L effusion. RUQ US \"most compatible with acalculus cholecystitis. \"  ED staff spoke w Dr. Marina Ramon w general surgery who will be providing consultation. Review of Systems - 12-point review of systems has been reviewed and is otherwise negative except as listed in the HPI    Past Medical History:   Diagnosis Date    Anemia     Bipolar disorder current episode depressed ([address]     Therapy    Depression     ETOH abuse     Humerus fracture 04/2021    left- For OR 5-12-21    Major depression, recurrent ([address]     Therapy -stable per pat    Mild tetrahydrocannabinol (THC) abuse     chronic     Protein calorie malnutrition ([address]     severe     Past Surgical History:   Procedure Laterality Date    HUMERUS FRACTURE SURGERY Left 5/12/2021    LEFT PROXIMAL HUMERUS OPEN REDUCTION INTERNAL FIXATION performed by Neeraj Varghese MD at [facility] Bilateral 2000   [address]       Prior to Admission medications    Medication Sig Start Date End Date Taking?  Authorizing Provider   ibuprofen (ADVIL;MOTRIN) 800 MG tablet Take 1 tablet by mouth 3 times daily as needed for Pain 8/4/21   Luz Bettencourt MD   traZODone (DESYREL) 50 MG tablet Take 1 tablet by mouth nightly as needed for Sleep 5/20/21   Lisbet Yousif MD   gabapentin (NEURONTIN) 300 MG capsule Take 1 capsule by mouth nightly for 30 days. Intended supply: 30 days 5/20/21 6/19/21  Luz Bettencourt MD     Patient has no known allergies. reports that she has been smoking cigarettes. She started smoking about 18 years ago. She has a 15.00 pack-year smoking history. She has quit using smokeless tobacco. She reports current alcohol use of about 70.0 standard drinks of alcohol per week. She reports current drug use. Frequency: 1.00 time per week. Drug: Marijuana Ursula Luis). family history is not on file. Objective  Physical Exam   General: well-developed, well-nourished, no acute distress, cooperative  Skin: warm, dry, intact, normal color without cyanosis  HEENT: normocephalic, atraumatic, mucous membranes normal  Respiratory: clear to auscultation bilaterally without respiratory distress  Cardiovascular: regular rate and rhythm without murmur / rub / gallop  Abdominal: soft, nontender, nondistended, normoactive bowel sounds  Extremities: no mottling, pulses intact, no edema  Neurologic: awake, alert, no focal deficits  Psychiatric: normal affect, cooperative    *Available labs, imaging studies, microbiologic studies, cardiac studies have been reviewed    Assessment / Plan  Abd pain, concern for acalculus cholecystitis - RUQ pain, bili was 1.3. PTA had US and CT that were c/w cholecystitis. Here today US most compatible w acalculus cholecystitis; ED spoke with general surgery who will be providing consultation. Would make NPO after midnight and hold chemical DVT ppx. Analgesia and anti-emetics prn.   Rocephin and Flagyl were given in ED though unsure abx are really indicated - will check procal, CRP, cultures, and ask ID for input    EtOH cirrhosis - seen by Dr. Ham Swartz in past; given the multiple GI issues we are currently facing will ask for their input. Does not appear decompensated at this time. Not on lactulose / rifaximin at home. Watch CMP      Lactic acidosis - lactic 4.5 though CO2 25 w gap 13; IVF and re-check in AM    Anemia, ?chronic - hgb is 10.6 here, EMR review shows some anemia over the past few years but normal prior to that. No reports of active bleed, will monitor CBC. Keeping off chemical DVT ppx in case of OR    Abnormal UA - w many bacteria / +leuk esterase. Check blood and urine cx. Pt could meet septic criteria w HR and WBC though these could just as easily be reactive. Check procal and monitor for urinary symptoms, monitor off abx for now though she did already get a dose of Rocephin and Flagyl    Doubt pancreatitis - CT 10/29 showed peripancreatic fluid but lipase 33 today.   No hx of chronic pancreatitis so currently feel this is not the issue    Code status  Full  DVT prophylaxis SCDs  Disposition  Home when ready    Electronically signed by Amie Rm DO on 11/2/2021 at 4:49 PM

## 2021-11-02 NOTE — ED PROVIDER NOTES
[facility] MED SURG/TELE  EMERGENCY DEPARTMENT ENCOUNTER      Pt Name: Benny Martinez  MRN: [id number]  [place] 1985  Date of evaluation: 11/2/2021      CHIEF COMPLAINT       Chief Complaint   Patient presents with    Abdominal Pain    Numbness     both lower extremities and abdomen to chest    Abnormal Lab     low potassium        HPI  Benny Martinez is a 43 y.o. female history of bipolar disorder, alcohol abuse, marijuana abuse presents complaints of right upper quadrant abdominal pain for 4 days. Patient states that she was recently seen at [facility] emergency room and from CT imaging she had cholecystitis. She signed herself [address] as she did not want to wait in the hospital until Monday for a cholecystectomy. Since then she has been anorexic. Describes her abdominal pain as moderate in severity, dull, aching, waxing and waning right upper quadrant focally located. No alleviating or exacerbating factors. She has not taken any medications or measures alleviate her symptoms. Admits to nausea, vomiting, and anomsia. Denies any fevers, chills, chest pain, shortness of breath, flank pain, dysuria, hematuria, diarrhea, constipation, new rashes or sores. States her last drink of alcohol was 1 month prior. Except as noted above the remainder of the review of systems was reviewed and negative. Review of Systems   Constitutional: Negative for chills and fever. HENT: Negative for ear pain, sinus pressure and sore throat. Eyes: Negative for visual disturbance. Respiratory: Negative for cough, shortness of breath and wheezing. Cardiovascular: Negative for chest pain, palpitations and leg swelling. Gastrointestinal: Positive for abdominal pain, nausea and vomiting. Negative for abdominal distention, constipation and diarrhea. Endocrine: Negative for polyuria. Genitourinary: Negative for dysuria and frequency. Musculoskeletal: Negative for arthralgias and back pain. appropriately. Procedure was preformed under live ultrasound for guidance. The patient tolerated the procedure well. Complications: None          MDM  Number of Diagnoses or Management Options  Acalculous cholecystitis  Acute cystitis with hematuria  Lactic acidosis  Diagnosis management comments: 40-year-old female history of alcohol abuse, marijuana abuse, was recently seen at an outside facility stated that she had cholecystitis and required surgery signed herself [address] presents with complaint of right upper quadrant abdominal pain, a total body numbness, and abnormal labs. Vitals significant for tachycardia. On physical exam patient is in no acute distress, speaking full sounds, alert and oriented x3. Abdomen moderately tender patient palpation right upper quadrant. No rebound or guarding. Positive Lemus sign. Negative CVA tenderness bilaterally. Diagnostic labs and imaging interpreted reviewed. Patient has cirrhosis of the liver. . Bilirubin 1.3. She does have a lactic acidosis of 4.5. Per imaging from radiology concern for acalculus cholecystitis. Patient was given morphine, Zofran, Rocephin, Flagyl and 1 L of normal saline in the department with moderate relief of her symptoms. Spoke with general surgery who will consult on patient. Patient did not the hospital for liver cirrhosis to internal medicine. Patient informed of all the results evaluation she is agreeable to plan. Amount and/or Complexity of Data Reviewed  Decide to obtain previous medical records or to obtain history from someone other than the patient: yes         ED Course as of Nov 02 2225   Tue Nov 02, 2021   1404 Ultrasound-guided IV performed. Left AC. 20-gauge. [JV]   1534 COVID-19 [JV]   1609 Acalculus cholecystitis. 6mm bile duct dilatation   US GALLBLADDER RUQ [JV]   1 Spoke with general surgery who will consult on patient. No other interventions at this time.   Spoke with RDW 14.9 11.5 - 15.0 fL    Platelets 074 813 - 236 E9/L    MPV 9.7 7.0 - 12.0 fL    Neutrophils % 74.1 43.0 - 80.0 %    Lymphocytes % 16.4 (L) 20.0 - 42.0 %    Monocytes % 8.6 2.0 - 12.0 %    Eosinophils % 0.0 0.0 - 6.0 %    Basophils % 0.9 0.0 - 2.0 %    Neutrophils Absolute 11.91 (H) 1.80 - 7.30 E9/L    Lymphocytes Absolute 2.58 1.50 - 4.00 E9/L    Monocytes Absolute 1.45 (H) 0.10 - 0.95 E9/L    Eosinophils Absolute 0.00 (L) 0.05 - 0.50 E9/L    Basophils Absolute 0.14 0.00 - 0.20 E9/L    nRBC 0.0 /100 WBC    Toxic Granulation 1+     Vacuolated Neutrophils 1+     Anisocytosis 1+     Polychromasia 2+     Hypochromia 1+     Poikilocytes 1+     Stomatocytes 1+     Target Cells 1+    Comprehensive Metabolic Panel   Result Value Ref Range    Sodium 134 132 - 146 mmol/L    Potassium 3.8 3.5 - 5.0 mmol/L    Chloride 96 (L) 98 - 107 mmol/L    CO2 25 22 - 29 mmol/L    Anion Gap 13 7 - 16 mmol/L    Glucose 108 (H) 74 - 99 mg/dL    BUN 2 (L) 6 - 20 mg/dL    CREATININE 0.4 (L) 0.5 - 1.0 mg/dL    GFR Non-African American >60 >=60 mL/min/1.73    GFR African American >60     Calcium 8.7 8.6 - 10.2 mg/dL    Total Protein 6.4 6.4 - 8.3 g/dL    Albumin 2.6 (L) 3.5 - 5.2 g/dL    Total Bilirubin 1.3 (H) 0.0 - 1.2 mg/dL    Alkaline Phosphatase 246 (H) 35 - 104 U/L    ALT 15 0 - 32 U/L    AST 63 (H) 0 - 31 U/L   Protime-INR   Result Value Ref Range    Protime 11.9 9.3 - 12.4 sec    INR 1.1    Magnesium   Result Value Ref Range    Magnesium 1.9 1.6 - 2.6 mg/dL   Lactic Acid, Plasma   Result Value Ref Range    Lactic Acid 4.5 (HH) 0.5 - 2.2 mmol/L   Lipase   Result Value Ref Range    Lipase 33 13 - 60 U/L   Urinalysis   Result Value Ref Range    Color, UA Yellow Straw/Yellow    Clarity, UA Clear Clear    Glucose, Ur Negative Negative mg/dL    Bilirubin Urine MODERATE (A) Negative    Ketones, Urine TRACE (A) Negative mg/dL    Specific Gravity, UA 1.015 1.005 - 1.030    Blood, Urine TRACE-LYSED Negative    pH, UA 6.0 5.0 - 9.0 Protein, UA TRACE Negative mg/dL    Urobilinogen, Urine 0.2 <2.0 E.U./dL    Nitrite, Urine Negative Negative    Leukocyte Esterase, Urine SMALL (A) Negative   APTT   Result Value Ref Range    aPTT 31.2 24.5 - 35.1 sec   Brain Natriuretic Peptide   Result Value Ref Range    Pro- (H) 0 - 125 pg/mL   Troponin   Result Value Ref Range    Troponin, High Sensitivity 11 (H) 0 - 9 ng/L   Ammonia   Result Value Ref Range    Ammonia 30.5 11.0 - 51.0 umol/L   Microscopic Urinalysis   Result Value Ref Range    Mucus, UA Present (A) None Seen /LPF    WBC, UA 1-3 0 - 5 /HPF    RBC, UA NONE 0 - 2 /HPF    Epithelial Cells, UA FEW /HPF    Bacteria, UA MANY (A) None Seen /HPF   POC Pregnancy Urine   Result Value Ref Range    HCG, Urine, POC Negative Negative    Lot Number [id number]     Positive QC Pass/Fail Pass     Negative QC Pass/Fail Pass    EKG 12 Lead   Result Value Ref Range    Ventricular Rate 102 BPM    Atrial Rate 102 BPM    P-R Interval 124 ms    QRS Duration 70 ms    Q-T Interval 380 ms    QTc Calculation (Bazett) 495 ms    P Axis 64 degrees    R Axis 80 degrees    T Axis 60 degrees       RADIOLOGY:  US GALLBLADDER RUQ   Final Result   Findings most compatible with acalculus cholecystitis. A very distal CBD   stone is not excluded. There is pericholecystic fluid and some upper   abdominal ascites. There are no gallstones. .      Fatty infiltration liver         XR CHEST (2 VW)   Final Result   Findings most compatible with new small left pleural effusion         NM HEPATOBILIARY    (Results Pending)       EKG: This EKG is signed and interpreted by me. Heart rate 102. Sinus tachycardia. Normal axis deviation. QTc prolonged. No ST elevations or depressions.   Stable as compared to previous EKG.      ------------------------- NURSING NOTES AND VITALS REVIEWED ---------------------------  Date / Time Roomed:  11/2/2021 12:12 PM  ED Bed Assignment:  [id number]    The nursing notes within the ED encounter and

## 2021-11-02 NOTE — CONSULTS
Prior to Admission medications    Medication Sig Start Date End Date Taking? Authorizing Provider   ibuprofen (ADVIL;MOTRIN) 800 MG tablet Take 1 tablet by mouth 3 times daily as needed for Pain 8/4/21   Ramin Owusu MD   traZODone (DESYREL) 50 MG tablet Take 1 tablet by mouth nightly as needed for Sleep 5/20/21   Marj Yuan MD   gabapentin (NEURONTIN) 300 MG capsule Take 1 capsule by mouth nightly for 30 days. Intended supply: 30 days 5/20/21 6/19/21  Ramin Owusu MD       No Known Allergies    No family history on file. Social History     Tobacco Use    Smoking status: Current Every Day Smoker     Packs/day: 1.00     Years: 15.00     Pack years: 15.00     Types: Cigarettes     Start date: 10/3/2003    Smokeless tobacco: Former User   Vaping Use    Vaping Use: Former   Substance Use Topics    Alcohol use: Yes     Alcohol/week: 70.0 standard drinks     Types: 70 Cans of beer per week     Comment: 12 packs    Drug use: Yes     Frequency: 1.0 times per week     Types: Marijuana Conklin Keiry)     Comment: daily         Review of Systems   General ROS: negative  Hematological and Lymphatic ROS: negative  Respiratory ROS: negative  Cardiovascular ROS: negative  Gastrointestinal ROS: positive for - abdominal pain, diarrhea and nausea/vomiting  Genito-Urinary ROS: negative  Musculoskeletal ROS: negative      PHYSICAL EXAM:    Vitals:    11/02/21 1704   BP: 122/80   Pulse: 52   Resp: 16   Temp: 98.2 °F (36.8 °C)   SpO2: 100%       General Appearance:  awake, alert, oriented, in no acute distress  Skin:  Skin color, texture, turgor normal. No rashes or lesions. Head/face:  NCAT  Eyes:  No gross abnormalities. Lungs:  Normal respiratory effort on room air  Heart:  Heart regular rate and rhythm  Abdomen:  Soft, mildly distended, non-tender to palpation, including RUQ, no rebound or guarding  Extremities: Extremities warm to touch, pink, with no edema.   Female Rectal: Rectal exam deferred    LABS:  CBC  Recent Labs     11/02/21  1358   WBC 16.1*   HGB 10.6*   HCT 31.2*        BMP  Recent Labs     11/02/21  1358      K 3.8   CL 96*   CO2 25   BUN 2*   CREATININE 0.4*   CALCIUM 8.7     Liver Function  Recent Labs     11/02/21  1358   LIPASE 33   BILITOT 1.3*   AST 63*   ALT 15   ALKPHOS 246*   PROT 6.4   LABALBU 2.6*     No results for input(s): LACTATE in the last 72 hours. Recent Labs     11/02/21  1358   INR 1.1       RADIOLOGY  XR CHEST (2 VW)    Result Date: 11/2/2021  EXAMINATION: TWO XRAY VIEWS OF THE CHEST 11/2/2021 12:51 pm COMPARISON: 08/06/2019 HISTORY: ORDERING SYSTEM PROVIDED HISTORY: cp TECHNOLOGIST PROVIDED HISTORY: Reason for exam:->cp Open reduction internal fixation of proximal left humerus fracture 05/12/2021 FINDINGS: There is new density in the lateral and posterior left costophrenic recess. This is most compatible with pleural effusion. No right pleural effusion. No pneumothorax. No new pulmonary consolidation. Normal cardiac silhouette size without vascular congestion. No acute displaced fracture. New hardware partially visualized in proximal left humerus compatible with history of left humerus fracture repair. Findings most compatible with new small left pleural effusion     US GALLBLADDER RUQ    Result Date: 11/2/2021  EXAMINATION: RIGHT UPPER QUADRANT ULTRASOUND 11/2/2021 3:46 pm COMPARISON: None. HISTORY: ORDERING SYSTEM PROVIDED HISTORY: ruq abdominal pain TECHNOLOGIST PROVIDED HISTORY: Reason for exam:->ruq abdominal pain What reading provider will be dictating this exam?->CRC FINDINGS: LIVER:  The liver demonstrates increased echogenicity without evidence of intrahepatic biliary ductal dilatation. BILIARY SYSTEM:  Gallbladder has wall thickening, significant sludge and some pericholecystic fluid. Alyssa Gull Positive sonographic Lemus's sign. Common bile duct is upper limits measuring 6 mm.  RIGHT KIDNEY: The right kidney is grossly unremarkable without evidence of hydronephrosis. It measures 10.2 x 5.1 cm. PANCREAS:  Visualized portions of the pancreas are unremarkable. OTHER: There is small to moderate amount amount of right upper quadrant ascites. Findings most compatible with acalculus cholecystitis. A very distal CBD stone is not excluded. There is pericholecystic fluid and some upper abdominal ascites. There are no gallstones. . Fatty infiltration liver         ASSESSMENT:  43 y.o. female with history of alcoholic cirrhosis, now presenting with concerns for gallbladder pathology.     PLAN:  - HIDA scan to r/o acute juana  - trend LFTs  - NPO + mIVFs  - GI consult as Dr. Ham Swartz has seen her in the past for her alcoholic cirrhosis  - prn pain control   - prn anti-emetics  - rest of care per primary team     Electronically signed by Clarence Farrell MD on 11/2/21 at 5:30 PM EDT

## 2021-11-03 ENCOUNTER — APPOINTMENT (OUTPATIENT)
Dept: MRI IMAGING | Age: 36
DRG: 280 | End: 2021-11-03
Payer: MEDICAID

## 2021-11-03 PROBLEM — E43 SEVERE PROTEIN-CALORIE MALNUTRITION (HCC): Chronic | Status: ACTIVE | Noted: 2021-11-03

## 2021-11-03 LAB
ALBUMIN SERPL-MCNC: 2.2 G/DL (ref 3.5–5.2)
ALP BLD-CCNC: 214 U/L (ref 35–104)
ALT SERPL-CCNC: 11 U/L (ref 0–32)
ANION GAP SERPL CALCULATED.3IONS-SCNC: 11 MMOL/L (ref 7–16)
AST SERPL-CCNC: 62 U/L (ref 0–31)
BILIRUB SERPL-MCNC: 1.3 MG/DL (ref 0–1.2)
BUN BLDV-MCNC: 2 MG/DL (ref 6–20)
C-REACTIVE PROTEIN: 0.8 MG/DL (ref 0–0.4)
CALCIUM SERPL-MCNC: 7.7 MG/DL (ref 8.6–10.2)
CHLORIDE BLD-SCNC: 103 MMOL/L (ref 98–107)
CO2: 22 MMOL/L (ref 22–29)
CREAT SERPL-MCNC: 0.4 MG/DL (ref 0.5–1)
EKG ATRIAL RATE: 102 BPM
EKG P AXIS: 64 DEGREES
EKG P-R INTERVAL: 124 MS
EKG Q-T INTERVAL: 380 MS
EKG QRS DURATION: 70 MS
EKG QTC CALCULATION (BAZETT): 495 MS
EKG R AXIS: 80 DEGREES
EKG T AXIS: 60 DEGREES
EKG VENTRICULAR RATE: 102 BPM
GFR AFRICAN AMERICAN: >60
GFR NON-AFRICAN AMERICAN: >60 ML/MIN/1.73
GLUCOSE BLD-MCNC: 82 MG/DL (ref 74–99)
HCT VFR BLD CALC: 30.5 % (ref 34–48)
HEMOGLOBIN: 10.3 G/DL (ref 11.5–15.5)
LACTIC ACID: 1.4 MMOL/L (ref 0.5–2.2)
MCH RBC QN AUTO: 40.7 PG (ref 26–35)
MCHC RBC AUTO-ENTMCNC: 33.8 % (ref 32–34.5)
MCV RBC AUTO: 120.6 FL (ref 80–99.9)
PDW BLD-RTO: 14.6 FL (ref 11.5–15)
PLATELET # BLD: 296 E9/L (ref 130–450)
PMV BLD AUTO: 10.2 FL (ref 7–12)
POTASSIUM SERPL-SCNC: 3.4 MMOL/L (ref 3.5–5)
PROCALCITONIN: 0.22 NG/ML (ref 0–0.08)
RBC # BLD: 2.53 E12/L (ref 3.5–5.5)
SODIUM BLD-SCNC: 136 MMOL/L (ref 132–146)
TOTAL PROTEIN: 5.7 G/DL (ref 6.4–8.3)
WBC # BLD: 14.3 E9/L (ref 4.5–11.5)

## 2021-11-03 PROCEDURE — 86140 C-REACTIVE PROTEIN: CPT

## 2021-11-03 PROCEDURE — 6370000000 HC RX 637 (ALT 250 FOR IP): Performed by: INTERNAL MEDICINE

## 2021-11-03 PROCEDURE — 99232 SBSQ HOSP IP/OBS MODERATE 35: CPT | Performed by: INTERNAL MEDICINE

## 2021-11-03 PROCEDURE — 36415 COLL VENOUS BLD VENIPUNCTURE: CPT

## 2021-11-03 PROCEDURE — 1200000000 HC SEMI PRIVATE

## 2021-11-03 PROCEDURE — 80053 COMPREHEN METABOLIC PANEL: CPT

## 2021-11-03 PROCEDURE — 2580000003 HC RX 258: Performed by: INTERNAL MEDICINE

## 2021-11-03 PROCEDURE — 93010 ELECTROCARDIOGRAM REPORT: CPT | Performed by: INTERNAL MEDICINE

## 2021-11-03 PROCEDURE — 84145 PROCALCITONIN (PCT): CPT

## 2021-11-03 PROCEDURE — 83605 ASSAY OF LACTIC ACID: CPT

## 2021-11-03 PROCEDURE — 85027 COMPLETE CBC AUTOMATED: CPT

## 2021-11-03 PROCEDURE — 6360000002 HC RX W HCPCS: Performed by: INTERNAL MEDICINE

## 2021-11-03 PROCEDURE — 87088 URINE BACTERIA CULTURE: CPT

## 2021-11-03 RX ORDER — PANTOPRAZOLE SODIUM 40 MG/1
40 TABLET, DELAYED RELEASE ORAL
Status: DISCONTINUED | OUTPATIENT
Start: 2021-11-04 | End: 2021-11-05 | Stop reason: HOSPADM

## 2021-11-03 RX ORDER — LORAZEPAM 0.5 MG/1
0.5 TABLET ORAL EVERY 6 HOURS PRN
Status: DISCONTINUED | OUTPATIENT
Start: 2021-11-03 | End: 2021-11-03

## 2021-11-03 RX ORDER — LORAZEPAM 1 MG/1
1 TABLET ORAL
Status: DISCONTINUED | OUTPATIENT
Start: 2021-11-03 | End: 2021-11-05

## 2021-11-03 RX ORDER — LORAZEPAM 1 MG/1
4 TABLET ORAL
Status: DISCONTINUED | OUTPATIENT
Start: 2021-11-03 | End: 2021-11-05

## 2021-11-03 RX ORDER — ACETAMINOPHEN 325 MG/1
650 TABLET ORAL EVERY 6 HOURS PRN
Status: DISCONTINUED | OUTPATIENT
Start: 2021-11-03 | End: 2021-11-05 | Stop reason: HOSPADM

## 2021-11-03 RX ORDER — LORAZEPAM 1 MG/1
3 TABLET ORAL
Status: DISCONTINUED | OUTPATIENT
Start: 2021-11-03 | End: 2021-11-05

## 2021-11-03 RX ORDER — NICOTINE 21 MG/24HR
1 PATCH, TRANSDERMAL 24 HOURS TRANSDERMAL DAILY
Status: DISCONTINUED | OUTPATIENT
Start: 2021-11-03 | End: 2021-11-05 | Stop reason: HOSPADM

## 2021-11-03 RX ORDER — ACETAMINOPHEN 650 MG/1
650 SUPPOSITORY RECTAL EVERY 6 HOURS PRN
Status: DISCONTINUED | OUTPATIENT
Start: 2021-11-03 | End: 2021-11-05 | Stop reason: HOSPADM

## 2021-11-03 RX ORDER — LORAZEPAM 2 MG/ML
2 INJECTION INTRAMUSCULAR
Status: DISCONTINUED | OUTPATIENT
Start: 2021-11-03 | End: 2021-11-05

## 2021-11-03 RX ORDER — LORAZEPAM 2 MG/ML
4 INJECTION INTRAMUSCULAR
Status: DISCONTINUED | OUTPATIENT
Start: 2021-11-03 | End: 2021-11-05

## 2021-11-03 RX ORDER — LORAZEPAM 2 MG/ML
3 INJECTION INTRAMUSCULAR
Status: DISCONTINUED | OUTPATIENT
Start: 2021-11-03 | End: 2021-11-05

## 2021-11-03 RX ORDER — LORAZEPAM 2 MG/ML
1 INJECTION INTRAMUSCULAR
Status: DISCONTINUED | OUTPATIENT
Start: 2021-11-03 | End: 2021-11-05

## 2021-11-03 RX ORDER — ALPRAZOLAM 1 MG/1
1 TABLET ORAL EVERY 6 HOURS PRN
Status: DISCONTINUED | OUTPATIENT
Start: 2021-11-03 | End: 2021-11-05

## 2021-11-03 RX ORDER — LORAZEPAM 1 MG/1
2 TABLET ORAL
Status: DISCONTINUED | OUTPATIENT
Start: 2021-11-03 | End: 2021-11-05

## 2021-11-03 RX ADMIN — MORPHINE SULFATE 2 MG: 2 INJECTION, SOLUTION INTRAMUSCULAR; INTRAVENOUS at 12:22

## 2021-11-03 RX ADMIN — MORPHINE SULFATE 2 MG: 2 INJECTION, SOLUTION INTRAMUSCULAR; INTRAVENOUS at 09:48

## 2021-11-03 RX ADMIN — MORPHINE SULFATE 2 MG: 2 INJECTION, SOLUTION INTRAMUSCULAR; INTRAVENOUS at 15:18

## 2021-11-03 RX ADMIN — MORPHINE SULFATE 2 MG: 2 INJECTION, SOLUTION INTRAMUSCULAR; INTRAVENOUS at 22:21

## 2021-11-03 RX ADMIN — ALPRAZOLAM 1 MG: 1 TABLET ORAL at 22:23

## 2021-11-03 RX ADMIN — MORPHINE SULFATE 2 MG: 2 INJECTION, SOLUTION INTRAMUSCULAR; INTRAVENOUS at 19:53

## 2021-11-03 RX ADMIN — MORPHINE SULFATE 2 MG: 2 INJECTION, SOLUTION INTRAMUSCULAR; INTRAVENOUS at 04:12

## 2021-11-03 RX ADMIN — SODIUM CHLORIDE, PRESERVATIVE FREE 1000 MG: 5 INJECTION INTRAVENOUS at 15:18

## 2021-11-03 RX ADMIN — ALPRAZOLAM 1 MG: 1 TABLET ORAL at 15:17

## 2021-11-03 RX ADMIN — Medication 6 MG: at 00:13

## 2021-11-03 RX ADMIN — MORPHINE SULFATE 2 MG: 2 INJECTION, SOLUTION INTRAMUSCULAR; INTRAVENOUS at 00:16

## 2021-11-03 RX ADMIN — MORPHINE SULFATE 2 MG: 2 INJECTION, SOLUTION INTRAMUSCULAR; INTRAVENOUS at 06:55

## 2021-11-03 ASSESSMENT — PAIN DESCRIPTION - FREQUENCY
FREQUENCY: CONTINUOUS

## 2021-11-03 ASSESSMENT — PAIN DESCRIPTION - ONSET
ONSET: ON-GOING

## 2021-11-03 ASSESSMENT — PAIN SCALES - GENERAL
PAINLEVEL_OUTOF10: 0
PAINLEVEL_OUTOF10: 6
PAINLEVEL_OUTOF10: 7
PAINLEVEL_OUTOF10: 0
PAINLEVEL_OUTOF10: 6
PAINLEVEL_OUTOF10: 6
PAINLEVEL_OUTOF10: 7
PAINLEVEL_OUTOF10: 6
PAINLEVEL_OUTOF10: 0
PAINLEVEL_OUTOF10: 6
PAINLEVEL_OUTOF10: 8
PAINLEVEL_OUTOF10: 0
PAINLEVEL_OUTOF10: 7

## 2021-11-03 ASSESSMENT — PAIN DESCRIPTION - LOCATION
LOCATION: ABDOMEN

## 2021-11-03 ASSESSMENT — PAIN DESCRIPTION - PAIN TYPE
TYPE: ACUTE PAIN

## 2021-11-03 ASSESSMENT — PAIN DESCRIPTION - PROGRESSION
CLINICAL_PROGRESSION: NOT CHANGED

## 2021-11-03 ASSESSMENT — PAIN DESCRIPTION - ORIENTATION
ORIENTATION: RIGHT;LEFT;MID

## 2021-11-03 ASSESSMENT — PAIN - FUNCTIONAL ASSESSMENT
PAIN_FUNCTIONAL_ASSESSMENT: PREVENTS OR INTERFERES SOME ACTIVE ACTIVITIES AND ADLS

## 2021-11-03 ASSESSMENT — PAIN DESCRIPTION - DESCRIPTORS
DESCRIPTORS: ACHING;SORE
DESCRIPTORS: ACHING;SORE;DISCOMFORT
DESCRIPTORS: ACHING;DISCOMFORT;SORE
DESCRIPTORS: ACHING;SORE

## 2021-11-03 NOTE — CARE COORDINATION
Social Work / Discharge Planning : Patient admitted with Cholecystitis. . Await GI plan. AWait ID plan. Patient has substance abuse and psyche hx including in patient Psyche stay. Patient independent from the community and will return HOME at discharge. Patient has PCP as well as insurance. SW to follow.  Electronically signed by Sherolyn Canavan, LSW on 11/3/21 at 8:01 AM EDT

## 2021-11-03 NOTE — PROGRESS NOTES
MRI notified about patient's earring in her left ear that she is unable to remove and her pins/rods/screws in her L shoulder.

## 2021-11-03 NOTE — PROGRESS NOTES
Comprehensive Nutrition Assessment    Type and Reason for Visit:  Initial, Positive Nutrition Screen, Patient Education    Nutrition Recommendations/Plan: Continue Diet. Will Start Ensure High Pro ONS BID and Ensure Clear ONS Once Daily. Diet Education:  Per pt request, provided the pt (no family present at time of visit) written and verbal edu on High Calorie/Protein Diet MNT for healthy wt gain. Discussed importance of following Low Fat Diet however if recommended on d/c pending further testing. Handout and contact info provided. Nutrition Assessment:  Pt adm w/ worsening abd pain w/ n/v x ~4d pta. PMHX ETOH/HTC Abuse, Cirrhosis, Bipolar w/ h/o S.I.; adm w/ suspected Acute Cholecystitis w/ possible stone (pending HIDA). Pt at risk d/t Severe Malnutrition w/ severe wasting, poor intake and subjective wt loss d/t poor appetite w/ altered GI 2/2 ETOH Abuse Cirrhosis. Education provided per pt request.  Will Start ONS and monitor. Malnutrition Assessment:  Malnutrition Status:  Severe malnutrition    Context:  Chronic Illness     Findings of the 6 clinical characteristics of malnutrition:  Energy Intake:  7 - 75% or less estimated energy requirements for 1 month or longer  Weight Loss:  Unable to assess (2/2 poor EMR wt hx pta)     Body Fat Loss:  7 - Severe body fat loss Orbital, Triceps, Buccal region   Muscle Mass Loss:  7 - Severe muscle mass loss Temples (temporalis), Clavicles (pectoralis & deltoids), Hand (interosseous), Scapula (trapezius)  Fluid Accumulation:  No significant fluid accumulation     Strength:  Not Performed    Estimated Daily Nutrient Needs:  Energy (kcal):  30-35 kcals/kg= 4868-3427; Weight Used for Energy Requirements:  Current     Protein (g):  1.5-1.8 gm/kg= 70-85 (as tolerated w/ h/o cirrhosis);  Weight Used for Protein Requirements:  Current        Fluid (ml/day):  2248-8814; Method Used for Fluid Requirements:  1 ml/kcal      Nutrition Related Findings:  A&O, dentition WNL, tender/distended Abd, +BS, Gross Ascites, no edema, +I/O's      Wounds:  None       Current Nutrition Therapies:    ADULT DIET; Regular; Low Fat (less than or equal to 50 gm/day)  ADULT ORAL NUTRITION SUPPLEMENT; Breakfast, Dinner; Low Calorie/High Protein Oral Supplement  ADULT ORAL NUTRITION SUPPLEMENT; Lunch; Clear Liquid Oral Supplement    Anthropometric Measures:  · Height: 5' 3\" (160 cm)  · Current Body Weight: 104 lb (47.2 kg) (bed 11/3)   · Admission Body Weight: 104 lb (47.2 kg) (bed 11/3)    · Usual Body Weight:  (UTO UBW 2/2 poor EMR wt hx pta; pt states UBW ~105# and states ~20# wt loss x ~1.5 months however unable to confirm at this time d/t limited EMR wt hx pta)     · Ideal Body Weight: 115 lbs; % Ideal Body Weight     · BMI: 18.4  · Adjusted Body Weight:  ; No Adjustment   · Adjusted BMI:      · BMI Categories: Underweight (BMI less than 18.5)       Nutrition Diagnosis:   · Severe malnutrition, In context of chronic illness related to catabolic illness (2/2 ETOH Abuse w/ Cirrhosis) as evidenced by poor intake prior to admission, weight loss, severe loss of subcutaneous fat, severe muscle loss      Nutrition Interventions:   Food and/or Nutrient Delivery:  Continue Current Diet, Start Oral Nutrition Supplement (Continue Diet. Will Start Ensure High Pro ONS BID and Ensure Clear ONS Once Daily.)  Nutrition Education/Counseling:  Education completed   Coordination of Nutrition Care:  Continue to monitor while inpatient    Goals:  PO intake >50% of meals/ONS. Nutrition Monitoring and Evaluation:   Behavioral-Environmental Outcomes:  None Identified   Food/Nutrient Intake Outcomes:  Diet Advancement/Tolerance, Food and Nutrient Intake, Supplement Intake  Physical Signs/Symptoms Outcomes:  Biochemical Data, Diarrhea, GI Status, Nausea or Vomiting, Fluid Status or Edema, Nutrition Focused Physical Findings, Skin, Weight     Discharge Planning:     Too soon to determine     Electronically signed by Jaya Franco RD, LD on 11/3/21 at 12:22 PM EDT    Contact: ext [phone]

## 2021-11-03 NOTE — CONSULTS
Name:  Clifford Rod  :  1985  MRN:  [id number]  Room:  3439/7299-X  DOS:  11/3/2021    [facility]  [facility]  Dr. Yanira Masterson M.D. Dr. Wilton Hernandes M.D. KISHORE Jackson Dr., M.D. Dr. Tab Feldman D.O.     -NP Consult Note-    PCP:  Ivonne Lew MD  Admitting Physician:  Taras Walker DO  Consultants:  GI  Chief Complaint:    Chief Complaint   Patient presents with    Abdominal Pain    Numbness     both lower extremities and abdomen to chest    Abnormal Lab     low potassium     Reason for Consult:  Cirrhosis    History of Present Illness  Clifford Rod is a 43 y.o. female on consult for cirrhosis. Patient initially presented to the hospital in [place] with complaints of nausea, vomiting, diarrhea, and muscle cramping starting about 2 weeks prior to arrival.  She states that she had stopped drinking alcohol about a week prior to onset of symptoms. She was diagnosed with acute cholecystitis. She was offered to stay in the hospital over the weekend and plan for cholecystectomy on Monday. She opted to sign out and return to the surgeon on Monday. She was seen by her surgeon who informed her they did not take her insurance and was directed to the ER in [facility]. She has persistent abdominal pain in the epigastrium and right upper abdomen. She reports associated nausea and vomiting. Emesis consisting initially of undigested food and liquid. She denies any hematemesis or emesis of coffee-ground material.  Nausea has improved. She is tolerating liquids currently. She was having diarrhea. Initially, diarrhea was described as brown and watery. Today, bowel movements are brown and soft. She denies any blood or melena. She denies fever, chills, or diaphoresis. She does not report any chest pain or shortness of breath. Patient has a history of heavy alcohol use. She states that she drinks a minimum of 3 beers daily.   She has stopped a few times in the past.  As stated above, most recently stopped drinking about 3 weeks ago. No prior episodes of pancreatitis or any issues with her gallbladder in the past.    PMH: GERD. PSH: Rotator cuff surgery. Tonsillectomy. EGD about a week ago. Will obtain records from our office. Family history: Paternal grandmother with gallbladder disease. Paternal aunt with gallbladder disease and kidney stones. Paternal uncle with gallbladder disease and kidney stones. Maternal aunt with cervical cancer. She is unaware of any other personal or family history of GI issues or malignancy. Social history: Patient reports that she currently smokes about 10 cigarettes/day. She quit drinking alcohol about 3 weeks ago. She states that previously she was drinking fairly heavily, at least 3 beers per day. She reports marijuana use. Otherwise denies current or past recreational or illicit drug use. On arrival, WBC 16.1, hemoglobin 10.6, hematocrit 31.2. Macrocytic. Platelets 026. BUN 2, creatinine 0.4, sodium 134, potassium 3.8, chloride 96, CO2 25, calcium 8.7. Lactic acid 4.5-1.4. Blood glucose 108. Magnesium 1.9. Mildly elevated troponin XI, proBNP 274. Alkaline phosphatase 246, ALT 15, AST 63, total bilirubin 1.3, lipase 33, ammonia 30.5. INR 1.1. Urinalysis showing WBC 1-3, RBC 0, small leukocyte Estrace, nitrite negative, moderate bilirubin, trace ketones. Ultrasound showed fatty liver, gallbladder with wall thickening, some sludge and pericholecystic fluid, positive Lemus sign, CBD 6 mm, small to moderate amount of right upper quadrant ascites. Chest x-ray showing small left pleural effusion. CT abdomen and pelvis 10/29/2021 at [facility] showing hepatomegaly with fatty infiltration, edema surrounding the pancreas extending along the colic gutter in the pelvis consistent with pancreatitis, no pseudocyst or abscess, CBD 9 mm, pericholecystic fluid.   Ultrasound 10/28/2021 at [facility] showing enlarged liver, suggestion of possible cirrhosis, cholelithiasis with no ductal dilation, perihepatic ascites and gallbladder wall thickening present. TRIAGE VITALS  BP: (!) 109/55, Temp: 97.5 °F (36.4 °C), Pulse: 89, Resp: 16, SpO2: 99 %    Vitals:    11/02/21 2200 11/03/21 0052 11/03/21 0945 11/03/21 0948   BP: 104/74  (!) 109/55    Pulse: 91 79 89    Resp: 16  16    Temp: 98.7 °F (37.1 °C)  97.5 °F (36.4 °C)    TempSrc: Oral  Oral    SpO2: 100%  99%    Weight:    104 lb (47.2 kg)   Height:             Histories  Past Medical History:   Diagnosis Date    Anemia     Bipolar disorder current episode depressed ([facility] [address]     Therapy    Depression     ETOH abuse     Humerus fracture 04/2021    left- For OR 5-12-21    Major depression, recurrent (HCC)     Therapy -stable per pat    Mild tetrahydrocannabinol (THC) abuse     chronic     Protein calorie malnutrition ([facility] [address]     severe     Past Surgical History:   Procedure Laterality Date    HUMERUS FRACTURE SURGERY Left 5/12/2021    LEFT PROXIMAL HUMERUS OPEN REDUCTION INTERNAL FIXATION performed by Nury Singh MD at [address] [place] [address] Bilateral 2000    TONSILLECTOMY       No family history on file. Home Medications  Prior to Admission medications    Medication Sig Start Date End Date Taking? Authorizing Provider   ibuprofen (ADVIL;MOTRIN) 800 MG tablet Take 1 tablet by mouth 3 times daily as needed for Pain 8/4/21   Nury Singh MD   traZODone (DESYREL) 50 MG tablet Take 1 tablet by mouth nightly as needed for Sleep 5/20/21   William Avalos MD   gabapentin (NEURONTIN) 300 MG capsule Take 1 capsule by mouth nightly for 30 days. Intended supply: 30 days 5/20/21 6/19/21  Nury Singh MD       Allergies  Patient has no known allergies.     Social Hx  Social History     Socioeconomic History    Marital status: Single     Spouse name: Not on file    Number of children: 0    Years of education: 15  Highest education level: Not on file   Occupational History    Not on file   Tobacco Use    Smoking status: Current Every Day Smoker     Packs/day: 1.00     Years: 15.00     Pack years: 15.00     Types: Cigarettes     Start date: 10/3/2003    Smokeless tobacco: Former User   Vaping Use    Vaping Use: Former   Substance and Sexual Activity    Alcohol use: Yes     Alcohol/week: 70.0 standard drinks     Types: 70 Cans of beer per week     Comment: 12 packs    Drug use: Yes     Frequency: 1.0 times per week     Types: Marijuana Candiss Littler)     Comment: daily    Sexual activity: Not on file   Other Topics Concern    Not on file   Social History Narrative    Not on file     Social Determinants of Health     Financial Resource Strain:     Difficulty of Paying Living Expenses:    Food Insecurity:     Worried About Running Out of Food in the Last Year:     Ran Out of Food in the Last Year:    Transportation Needs:     Lack of Transportation (Medical):  Lack of Transportation (Non-Medical):    Physical Activity:     Days of Exercise per Week:     Minutes of Exercise per Session:    Stress:     Feeling of Stress :    Social Connections:     Frequency of Communication with Friends and Family:     Frequency of Social Gatherings with Friends and Family:     Attends Jehov[employer] witness Services:     Active Member of Clubs or Organizations:     Attends Club or Organization Meetings:     Marital Status:    Intimate Partner Violence:     Fear of Current or Ex-Partner:     Emotionally Abused:     Physically Abused:     Sexually Abused:        Review of Systems  All bolded are positive; please see HPI  General:  Fever, chills, diaphoresis, fatigue, malaise, night sweats, weight loss  Psychological:  Anxiety, disorientation, hallucinations. ENT:  Epistaxis, headaches, vertigo, visual changes. Cardiovascular:  Chest pain, irregular heartbeats, palpitations, paroxysmal nocturnal dyspnea.   Respiratory:  Shortness of breath, coughing, sputum production, hemoptysis, wheezing, orthopnea. Gastrointestinal:  Nausea, vomiting, diarrhea, heartburn, constipation, abdominal pain, hematemesis, hematochezia, melena, acholic stools  Genito-Urinary:  Dysuria, urgency, frequency, hematuria  Musculoskeletal:  Joint pain, joint stiffness, joint swelling, muscle pain  Neurology:  Headache, focal neurological deficits, weakness, numbness, paresthesia  Derm:  Rashes, ulcers, excoriations, bruising  Extremities:  Decreased ROM, peripheral edema, mottling    Physical Examination  Vitals:  BP (!) 109/55   Pulse 89   Temp 97.5 °F (36.4 °C) (Oral)   Resp 16   Ht 5' 3\" (1.6 m)   Wt 104 lb (47.2 kg)   SpO2 99%   BMI 18.42 kg/m²   General Appearance:  awake, alert, and oriented to person, place, time, and purpose; appears stated age and cooperative; no apparent distress no labored breathing  HEENT:  NCAT; PERRL; conjunctiva pink, sclera clear  Neck:  no adenopathy, bruit, JVD, tenderness, masses, or nodules; supple, symmetrical, trachea midline, thyroid not enlarged  Lung:  clear to auscultation bilaterally; no use of accessory muscles; no rhonchi, rales, or crackles  Heart:  regular rate and regular rhythm without murmur, rub, or gallop  Abdomen:  soft, tender epigastrium and right abdomen, distended; normoactive bowel sounds; no organomegaly  Extremities:  extremities normal, atraumatic, no cyanosis or edema  Musculokeletal:  no joint swelling, no muscle tenderness. ROM normal in all joints of extremities.    Neurologic:  mental status A&Ox3, thought content appropriate; CN II-XII grossly intact; sensation intact, motor strength 5/5 globally; no slurred speech    Laboratory Data  Recent Results (from the past 24 hour(s))   CBC    Collection Time: 11/03/21  7:47 AM   Result Value Ref Range    WBC 14.3 (H) 4.5 - 11.5 E9/L    RBC 2.53 (L) 3.50 - 5.50 E12/L    Hemoglobin 10.3 (L) 11.5 - 15.5 g/dL    Hematocrit 30.5 (L) 34.0 - 48.0 %    .6 (H) new small left pleural effusion     CT ABDOMEN PELVIS W IV CONTRAST    Result Date: 10/29/2021                                      [address]                                             [address]                                              [phone]                                               CT Scan Report                                                  Signed    Patient: Shania Issa                                                                MR#: [id number]          : 1985                                                                [de-identified]            Age/Sex: 43 / F                                                                Admit Date: 10/29/21            Loc: ER                                                                                     Attending Dr:     Ordering Physician: Juvencio Carr MD   Date of Service: 10/29/21  Procedure(s): CT abdomen pelvis w con  Accession Number(s): [id number]    cc: Juvencio Carr MD              PHYSICIAN INDICATIONS:  Jaundice, weight loss, abdominal pain       Technique: Axial images through the abdomen and pelvis with intravenous contrast. CT Dose Index:   4.2 mGy. DLP: 374 mGy-cm. Nelma Slight. k=0.015 mSv/(mGy-cm)       Findings: The heart size is normal. The lung bases are clear. Liver is enlarged measuring 18 cm in length with decreased attenuation compatible with fatty   infiltration. Considerable edema and fluid surrounding the pancreas and extending along the colic gutters and the   pelvis suggesting acute pancreatitis. No pseudocyst or abscess. Common bile duct measures 9 mm and appears enlarged. Recommend MRCP to evaluate for underlying   stone. Gallbladder is present with pericholecystic fluid. The kidneys are unremarkable. No renal or utereral calculi or hydronephrosis.  The adrenal glands   are unremarkable. No adenopathy or mass lesion in the mesentery or retroperitoneum. The aorta is normal in caliber. Bowel is not distended. No inflammation around the appendix. Uterus is present. No adnexal mass. Bladder is unremarkable. No masses are seen in the pelvis. Impression:       CT Abdomen and Pelvis with contrast:   1. Considerable edema and fluid surrounding the pancreas and extending along the colic gutters and   the pelvis suggesting acute pancreatitis. No pseudocyst or abscess. 2. Common bile duct measures 9 mm and appears enlarged. Recommend MRCP to evaluate for underlying   stone. 3. Liver is enlarged measuring 18 cm in length with decreased attenuation compatible with fatty   infiltration. 4. Gallbladder is present with pericholecystic fluid. Dictated ByBradly Naranjo MD   DD/DT: 10/29/21 2115               Signed By: Alaina Adams MD 10/29/21 2115            : CHERY JIMENEZ GALLBLADDER RUQ    Result Date: 11/2/2021  EXAMINATION: RIGHT UPPER QUADRANT ULTRASOUND 11/2/2021 3:46 pm COMPARISON: None. HISTORY: ORDERING SYSTEM PROVIDED HISTORY: ruq abdominal pain TECHNOLOGIST PROVIDED HISTORY: Reason for exam:->ruq abdominal pain What reading provider will be dictating this exam?->CRC FINDINGS: LIVER:  The liver demonstrates increased echogenicity without evidence of intrahepatic biliary ductal dilatation. BILIARY SYSTEM:  Gallbladder has wall thickening, significant sludge and some pericholecystic fluid. Lawernce Roughen Positive sonographic Lemus's sign. Common bile duct is upper limits measuring 6 mm. RIGHT KIDNEY: The right kidney is grossly unremarkable without evidence of hydronephrosis. It measures 10.2 x 5.1 cm. PANCREAS:  Visualized portions of the pancreas are unremarkable. OTHER: There is small to moderate amount amount of right upper quadrant ascites. Findings most compatible with acalculus cholecystitis.   A very distal CBD stone is not excluded. There is pericholecystic fluid and some upper abdominal ascites. There are no gallstones. . Fatty infiltration liver     US ABDOMEN LIMITED Specify organ? LIVER, GALLBLADDER, PANCREAS    Result Date: 10/29/2021  EXAMINATION: RIGHT UPPER QUADRANT ULTRASOUND 10/28/2021 6:14 pm COMPARISON: None. HISTORY: ORDERING SYSTEM PROVIDED HISTORY: Abnormal results of liver function studies TECHNOLOGIST PROVIDED HISTORY: Specify organ?->LIVER Specify organ?->GALLBLADDER Specify organ?->PANCREAS What reading provider will be dictating this exam?->CRC FINDINGS: LIVER:  Enlarged with increased echogenicity somewhat heterogeneous BILIARY SYSTEM:  Unremarkable gallbladder other than borderline wall thickening Common bile duct is within normal limits measuring 4 mm. RIGHT KIDNEY: The right kidney is grossly unremarkable without evidence of hydronephrosis. PANCREAS:  Visualized portions of the pancreas are unremarkable. OTHER: Question perihepatic ascites     1. Findings suggestive of diffuse hepatic steatosis. However also cirrhosis is possible with perihepatic ascites and gallbladder wall thickening possibly present 2. No cholelithiasis or biliary dilation RECOMMENDATIONS: Consider CT or MRI for further clinical concern or persistence of symptoms. Assessment and Plan  Patient is a 43 y.o. female on consult for cirrhosis. 1.  Cirrhosis  -Initial MELD = 7, MELD-Na = 11  -Suspect secondary to alcohol abuse  -Viral serology negative 10/28/2021  -Elevated ferritin - check HFE genotype  -Normal AFP 4 (10/28/2021)  -Imaging with no remark of hepatic mass  -Recent EGD in our office - will obtain records  -No evidence of encephalopathy  -New ascites - see below    2.   Pancreatitis  -Suspect secondary to alcohol  -Ultrasound 11/2/2021 indicating that distal CBD stone cannot be excluded  -Obtain MRI/MRCP  -Lipase in normal range  -Persistent abdominal pain  -Recommend aggressive hydration  -None elevated calcium  -Obtain lipid panel  -Pain management per admitting    3. Ascites  -New onset ascites  -Distended abdomen  -Consider paracentesis - unlikely enough volume to obtain sample  -Obtain MRI/MRCP - rule out PVT    4. Cholecystitis  -Pain questionable cholecystitis versus pancreatitis  -Poor candidate for cholecystectomy secondary to risk for hepatic decompensation  -Pending HIDA scan  -Agree with antibiotics  -Consider percutaneous drainage if needed    5. GERD  -PPI daily  -Will obtain recent EGD reports from our office     6. Abdominal pain  -Likely secondary to cholecystitis versus pancreatitis  -Will obtain recent EGD reports from our office  -Pain management per admitting    7. Anemia  -Chronic  -Macrocytic  -No overt GI bleeding  -No evidence of iron deficiency  -Will obtain recent EGD report from our office  -Consider colonoscopy - likely as outpatient  -Monitor CBC daily    8. Alcohol abuse  -Reports alcohol cessation about 3 weeks ago  -Advised on need for permanent alcohol cessation  -Withdrawal management per admitting        PPI daily. Pending HIDA scan. Obtain MRI/MRCP. Recommend against cholecystectomy for risk of hepatic decompensation. Agree with antibiotics. Further orders will depend on patient course and results of testing. Thank you for the opportunity to participate in the care of Ms. Moustapha Woods.             Shelly Nash, BASIL - CNP  2:41 PM  11/3/2021

## 2021-11-03 NOTE — PROGRESS NOTES
[facility]) Hospitalist Progress Note    Admission Date  11/2/2021 12:12 PM  Chief Complaint Abd pain    Subjective  History of Present Illness  Seen at bedside w father present  Anxious / does not appear to be withdrawing  W L-sided abdominal pain  Not happy about HIDA being pushed to tomorrow or w low fat diet  No family present during my visit  No new issues identified today  Case was discussed with charge nurse    Review of Systems - 12-point review of systems has been reviewed and is otherwise negative except as listed in the HPI    Objective  Physical Exam  Vitals: BP (!) 109/55   Pulse 89   Temp 97.5 °F (36.4 °C) (Oral)   Resp 16   Ht 5' 3\" (1.6 m)   Wt 104 lb (47.2 kg)   SpO2 99%   BMI 18.42 kg/m²   General: well-developed, well-nourished, no acute distress, cooperative  Skin: warm, dry, intact, normal color without cyanosis  HEENT: normocephalic, atraumatic, mucous membranes normal  Respiratory: clear to auscultation bilaterally without respiratory distress  Cardiovascular: regular rate and rhythm without murmur / rub / gallop  Abdominal: soft, nontender, nondistended, normoactive bowel sounds  Extremities: no mottling, pulses intact, no edema  Neurologic: awake, alert, no focal deficits  Psychiatric: normal affect, cooperative    Assessment / Plan  Abd pain, concern for acalculus cholecystitis - RUQ pain, bili was 1.3. PTA had US and CT that were c/w cholecystitis. Here today US most compatible w acalculous cholecystitis. Surgery consulted, checking HIDA which can't be done til tomorrow. GI to see. Pt was given Rocephin and Flagyl in ED; CRP and procal were ordered on admission, don't see results yet. ID was consulted to help determine if pt even needs abx. EtOH cirrhosis - seen by Dr. Rossy Blunt in past; given the multiple GI issues we are currently facing will ask for their input. Does not appear decompensated at this time. Not on lactulose / rifaximin at home. Watch CMP.   CIWA for withdrawal which is not currently an issue     Lactic acidosis resolved - lactic 4.5 though CO2 25 w gap 13; IVF and now resolved     Anemia, ?chronic - hgb is 10.6 here, EMR review shows some anemia over the past few years but normal prior to that. No reports of active bleed, will monitor CBC. Keeping off chemical DVT ppx in case of OR     Abnormal UA - w many bacteria / +leuk esterase. Check blood and urine cx. Pt now w sx and Rocephin started       Doubt pancreatitis - CT 10/29 showed peripancreatic fluid but lipase 33 in ED.   No hx of chronic pancreatitis so currently feel this is not the issue     Code status  Full   DVT prophylaxis SCDs   Disposition  Home when ready    Electronically signed by Arabella Mattson DO on 11/3/2021 at 12:47 PM

## 2021-11-03 NOTE — CONSULTS
[address] [place] [address] [facility]  Consultation Note     Admit Date: 11/2/2021 12:12 PM    Reason for Consult:   Question if patient really warrants antibiotics. Abdominal pain likely due to GB + abnormal UA    Attending Physician:  Michel Jiménez DO    HISTORY OF PRESENT ILLNESS:             The history is obtained from extensive review of available past medical records. The patient is a 43 y.o. female who is not previously known to the ID service. The patient had gone to [facility] with a 4-day history of abdominal pain and had a CT suggesting cholecystitis. She did not want to wait for a cholecystectomy for next Monday so she is signed out [address] and came to [facility] on 11/2/2021. She was afebrile but tachycardic. Lactic acid was 4.5. AST and alk phos were slightly elevated. White count was 16.1. Ultrasound showed a calculus cholecystitis and possible distal common bile duct stone. She was seen by surgery. They ordered a HIDA scan. I also think her symptoms are as of result of her underlying liver disease.     Past Medical History:        Diagnosis Date    Anemia     Bipolar disorder current episode depressed ([address]     Therapy    Depression     ETOH abuse     Humerus fracture 04/2021    left- For OR 5-12-21    Major depression, recurrent (HCC)     Therapy -stable per pat    Mild tetrahydrocannabinol (THC) abuse     chronic     Protein calorie malnutrition ([address]     severe     Past Surgical History:        Procedure Laterality Date    HUMERUS FRACTURE SURGERY Left 5/12/2021    LEFT PROXIMAL HUMERUS OPEN REDUCTION INTERNAL FIXATION performed by Deanna Sifuentes MD at [facility] Bilateral 2000    TONSILLECTOMY       Current Medications:   Scheduled Meds:   nicotine  1 patch TransDERmal Daily    cefTRIAXone (ROCEPHIN) IV  1,000 mg IntraVENous Q24H    morphine  4 mg IntraVENous Once    sodium chloride flush  5-40 mL IntraVENous 2 times per day     Continuous Infusions:   sodium chloride      sodium chloride 100 mL/hr at 11/03/21 0659     PRN Meds:acetaminophen **OR** acetaminophen, LORazepam **OR** LORazepam **OR** LORazepam **OR** LORazepam **OR** LORazepam **OR** LORazepam **OR** LORazepam **OR** LORazepam, ALPRAZolam, sodium chloride flush, sodium chloride, ondansetron **OR** ondansetron, polyethylene glycol, morphine **OR** morphine, melatonin    Allergies:  Patient has no known allergies. Social History:   Social History     Socioeconomic History    Marital status: Single     Spouse name: Not on file    Number of children: 0    Years of education: 15    Highest education level: Not on file   Occupational History    Not on file   Tobacco Use    Smoking status: Current Every Day Smoker     Packs/day: 1.00     Years: 15.00     Pack years: 15.00     Types: Cigarettes     Start date: 10/3/2003    Smokeless tobacco: Former User   Vaping Use    Vaping Use: Former   Substance and Sexual Activity    Alcohol use: Yes     Alcohol/week: 70.0 standard drinks     Types: 70 Cans of beer per week     Comment: 12 packs    Drug use: Yes     Frequency: 1.0 times per week     Types: Marijuana [place])     Comment: daily    Sexual activity: Not on file   Other Topics Concern    Not on file   Social History Narrative    Not on file     Social Determinants of Health     Financial Resource Strain:     Difficulty of Paying Living Expenses:    Food Insecurity:     Worried About Running Out of Food in the Last Year:     Ran Out of Food in the Last Year:    Transportation Needs:     Lack of Transportation (Medical):      Lack of Transportation (Non-Medical):    Physical Activity:     Days of Exercise per Week:     Minutes of Exercise per Session:    Stress:     Feeling of Stress :    Social Connections:     Frequency of Communication with Friends and Family:     Frequency of Social Gatherings with Friends and Family:     Attends [employer]ism Services:     Active Member of Clubs or Organizations:     Attends Club or Organization Meetings:     Marital Status:    Intimate Partner Violence:     Fear of Current or Ex-Partner:     Emotionally Abused:     Physically Abused:     Sexually Abused:       Pets: No  Travel: No  The patient lives in with her father. She is currently not working. She used to work at [employer]    Family History:   No family history on file. . Otherwise non-pertinent to the chief complaint. REVIEW OF SYSTEMS:    Constitutional: Negative for fevers, chills, diaphoresis  Neurologic: Negative   Psychiatric: Negative  Rheumatologic: Negative   Endocrine: Negative  Hematologic: Negative  Immunologic: Negative  ENT: Negative  Respiratory: Negative   Cardiovascular: Negative  GI: Negative  : Negative  Musculoskeletal: Negative  Skin: No rashes. PHYSICAL EXAM:    Vitals:   BP (!) 109/55   Pulse 89   Temp 97.5 °F (36.4 °C) (Oral)   Resp 16   Ht 5' 3\" (1.6 m)   Wt 104 lb (47.2 kg)   SpO2 99%   BMI 18.42 kg/m²   Constitutional: The patient is awake, alert, and oriented. She is in no distress. She does look somewhat emaciated and chronically ill. Skin: Warm and dry. No rashes were noted. HEENT: Eyes show round, and reactive pupils. No jaundice. Moist mucous membranes, no ulcerations, no thrush. Neck: Supple to movements. No lymphadenopathy. Chest: No use of accessory muscles to breathe. Symmetrical expansion. Auscultation reveals no wheezing, crackles, or rhonchi. Cardiovascular: S1 and S2 are rhythmic and regular. No murmurs appreciated. Abdomen: Positive bowel sounds to auscultation. Slightly distended but not tympanic. Firm to palpation. Liver palpable right upper quadrant. Tender to palpation. Extremities: No clubbing, no cyanosis, no edema.   Lines: peripheral      CBC+dif:  Recent Labs     11/02/21  1358 11/02/21  1358 11/03/21  0747   WBC 16.1*  --  14.3*   HGB 10.6*   < > 10.3*   HCT 31.2*   < > 30.5*   .1*   < > 120.6*      < > 296   NEUTROABS 11.91*  --   --     < > = values in this interval not displayed. Lab Results   Component Value Date    CRP 0.8 (H) 11/03/2021    CRP 1.1 (H) 10/28/2021      No results found for: CRPHS  Lab Results   Component Value Date    SEDRATE 46 (H) 10/28/2021     Lab Results   Component Value Date    ALT 11 11/03/2021    AST 62 (H) 11/03/2021    ALKPHOS 214 (H) 11/03/2021    BILITOT 1.3 (H) 11/03/2021     Lab Results   Component Value Date     11/03/2021    K 3.4 11/03/2021    K 3.4 08/06/2019     11/03/2021    CO2 22 11/03/2021    BUN 2 11/03/2021    CREATININE 0.4 11/03/2021    GFRAA >60 11/03/2021    AGRATIO 0.6 10/29/2021    LABGLOM >60 11/03/2021    GLUCOSE 82 11/03/2021    GLUCOSE 166 05/04/2012    PROT 5.7 11/03/2021    LABALBU 2.2 11/03/2021    LABALBU 3.6 05/04/2012    CALCIUM 7.7 11/03/2021    BILITOT 1.3 11/03/2021    ALKPHOS 214 11/03/2021    AST 62 11/03/2021    ALT 11 11/03/2021       Lab Results   Component Value Date    PROTIME 11.9 11/02/2021    PROTIME 10.1 05/04/2012    INR 1.1 11/02/2021       Lab Results   Component Value Date    TSH 10.950 10/28/2021       Lab Results   Component Value Date    COLORU Yellow 11/02/2021    PHUR 6.0 11/02/2021    WBCUA 1-3 11/02/2021    WBCUA NEGATIVE 10/29/2021    RBCUA NONE 11/02/2021    RBCUA NEGATIVE 10/29/2021    MUCUS Present 11/02/2021    BACTERIA MANY 11/02/2021    CLARITYU Clear 11/02/2021    SPECGRAV 1.015 11/02/2021    LEUKOCYTESUR SMALL 11/02/2021    UROBILINOGEN 0.2 11/02/2021    BILIRUBINUR MODERATE 11/02/2021    BLOODU TRACE-LYSED 11/02/2021    GLUCOSEU Negative 11/02/2021       No results found for: HCO3, BE, O2SAT, PH, THGB, PCO2, PO2, TCO2  Radiology:  Noted    Microbiology:  Pending  No results for input(s): BC in the last 72 hours. No results for input(s): ORG in the last 72 hours. No results for input(s): Ingrid Titus in the last 72 hours.   No results for input(s): STREPNEUMAGU in the last 72 hours. No results for input(s): LP1UAG in the last 72 hours. No results for input(s): ASO in the last 72 hours. No results for input(s): CULTRESP in the last 72 hours. Recent Labs     11/03/21  0747   PROCAL 0.22*       Assessment:  · Alcoholic hepatitis versus cholecystitis  · Leukocytosis associated to the above, improving  · Liver cirrhosis  · Possible uncomplicated UTI, improving    Plan:    · I recommend you stop antibiotics altogether and see how she does without them since there does not seem to be concerned by surgery of cholecystitis  · Will follow with you    Thank you for having us see this patient in consultation. I will be discussing this case with the treating physicians.     Oneida Tracy MD  3:06 PM  11/3/2021

## 2021-11-03 NOTE — PROGRESS NOTES
GENERAL SURGERY  DAILY PROGRESS NOTE  11/3/2021    CHIEF COMPLAINT:  Chief Complaint   Patient presents with    Abdominal Pain    Numbness     both lower extremities and abdomen to chest    Abnormal Lab     low potassium       SUBJECTIVE:  No acute events overnight. This morning, patient with no further episodes of nausea or vomiting. Has many questions about her liver disease. OBJECTIVE:  BP (!) 109/55   Pulse 89   Temp 97.5 °F (36.4 °C) (Oral)   Resp 16   Ht 5' 3\" (1.6 m)   Wt 104 lb (47.2 kg)   SpO2 99%   BMI 18.42 kg/m²     GENERAL:  NAD. A&Ox3. LUNGS:  No increased work of breathing. CARDIOVASCULAR: RR  ABDOMEN:  Soft, non-distended, non-tender. No guarding, rigidity, rebound.   EXT: warm and well perfused    ASSESSMENT/PLAN:  43 y.o. female with history of alcoholic cirrhosis who presented with concerns for gallbladder pathology, however her symptoms are likely a result of her underlying liver disease.     - HIDA scan  - trend LFTs  - NPO + mIVFs  - await GI recommendations  - prn pain control, anti-emetics    Anna Duggan MD  Surgery Resident PGY-2  11/3/2021  12:31 PM

## 2021-11-04 ENCOUNTER — APPOINTMENT (OUTPATIENT)
Dept: MRI IMAGING | Age: 36
DRG: 280 | End: 2021-11-04
Payer: MEDICAID

## 2021-11-04 ENCOUNTER — APPOINTMENT (OUTPATIENT)
Dept: NUCLEAR MEDICINE | Age: 36
DRG: 280 | End: 2021-11-04
Payer: MEDICAID

## 2021-11-04 LAB
ALBUMIN SERPL-MCNC: 2 G/DL (ref 3.5–5.2)
ALP BLD-CCNC: 199 U/L (ref 35–104)
ALT SERPL-CCNC: 11 U/L (ref 0–32)
ANION GAP SERPL CALCULATED.3IONS-SCNC: 12 MMOL/L (ref 7–16)
ANION GAP SERPL CALCULATED.3IONS-SCNC: 12 MMOL/L (ref 7–16)
ANISOCYTOSIS: ABNORMAL
AST SERPL-CCNC: 64 U/L (ref 0–31)
BASOPHILS ABSOLUTE: 0.08 E9/L (ref 0–0.2)
BASOPHILS RELATIVE PERCENT: 0.6 % (ref 0–2)
BILIRUB SERPL-MCNC: 1.2 MG/DL (ref 0–1.2)
BILIRUBIN DIRECT: 0.7 MG/DL (ref 0–0.3)
BILIRUBIN, INDIRECT: 0.5 MG/DL (ref 0–1)
BUN BLDV-MCNC: <2 MG/DL (ref 6–20)
BUN BLDV-MCNC: <2 MG/DL (ref 6–20)
CALCIUM SERPL-MCNC: 7.6 MG/DL (ref 8.6–10.2)
CALCIUM SERPL-MCNC: 7.8 MG/DL (ref 8.6–10.2)
CHLORIDE BLD-SCNC: 103 MMOL/L (ref 98–107)
CHLORIDE BLD-SCNC: 103 MMOL/L (ref 98–107)
CHOLESTEROL, TOTAL: 160 MG/DL (ref 0–199)
CO2: 20 MMOL/L (ref 22–29)
CO2: 20 MMOL/L (ref 22–29)
CREAT SERPL-MCNC: 0.3 MG/DL (ref 0.5–1)
CREAT SERPL-MCNC: 0.4 MG/DL (ref 0.5–1)
EOSINOPHILS ABSOLUTE: 0.18 E9/L (ref 0.05–0.5)
EOSINOPHILS RELATIVE PERCENT: 1.4 % (ref 0–6)
FOLATE: 3 NG/ML (ref 4.8–24.2)
GFR AFRICAN AMERICAN: >60
GFR AFRICAN AMERICAN: >60
GFR NON-AFRICAN AMERICAN: >60 ML/MIN/1.73
GFR NON-AFRICAN AMERICAN: >60 ML/MIN/1.73
GLUCOSE BLD-MCNC: 105 MG/DL (ref 74–99)
GLUCOSE BLD-MCNC: 87 MG/DL (ref 74–99)
HCT VFR BLD CALC: 32.9 % (ref 34–48)
HDLC SERPL-MCNC: 27 MG/DL
HEMOGLOBIN: 10.9 G/DL (ref 11.5–15.5)
IMMATURE GRANULOCYTES #: 0.06 E9/L
IMMATURE GRANULOCYTES %: 0.5 % (ref 0–5)
INR BLD: 1.1
LDL CHOLESTEROL CALCULATED: 109 MG/DL (ref 0–99)
LIPASE: 15 U/L (ref 13–60)
LYMPHOCYTES ABSOLUTE: 2.76 E9/L (ref 1.5–4)
LYMPHOCYTES RELATIVE PERCENT: 22 % (ref 20–42)
MAGNESIUM: 1.6 MG/DL (ref 1.6–2.6)
MCH RBC QN AUTO: 39.9 PG (ref 26–35)
MCHC RBC AUTO-ENTMCNC: 33.1 % (ref 32–34.5)
MCV RBC AUTO: 120.5 FL (ref 80–99.9)
MONOCYTES ABSOLUTE: 1.38 E9/L (ref 0.1–0.95)
MONOCYTES RELATIVE PERCENT: 11 % (ref 2–12)
NEUTROPHILS ABSOLUTE: 8.06 E9/L (ref 1.8–7.3)
NEUTROPHILS RELATIVE PERCENT: 64.5 % (ref 43–80)
PDW BLD-RTO: 14.3 FL (ref 11.5–15)
PLATELET # BLD: 317 E9/L (ref 130–450)
PMV BLD AUTO: 9.7 FL (ref 7–12)
POIKILOCYTES: ABNORMAL
POTASSIUM REFLEX MAGNESIUM: 2.9 MMOL/L (ref 3.5–5)
POTASSIUM SERPL-SCNC: 2.9 MMOL/L (ref 3.5–5)
POTASSIUM SERPL-SCNC: 3.5 MMOL/L (ref 3.5–5)
PROTHROMBIN TIME: 11.6 SEC (ref 9.3–12.4)
RBC # BLD: 2.73 E12/L (ref 3.5–5.5)
SODIUM BLD-SCNC: 135 MMOL/L (ref 132–146)
SODIUM BLD-SCNC: 135 MMOL/L (ref 132–146)
TARGET CELLS: ABNORMAL
TOTAL PROTEIN: 5.4 G/DL (ref 6.4–8.3)
TRIGL SERPL-MCNC: 118 MG/DL (ref 0–149)
VITAMIN B-12: 1037 PG/ML (ref 211–946)
VLDLC SERPL CALC-MCNC: 24 MG/DL
WBC # BLD: 12.5 E9/L (ref 4.5–11.5)

## 2021-11-04 PROCEDURE — 80048 BASIC METABOLIC PNL TOTAL CA: CPT

## 2021-11-04 PROCEDURE — A9537 TC99M MEBROFENIN: HCPCS | Performed by: RADIOLOGY

## 2021-11-04 PROCEDURE — 82607 VITAMIN B-12: CPT

## 2021-11-04 PROCEDURE — 83690 ASSAY OF LIPASE: CPT

## 2021-11-04 PROCEDURE — 99233 SBSQ HOSP IP/OBS HIGH 50: CPT | Performed by: INTERNAL MEDICINE

## 2021-11-04 PROCEDURE — 78226 HEPATOBILIARY SYSTEM IMAGING: CPT

## 2021-11-04 PROCEDURE — 1200000000 HC SEMI PRIVATE

## 2021-11-04 PROCEDURE — 6360000002 HC RX W HCPCS: Performed by: INTERNAL MEDICINE

## 2021-11-04 PROCEDURE — 6370000000 HC RX 637 (ALT 250 FOR IP): Performed by: INTERNAL MEDICINE

## 2021-11-04 PROCEDURE — 80061 LIPID PANEL: CPT

## 2021-11-04 PROCEDURE — 80053 COMPREHEN METABOLIC PANEL: CPT

## 2021-11-04 PROCEDURE — 36415 COLL VENOUS BLD VENIPUNCTURE: CPT

## 2021-11-04 PROCEDURE — 3430000000 HC RX DIAGNOSTIC RADIOPHARMACEUTICAL: Performed by: RADIOLOGY

## 2021-11-04 PROCEDURE — 85610 PROTHROMBIN TIME: CPT

## 2021-11-04 PROCEDURE — 74183 MRI ABD W/O CNTR FLWD CNTR: CPT

## 2021-11-04 PROCEDURE — 6360000004 HC RX CONTRAST MEDICATION: Performed by: RADIOLOGY

## 2021-11-04 PROCEDURE — 2580000003 HC RX 258: Performed by: INTERNAL MEDICINE

## 2021-11-04 PROCEDURE — 83735 ASSAY OF MAGNESIUM: CPT

## 2021-11-04 PROCEDURE — 85025 COMPLETE CBC W/AUTO DIFF WBC: CPT

## 2021-11-04 PROCEDURE — 82248 BILIRUBIN DIRECT: CPT

## 2021-11-04 PROCEDURE — 82746 ASSAY OF FOLIC ACID SERUM: CPT

## 2021-11-04 PROCEDURE — A9579 GAD-BASE MR CONTRAST NOS,1ML: HCPCS | Performed by: RADIOLOGY

## 2021-11-04 RX ORDER — POTASSIUM CHLORIDE 7.45 MG/ML
10 INJECTION INTRAVENOUS
Status: COMPLETED | OUTPATIENT
Start: 2021-11-04 | End: 2021-11-04

## 2021-11-04 RX ADMIN — ALPRAZOLAM 1 MG: 1 TABLET ORAL at 06:38

## 2021-11-04 RX ADMIN — POTASSIUM CHLORIDE 10 MEQ: 7.46 INJECTION, SOLUTION INTRAVENOUS at 21:11

## 2021-11-04 RX ADMIN — MORPHINE SULFATE 2 MG: 2 INJECTION, SOLUTION INTRAMUSCULAR; INTRAVENOUS at 04:20

## 2021-11-04 RX ADMIN — POTASSIUM CHLORIDE 10 MEQ: 7.46 INJECTION, SOLUTION INTRAVENOUS at 18:11

## 2021-11-04 RX ADMIN — ALPRAZOLAM 1 MG: 1 TABLET ORAL at 13:56

## 2021-11-04 RX ADMIN — MORPHINE SULFATE 2 MG: 2 INJECTION, SOLUTION INTRAMUSCULAR; INTRAVENOUS at 06:38

## 2021-11-04 RX ADMIN — MORPHINE SULFATE 2 MG: 2 INJECTION, SOLUTION INTRAMUSCULAR; INTRAVENOUS at 10:12

## 2021-11-04 RX ADMIN — Medication 6 MILLICURIE: at 06:50

## 2021-11-04 RX ADMIN — MORPHINE SULFATE 2 MG: 2 INJECTION, SOLUTION INTRAMUSCULAR; INTRAVENOUS at 16:47

## 2021-11-04 RX ADMIN — POTASSIUM CHLORIDE 10 MEQ: 7.46 INJECTION, SOLUTION INTRAVENOUS at 20:07

## 2021-11-04 RX ADMIN — MORPHINE SULFATE 2 MG: 2 INJECTION, SOLUTION INTRAMUSCULAR; INTRAVENOUS at 21:59

## 2021-11-04 RX ADMIN — MORPHINE SULFATE 2 MG: 2 INJECTION, SOLUTION INTRAMUSCULAR; INTRAVENOUS at 19:50

## 2021-11-04 RX ADMIN — ALPRAZOLAM 1 MG: 1 TABLET ORAL at 19:50

## 2021-11-04 RX ADMIN — GADOTERIDOL 9 ML: 279.3 INJECTION, SOLUTION INTRAVENOUS at 11:27

## 2021-11-04 RX ADMIN — MORPHINE SULFATE 2 MG: 2 INJECTION, SOLUTION INTRAMUSCULAR; INTRAVENOUS at 13:56

## 2021-11-04 RX ADMIN — POTASSIUM CHLORIDE 10 MEQ: 7.46 INJECTION, SOLUTION INTRAVENOUS at 17:17

## 2021-11-04 RX ADMIN — Medication 6 MG: at 21:59

## 2021-11-04 ASSESSMENT — PAIN SCALES - GENERAL
PAINLEVEL_OUTOF10: 7
PAINLEVEL_OUTOF10: 6
PAINLEVEL_OUTOF10: 0
PAINLEVEL_OUTOF10: 6
PAINLEVEL_OUTOF10: 4
PAINLEVEL_OUTOF10: 6
PAINLEVEL_OUTOF10: 6

## 2021-11-04 ASSESSMENT — PAIN DESCRIPTION - PROGRESSION
CLINICAL_PROGRESSION: NOT CHANGED
CLINICAL_PROGRESSION: NOT CHANGED

## 2021-11-04 ASSESSMENT — PAIN DESCRIPTION - FREQUENCY
FREQUENCY: CONTINUOUS
FREQUENCY: CONTINUOUS

## 2021-11-04 ASSESSMENT — PAIN DESCRIPTION - PAIN TYPE
TYPE: ACUTE PAIN

## 2021-11-04 ASSESSMENT — PAIN DESCRIPTION - LOCATION
LOCATION: ABDOMEN

## 2021-11-04 ASSESSMENT — PAIN DESCRIPTION - ONSET
ONSET: ON-GOING
ONSET: ON-GOING

## 2021-11-04 ASSESSMENT — PAIN DESCRIPTION - DESCRIPTORS
DESCRIPTORS: ACHING;SHARP;SORE
DESCRIPTORS: ACHING;SORE

## 2021-11-04 ASSESSMENT — PAIN DESCRIPTION - ORIENTATION
ORIENTATION: RIGHT;LEFT;MID
ORIENTATION: RIGHT;LEFT;MID

## 2021-11-04 ASSESSMENT — PAIN - FUNCTIONAL ASSESSMENT
PAIN_FUNCTIONAL_ASSESSMENT: PREVENTS OR INTERFERES SOME ACTIVE ACTIVITIES AND ADLS
PAIN_FUNCTIONAL_ASSESSMENT: PREVENTS OR INTERFERES SOME ACTIVE ACTIVITIES AND ADLS

## 2021-11-04 NOTE — CARE COORDINATION
Social Work / Discharge Planning : SW followed up with patient along with Steven Ortega from [facility]. Goal at discharge is HOME. Patient receptive to speaking with Steven Ortega in regards to assistance with her substance use. SW to follow. Electronically signed by MYAH Moran on 11/4/21 at 1:20 PM EDT  .

## 2021-11-04 NOTE — PROGRESS NOTES
[address] [place] [address] [facility]  Progress Note    SUBJECTIVE:  Chief Complaint   Patient presents with    Abdominal Pain    Numbness     both lower extremities and abdomen to chest    Abnormal Lab     low potassium     No new complaints today. She says her abdomen is still somewhat tender. No fevers. Review of systems:  As stated above in the chief complaint, otherwise negative. Medications:  Scheduled Meds:   nicotine  1 patch TransDERmal Daily    pantoprazole  40 mg Oral QAM AC    morphine  4 mg IntraVENous Once    sodium chloride flush  5-40 mL IntraVENous 2 times per day     Continuous Infusions:   sodium chloride      sodium chloride 150 mL/hr at 21 1213     PRN Meds:acetaminophen **OR** acetaminophen, LORazepam **OR** LORazepam **OR** LORazepam **OR** LORazepam **OR** LORazepam **OR** LORazepam **OR** LORazepam **OR** LORazepam, ALPRAZolam, sodium chloride flush, sodium chloride, ondansetron **OR** ondansetron, polyethylene glycol, morphine **OR** morphine, melatonin    OBJECTIVE:  /76   Pulse 96   Temp 99 °F (37.2 °C) (Oral)   Resp 16   Ht 5' 3\" (1.6 m)   Wt 104 lb (47.2 kg)   SpO2 99%   BMI 18.42 kg/m²   Temp  Av.4 °F (36.9 °C)  Min: 97.8 °F (36.6 °C)  Max: 99 °F (37.2 °C)  Constitutional: The patient is awake, alert, and oriented. Lying in bed. No distress. Skin: Warm and dry. No rashes were noted. HEENT: Round and reactive pupils. Moist mucous membranes. No ulcerations or thrush. Neck: Supple to movements. Chest: No use of accessory muscles to breathe. Symmetrical expansion. No wheezing, crackles or rhonchi. Cardiovascular: S1 and S2 are rhythmic and regular. No murmurs appreciated. Abdomen: Positive bowel sounds to auscultation. Diffusely tender. No rebound tenderness. Extremities: No edema.   Lines: peripheral    Laboratory and Tests Review:  Lab Results   Component Value Date    WBC 12.5 (H) 2021    WBC 14.3 (H) 11/03/2021    WBC 16.1 (H) 11/02/2021    HGB 10.9 (L) 11/04/2021    HCT 32.9 (L) 11/04/2021    .5 (H) 11/04/2021     11/04/2021     Lab Results   Component Value Date    NEUTROABS 11.91 (H) 11/02/2021    NEUTROABS 8.64 10/29/2021    NEUTROABS 11.90 (H) 10/28/2021     No results found for: CRPHS  Lab Results   Component Value Date    ALT 11 11/03/2021    AST 62 (H) 11/03/2021    ALKPHOS 214 (H) 11/03/2021    BILITOT 1.3 (H) 11/03/2021     Lab Results   Component Value Date     11/03/2021    K 3.4 11/03/2021    K 3.4 08/06/2019     11/03/2021    CO2 22 11/03/2021    BUN 2 11/03/2021    CREATININE 0.4 11/03/2021    CREATININE 0.4 11/02/2021    CREATININE 0.5 10/29/2021    GFRAA >60 11/03/2021    AGRATIO 0.6 10/29/2021    LABGLOM >60 11/03/2021    GLUCOSE 82 11/03/2021    GLUCOSE 166 05/04/2012    PROT 5.7 11/03/2021    LABALBU 2.2 11/03/2021    LABALBU 3.6 05/04/2012    CALCIUM 7.7 11/03/2021    BILITOT 1.3 11/03/2021    ALKPHOS 214 11/03/2021    AST 62 11/03/2021    ALT 11 11/03/2021     Lab Results   Component Value Date    CRP 0.8 (H) 11/03/2021    CRP 1.1 (H) 10/28/2021     Lab Results   Component Value Date    SEDRATE 46 (H) 10/28/2021     Radiology:      Microbiology:       Recent Labs     11/03/21  0747   PROCAL 0.22*       ASSESSMENT:  · Probable alcoholic hepatitis  · Cholecystitis less likely with a negative HIDA scan    PLAN:  · Supportive treatment  · The patient can be discharged from ID standpoint    Gricelda Palm MD  2:12 PM  11/4/2021

## 2021-11-04 NOTE — PLAN OF CARE
Problem: Pain:  Goal: Control of acute pain  Description: Control of acute pain  Outcome: Met This Shift     Problem: Malnutrition  (NI-5.2)  Goal: Food and/or Nutrient Delivery  Description: Individualized approach for food/nutrient provision.   11/3/2021 1221 by Chanelle Hollis RD, LD  Outcome: Met This Shift

## 2021-11-04 NOTE — PROGRESS NOTES
Brought pt to MRI. After starting exam, scanner was having communication/scanning issues. Trouble shooting scanner and calling [employer]. Patient said back hurting couldn't  tolerate being on table longer to wait. Calling RN to notify and sending pt to room.  Left message with unit clerk/RN whom answered phone

## 2021-11-04 NOTE — CARE COORDINATION
[facility] Support Note    Name: Shasha Noriega  Date: 11/4/2021    Chief Complaint   Patient presents with    Abdominal Pain    Numbness     both lower extremities and abdomen to chest    Abnormal Lab     low potassium       Peer Support met with patient. [x] Support and education provided  [x] Resources provided   [] Treatment referral:   [] Other:   [] Patient declined peer recovery services     Referred By: Lizette Bravo. Notes:  [facility] meetings in our area were provided along with resources for residential treatment and IOP. Patient and peer also discussed Vivitrol  (Naltrexone) treatment.   Valencia Nathan, 11/4/2021

## 2021-11-04 NOTE — PROGRESS NOTES
Name:  Steve Bolaños  :  1985  MRN:  [id number]  Room:  [facility]  DOS:  2021    [facility]  Dr. Florian Stephens M.D. Dr. Gary Singh M.D. Dr. Charles Landeros D.O. Dr. Phoenix Johnson M.D. Dr. Clifford Bingham D.O.    -NP Progress Note-    PCP:  Effie De La Torre MD  Admitting Physician:  Adina Murillo DO  Chief Complaint:    Chief Complaint   Patient presents with    Abdominal Pain    Numbness     both lower extremities and abdomen to chest    Abnormal Lab     low potassium       Subjective  Persistent abdominal pain.     Physical Examination  Vitals:  /76   Pulse 96   Temp 99 °F (37.2 °C) (Oral)   Resp 16   Ht 5' 3\" (1.6 m)   Wt 104 lb (47.2 kg)   SpO2 99%   BMI 18.42 kg/m²   General Appearance:  awake, alert, and oriented to person, place, time, and purpose; appears stated age and cooperative; no apparent distress no labored breathing  HEENT:  PERRL; EOMI; sclera clear; buccal mucosa moist  Neck:  supple; trachea midline; no thyromegaly; no JVD; no bruits  Heart:  rhythm regular; rate controlled; no murmurs  Lungs:  symmetrical; clear to auscultation bilaterally; no wheezes; no rhonchi; no rales  Abdomen:  soft, tender, distended; bowel sounds positive; no organomegaly or masses  Extremities:  peripheral pulses present; no peripheral edema; no ulcers  Neurologic:  alert and oriented x 3; no focal deficit; cranial nerves grossly intact  Skin:  no petechia; no hemorrhage; no wounds    Medications  Scheduled Meds    nicotine  1 patch TransDERmal Daily    pantoprazole  40 mg Oral QAM AC    morphine  4 mg IntraVENous Once    sodium chloride flush  5-40 mL IntraVENous 2 times per day     Infusion Meds    sodium chloride      sodium chloride 150 mL/hr at 21 1213     PRN Meds acetaminophen **OR** acetaminophen, LORazepam **OR** LORazepam **OR** LORazepam **OR** LORazepam **OR** LORazepam **OR** LORazepam **OR** LORazepam **OR** LORazepam, ALPRAZolam, sodium chloride flush, sodium chloride, ondansetron **OR** ondansetron, polyethylene glycol, morphine **OR** morphine, melatonin    Laboratory Data  No results found for this or any previous visit (from the past 24 hour(s)). Imaging  XR CHEST (2 VW)    Result Date: 11/2/2021  EXAMINATION: TWO XRAY VIEWS OF THE CHEST 11/2/2021 12:51 pm COMPARISON: 08/06/2019 HISTORY: ORDERING SYSTEM PROVIDED HISTORY: cp TECHNOLOGIST PROVIDED HISTORY: Reason for exam:->cp Open reduction internal fixation of proximal left humerus fracture 05/12/2021 FINDINGS: There is new density in the lateral and posterior left costophrenic recess. This is most compatible with pleural effusion. No right pleural effusion. No pneumothorax. No new pulmonary consolidation. Normal cardiac silhouette size without vascular congestion. No acute displaced fracture. New hardware partially visualized in proximal left humerus compatible with history of left humerus fracture repair. Findings most compatible with new small left pleural effusion     NM HEPATOBILIARY    Result Date: 11/4/2021  EXAMINATION: NUCLEAR MEDICINE HEPATOBILIARY SCINTIGRAPHY (HIDA SCAN). 11/4/2021 6:50 am TECHNIQUE: Approximately 5.8 [id number] Mebrofenin (Choletec) was administered IV. Then, dynamic images of the abdomen were obtained in the anterior projection for 60 mins. COMPARISON: Ultrasound 11/02/2021 HISTORY: ORDERING SYSTEM PROVIDED HISTORY: R/o acute juana TECHNOLOGIST PROVIDED HISTORY: Reason for exam:->R/o acute juana What reading provider will be dictating this exam?->CRC FINDINGS: Prompt, homogenous uptake by the liver is noted with normal appearance of radiotracer excretion into the biliary system. Clearance of bloodpool activity appears appropriate. Gallbladder and small bowel is visualized in appropriate sequence. Patency of the common bile duct and cystic duct. No acute cholecystitis.      CT ABDOMEN PELVIS W IV CONTRAST    Result Date: 10/29/2021 [address] [place], [place] [address]                                              [phone]                                               CT Scan Report                                                  Signed    Patient: Andres Carroll                                                                MR#: [id number]  [id number]          : 1985                                                                [de-identified]            Age/Sex: 43 / F                                                                Admit Date: 10/29/21            Loc: ER                                                                                     Attending Dr:     Ordering Physician: Kapil Timmons MD   Date of Service: 10/29/21  Procedure(s): CT abdomen pelvis w con  Accession Number(s): [id number]    cc: Kapil Timmons MD              PHYSICIAN INDICATIONS:  Jaundice, weight loss, abdominal pain       Technique: Axial images through the abdomen and pelvis with intravenous contrast. CT Dose Index:   4.2 mGy. DLP: 374 mGy-cm. Berdie Fidel. k=0.015 mSv/(mGy-cm)       Findings: The heart size is normal. The lung bases are clear. Liver is enlarged measuring 18 cm in length with decreased attenuation compatible with fatty   infiltration. Considerable edema and fluid surrounding the pancreas and extending along the colic gutters and the   pelvis suggesting acute pancreatitis. No pseudocyst or abscess. Common bile duct measures 9 mm and appears enlarged. Recommend MRCP to evaluate for underlying   stone. Gallbladder is present with pericholecystic fluid. The kidneys are unremarkable. No renal or utereral calculi or hydronephrosis. The adrenal glands   are unremarkable. No adenopathy or mass lesion in the mesentery or retroperitoneum.  The aorta is normal in caliber. Bowel is not distended. No inflammation around the appendix. Uterus is present. No adnexal mass. Bladder is unremarkable. No masses are seen in the pelvis. Impression:       CT Abdomen and Pelvis with contrast:   1. Considerable edema and fluid surrounding the pancreas and extending along the colic gutters and   the pelvis suggesting acute pancreatitis. No pseudocyst or abscess. 2. Common bile duct measures 9 mm and appears enlarged. Recommend MRCP to evaluate for underlying   stone. 3. Liver is enlarged measuring 18 cm in length with decreased attenuation compatible with fatty   infiltration. 4. Gallbladder is present with pericholecystic fluid. Dictated ByArleth James MD   DD/DT: 10/29/21 2115               Signed By: Juan Luis Jack MD 10/29/21 2115            : CHERY JIMENEZ GALLBLADDER RUQ    Result Date: 11/2/2021  EXAMINATION: RIGHT UPPER QUADRANT ULTRASOUND 11/2/2021 3:46 pm COMPARISON: None. HISTORY: ORDERING SYSTEM PROVIDED HISTORY: ruq abdominal pain TECHNOLOGIST PROVIDED HISTORY: Reason for exam:->ruq abdominal pain What reading provider will be dictating this exam?->CRC FINDINGS: LIVER:  The liver demonstrates increased echogenicity without evidence of intrahepatic biliary ductal dilatation. BILIARY SYSTEM:  Gallbladder has wall thickening, significant sludge and some pericholecystic fluid. Rachele Kameron Positive sonographic Lemus's sign. Common bile duct is upper limits measuring 6 mm. RIGHT KIDNEY: The right kidney is grossly unremarkable without evidence of hydronephrosis. It measures 10.2 x 5.1 cm. PANCREAS:  Visualized portions of the pancreas are unremarkable. OTHER: There is small to moderate amount amount of right upper quadrant ascites. Findings most compatible with acalculus cholecystitis. A very distal CBD stone is not excluded. There is pericholecystic fluid and some upper abdominal ascites. There are no gallstones. . Fatty infiltration liver     US ABDOMEN LIMITED Specify organ? LIVER, GALLBLADDER, PANCREAS    Result Date: 10/29/2021  EXAMINATION: RIGHT UPPER QUADRANT ULTRASOUND 10/28/2021 6:14 pm COMPARISON: None. HISTORY: ORDERING SYSTEM PROVIDED HISTORY: Abnormal results of liver function studies TECHNOLOGIST PROVIDED HISTORY: Specify organ?->LIVER Specify organ?->GALLBLADDER Specify organ?->PANCREAS What reading provider will be dictating this exam?->CRC FINDINGS: LIVER:  Enlarged with increased echogenicity somewhat heterogeneous BILIARY SYSTEM:  Unremarkable gallbladder other than borderline wall thickening Common bile duct is within normal limits measuring 4 mm. RIGHT KIDNEY: The right kidney is grossly unremarkable without evidence of hydronephrosis. PANCREAS:  Visualized portions of the pancreas are unremarkable. OTHER: Question perihepatic ascites     1. Findings suggestive of diffuse hepatic steatosis. However also cirrhosis is possible with perihepatic ascites and gallbladder wall thickening possibly present 2. No cholelithiasis or biliary dilation RECOMMENDATIONS: Consider CT or MRI for further clinical concern or persistence of symptoms. Assessment and Plan  Patient is a 43 y.o. female on consult for cirrhosis.     1. Cirrhosis  -Initial MELD = 7, MELD-Na = 11  -Suspect secondary to alcohol abuse  -Viral serology negative 10/28/2021  -Elevated ferritin - check HFE genotype  -Normal AFP 4 (10/28/2021)  -Imaging with no remark of hepatic mass  -Recent EGD in our office - will obtain records  -No evidence of encephalopathy  -New ascites - see below     2. Pancreatitis  -Suspect secondary to alcohol  -Ultrasound 11/2/2021 indicating that distal CBD stone cannot be excluded  -Pending MRI/MRCP  -Lipase in normal range  -Persistent abdominal pain  -Recommend aggressive hydration  -None elevated calcium  -Pending lipid panel  -Pain management per admitting     3.   Ascites  -New onset ascites  -Distended abdomen  -Consider paracentesis - unlikely enough volume to obtain sample  -Pending MRI/MRCP - rule out PVT     4.  Cholecystitis  -Pain questionable cholecystitis versus pancreatitis  -Poor candidate for cholecystectomy secondary to risk for hepatic decompensation  -Negative HIDA scan  -Agree with antibiotics     5. GERD  -PPI daily  -Will obtain recent EGD reports from our office     6. Abdominal pain  -Likely secondary to cholecystitis versus pancreatitis  -Will obtain recent EGD reports from our office  -Pain management per admitting     7. Anemia  -Chronic  -Macrocytic  -No overt GI bleeding  -No evidence of iron deficiency  -Will obtain recent EGD report from our office  -Consider colonoscopy - likely as outpatient  -Monitor CBC daily     8. Alcohol abuse  -Reports alcohol cessation about 3 weeks ago  -Advised on need for permanent alcohol cessation  -Withdrawal management per admitting      Hgb with mild decrease since admission. Pending lipid panel. Pending MRI / MRCP. Continue antibiotics. Continue PPI. If MRI / MRCP shows improvement in pancreatitis, ok to start clear liquids from GI POV. Continue to monitor labs. Will follow.       BASIL Galeas - CNP  12:39 PM  11/4/2021

## 2021-11-05 VITALS
RESPIRATION RATE: 16 BRPM | SYSTOLIC BLOOD PRESSURE: 124 MMHG | BODY MASS INDEX: 18.43 KG/M2 | HEART RATE: 92 BPM | DIASTOLIC BLOOD PRESSURE: 86 MMHG | HEIGHT: 63 IN | TEMPERATURE: 97.6 F | WEIGHT: 104 LBS | OXYGEN SATURATION: 100 %

## 2021-11-05 LAB
ALBUMIN SERPL-MCNC: 2.2 G/DL (ref 3.5–5.2)
ALP BLD-CCNC: 193 U/L (ref 35–104)
ALT SERPL-CCNC: 11 U/L (ref 0–32)
ANION GAP SERPL CALCULATED.3IONS-SCNC: 11 MMOL/L (ref 7–16)
AST SERPL-CCNC: 66 U/L (ref 0–31)
BASOPHILS ABSOLUTE: 0.08 E9/L (ref 0–0.2)
BASOPHILS RELATIVE PERCENT: 0.7 % (ref 0–2)
BILIRUB SERPL-MCNC: 1.2 MG/DL (ref 0–1.2)
BUN BLDV-MCNC: <2 MG/DL (ref 6–20)
CALCIUM SERPL-MCNC: 7.6 MG/DL (ref 8.6–10.2)
CHLORIDE BLD-SCNC: 103 MMOL/L (ref 98–107)
CO2: 21 MMOL/L (ref 22–29)
CREAT SERPL-MCNC: 0.3 MG/DL (ref 0.5–1)
EOSINOPHILS ABSOLUTE: 0.13 E9/L (ref 0.05–0.5)
EOSINOPHILS RELATIVE PERCENT: 1.1 % (ref 0–6)
GFR AFRICAN AMERICAN: >60
GFR NON-AFRICAN AMERICAN: >60 ML/MIN/1.73
GLUCOSE BLD-MCNC: 78 MG/DL (ref 74–99)
HCT VFR BLD CALC: 28.5 % (ref 34–48)
HEMOGLOBIN: 9.4 G/DL (ref 11.5–15.5)
IMMATURE GRANULOCYTES #: 0.05 E9/L
IMMATURE GRANULOCYTES %: 0.4 % (ref 0–5)
LIPASE: 11 U/L (ref 13–60)
LYMPHOCYTES ABSOLUTE: 2.79 E9/L (ref 1.5–4)
LYMPHOCYTES RELATIVE PERCENT: 23.7 % (ref 20–42)
MCH RBC QN AUTO: 39.5 PG (ref 26–35)
MCHC RBC AUTO-ENTMCNC: 33 % (ref 32–34.5)
MCV RBC AUTO: 119.7 FL (ref 80–99.9)
MONOCYTES ABSOLUTE: 1.33 E9/L (ref 0.1–0.95)
MONOCYTES RELATIVE PERCENT: 11.3 % (ref 2–12)
NEUTROPHILS ABSOLUTE: 7.37 E9/L (ref 1.8–7.3)
NEUTROPHILS RELATIVE PERCENT: 62.8 % (ref 43–80)
PAPPENHEIMER BODIES: ABNORMAL
PDW BLD-RTO: 14.2 FL (ref 11.5–15)
PLATELET # BLD: 310 E9/L (ref 130–450)
PMV BLD AUTO: 9.9 FL (ref 7–12)
POIKILOCYTES: ABNORMAL
POTASSIUM REFLEX MAGNESIUM: 4.3 MMOL/L (ref 3.5–5)
POTASSIUM SERPL-SCNC: 4.3 MMOL/L (ref 3.5–5)
RBC # BLD: 2.38 E12/L (ref 3.5–5.5)
SODIUM BLD-SCNC: 135 MMOL/L (ref 132–146)
TARGET CELLS: ABNORMAL
TOTAL PROTEIN: 5.1 G/DL (ref 6.4–8.3)
URINE CULTURE, ROUTINE: NORMAL
WBC # BLD: 11.8 E9/L (ref 4.5–11.5)

## 2021-11-05 PROCEDURE — 2580000003 HC RX 258: Performed by: INTERNAL MEDICINE

## 2021-11-05 PROCEDURE — 80053 COMPREHEN METABOLIC PANEL: CPT

## 2021-11-05 PROCEDURE — 6370000000 HC RX 637 (ALT 250 FOR IP): Performed by: NURSE PRACTITIONER

## 2021-11-05 PROCEDURE — 83690 ASSAY OF LIPASE: CPT

## 2021-11-05 PROCEDURE — 36415 COLL VENOUS BLD VENIPUNCTURE: CPT

## 2021-11-05 PROCEDURE — 6370000000 HC RX 637 (ALT 250 FOR IP): Performed by: INTERNAL MEDICINE

## 2021-11-05 PROCEDURE — 99233 SBSQ HOSP IP/OBS HIGH 50: CPT | Performed by: INTERNAL MEDICINE

## 2021-11-05 PROCEDURE — 99239 HOSP IP/OBS DSCHRG MGMT >30: CPT | Performed by: FAMILY MEDICINE

## 2021-11-05 PROCEDURE — 85025 COMPLETE CBC W/AUTO DIFF WBC: CPT

## 2021-11-05 PROCEDURE — 6360000002 HC RX W HCPCS: Performed by: INTERNAL MEDICINE

## 2021-11-05 PROCEDURE — 81256 HFE GENE: CPT

## 2021-11-05 RX ORDER — THIAMINE MONONITRATE (VIT B1) 100 MG
100 TABLET ORAL DAILY
Qty: 30 TABLET | Refills: 0 | COMMUNITY
Start: 2021-11-05 | End: 2022-04-18 | Stop reason: SDUPTHER

## 2021-11-05 RX ORDER — NICOTINE 21 MG/24HR
1 PATCH, TRANSDERMAL 24 HOURS TRANSDERMAL DAILY
Qty: 30 PATCH | Refills: 0 | COMMUNITY
Start: 2021-11-06 | End: 2022-01-05

## 2021-11-05 RX ORDER — PANTOPRAZOLE SODIUM 40 MG/1
40 TABLET, DELAYED RELEASE ORAL
Qty: 30 TABLET | Refills: 0 | Status: ON HOLD | OUTPATIENT
Start: 2021-11-06 | End: 2022-02-25

## 2021-11-05 RX ORDER — FOLIC ACID 1 MG/1
1 TABLET ORAL DAILY
Qty: 30 TABLET | Refills: 0 | COMMUNITY
Start: 2021-11-05 | End: 2022-04-18 | Stop reason: SDUPTHER

## 2021-11-05 RX ORDER — ALPRAZOLAM 1 MG/1
1 TABLET ORAL EVERY 12 HOURS PRN
Status: DISCONTINUED | OUTPATIENT
Start: 2021-11-05 | End: 2021-11-05 | Stop reason: HOSPADM

## 2021-11-05 RX ORDER — FOLIC ACID 1 MG/1
2 TABLET ORAL DAILY
Status: DISCONTINUED | OUTPATIENT
Start: 2021-11-05 | End: 2021-11-05 | Stop reason: HOSPADM

## 2021-11-05 RX ADMIN — MORPHINE SULFATE 2 MG: 2 INJECTION, SOLUTION INTRAMUSCULAR; INTRAVENOUS at 17:34

## 2021-11-05 RX ADMIN — MORPHINE SULFATE 2 MG: 2 INJECTION, SOLUTION INTRAMUSCULAR; INTRAVENOUS at 04:20

## 2021-11-05 RX ADMIN — MORPHINE SULFATE 2 MG: 2 INJECTION, SOLUTION INTRAMUSCULAR; INTRAVENOUS at 12:48

## 2021-11-05 RX ADMIN — MORPHINE SULFATE 2 MG: 2 INJECTION, SOLUTION INTRAMUSCULAR; INTRAVENOUS at 09:10

## 2021-11-05 RX ADMIN — MORPHINE SULFATE 2 MG: 2 INJECTION, SOLUTION INTRAMUSCULAR; INTRAVENOUS at 06:48

## 2021-11-05 RX ADMIN — MORPHINE SULFATE 2 MG: 2 INJECTION, SOLUTION INTRAMUSCULAR; INTRAVENOUS at 00:31

## 2021-11-05 RX ADMIN — MORPHINE SULFATE 2 MG: 2 INJECTION, SOLUTION INTRAMUSCULAR; INTRAVENOUS at 01:56

## 2021-11-05 ASSESSMENT — PAIN SCALES - GENERAL
PAINLEVEL_OUTOF10: 2
PAINLEVEL_OUTOF10: 3
PAINLEVEL_OUTOF10: 10
PAINLEVEL_OUTOF10: 2
PAINLEVEL_OUTOF10: 6
PAINLEVEL_OUTOF10: 6
PAINLEVEL_OUTOF10: 2
PAINLEVEL_OUTOF10: 7

## 2021-11-05 ASSESSMENT — PAIN DESCRIPTION - PROGRESSION: CLINICAL_PROGRESSION: NOT CHANGED

## 2021-11-05 NOTE — DISCHARGE SUMMARY
[facility] Physician Discharge Summary       No follow-up provider specified. Activity level: As tolerated     Dispo: home      Condition on discharge: Stable     Patient ID:  Ernie Kwok  [id number]  41 y.o.  1985    Admit date: 11/2/2021    Discharge date and time:  11/5/2021  5:29 PM    Admission Diagnoses: Active Problems:    Severe protein-calorie malnutrition ([address]    Cirrhosis ([address]  Resolved Problems:    * No resolved hospital problems. *      Discharge Diagnoses: Active Problems:    Severe protein-calorie malnutrition ([address]    Cirrhosis ([address]  Resolved Problems:    * No resolved hospital problems. *      Consults:  IP CONSULT TO GENERAL SURGERY  IP CONSULT TO INFECTIOUS DISEASES  IP CONSULT TO SOCIAL WORK  IP CONSULT TO GI  IP CONSULT TO IV TEAM    Procedures:   n/a    Hospital Course:   Patient Ernie Kwok is a 43 y.o. presented with Acalculous cholecystitis [K81.9]  Lactic acidosis [E87.2]  Cirrhosis ([address] [K74.60]  Acute cystitis with hematuria [N30.01]  Patient was admitted for further evaluation and treatment. She was seen by General Surgery, GI, Infectious Disease. General Surgery recommended HIDA scan which was negative. GI recommended MRCP/MRI of abdomen. There was no evidence of acute pancreatitis, fatty liver with diffuse liver disease, gallbladder thickening and moderate ascites and bilateral pleural effusions. ID recommended no antibiotics. GI recommended PPI daily and vitamin supplementation. F/U with GI in 1 - 2 wks.     Discharge Exam:  As per Dr. Shani Burger exam    /86   Pulse 92   Temp 97.6 °F (36.4 °C) (Oral)   Resp 16   Ht 5' 3\" (1.6 m)   Wt 104 lb (47.2 kg)   SpO2 100%   BMI 18.42 kg/m²      General: well-developed, well-nourished, no acute distress, cooperative  Skin: warm, dry, intact, normal color without cyanosis  HEENT: normocephalic, atraumatic, mucous membranes normal  Respiratory: clear to auscultation bilaterally without respiratory distress  Cardiovascular: regular rate and rhythm without murmur / rub / gallop  Abdominal: soft, tender, nondistended, normoactive bowel sounds  Extremities: no mottling, pulses intact, no edema  Neurologic: awake, alert, no focal deficits  Psychiatric: normal affect, cooperative    LABS:  Recent Labs     11/04/21  1305 11/04/21  1305 11/04/21 2046 11/05/21 0223     --  135 135   K 2.9*  2.9*   < > 3.5 4.3  4.3     --  103 103   CO2 20*  --  20* 21*   BUN <2*  --  <2* <2*   CREATININE 0.3*  --  0.4* 0.3*   GLUCOSE 87  --  105* 78   CALCIUM 7.6*  --  7.8* 7.6*    < > = values in this interval not displayed. Recent Labs     11/03/21  0747 11/04/21 1305 11/05/21 0223   WBC 14.3* 12.5* 11.8*   RBC 2.53* 2.73* 2.38*   HGB 10.3* 10.9* 9.4*   HCT 30.5* 32.9* 28.5*   .6* 120.5* 119.7*   MCH 40.7* 39.9* 39.5*   MCHC 33.8 33.1 33.0   RDW 14.6 14.3 14.2    317 310   MPV 10.2 9.7 9.9     Imaging:  XR CHEST (2 VW)    Result Date: 11/2/2021  EXAMINATION: TWO XRAY VIEWS OF THE CHEST 11/2/2021 12:51 pm COMPARISON: 08/06/2019 HISTORY: ORDERING SYSTEM PROVIDED HISTORY:  TECHNOLOGIST PROVIDED HISTORY: Reason for exam:->cp Open reduction internal fixation of proximal left humerus fracture 05/12/2021 FINDINGS: There is new density in the lateral and posterior left costophrenic recess. This is most compatible with pleural effusion. No right pleural effusion. No pneumothorax. No new pulmonary consolidation. Normal cardiac silhouette size without vascular congestion. No acute displaced fracture. New hardware partially visualized in proximal left humerus compatible with history of left humerus fracture repair. Findings most compatible with new small left pleural effusion     US GALLBLADDER RUQ    Result Date: 11/2/2021  EXAMINATION: RIGHT UPPER QUADRANT ULTRASOUND 11/2/2021 3:46 pm COMPARISON: None.  HISTORY: ORDERING SYSTEM PROVIDED HISTORY: ruq abdominal pain TECHNOLOGIST PROVIDED HISTORY: Reason for exam:->ruq abdominal pain What reading provider will be dictating this exam?->CRC FINDINGS: LIVER:  The liver demonstrates increased echogenicity without evidence of intrahepatic biliary ductal dilatation. BILIARY SYSTEM:  Gallbladder has wall thickening, significant sludge and some pericholecystic fluid. Kia Karst Positive sonographic Lemus's sign. Common bile duct is upper limits measuring 6 mm. RIGHT KIDNEY: The right kidney is grossly unremarkable without evidence of hydronephrosis. It measures 10.2 x 5.1 cm. PANCREAS:  Visualized portions of the pancreas are unremarkable. OTHER: There is small to moderate amount amount of right upper quadrant ascites. Findings most compatible with acalculus cholecystitis. A very distal CBD stone is not excluded. There is pericholecystic fluid and some upper abdominal ascites. There are no gallstones. . Fatty infiltration liver       Patient Instructions:      Medication List      START taking these medications    folic acid 1 MG tablet  Commonly known as: FOLVITE  Take 1 tablet by mouth daily     nicotine 21 MG/24HR  Commonly known as: [address] [facility] 1 patch onto the skin daily  Start taking on: November 6, 2021     pantoprazole 40 MG tablet  Commonly known as: PROTONIX  Take 1 tablet by mouth every morning (before breakfast)  Start taking on: November 6, 2021     vitamin B-1 100 MG tablet  Commonly known as: THIAMINE  Take 1 tablet by mouth daily        STOP taking these medications    gabapentin 300 MG capsule  Commonly known as: NEURONTIN     ibuprofen 800 MG tablet  Commonly known as: ADVIL;MOTRIN     traZODone 50 MG tablet  Commonly known as: DESYREL           Where to Get Your Medications      These medications were sent to [address] [place] [address] [address] [facility] [address]    Phone: [phone]   · pantoprazole 40 MG tablet     You can get these medications from any pharmacy    You don't need a prescription for these medications  · folic acid 1 MG tablet  · nicotine 21 MG/24HR  · vitamin B-1 100 MG tablet           Note that more than 30 minutes was spent in preparing discharge papers, discussing discharge with patient, medication review, etc.  Completed for Dr. Ameya Harrison, who was unavailable for patient's discharge.     Signed:  Electronically signed by Vale Chen MD on 11/5/2021 at 5:29 PM

## 2021-11-05 NOTE — PROGRESS NOTES
Spoke with GI at nurses station, signed off - Dr. Valri Osler notified     Electronically signed by Rosales Yin RN on 11/5/2021 at 2:30 PM

## 2021-11-05 NOTE — PROGRESS NOTES
[facility] Progress Note    Admitting Date and Time: 11/2/2021 12:12 PM  Admit Dx: Acalculous cholecystitis [K81.9]  Lactic acidosis [E87.2]  Cirrhosis ([address] [K74.60]  Acute cystitis with hematuria [N30.01]    Subjective:  Patient is being followed for Acalculous cholecystitis [K81.9]  Lactic acidosis [E87.2]  Cirrhosis ([address] [K74.60]  Acute cystitis with hematuria [N30.01]     Patient was seen and examined this AM.  Awake, alert and oriented X3. Complaining of abdominal pain and distention, noted that the medications were not working for her anymore. -GI following, MRCP done, no evidence of acute pancreatitis and no reports of weight loss.  -Note with moderate ascites, awaiting GI recommendations first paracentesis  -Will DC CIWA protocol, decrease dose of Xanax prescribed. ROS: denies fever, chills, cp, sob, n/v, HA unless stated above.       folic acid  2 mg Oral Daily    nicotine  1 patch TransDERmal Daily    pantoprazole  40 mg Oral QAM AC    morphine  4 mg IntraVENous Once    sodium chloride flush  5-40 mL IntraVENous 2 times per day     ALPRAZolam, 1 mg, Q12H PRN  acetaminophen, 650 mg, Q6H PRN   Or  acetaminophen, 650 mg, Q6H PRN  sodium chloride flush, 5-40 mL, PRN  sodium chloride, 25 mL, PRN  ondansetron, 4 mg, Q8H PRN   Or  ondansetron, 4 mg, Q6H PRN  polyethylene glycol, 17 g, Daily PRN  morphine, 2 mg, Q2H PRN   Or  morphine, 4 mg, Q2H PRN  melatonin, 6 mg, Nightly PRN         Objective:    /86   Pulse 92   Temp 97.6 °F (36.4 °C) (Oral)   Resp 16   Ht 5' 3\" (1.6 m)   Wt 104 lb (47.2 kg)   SpO2 100%   BMI 18.42 kg/m²     General: well-developed, well-nourished, no acute distress, cooperative  Skin: warm, dry, intact, normal color without cyanosis  HEENT: normocephalic, atraumatic, mucous membranes normal  Respiratory: clear to auscultation bilaterally without respiratory distress  Cardiovascular: regular rate and rhythm without murmur / rub / gallop  Abdominal: soft, tender, nondistended, normoactive bowel sounds  Extremities: no mottling, pulses intact, no edema  Neurologic: awake, alert, no focal deficits  Psychiatric: normal affect, cooperative      Recent Labs     11/04/21  1305 11/04/21  1305 11/04/21 2046 11/05/21 0223     --  135 135   K 2.9*  2.9*   < > 3.5 4.3  4.3     --  103 103   CO2 20*  --  20* 21*   BUN <2*  --  <2* <2*   CREATININE 0.3*  --  0.4* 0.3*   GLUCOSE 87  --  105* 78   CALCIUM 7.6*  --  7.8* 7.6*    < > = values in this interval not displayed. Recent Labs     11/03/21  0747 11/04/21 1305 11/05/21 0223   WBC 14.3* 12.5* 11.8*   RBC 2.53* 2.73* 2.38*   HGB 10.3* 10.9* 9.4*   HCT 30.5* 32.9* 28.5*   .6* 120.5* 119.7*   MCH 40.7* 39.9* 39.5*   MCHC 33.8 33.1 33.0   RDW 14.6 14.3 14.2    317 310   MPV 10.2 9.7 9.9       Assessment:    Active Problems:    Severe protein-calorie malnutrition (HCC)    Cirrhosis (HCC)  Resolved Problems:    * No resolved hospital problems. *    Assessment:  -Abdominal pain, no evidence of pancreatitis or acute cholecystitis on imaging. -RUQ GB US could not exclude distal CBD stone.   -Also noted with moderate ascites with MRI  -Alcoholic hepatic cirrhosis with ascites  -Leukocytosis, reactive to the above  -Chronic macrocytic anemia -no evidence of GIB  -Hypokalemia  -Hx alcohol use disorder    Plan:  -GI following, input appreciated. Might need paracentesis, follow EGD recoo=rds  -Follow cirrhosis work-up, HFE genotype with the elevated ferritin  -ID following, no indication for antibiotics as no concern for cholecystitis.  -We will discontinue CIWA protocol no requirements over the past 24 hours. Space out Xanax dose    PCD for possible paracentesis  Full code    NOTE: This report was transcribed using voice recognition software. Every effort was made to ensure accuracy; however, inadvertent computerized transcription errors may be present.   Electronically signed by Colin Wagner MD on 11/5/2021 at 8:35 AM

## 2021-11-05 NOTE — PROGRESS NOTES
Occupational Therapy  Date:11/5/2021  Patient Name: Philip Angulo  Room: 9451/9985-E     Occupational Therapy (OT) order received, patient's medical record reviewed, and OT evaluation attempted this date; patient reported that she is independent with ADLs and functional transfers/mobility and declined provision of OT services during this hospitalization. Patient stated that her father can assist with IADLs, as needed, upon discharge. No OT evaluation completed, per patient preference. Tootie Alas, OTR/L  License Number: [id number]

## 2021-11-05 NOTE — PROGRESS NOTES
Name:  Maile Palacios  :  1985  MRN:  [id number]  Room:  [address] [place] [address]  DOS:  2021    [facility]  [facility]  Dr. Colby Sow M.D. Dr. Shashi Mandel M.D. Dr. Jayson Potter D.O. Dr. Zully Blackman M.D. Dr. Sheeba Pollard D.O.    -NP Progress Note-    PCP:  Reginaldo Juárez MD  Admitting Physician:  Gibran Black DO  Chief Complaint:    Chief Complaint   Patient presents with    Abdominal Pain    Numbness     both lower extremities and abdomen to chest    Abnormal Lab     low potassium       Subjective  Patient resting in bed. Reports that abdominal pain is mostly resolved. Tolerated soft diet. Denies nausea or vomiting. States that she is having pain in her legs.     Physical Examination  Vitals:  /86   Pulse 92   Temp 97.6 °F (36.4 °C) (Oral)   Resp 16   Ht 5' 3\" (1.6 m)   Wt 104 lb (47.2 kg)   SpO2 100%   BMI 18.42 kg/m²   General Appearance:  awake, alert, and oriented to person, place, time, and purpose; appears stated age and cooperative; no apparent distress no labored breathing  HEENT:  PERRL; EOMI; sclera clear; buccal mucosa moist  Neck:  supple; trachea midline; no thyromegaly; no JVD; no bruits  Heart:  rhythm regular; rate controlled; no murmurs  Lungs:  symmetrical; clear to auscultation bilaterally; no wheezes; no rhonchi; no rales  Abdomen:  soft, mostly resolved tenderness, distended; bowel sounds positive; no organomegaly or masses  Extremities:  peripheral pulses present; BLE edema; no ulcers  Neurologic:  alert and oriented x 3; no focal deficit; cranial nerves grossly intact  Skin:  no petechia; no hemorrhage; no wounds    Medications  Scheduled Meds    folic acid  2 mg Oral Daily    nicotine  1 patch TransDERmal Daily    pantoprazole  40 mg Oral QAM AC    morphine  4 mg IntraVENous Once    sodium chloride flush  5-40 mL IntraVENous 2 times per day     Infusion Meds    sodium chloride      sodium chloride 75 mL/hr at 21 0908     PRN Basophils % 0.7 0.0 - 2.0 %    Neutrophils Absolute 7.37 (H) 1.80 - 7.30 E9/L    Immature Granulocytes # 0.05 E9/L    Lymphocytes Absolute 2.79 1.50 - 4.00 E9/L    Monocytes Absolute 1.33 (H) 0.10 - 0.95 E9/L    Eosinophils Absolute 0.13 0.05 - 0.50 E9/L    Basophils Absolute 0.08 0.00 - 0.20 E9/L    Poikilocytes 1+     Target Cells 1+     Pappenheimer Bodies 1+    Comprehensive Metabolic Panel w/ Reflex to MG    Collection Time: 11/05/21  2:23 AM   Result Value Ref Range    Potassium reflex Magnesium 4.3 3.5 - 5.0 mmol/L   Lipase    Collection Time: 11/05/21  2:23 AM   Result Value Ref Range    Lipase 11 (L) 13 - 60 U/L       Imaging  XR CHEST (2 VW)    Result Date: 11/2/2021  EXAMINATION: TWO XRAY VIEWS OF THE CHEST 11/2/2021 12:51 pm COMPARISON: 08/06/2019 HISTORY: ORDERING SYSTEM PROVIDED HISTORY: cp TECHNOLOGIST PROVIDED HISTORY: Reason for exam:->cp Open reduction internal fixation of proximal left humerus fracture 05/12/2021 FINDINGS: There is new density in the lateral and posterior left costophrenic recess. This is most compatible with pleural effusion. No right pleural effusion. No pneumothorax. No new pulmonary consolidation. Normal cardiac silhouette size without vascular congestion. No acute displaced fracture. New hardware partially visualized in proximal left humerus compatible with history of left humerus fracture repair. Findings most compatible with new small left pleural effusion     NM HEPATOBILIARY    Result Date: 11/4/2021  EXAMINATION: NUCLEAR MEDICINE HEPATOBILIARY SCINTIGRAPHY (HIDA SCAN). 11/4/2021 6:50 am TECHNIQUE: Approximately 5.8 [id number] Mebrofenin (Choletec) was administered IV. Then, dynamic images of the abdomen were obtained in the anterior projection for 60 mins.  COMPARISON: Ultrasound 11/02/2021 HISTORY: ORDERING SYSTEM PROVIDED HISTORY: R/o acute juana TECHNOLOGIST PROVIDED HISTORY: Reason for exam:->R/o acute juana What reading provider will be dictating this exam?->CRC FINDINGS: Prompt, homogenous uptake by the liver is noted with normal appearance of radiotracer excretion into the biliary system. Clearance of bloodpool activity appears appropriate. Gallbladder and small bowel is visualized in appropriate sequence. Patency of the common bile duct and cystic duct. No acute cholecystitis. CT ABDOMEN PELVIS W IV CONTRAST    Result Date: 10/29/2021                                      Winifred Santamaria, [address] [place], [place] [address]y                                              [phone]                                               CT Scan Report                                                  Signed    Patient: Brenda Davenport                                                                MR#: [id number]          : 1985                                                                [de-identified]            Age/Sex: 43 / F                                                                Admit Date: 10/29/21            Loc: ER                                                                                     Attending Dr:     Ordering Physician: Elaine Mccauley MD   Date of Service: 10/29/21  Procedure(s): CT abdomen pelvis w con  Accession Number(s): [id number]    cc: Elaine Mccauley MD              PHYSICIAN INDICATIONS:  Jaundice, weight loss, abdominal pain       Technique: Axial images through the abdomen and pelvis with intravenous contrast. CT Dose Index:   4.2 mGy. DLP: 374 mGy-cm. Jose Wyatt. k=0.015 mSv/(mGy-cm)       Findings: The heart size is normal. The lung bases are clear. Liver is enlarged measuring 18 cm in length with decreased attenuation compatible with fatty   infiltration.        Considerable edema and fluid surrounding the pancreas and extending along the colic gutters and the   pelvis suggesting acute pancreatitis. No pseudocyst or abscess. Common bile duct measures 9 mm and appears enlarged. Recommend MRCP to evaluate for underlying   stone. Gallbladder is present with pericholecystic fluid. The kidneys are unremarkable. No renal or utereral calculi or hydronephrosis. The adrenal glands   are unremarkable. No adenopathy or mass lesion in the mesentery or retroperitoneum. The aorta is normal in caliber. Bowel is not distended. No inflammation around the appendix. Uterus is present. No adnexal mass. Bladder is unremarkable. No masses are seen in the pelvis. Impression:       CT Abdomen and Pelvis with contrast:   1. Considerable edema and fluid surrounding the pancreas and extending along the colic gutters and   the pelvis suggesting acute pancreatitis. No pseudocyst or abscess. 2. Common bile duct measures 9 mm and appears enlarged. Recommend MRCP to evaluate for underlying   stone. 3. Liver is enlarged measuring 18 cm in length with decreased attenuation compatible with fatty   infiltration. 4. Gallbladder is present with pericholecystic fluid. Dictated ByFrancisca Munoz MD   DD/DT: 10/29/21 2115               Signed By: Lisa Beaver MD 10/29/21 2115            : CHERY    US GALLBLADDER RUQ    Result Date: 11/2/2021  EXAMINATION: RIGHT UPPER QUADRANT ULTRASOUND 11/2/2021 3:46 pm COMPARISON: None. HISTORY: ORDERING SYSTEM PROVIDED HISTORY: ruq abdominal pain TECHNOLOGIST PROVIDED HISTORY: Reason for exam:->ruq abdominal pain What reading provider will be dictating this exam?->CRC FINDINGS: LIVER:  The liver demonstrates increased echogenicity without evidence of intrahepatic biliary ductal dilatation. BILIARY SYSTEM:  Gallbladder has wall thickening, significant sludge and some pericholecystic fluid. Casar Copping Positive sonographic Lemus's sign. Common bile duct is upper limits measuring 6 mm.  RIGHT KIDNEY: The right kidney is grossly unremarkable without evidence of hydronephrosis. It measures 10.2 x 5.1 cm. PANCREAS:  Visualized portions of the pancreas are unremarkable. OTHER: There is small to moderate amount amount of right upper quadrant ascites. Findings most compatible with acalculus cholecystitis. A very distal CBD stone is not excluded. There is pericholecystic fluid and some upper abdominal ascites. There are no gallstones. . Fatty infiltration liver     US ABDOMEN LIMITED Specify organ? LIVER, GALLBLADDER, PANCREAS    Result Date: 10/29/2021  EXAMINATION: RIGHT UPPER QUADRANT ULTRASOUND 10/28/2021 6:14 pm COMPARISON: None. HISTORY: ORDERING SYSTEM PROVIDED HISTORY: Abnormal results of liver function studies TECHNOLOGIST PROVIDED HISTORY: Specify organ?->LIVER Specify organ?->GALLBLADDER Specify organ?->PANCREAS What reading provider will be dictating this exam?->CRC FINDINGS: LIVER:  Enlarged with increased echogenicity somewhat heterogeneous BILIARY SYSTEM:  Unremarkable gallbladder other than borderline wall thickening Common bile duct is within normal limits measuring 4 mm. RIGHT KIDNEY: The right kidney is grossly unremarkable without evidence of hydronephrosis. PANCREAS:  Visualized portions of the pancreas are unremarkable. OTHER: Question perihepatic ascites     1. Findings suggestive of diffuse hepatic steatosis. However also cirrhosis is possible with perihepatic ascites and gallbladder wall thickening possibly present 2. No cholelithiasis or biliary dilation RECOMMENDATIONS: Consider CT or MRI for further clinical concern or persistence of symptoms. Assessment and Plan  Patient is a 43 y.o. female on consult for cirrhosis.     1.   Cirrhosis  -Initial MELD = 7, MELD-Na = 11  -Suspect secondary to alcohol abuse  -Viral serology negative 10/28/2021  -Elevated ferritin - check HFE genotype  -Normal AFP 4 (10/28/2021)  -Imaging with no remark of hepatic mass  -Recent EGD in our office - will obtain records  -No evidence of encephalopathy  -New ascites - see below     2. Pancreatitis  -Suspect secondary to alcohol  -Ultrasound 11/2/2021 indicating that distal CBD stone cannot be excluded  -Pending MRI/MRCP  -Lipase in normal range  -Persistent abdominal pain  -Recommend aggressive hydration  -None elevated calcium  -Pending lipid panel  -Pain management per admitting     3. Ascites  -New onset ascites  -Distended abdomen  -Consider paracentesis - unlikely enough volume to obtain sample  -Pending MRI/MRCP - rule out PVT     4.  Cholecystitis  -Pain questionable cholecystitis versus pancreatitis  -Poor candidate for cholecystectomy secondary to risk for hepatic decompensation  -Negative HIDA scan  -Agree with antibiotics     5. GERD  -PPI daily  -Will obtain recent EGD reports from our office     6. Abdominal pain  -Likely secondary to cholecystitis versus pancreatitis  -Will obtain recent EGD reports from our office  -Pain management per admitting     7. Anemia  -Chronic  -Macrocytic  -No overt GI bleeding  -No evidence of iron deficiency  -Will obtain recent EGD report from our office  -Consider colonoscopy - likely as outpatient  -Monitor CBC daily     8. Alcohol abuse  -Reports alcohol cessation about 3 weeks ago  -Advised on need for permanent alcohol cessation  -Withdrawal management per admitting      Feeling much better. Tolerating diet. Would like to go home. Folic acid supplementation per admitting. No other immediate interventions planned from GI POV. Patient advised again on the need for absolute alcohol cessation. Odds of long-term survival decrease significantly with ongoing alcohol use. She verbalizes understanding. Resume PPI daily on discharge. Patient to follow in our office in 1 to 2 weeks. Patient to call for appointment. [phone]. Thank you for the opportunity to participate in the care of Ms. Aron Albarado.     Toni Jackson Masters, APRN - CNP  2:27 PM  11/5/2021

## 2021-11-08 ENCOUNTER — APPOINTMENT (OUTPATIENT)
Dept: GENERAL RADIOLOGY | Age: 36
DRG: 720 | End: 2021-11-08
Payer: MEDICAID

## 2021-11-08 ENCOUNTER — HOSPITAL ENCOUNTER (INPATIENT)
Age: 36
LOS: 8 days | Discharge: HOME | DRG: 720 | End: 2021-11-16
Attending: STUDENT IN AN ORGANIZED HEALTH CARE EDUCATION/TRAINING PROGRAM | Admitting: HOSPITALIST
Payer: MEDICAID

## 2021-11-08 ENCOUNTER — TELEPHONE (OUTPATIENT)
Dept: FAMILY MEDICINE CLINIC | Age: 36
End: 2021-11-08

## 2021-11-08 DIAGNOSIS — K65.2 SBP (SPONTANEOUS BACTERIAL PERITONITIS) (HCC): ICD-10-CM

## 2021-11-08 DIAGNOSIS — F31.11 BIPOLAR AFFECTIVE DISORDER, CURRENTLY MANIC, MILD (HCC): ICD-10-CM

## 2021-11-08 DIAGNOSIS — K74.60 DECOMPENSATION OF CIRRHOSIS OF LIVER (HCC): Primary | ICD-10-CM

## 2021-11-08 DIAGNOSIS — K70.11 ALCOHOLIC HEPATITIS WITH ASCITES: ICD-10-CM

## 2021-11-08 DIAGNOSIS — K72.90 DECOMPENSATION OF CIRRHOSIS OF LIVER (HCC): Primary | ICD-10-CM

## 2021-11-08 DIAGNOSIS — R10.84 GENERALIZED ABDOMINAL PAIN: ICD-10-CM

## 2021-11-08 PROBLEM — K70.10 ALCOHOLIC HEPATITIS: Status: ACTIVE | Noted: 2021-11-08

## 2021-11-08 PROBLEM — R10.9 ABDOMINAL PAIN: Status: ACTIVE | Noted: 2021-11-08

## 2021-11-08 LAB
ALBUMIN SERPL-MCNC: 2.2 G/DL (ref 3.5–5.2)
ALP BLD-CCNC: 218 U/L (ref 35–104)
ALT SERPL-CCNC: 33 U/L (ref 0–32)
ANION GAP SERPL CALCULATED.3IONS-SCNC: 12 MMOL/L (ref 7–16)
APTT: 29.2 SEC (ref 24.5–35.1)
AST SERPL-CCNC: 300 U/L (ref 0–31)
BASOPHILS ABSOLUTE: 0.07 E9/L (ref 0–0.2)
BASOPHILS RELATIVE PERCENT: 0.4 % (ref 0–2)
BILIRUB SERPL-MCNC: 1.3 MG/DL (ref 0–1.2)
BUN BLDV-MCNC: <2 MG/DL (ref 6–20)
CALCIUM SERPL-MCNC: 8.4 MG/DL (ref 8.6–10.2)
CHLORIDE BLD-SCNC: 101 MMOL/L (ref 98–107)
CO2: 21 MMOL/L (ref 22–29)
CREAT SERPL-MCNC: 0.3 MG/DL (ref 0.5–1)
EOSINOPHILS ABSOLUTE: 0.04 E9/L (ref 0.05–0.5)
EOSINOPHILS RELATIVE PERCENT: 0.2 % (ref 0–6)
GFR AFRICAN AMERICAN: >60
GFR NON-AFRICAN AMERICAN: >60 ML/MIN/1.73
GLUCOSE BLD-MCNC: 122 MG/DL (ref 74–99)
HCT VFR BLD CALC: 30.4 % (ref 34–48)
HEMOGLOBIN: 10.5 G/DL (ref 11.5–15.5)
HYPOCHROMIA: ABNORMAL
IMMATURE GRANULOCYTES #: 0.07 E9/L
IMMATURE GRANULOCYTES %: 0.4 % (ref 0–5)
INR BLD: 1.2
LACTIC ACID: 5.5 MMOL/L (ref 0.5–2.2)
LIPASE: 10 U/L (ref 13–60)
LYMPHOCYTES ABSOLUTE: 2.49 E9/L (ref 1.5–4)
LYMPHOCYTES RELATIVE PERCENT: 13.8 % (ref 20–42)
MAGNESIUM: 1.7 MG/DL (ref 1.6–2.6)
MCH RBC QN AUTO: 40.5 PG (ref 26–35)
MCHC RBC AUTO-ENTMCNC: 34.5 % (ref 32–34.5)
MCV RBC AUTO: 117.4 FL (ref 80–99.9)
MONOCYTES ABSOLUTE: 1.52 E9/L (ref 0.1–0.95)
MONOCYTES RELATIVE PERCENT: 8.4 % (ref 2–12)
NEUTROPHILS ABSOLUTE: 13.81 E9/L (ref 1.8–7.3)
NEUTROPHILS RELATIVE PERCENT: 76.8 % (ref 43–80)
PDW BLD-RTO: 13.9 FL (ref 11.5–15)
PLATELET # BLD: 373 E9/L (ref 130–450)
PMV BLD AUTO: 9.7 FL (ref 7–12)
POIKILOCYTES: ABNORMAL
POLYCHROMASIA: ABNORMAL
POTASSIUM REFLEX MAGNESIUM: 2.8 MMOL/L (ref 3.5–5)
PRO-BNP: 423 PG/ML (ref 0–125)
PROTHROMBIN TIME: 13.7 SEC (ref 9.3–12.4)
RBC # BLD: 2.59 E12/L (ref 3.5–5.5)
SODIUM BLD-SCNC: 134 MMOL/L (ref 132–146)
TARGET CELLS: ABNORMAL
TOTAL PROTEIN: 5.7 G/DL (ref 6.4–8.3)
WBC # BLD: 18 E9/L (ref 4.5–11.5)

## 2021-11-08 PROCEDURE — 83690 ASSAY OF LIPASE: CPT

## 2021-11-08 PROCEDURE — 36415 COLL VENOUS BLD VENIPUNCTURE: CPT

## 2021-11-08 PROCEDURE — 1200000000 HC SEMI PRIVATE

## 2021-11-08 PROCEDURE — 6360000002 HC RX W HCPCS: Performed by: STUDENT IN AN ORGANIZED HEALTH CARE EDUCATION/TRAINING PROGRAM

## 2021-11-08 PROCEDURE — 2580000003 HC RX 258: Performed by: STUDENT IN AN ORGANIZED HEALTH CARE EDUCATION/TRAINING PROGRAM

## 2021-11-08 PROCEDURE — 6370000000 HC RX 637 (ALT 250 FOR IP): Performed by: STUDENT IN AN ORGANIZED HEALTH CARE EDUCATION/TRAINING PROGRAM

## 2021-11-08 PROCEDURE — 74022 RADEX COMPL AQT ABD SERIES: CPT

## 2021-11-08 PROCEDURE — 85730 THROMBOPLASTIN TIME PARTIAL: CPT

## 2021-11-08 PROCEDURE — 83735 ASSAY OF MAGNESIUM: CPT

## 2021-11-08 PROCEDURE — 99285 EMERGENCY DEPT VISIT HI MDM: CPT

## 2021-11-08 PROCEDURE — 96375 TX/PRO/DX INJ NEW DRUG ADDON: CPT

## 2021-11-08 PROCEDURE — 83605 ASSAY OF LACTIC ACID: CPT

## 2021-11-08 PROCEDURE — 89051 BODY FLUID CELL COUNT: CPT

## 2021-11-08 PROCEDURE — 85610 PROTHROMBIN TIME: CPT

## 2021-11-08 PROCEDURE — 80053 COMPREHEN METABOLIC PANEL: CPT

## 2021-11-08 PROCEDURE — 83880 ASSAY OF NATRIURETIC PEPTIDE: CPT

## 2021-11-08 PROCEDURE — 99222 1ST HOSP IP/OBS MODERATE 55: CPT | Performed by: HOSPITALIST

## 2021-11-08 PROCEDURE — 85025 COMPLETE CBC W/AUTO DIFF WBC: CPT

## 2021-11-08 PROCEDURE — 96374 THER/PROPH/DIAG INJ IV PUSH: CPT

## 2021-11-08 RX ORDER — SPIRONOLACTONE 25 MG/1
100 TABLET ORAL DAILY
Status: DISCONTINUED | OUTPATIENT
Start: 2021-11-09 | End: 2021-11-16 | Stop reason: HOSPADM

## 2021-11-08 RX ORDER — CALCIUM CARBONATE 200(500)MG
500 TABLET,CHEWABLE ORAL 3 TIMES DAILY PRN
Status: DISCONTINUED | OUTPATIENT
Start: 2021-11-08 | End: 2021-11-16 | Stop reason: HOSPADM

## 2021-11-08 RX ORDER — SODIUM CHLORIDE 0.9 % (FLUSH) 0.9 %
5-40 SYRINGE (ML) INJECTION PRN
Status: DISCONTINUED | OUTPATIENT
Start: 2021-11-08 | End: 2021-11-16 | Stop reason: HOSPADM

## 2021-11-08 RX ORDER — POLYETHYLENE GLYCOL 3350 17 G/17G
17 POWDER, FOR SOLUTION ORAL DAILY PRN
Status: DISCONTINUED | OUTPATIENT
Start: 2021-11-08 | End: 2021-11-16 | Stop reason: HOSPADM

## 2021-11-08 RX ORDER — SODIUM CHLORIDE 9 MG/ML
25 INJECTION, SOLUTION INTRAVENOUS PRN
Status: DISCONTINUED | OUTPATIENT
Start: 2021-11-08 | End: 2021-11-16 | Stop reason: HOSPADM

## 2021-11-08 RX ORDER — MAGNESIUM SULFATE 1 G/100ML
1000 INJECTION INTRAVENOUS PRN
Status: DISCONTINUED | OUTPATIENT
Start: 2021-11-08 | End: 2021-11-16 | Stop reason: HOSPADM

## 2021-11-08 RX ORDER — POTASSIUM CHLORIDE 20 MEQ/1
40 TABLET, EXTENDED RELEASE ORAL PRN
Status: DISCONTINUED | OUTPATIENT
Start: 2021-11-08 | End: 2021-11-16 | Stop reason: HOSPADM

## 2021-11-08 RX ORDER — SODIUM CHLORIDE 0.9 % (FLUSH) 0.9 %
5-40 SYRINGE (ML) INJECTION EVERY 12 HOURS SCHEDULED
Status: DISCONTINUED | OUTPATIENT
Start: 2021-11-08 | End: 2021-11-16 | Stop reason: HOSPADM

## 2021-11-08 RX ORDER — LANOLIN ALCOHOL/MO/W.PET/CERES
100 CREAM (GRAM) TOPICAL DAILY
Status: DISCONTINUED | OUTPATIENT
Start: 2021-11-09 | End: 2021-11-16 | Stop reason: HOSPADM

## 2021-11-08 RX ORDER — MORPHINE SULFATE 2 MG/ML
2 INJECTION, SOLUTION INTRAMUSCULAR; INTRAVENOUS ONCE
Status: COMPLETED | OUTPATIENT
Start: 2021-11-08 | End: 2021-11-08

## 2021-11-08 RX ORDER — NICOTINE 21 MG/24HR
1 PATCH, TRANSDERMAL 24 HOURS TRANSDERMAL DAILY
Status: DISCONTINUED | OUTPATIENT
Start: 2021-11-09 | End: 2021-11-16 | Stop reason: HOSPADM

## 2021-11-08 RX ORDER — ONDANSETRON 2 MG/ML
4 INJECTION INTRAMUSCULAR; INTRAVENOUS ONCE
Status: COMPLETED | OUTPATIENT
Start: 2021-11-08 | End: 2021-11-08

## 2021-11-08 RX ORDER — MULTIVITAMIN WITH IRON
1 TABLET ORAL DAILY
Status: DISCONTINUED | OUTPATIENT
Start: 2021-11-09 | End: 2021-11-16 | Stop reason: HOSPADM

## 2021-11-08 RX ORDER — FOLIC ACID 1 MG/1
1 TABLET ORAL DAILY
Status: DISCONTINUED | OUTPATIENT
Start: 2021-11-09 | End: 2021-11-16 | Stop reason: HOSPADM

## 2021-11-08 RX ORDER — 0.9 % SODIUM CHLORIDE 0.9 %
500 INTRAVENOUS SOLUTION INTRAVENOUS ONCE
Status: COMPLETED | OUTPATIENT
Start: 2021-11-08 | End: 2021-11-08

## 2021-11-08 RX ORDER — FUROSEMIDE 40 MG/1
40 TABLET ORAL DAILY
Status: DISCONTINUED | OUTPATIENT
Start: 2021-11-09 | End: 2021-11-16 | Stop reason: HOSPADM

## 2021-11-08 RX ORDER — PANTOPRAZOLE SODIUM 40 MG/1
40 TABLET, DELAYED RELEASE ORAL
Status: DISCONTINUED | OUTPATIENT
Start: 2021-11-09 | End: 2021-11-16 | Stop reason: HOSPADM

## 2021-11-08 RX ORDER — LANOLIN ALCOHOL/MO/W.PET/CERES
3 CREAM (GRAM) TOPICAL NIGHTLY PRN
Status: DISCONTINUED | OUTPATIENT
Start: 2021-11-09 | End: 2021-11-09

## 2021-11-08 RX ORDER — POTASSIUM CHLORIDE 7.45 MG/ML
10 INJECTION INTRAVENOUS PRN
Status: DISCONTINUED | OUTPATIENT
Start: 2021-11-08 | End: 2021-11-16 | Stop reason: HOSPADM

## 2021-11-08 RX ORDER — FENTANYL CITRATE 50 UG/ML
25 INJECTION, SOLUTION INTRAMUSCULAR; INTRAVENOUS ONCE
Status: COMPLETED | OUTPATIENT
Start: 2021-11-08 | End: 2021-11-08

## 2021-11-08 RX ORDER — ONDANSETRON 4 MG/1
4 TABLET, ORALLY DISINTEGRATING ORAL EVERY 8 HOURS PRN
Status: DISCONTINUED | OUTPATIENT
Start: 2021-11-08 | End: 2021-11-16 | Stop reason: HOSPADM

## 2021-11-08 RX ORDER — POTASSIUM CHLORIDE 20 MEQ/1
40 TABLET, EXTENDED RELEASE ORAL ONCE
Status: COMPLETED | OUTPATIENT
Start: 2021-11-08 | End: 2021-11-08

## 2021-11-08 RX ORDER — ONDANSETRON 2 MG/ML
4 INJECTION INTRAMUSCULAR; INTRAVENOUS EVERY 6 HOURS PRN
Status: DISCONTINUED | OUTPATIENT
Start: 2021-11-08 | End: 2021-11-16 | Stop reason: HOSPADM

## 2021-11-08 RX ADMIN — ONDANSETRON 4 MG: 2 INJECTION INTRAMUSCULAR; INTRAVENOUS at 17:46

## 2021-11-08 RX ADMIN — MORPHINE SULFATE 2 MG: 2 INJECTION, SOLUTION INTRAMUSCULAR; INTRAVENOUS at 20:01

## 2021-11-08 RX ADMIN — FENTANYL CITRATE 25 MCG: 0.05 INJECTION, SOLUTION INTRAMUSCULAR; INTRAVENOUS at 17:46

## 2021-11-08 RX ADMIN — WATER 2000 MG: 1 INJECTION INTRAMUSCULAR; INTRAVENOUS; SUBCUTANEOUS at 21:26

## 2021-11-08 RX ADMIN — POTASSIUM CHLORIDE 40 MEQ: 1500 TABLET, EXTENDED RELEASE ORAL at 20:01

## 2021-11-08 RX ADMIN — SODIUM CHLORIDE 500 ML: 9 INJECTION, SOLUTION INTRAVENOUS at 20:01

## 2021-11-08 ASSESSMENT — ENCOUNTER SYMPTOMS
EYE REDNESS: 0
EYE DISCHARGE: 0
CHEST TIGHTNESS: 1
SORE THROAT: 0
CONSTIPATION: 1
VOMITING: 0
ABDOMINAL PAIN: 1
SINUS PRESSURE: 0
BACK PAIN: 0
SHORTNESS OF BREATH: 0
COUGH: 0
NAUSEA: 0
NAUSEA: 1
DIARRHEA: 1
ABDOMINAL DISTENTION: 1
EYE PAIN: 0
WHEEZING: 0
DIARRHEA: 0

## 2021-11-08 ASSESSMENT — PAIN SCALES - GENERAL
PAINLEVEL_OUTOF10: 10
PAINLEVEL_OUTOF10: 10

## 2021-11-08 ASSESSMENT — PAIN DESCRIPTION - LOCATION: LOCATION: ABDOMEN

## 2021-11-08 NOTE — ED PROVIDER NOTES
[facility]  Department of Emergency Medicine     Written by: Emiliana Hernandez DO  Patient Name: Ernie Kwok  Attending Provider: Jacquelyn Ortiz*  Admit Date: 2021  3:08 PM  MRN: [id number]                   : 1985        Chief Complaint   Patient presents with    Abdominal Pain     generalized swelling. just diagnosed with cirrosis of the liver. - Chief complaint    Patient is a 33-year-old female past medical history of alcoholic cirrhosis, bipolar and pancreatitis. Patient notes that she was just recently admitted for generalized abdominal pain was diagnosed with alcoholic cirrhosis as well as pancreatitis. Patient notes that she was discharged home a couple days ago. Since that time patient has noted worsening abdominal swelling and pain as well as bilateral lower extremity swelling. She describes the pain as a tightness sensation. She currently rates pain a 6 out of 10. Patient denies any exacerbating relieving factors. Patient notes symptoms have been constant since onset. Patient does admit to smoking and eating junk food upon discharge. Patient denies any alcohol use since discharge. Patient denies any fevers, chills, nausea, vomiting, chest pain, cough, constipation or diarrhea. The history is provided by the patient. No  was used. Review of Systems   Constitutional: Negative for chills and fever. HENT: Negative for ear pain, sinus pressure and sore throat. Eyes: Negative for pain, discharge and redness. Respiratory: Negative for cough, shortness of breath and wheezing. Cardiovascular: Positive for leg swelling. Negative for chest pain. Gastrointestinal: Positive for abdominal distention and abdominal pain. Negative for diarrhea, nausea and vomiting. Genitourinary: Negative for dysuria and frequency. Musculoskeletal: Negative for arthralgias and back pain. Skin: Negative for rash and wound. Neurological: Negative for weakness and headaches. Hematological: Negative for adenopathy. All other systems reviewed and are negative. Physical Exam  Vitals and nursing note reviewed. Constitutional:       General: She is not in acute distress. Appearance: Normal appearance. HENT:      Head: Normocephalic and atraumatic. Nose: No congestion or rhinorrhea. Mouth/Throat:      Mouth: Mucous membranes are moist.      Pharynx: Oropharynx is clear. Eyes:      Extraocular Movements: Extraocular movements intact. Pupils: Pupils are equal, round, and reactive to light. Cardiovascular:      Rate and Rhythm: Regular rhythm. Tachycardia present. Heart sounds: No murmur heard. No gallop. Pulmonary:      Effort: Pulmonary effort is normal. No respiratory distress. Breath sounds: No wheezing, rhonchi or rales. Abdominal:      General: Abdomen is flat. There is distension. Palpations: Abdomen is soft. There is fluid wave and splenomegaly. There is no mass. Tenderness: There is generalized abdominal tenderness. There is no guarding. Hernia: No hernia is present. Musculoskeletal:         General: No swelling, tenderness or signs of injury. Normal range of motion. Cervical back: Normal range of motion. No rigidity. No muscular tenderness. Right lower le+ Pitting Edema present. Left lower le+ Pitting Edema present. Skin:     General: Skin is warm and dry. Capillary Refill: Capillary refill takes less than 2 seconds. Neurological:      General: No focal deficit present. Mental Status: She is alert and oriented to person, place, and time. Mental status is at baseline.    Psychiatric:         Mood and Affect: Mood normal.         Behavior: Behavior normal.          Procedures       MDM  Number of Diagnoses or Management Options  Concern for SBP (spontaneous bacterial peritonitis) ([facility] [address]  Decompensation of cirrhosis of liver detail with the resident, and provided the instruction and education regarding the patient. My findings/plan:   43year old female with history of alcohol abuse and cirrhosis, recently just discharged after being worked up who went home smoked, and did things she was not supposed to. She denies using alcohol. Notes that her father took her keys so she can not go and buy anything. The patient states that she has been having worsening abdominal discomfort and distension. She also notes worse leg swelling since being discharged home as well. Currently laying in bed in no acute distress. Heart regular rate and rhythm. Abdomen with slight distension and generalized tenderness. Pitting edema to the bilateral lower extremities. Plan: labs, imaging, supportive care. [BB]      ED Course User Index  [BB] Maite Garcia DO        --------------------------------------------- PAST HISTORY ---------------------------------------------  Past Medical History:  has a past medical history of Anemia, Bipolar disorder current episode depressed ([facility] [address] Depression, ETOH abuse, Humerus fracture, Major depression, recurrent ([facility] [address] Mild tetrahydrocannabinol (THC) abuse, and Protein calorie malnutrition ([facility] [address] Past Surgical History:  has a past surgical history that includes Pelvic fracture surgery (Bilateral, 2000); Tonsillectomy; and Humerus fracture surgery (Left, 5/12/2021). Social History:  reports that she has been smoking cigarettes. She started smoking about 18 years ago. She has a 15.00 pack-year smoking history. She has quit using smokeless tobacco. She reports previous alcohol use of about 70.0 standard drinks of alcohol per week. She reports current drug use. Frequency: 1.00 time per week. Drug: Marijuana Charmayne Stai). Family History: family history is not on file. The patients home medications have been reviewed. Allergies: Patient has no known allergies.     -------------------------------------------------- RESULTS -------------------------------------------------    LABS:  Results for orders placed or performed during the hospital encounter of 11/08/21   Culture, Body Fluid    Specimen: Ascitic Fluid;  Body Fluid   Result Value Ref Range    Body Fluid Culture, Sterile Growth not present, incubation continues     Gram Stain Result Refer to ordered Gram stain for results    Gram stain    Specimen: Ascitic Fluid   Result Value Ref Range    Gram Stain Orderable       Gram stain performed on unspun fluid  Polymorphonuclear leukocytes not seen  Epithelial cells not seen  No organisms seen     CBC Auto Differential   Result Value Ref Range    WBC 18.0 (H) 4.5 - 11.5 E9/L    RBC 2.59 (L) 3.50 - 5.50 E12/L    Hemoglobin 10.5 (L) 11.5 - 15.5 g/dL    Hematocrit 30.4 (L) 34.0 - 48.0 %    .4 (H) 80.0 - 99.9 fL    MCH 40.5 (H) 26.0 - 35.0 pg    MCHC 34.5 32.0 - 34.5 %    RDW 13.9 11.5 - 15.0 fL    Platelets 718 249 - 679 E9/L    MPV 9.7 7.0 - 12.0 fL    Neutrophils % 76.8 43.0 - 80.0 %    Immature Granulocytes % 0.4 0.0 - 5.0 %    Lymphocytes % 13.8 (L) 20.0 - 42.0 %    Monocytes % 8.4 2.0 - 12.0 %    Eosinophils % 0.2 0.0 - 6.0 %    Basophils % 0.4 0.0 - 2.0 %    Neutrophils Absolute 13.81 (H) 1.80 - 7.30 E9/L    Immature Granulocytes # 0.07 E9/L    Lymphocytes Absolute 2.49 1.50 - 4.00 E9/L    Monocytes Absolute 1.52 (H) 0.10 - 0.95 E9/L    Eosinophils Absolute 0.04 (L) 0.05 - 0.50 E9/L    Basophils Absolute 0.07 0.00 - 0.20 E9/L    Polychromasia 1+     Hypochromia 1+     Poikilocytes 1+     Target Cells 1+    Comprehensive Metabolic Panel w/ Reflex to MG   Result Value Ref Range    Sodium 134 132 - 146 mmol/L    Potassium reflex Magnesium 2.8 (L) 3.5 - 5.0 mmol/L    Chloride 101 98 - 107 mmol/L    CO2 21 (L) 22 - 29 mmol/L    Anion Gap 12 7 - 16 mmol/L    Glucose 122 (H) 74 - 99 mg/dL    BUN <2 (L) 6 - 20 mg/dL    CREATININE 0.3 (L) 0.5 - 1.0 mg/dL    GFR Non-African American >60 >=60 mL/min/1.73    GFR African American 4.00 E9/L    Monocytes Absolute 1.08 (H) 0.10 - 0.95 E9/L    Eosinophils Absolute 0.20 0.05 - 0.50 E9/L    Basophils Absolute 0.06 0.00 - 0.20 E9/L    Anisocytosis 1+     Polychromasia 1+     Poikilocytes 1+     Target Cells 1+    Body fluid cell count with differential   Result Value Ref Range    Cell Count Fluid Type Ascites     Color, Fluid Yellow     Appearance, Fluid Clear     Nucl Cell, Fluid 73 /uL    RBC, Fluid <2,000 /uL    Neutrophil Count, Fluid 4 %    Monocyte Count, Fluid 96 %   Magnesium   Result Value Ref Range    Magnesium 1.6 1.6 - 2.6 mg/dL   GAMMA GT   Result Value Ref Range     (H) 6 - 42 U/L   Ethanol   Result Value Ref Range    Ethanol Lvl <10 mg/dL   Hepatic function panel   Result Value Ref Range    Total Protein 5.4 (L) 6.4 - 8.3 g/dL    Albumin 1.9 (L) 3.5 - 5.2 g/dL    Alkaline Phosphatase 208 (H) 35 - 104 U/L    ALT 70 (H) 0 - 32 U/L     (H) 0 - 31 U/L    Total Bilirubin 1.7 (H) 0.0 - 1.2 mg/dL    Bilirubin, Direct 1.1 (H) 0.0 - 0.3 mg/dL    Bilirubin, Indirect 0.6 0.0 - 1.0 mg/dL   C-Reactive Protein   Result Value Ref Range    CRP 3.0 (H) 0.0 - 0.4 mg/dL   Sedimentation Rate   Result Value Ref Range    Sed Rate 45 (H) 0 - 20 mm/Hr   Comprehensive Metabolic Panel w/ Reflex to MG   Result Value Ref Range    Sodium 132 132 - 146 mmol/L    Potassium reflex Magnesium 3.8 3.5 - 5.0 mmol/L    Chloride 96 (L) 98 - 107 mmol/L    CO2 23 22 - 29 mmol/L    Anion Gap 13 7 - 16 mmol/L    Glucose 78 74 - 99 mg/dL    BUN <2 (L) 6 - 20 mg/dL    CREATININE 0.3 (L) 0.5 - 1.0 mg/dL    GFR Non-African American >60 >=60 mL/min/1.73    GFR African American >60     Calcium 8.4 (L) 8.6 - 10.2 mg/dL    Total Protein 6.4 6.4 - 8.3 g/dL    Albumin 2.3 (L) 3.5 - 5.2 g/dL    Total Bilirubin 1.9 (H) 0.0 - 1.2 mg/dL    Alkaline Phosphatase 253 (H) 35 - 104 U/L    ALT 65 (H) 0 - 32 U/L     (H) 0 - 31 U/L   CBC WITH AUTO DIFFERENTIAL   Result Value Ref Range    WBC 26.7 (H) 4.5 - 11.5 E9/L    RBC 2.89 (L) 3.50 - 5.50 E12/L    Hemoglobin 11.4 (L) 11.5 - 15.5 g/dL    Hematocrit 33.2 (L) 34.0 - 48.0 %    .9 (H) 80.0 - 99.9 fL    MCH 39.4 (H) 26.0 - 35.0 pg    MCHC 34.3 32.0 - 34.5 %    RDW 13.9 11.5 - 15.0 fL    Platelets 356 581 - 045 E9/L    MPV 10.0 7.0 - 12.0 fL    Neutrophils % 84.9 (H) 43.0 - 80.0 %    Immature Granulocytes % 0.6 0.0 - 5.0 %    Lymphocytes % 8.6 (L) 20.0 - 42.0 %    Monocytes % 5.5 2.0 - 12.0 %    Eosinophils % 0.1 0.0 - 6.0 %    Basophils % 0.3 0.0 - 2.0 %    Neutrophils Absolute 22.68 (H) 1.80 - 7.30 E9/L    Immature Granulocytes # 0.17 E9/L    Lymphocytes Absolute 2.31 1.50 - 4.00 E9/L    Monocytes Absolute 1.46 (H) 0.10 - 0.95 E9/L    Eosinophils Absolute 0.03 (L) 0.05 - 0.50 E9/L    Basophils Absolute 0.07 0.00 - 0.20 E9/L    Anisocytosis 1+    Hepatic function panel   Result Value Ref Range    Bilirubin, Direct 1.3 (H) 0.0 - 0.3 mg/dL    Bilirubin, Indirect 0.6 0.0 - 1.0 mg/dL   Protime-INR   Result Value Ref Range    Protime 14.6 (H) 9.3 - 12.4 sec    INR 1.3    CK   Result Value Ref Range    Total CK 46 20 - 180 U/L   D-dimer, quantitative   Result Value Ref Range    D-Dimer, Quant 712 ng/mL DDU   LACTIC ACID, PLASMA   Result Value Ref Range    Lactic Acid 4.2 (HH) 0.5 - 2.2 mmol/L   EKG 12 Lead   Result Value Ref Range    Ventricular Rate 136 BPM    Atrial Rate 136 BPM    P-R Interval 120 ms    QRS Duration 62 ms    Q-T Interval 296 ms    QTc Calculation (Bazett) 445 ms    P Axis 55 degrees    R Axis 57 degrees    T Axis 42 degrees       RADIOLOGY:  CTA TRIPHASIC LIVER   Final Result   No suspicious patent lesions identified. No evidence of acute pancreatitis. Partially imaged bilateral pleural effusions. Small to moderate amount of abdominal ascites. US DUP LOWER EXTREMITIES BILATERAL VENOUS   Final Result   No evidence of DVT in either lower extremity. XR ACUTE ABD SERIES CHEST 1 VW   Final Result   1. No acute cardiopulmonary pathology. 2.  Mildly prominent air-filled bowel loops throughout the abdomen. No   pathologic fluid levels identified. No significant stool burden. US GUIDED PARACENTESIS    (Results Pending)       ------------------------- NURSING NOTES AND VITALS REVIEWED ---------------------------  Date / Time Roomed:  11/8/2021  3:08 PM  ED Bed Assignment:  [id number]    The nursing notes within the ED encounter and vital signs as below have been reviewed. Patient Vitals for the past 24 hrs:   BP Temp Temp src Pulse Resp SpO2   11/10/21 0830 102/60 98.2 °F (36.8 °C) Oral 104 18 97 %   11/09/21 2256 -- -- -- 130 -- --   11/09/21 2035 107/77 98 °F (36.7 °C) Oral 130 18 98 %   11/09/21 2015 133/86 98.4 °F (36.9 °C) Oral 108 18 --       Oxygen Saturation Interpretation: Normal    ------------------------------------------ PROGRESS NOTES ------------------------------------------  Re-evaluation(s):  Time: 2045  Patients symptoms show no change  Repeat physical examination is not changed    Counseling:  I have spoken with the patient and discussed todays results, in addition to providing specific details for the plan of care and counseling regarding the diagnosis and prognosis. Their questions are answered at this time and they are agreeable with the plan of admission.    --------------------------------- ADDITIONAL PROVIDER NOTES ---------------------------------  Consultations:  Time: 2100. Spoke with Dr. Marlin Bravo. Discussed case. They will admit the patient. This patient's ED course included: a personal history and physicial examination and re-evaluation prior to disposition    This patient has remained hemodynamically stable during their ED course. Diagnosis:  1. Decompensation of cirrhosis of liver ([facility] [address]    2. Concern for SBP (spontaneous bacterial peritonitis) ([facility] [address]        Disposition:  Patient's disposition: Admit to med/surg floor  Patient's condition is stable.         Patient was seen and evaluated by myself and my attending Artice Giselle Hunter*. Assessment and Plan discussed with attending provider, please see attestation for final plan of care.      DO Eliazar Canales DO  Resident  11/10/21 [id number]

## 2021-11-08 NOTE — Clinical Note
Patient Class: Inpatient [101]   REQUIRED: Diagnosis: Abdominal pain [825353]   Estimated Length of Stay: Estimated stay of more than 2 midnights   Telemetry/Cardiac Monitoring Required?: No

## 2021-11-09 ENCOUNTER — APPOINTMENT (OUTPATIENT)
Dept: ULTRASOUND IMAGING | Age: 36
DRG: 720 | End: 2021-11-09
Payer: MEDICAID

## 2021-11-09 ENCOUNTER — APPOINTMENT (OUTPATIENT)
Dept: CT IMAGING | Age: 36
DRG: 720 | End: 2021-11-09
Payer: MEDICAID

## 2021-11-09 LAB
ALBUMIN SERPL-MCNC: 1.9 G/DL (ref 3.5–5.2)
ALP BLD-CCNC: 208 U/L (ref 35–104)
ALT SERPL-CCNC: 70 U/L (ref 0–32)
ANION GAP SERPL CALCULATED.3IONS-SCNC: 14 MMOL/L (ref 7–16)
ANISOCYTOSIS: ABNORMAL
APPEARANCE FLUID: CLEAR
AST SERPL-CCNC: 420 U/L (ref 0–31)
BASOPHILS ABSOLUTE: 0.06 E9/L (ref 0–0.2)
BASOPHILS RELATIVE PERCENT: 0.4 % (ref 0–2)
BILIRUB SERPL-MCNC: 1.7 MG/DL (ref 0–1.2)
BILIRUBIN DIRECT: 1.1 MG/DL (ref 0–0.3)
BILIRUBIN, INDIRECT: 0.6 MG/DL (ref 0–1)
BUN BLDV-MCNC: <2 MG/DL (ref 6–20)
C-REACTIVE PROTEIN: 3 MG/DL (ref 0–0.4)
CALCIUM SERPL-MCNC: 8 MG/DL (ref 8.6–10.2)
CELL COUNT FLUID TYPE: NORMAL
CHLORIDE BLD-SCNC: 102 MMOL/L (ref 98–107)
CO2: 20 MMOL/L (ref 22–29)
COLOR FLUID: YELLOW
CREAT SERPL-MCNC: 0.3 MG/DL (ref 0.5–1)
EOSINOPHILS ABSOLUTE: 0.2 E9/L (ref 0.05–0.5)
EOSINOPHILS RELATIVE PERCENT: 1.5 % (ref 0–6)
ETHANOL: <10 MG/DL (ref 0–0.08)
GAMMA GLUTAMYL TRANSFERASE: 475 U/L (ref 6–42)
GFR AFRICAN AMERICAN: >60
GFR NON-AFRICAN AMERICAN: >60 ML/MIN/1.73
GLUCOSE BLD-MCNC: 76 MG/DL (ref 74–99)
HCT VFR BLD CALC: 29.7 % (ref 34–48)
HEMOGLOBIN: 10 G/DL (ref 11.5–15.5)
IMMATURE GRANULOCYTES #: 0.05 E9/L
IMMATURE GRANULOCYTES %: 0.4 % (ref 0–5)
LACTIC ACID: 3.2 MMOL/L (ref 0.5–2.2)
LYMPHOCYTES ABSOLUTE: 1.9 E9/L (ref 1.5–4)
LYMPHOCYTES RELATIVE PERCENT: 14.1 % (ref 20–42)
MAGNESIUM: 1.6 MG/DL (ref 1.6–2.6)
MCH RBC QN AUTO: 39.2 PG (ref 26–35)
MCHC RBC AUTO-ENTMCNC: 33.7 % (ref 32–34.5)
MCV RBC AUTO: 116.5 FL (ref 80–99.9)
MONOCYTE, FLUID: 96 %
MONOCYTES ABSOLUTE: 1.08 E9/L (ref 0.1–0.95)
MONOCYTES RELATIVE PERCENT: 8 % (ref 2–12)
NEUTROPHIL, FLUID: 4 %
NEUTROPHILS ABSOLUTE: 10.16 E9/L (ref 1.8–7.3)
NEUTROPHILS RELATIVE PERCENT: 75.6 % (ref 43–80)
NUCLEATED CELLS FLUID: 73 /UL
PDW BLD-RTO: 13.7 FL (ref 11.5–15)
PLATELET # BLD: 341 E9/L (ref 130–450)
PMV BLD AUTO: 10 FL (ref 7–12)
POIKILOCYTES: ABNORMAL
POLYCHROMASIA: ABNORMAL
POTASSIUM REFLEX MAGNESIUM: 2.9 MMOL/L (ref 3.5–5)
RBC # BLD: 2.55 E12/L (ref 3.5–5.5)
RBC FLUID: <2000 /UL
SEDIMENTATION RATE, ERYTHROCYTE: 45 MM/HR (ref 0–20)
SODIUM BLD-SCNC: 136 MMOL/L (ref 132–146)
TARGET CELLS: ABNORMAL
TOTAL CK: 46 U/L (ref 20–180)
TOTAL PROTEIN: 5.4 G/DL (ref 6.4–8.3)
WBC # BLD: 13.5 E9/L (ref 4.5–11.5)

## 2021-11-09 PROCEDURE — 82550 ASSAY OF CK (CPK): CPT

## 2021-11-09 PROCEDURE — 85025 COMPLETE CBC W/AUTO DIFF WBC: CPT

## 2021-11-09 PROCEDURE — 80076 HEPATIC FUNCTION PANEL: CPT

## 2021-11-09 PROCEDURE — 85651 RBC SED RATE NONAUTOMATED: CPT

## 2021-11-09 PROCEDURE — 88112 CYTOPATH CELL ENHANCE TECH: CPT

## 2021-11-09 PROCEDURE — 93970 EXTREMITY STUDY: CPT

## 2021-11-09 PROCEDURE — 83735 ASSAY OF MAGNESIUM: CPT

## 2021-11-09 PROCEDURE — 82077 ASSAY SPEC XCP UR&BREATH IA: CPT

## 2021-11-09 PROCEDURE — 87205 SMEAR GRAM STAIN: CPT

## 2021-11-09 PROCEDURE — 87070 CULTURE OTHR SPECIMN AEROBIC: CPT

## 2021-11-09 PROCEDURE — 1200000000 HC SEMI PRIVATE

## 2021-11-09 PROCEDURE — 2580000003 HC RX 258: Performed by: HOSPITALIST

## 2021-11-09 PROCEDURE — 88305 TISSUE EXAM BY PATHOLOGIST: CPT

## 2021-11-09 PROCEDURE — 82977 ASSAY OF GGT: CPT

## 2021-11-09 PROCEDURE — 86140 C-REACTIVE PROTEIN: CPT

## 2021-11-09 PROCEDURE — 83605 ASSAY OF LACTIC ACID: CPT

## 2021-11-09 PROCEDURE — 6370000000 HC RX 637 (ALT 250 FOR IP): Performed by: HOSPITALIST

## 2021-11-09 PROCEDURE — 80048 BASIC METABOLIC PNL TOTAL CA: CPT

## 2021-11-09 PROCEDURE — 6360000002 HC RX W HCPCS: Performed by: HOSPITALIST

## 2021-11-09 PROCEDURE — 74175 CTA ABDOMEN W/CONTRAST: CPT

## 2021-11-09 PROCEDURE — 0W9G3ZZ DRAINAGE OF PERITONEAL CAVITY, PERCUTANEOUS APPROACH: ICD-10-PCS | Performed by: STUDENT IN AN ORGANIZED HEALTH CARE EDUCATION/TRAINING PROGRAM

## 2021-11-09 PROCEDURE — 99233 SBSQ HOSP IP/OBS HIGH 50: CPT | Performed by: INTERNAL MEDICINE

## 2021-11-09 PROCEDURE — 36415 COLL VENOUS BLD VENIPUNCTURE: CPT

## 2021-11-09 PROCEDURE — 6370000000 HC RX 637 (ALT 250 FOR IP): Performed by: INTERNAL MEDICINE

## 2021-11-09 PROCEDURE — C1729 CATH, DRAINAGE: HCPCS

## 2021-11-09 PROCEDURE — 6360000004 HC RX CONTRAST MEDICATION: Performed by: RADIOLOGY

## 2021-11-09 RX ORDER — POTASSIUM CHLORIDE 20 MEQ/1
40 TABLET, EXTENDED RELEASE ORAL 2 TIMES DAILY WITH MEALS
Status: DISCONTINUED | OUTPATIENT
Start: 2021-11-09 | End: 2021-11-16 | Stop reason: HOSPADM

## 2021-11-09 RX ORDER — MAGNESIUM SULFATE 1 G/100ML
1000 INJECTION INTRAVENOUS PRN
Status: DISCONTINUED | OUTPATIENT
Start: 2021-11-09 | End: 2021-11-16 | Stop reason: HOSPADM

## 2021-11-09 RX ORDER — POTASSIUM CHLORIDE 7.45 MG/ML
10 INJECTION INTRAVENOUS PRN
Status: DISCONTINUED | OUTPATIENT
Start: 2021-11-09 | End: 2021-11-09

## 2021-11-09 RX ORDER — POTASSIUM CHLORIDE 20 MEQ/1
40 TABLET, EXTENDED RELEASE ORAL ONCE
Status: COMPLETED | OUTPATIENT
Start: 2021-11-09 | End: 2021-11-09

## 2021-11-09 RX ORDER — LANOLIN ALCOHOL/MO/W.PET/CERES
3 CREAM (GRAM) TOPICAL NIGHTLY PRN
Status: DISCONTINUED | OUTPATIENT
Start: 2021-11-09 | End: 2021-11-16 | Stop reason: HOSPADM

## 2021-11-09 RX ADMIN — FOLIC ACID 1 MG: 1 TABLET ORAL at 10:09

## 2021-11-09 RX ADMIN — HYDROMORPHONE HYDROCHLORIDE 1 MG: 1 INJECTION, SOLUTION INTRAMUSCULAR; INTRAVENOUS; SUBCUTANEOUS at 22:26

## 2021-11-09 RX ADMIN — POTASSIUM CHLORIDE 40 MEQ: 1500 TABLET, EXTENDED RELEASE ORAL at 16:51

## 2021-11-09 RX ADMIN — POTASSIUM CHLORIDE 40 MEQ: 1500 TABLET, EXTENDED RELEASE ORAL at 14:43

## 2021-11-09 RX ADMIN — HYDROMORPHONE HYDROCHLORIDE 1 MG: 1 INJECTION, SOLUTION INTRAMUSCULAR; INTRAVENOUS; SUBCUTANEOUS at 04:10

## 2021-11-09 RX ADMIN — HYDROMORPHONE HYDROCHLORIDE 1 MG: 1 INJECTION, SOLUTION INTRAMUSCULAR; INTRAVENOUS; SUBCUTANEOUS at 08:32

## 2021-11-09 RX ADMIN — Medication 100 MG: at 10:09

## 2021-11-09 RX ADMIN — FUROSEMIDE 40 MG: 40 TABLET ORAL at 10:08

## 2021-11-09 RX ADMIN — Medication 10 ML: at 21:52

## 2021-11-09 RX ADMIN — HYDROMORPHONE HYDROCHLORIDE 1 MG: 1 INJECTION, SOLUTION INTRAMUSCULAR; INTRAVENOUS; SUBCUTANEOUS at 00:12

## 2021-11-09 RX ADMIN — PANTOPRAZOLE SODIUM 40 MG: 40 TABLET, DELAYED RELEASE ORAL at 06:19

## 2021-11-09 RX ADMIN — MULTIVITAMIN TABLET 1 TABLET: TABLET at 10:08

## 2021-11-09 RX ADMIN — Medication 3 MG: at 22:26

## 2021-11-09 RX ADMIN — Medication 3 MG: at 01:54

## 2021-11-09 RX ADMIN — HYDROMORPHONE HYDROCHLORIDE 1 MG: 1 INJECTION, SOLUTION INTRAMUSCULAR; INTRAVENOUS; SUBCUTANEOUS at 14:52

## 2021-11-09 RX ADMIN — WATER 2000 MG: 1 INJECTION INTRAMUSCULAR; INTRAVENOUS; SUBCUTANEOUS at 21:45

## 2021-11-09 RX ADMIN — HYDROMORPHONE HYDROCHLORIDE 1 MG: 1 INJECTION, SOLUTION INTRAMUSCULAR; INTRAVENOUS; SUBCUTANEOUS at 18:30

## 2021-11-09 RX ADMIN — Medication 10 ML: at 00:13

## 2021-11-09 RX ADMIN — IOPAMIDOL 75 ML: 755 INJECTION, SOLUTION INTRAVENOUS at 21:27

## 2021-11-09 RX ADMIN — SPIRONOLACTONE 100 MG: 25 TABLET ORAL at 10:09

## 2021-11-09 ASSESSMENT — PAIN DESCRIPTION - LOCATION
LOCATION: LEG

## 2021-11-09 ASSESSMENT — PAIN DESCRIPTION - ORIENTATION
ORIENTATION: RIGHT;LEFT

## 2021-11-09 ASSESSMENT — PAIN DESCRIPTION - FREQUENCY
FREQUENCY: INTERMITTENT

## 2021-11-09 ASSESSMENT — PAIN DESCRIPTION - ONSET
ONSET: ON-GOING

## 2021-11-09 ASSESSMENT — PAIN SCALES - GENERAL
PAINLEVEL_OUTOF10: 9
PAINLEVEL_OUTOF10: 9
PAINLEVEL_OUTOF10: 0
PAINLEVEL_OUTOF10: 7
PAINLEVEL_OUTOF10: 10
PAINLEVEL_OUTOF10: 9
PAINLEVEL_OUTOF10: 0
PAINLEVEL_OUTOF10: 8
PAINLEVEL_OUTOF10: 9
PAINLEVEL_OUTOF10: 0

## 2021-11-09 ASSESSMENT — PAIN DESCRIPTION - PROGRESSION
CLINICAL_PROGRESSION: GRADUALLY WORSENING

## 2021-11-09 ASSESSMENT — PAIN DESCRIPTION - DESCRIPTORS
DESCRIPTORS: ACHING;DISCOMFORT;SHARP
DESCRIPTORS: ACHING;DISCOMFORT
DESCRIPTORS: ACHING;DISCOMFORT
DESCRIPTORS: ACHING;DISCOMFORT;NUMBNESS
DESCRIPTORS: BURNING;CONSTANT;NUMBNESS

## 2021-11-09 ASSESSMENT — PAIN - FUNCTIONAL ASSESSMENT
PAIN_FUNCTIONAL_ASSESSMENT: 0-10
PAIN_FUNCTIONAL_ASSESSMENT: 0-10
PAIN_FUNCTIONAL_ASSESSMENT: ACTIVITIES ARE NOT PREVENTED

## 2021-11-09 ASSESSMENT — PAIN DESCRIPTION - PAIN TYPE: TYPE: ACUTE PAIN

## 2021-11-09 NOTE — ED NOTES
SBAR report faxed to 7th floor, verified receipt with Tamir Garcia.      Essence Silva RN  11/08/21 [id number]

## 2021-11-09 NOTE — PROGRESS NOTES
IRFLOWSHEET    Date: 11/9/2021    Time: 11:28 AM     Exam: ultrasound guided paraacentesis    Radiologist Performing Procedure: Dr Jack Matt    Nurse Reporting/Phone: [phone]     Nurse Receiving: Yanet Valenzuela RN    Permit signed:      Yes    ID Band Checklist: Yes    Allergies: Patient has no known allergies. TIME OUT: 1138    PROCEDURE START TIME: 1137    Puncture Site: LLQ    Puncture Time: 1140    Catheters: 5 Tamazight    LABS:   Lab Results   Component Value Date    INR 1.2 11/08/2021    PROTIME 13.7 (H) 11/08/2021           Lab Results   Component Value Date    CREATININE 0.3 (L) 11/09/2021    BUN <2 (L) 11/09/2021          Lab Results   Component Value Date    HGB 10.0 (L) 11/09/2021    HCT 29.7 (L) 11/09/2021     11/09/2021         PROCEDURE END TIME: 1158    Total Contrast: na    Fluoroscopy Time: na    Complications:  None    Comments: Pt transported to IR room for paracentesis from room. Pt is alert and oriented, states stabbing pain in abdomen  prior to procedure. Ultrasound images taken prior to procedure. Procedure is explained to patient, including possible risks by Dr Jack Matt. Pt verbalizes understanding and consent form signed. Procedure done under sterile technique and guidance of ultrasound imaging. 1% Lidocaine is used during procedure for comfort measures. A total of 1850 ml's of clear light colored ascitic fluid drained during procedure. Catheter removed and op-site dressing applied to site with no bleeding noted. Specimen collected and sent to lab for ordered testing. Pt tolerated procedure well and denies any pain after. Pt transported out of department with no difficulties. Report called to floor.

## 2021-11-09 NOTE — CONSULTS
Name:  Ariel Vieira  :  1985  MRN:  [id number]  Room:  2578/9602-L  DOS:  2021    [address] [facility]  [facility]  Dr. Shayy Fung M.D. Dr. Chantel Painter M.D. Dr. Petra Soto D.O. Dr. Day Jensen M.D. Dr. Danny Pelletier D.O.     -NP Consult Note-    PCP:  Kindra Childs MD  Admitting Physician:  José Miguel Johnson MD  Consultants:  GI   Chief Complaint:    Chief Complaint   Patient presents with    Abdominal Pain     generalized swelling. just diagnosed with cirrosis of the liver. Reason for Consult:  Cirrhosis / ascites     History of Present Illness  Ariel Vieira is a 43 y.o. female on consult for cirrhosis. Recent hospital admission 2021-2021. Patient was previously admitted with cirrhosis and pancreatitis. Patient previously presented to the hospital in [place] with complaints of nausea, vomiting, diarrhea, and muscle cramping starting about 2 weeks prior to arrival.  She states that she had stopped drinking alcohol about a week prior to onset of symptoms. She was diagnosed with acute cholecystitis. She was offered to stay in the hospital over the weekend and plan for cholecystectomy, but opted to sign out and return to the surgeon outpatient. She was seen by her surgeon who informed her they did not take her insurance and was directed to the ER in [facility]. On prior admission, she was evaluated for cholecystitis. HIDA showed no evidence. Also evaluated for pancreatitis based on initial findings. MRI showed no evidence of pancreatitis. She was also evaluated for ascites. No paracentesis was attempted due to suspected insufficient volume for successful tap. She improved clinically and was discharged. Following her prior discharge, she reports that her dysuria worsened. She states that on discharge, she had some dietary indiscretion. Also reports that she was smoking more than normal.  Denies alcohol use.   She denies nausea or Lipase 10. INR 1.2. Acute abdominal series showing no acute cardiopulmonary pathology, mildly prominent air-filled bowel loops, no air-fluid levels, no significant constipation. Paracentesis performed, 1.8 L removed per nursing report. On prior admissions, ultrasound showed fatty liver, gallbladder with wall thickening, some sludge and pericholecystic fluid, positive Lemus sign, CBD 6 mm, small to moderate amount of right upper quadrant ascites. Chest x-ray showing small left pleural effusion. CT abdomen and pelvis 10/29/2021 at [facility] showing hepatomegaly with fatty infiltration, edema surrounding the pancreas extending along the colic gutter in the pelvis consistent with pancreatitis, no pseudocyst or abscess, CBD 9 mm, pericholecystic fluid. Ultrasound 10/28/2021 at [facility] showing enlarged liver, suggestion of possible cirrhosis, cholelithiasis with no ductal dilation, perihepatic ascites and gallbladder wall thickening present. MRI showing no evidence of pancreatitis, fatty liver with diffuse liver disease, patent hepatic vasculature, moderate ascites, moderate gallbladder wall thickening.       TRIAGE VITALS  BP: (!) 133/92, Temp: 98.4 °F (36.9 °C), Pulse: 112, Resp: 18, SpO2: 99 %    Vitals:    11/09/21 0015 11/09/21 0829 11/09/21 1129 11/09/21 1201   BP: 114/79 114/83 (!) 130/94 (!) 133/92   Pulse: 103 108 116 112   Resp: 16 16 20 18   Temp: 98 °F (36.7 °C) 97.9 °F (36.6 °C) 98.4 °F (36.9 °C)    TempSrc: Oral  Oral    SpO2: 96% 96% 97% 99%   Weight:       Height:             Histories  Past Medical History:   Diagnosis Date    Anemia     Bipolar disorder current episode depressed ([facility] [address]     Therapy    Depression     ETOH abuse     Humerus fracture 04/2021    left- For OR 5-12-21    Major depression, recurrent (HCC)     Therapy -stable per pat    Mild tetrahydrocannabinol (THC) abuse     chronic     Protein calorie malnutrition (HCC)     severe Past Surgical History:   Procedure Laterality Date    HUMERUS FRACTURE SURGERY Left 5/12/2021    LEFT PROXIMAL HUMERUS OPEN REDUCTION INTERNAL FIXATION performed by Nury Singh MD at [address] [facility] Bilateral 2000    TONSILLECTOMY       No family history on file. Home Medications  Prior to Admission medications    Medication Sig Start Date End Date Taking? Authorizing Provider   folic acid (FOLVITE) 1 MG tablet Take 1 tablet by mouth daily 11/5/21   Beronica Brown MD   vitamin B-1 (THIAMINE) 100 MG tablet Take 1 tablet by mouth daily 11/5/21   Beronica Brown MD   nicotine (NICODERM CQ) 21 MG/24HR Place 1 patch onto the skin daily 11/6/21 12/6/21  Beronica Brown MD   pantoprazole (PROTONIX) 40 MG tablet Take 1 tablet by mouth every morning (before breakfast) 11/6/21   Beronica Brown MD       Allergies  Patient has no known allergies.     Social Hx  Social History     Socioeconomic History    Marital status: Single     Spouse name: Not on file    Number of children: 0    Years of education: 15    Highest education level: Not on file   Occupational History    Not on file   Tobacco Use    Smoking status: Current Every Day Smoker     Packs/day: 1.00     Years: 15.00     Pack years: 15.00     Types: Cigarettes     Start date: 10/3/2003    Smokeless tobacco: Former User   Vaping Use    Vaping Use: Former   Substance and Sexual Activity    Alcohol use: Not Currently     Alcohol/week: 70.0 standard drinks     Types: 70 Cans of beer per week     Comment: 12 packs    Drug use: Yes     Frequency: 1.0 times per week     Types: Marijuana [place])     Comment: daily    Sexual activity: Not on file   Other Topics Concern    Not on file   Social History Narrative    Not on file     Social Determinants of Health     Financial Resource Strain:     Difficulty of Paying Living Expenses: Not on file   Food Insecurity:     Worried About Running Out of Food in the Last Year: Not on file  Ran Out of Food in the Last Year: Not on file   Transportation Needs:     Lack of Transportation (Medical): Not on file    Lack of Transportation (Non-Medical): Not on file   Physical Activity:     Days of Exercise per Week: Not on file    Minutes of Exercise per Session: Not on file   Stress:     Feeling of Stress : Not on file   Social Connections:     Frequency of Communication with Friends and Family: Not on file    Frequency of Social Gatherings with Friends and Family: Not on file    Attends Jainism Services: Not on file    Active Member of [address] [place] [address] or Organizations: Not on file    Attends Club or Organization Meetings: Not on file    Marital Status: Not on file   Intimate Partner Violence:     Fear of Current or Ex-Partner: Not on file    Emotionally Abused: Not on file    Physically Abused: Not on file    Sexually Abused: Not on file   Housing Stability:     Unable to Pay for Housing in the Last Year: Not on file    Number of [place] in the Last Year: Not on file    Unstable Housing in the Last Year: Not on file       Review of Systems  All bolded are positive; please see HPI  General:  Fever, chills, diaphoresis, fatigue, malaise, night sweats, weight loss  Psychological:  Anxiety, disorientation, hallucinations. ENT:  Epistaxis, headaches, vertigo, visual changes. Cardiovascular:  Chest pain, irregular heartbeats, palpitations, paroxysmal nocturnal dyspnea. Respiratory:  Shortness of breath, coughing, sputum production, hemoptysis, wheezing, orthopnea.   Gastrointestinal:  Nausea, vomiting, diarrhea, heartburn, constipation, abdominal pain, hematemesis, hematochezia, melena, acholic stools  Genito-Urinary:  Dysuria, urgency, frequency, hematuria  Musculoskeletal:  Joint pain, joint stiffness, joint swelling, muscle pain  Neurology:  Headache, focal neurological deficits, weakness, numbness, paresthesia  Derm:  Rashes, ulcers, excoriations, bruising  Extremities:  Decreased ROM, peripheral edema, mottling    Physical Examination  Vitals:  BP (!) 133/92   Pulse 112   Temp 98.4 °F (36.9 °C) (Oral)   Resp 18   Ht 5' 3\" (1.6 m)   Wt 130 lb (59 kg)   SpO2 99%   BMI 23.03 kg/m²   General Appearance:  awake, alert, and oriented to person, place, time, and purpose; appears stated age and cooperative; no apparent distress no labored breathing  HEENT:  NCAT; PERRL; conjunctiva pink, sclera clear  Neck:  no adenopathy, bruit, JVD, tenderness, masses, or nodules; supple, symmetrical, trachea midline, thyroid not enlarged  Lung:  clear to auscultation bilaterally; no use of accessory muscles; no rhonchi, rales, or crackles  Heart:  regular rate and regular rhythm without murmur, rub, or gallop  Abdomen:  soft, generalized tenderness, less distended; normoactive bowel sounds; no palpable organomegaly  Extremities:  extremities normal, atraumatic, no cyanosis, +2 BLE edema  Musculokeletal:  no joint swelling, no muscle tenderness. ROM normal in all joints of extremities.    Neurologic:  mental status A&Ox3, thought content appropriate; CN II-XII grossly intact; sensation intact, motor strength 5/5 globally; no slurred speech    Laboratory Data  Recent Results (from the past 24 hour(s))   CBC Auto Differential    Collection Time: 11/08/21  5:30 PM   Result Value Ref Range    WBC 18.0 (H) 4.5 - 11.5 E9/L    RBC 2.59 (L) 3.50 - 5.50 E12/L    Hemoglobin 10.5 (L) 11.5 - 15.5 g/dL    Hematocrit 30.4 (L) 34.0 - 48.0 %    .4 (H) 80.0 - 99.9 fL    MCH 40.5 (H) 26.0 - 35.0 pg    MCHC 34.5 32.0 - 34.5 %    RDW 13.9 11.5 - 15.0 fL    Platelets 063 789 - 736 E9/L    MPV 9.7 7.0 - 12.0 fL    Neutrophils % 76.8 43.0 - 80.0 %    Immature Granulocytes % 0.4 0.0 - 5.0 %    Lymphocytes % 13.8 (L) 20.0 - 42.0 %    Monocytes % 8.4 2.0 - 12.0 %    Eosinophils % 0.2 0.0 - 6.0 %    Basophils % 0.4 0.0 - 2.0 %    Neutrophils Absolute 13.81 (H) 1.80 - 7.30 E9/L    Immature Granulocytes # 0.07 E9/L    Lymphocytes Absolute 2.49 1.50 - 4.00 E9/L    Monocytes Absolute 1.52 (H) 0.10 - 0.95 E9/L    Eosinophils Absolute 0.04 (L) 0.05 - 0.50 E9/L    Basophils Absolute 0.07 0.00 - 0.20 E9/L    Polychromasia 1+     Hypochromia 1+     Poikilocytes 1+     Target Cells 1+    Comprehensive Metabolic Panel w/ Reflex to MG    Collection Time: 11/08/21  5:30 PM   Result Value Ref Range    Sodium 134 132 - 146 mmol/L    Potassium reflex Magnesium 2.8 (L) 3.5 - 5.0 mmol/L    Chloride 101 98 - 107 mmol/L    CO2 21 (L) 22 - 29 mmol/L    Anion Gap 12 7 - 16 mmol/L    Glucose 122 (H) 74 - 99 mg/dL    BUN <2 (L) 6 - 20 mg/dL    CREATININE 0.3 (L) 0.5 - 1.0 mg/dL    GFR Non-African American >60 >=60 mL/min/1.73    GFR African American >60     Calcium 8.4 (L) 8.6 - 10.2 mg/dL    Total Protein 5.7 (L) 6.4 - 8.3 g/dL    Albumin 2.2 (L) 3.5 - 5.2 g/dL    Total Bilirubin 1.3 (H) 0.0 - 1.2 mg/dL    Alkaline Phosphatase 218 (H) 35 - 104 U/L    ALT 33 (H) 0 - 32 U/L     (H) 0 - 31 U/L   Lipase    Collection Time: 11/08/21  5:30 PM   Result Value Ref Range    Lipase 10 (L) 13 - 60 U/L   Protime-INR    Collection Time: 11/08/21  5:30 PM   Result Value Ref Range    Protime 13.7 (H) 9.3 - 12.4 sec    INR 1.2    APTT    Collection Time: 11/08/21  5:30 PM   Result Value Ref Range    aPTT 29.2 24.5 - 35.1 sec   Lactic Acid, Plasma    Collection Time: 11/08/21  5:30 PM   Result Value Ref Range    Lactic Acid 5.5 (HH) 0.5 - 2.2 mmol/L   Magnesium    Collection Time: 11/08/21  5:30 PM   Result Value Ref Range    Magnesium 1.7 1.6 - 2.6 mg/dL   Brain Natriuretic Peptide    Collection Time: 11/08/21  5:30 PM   Result Value Ref Range    Pro- (H) 0 - 125 pg/mL   Basic Metabolic Panel w/ Reflex to MG    Collection Time: 11/09/21  4:48 AM   Result Value Ref Range    Sodium 136 132 - 146 mmol/L    Potassium reflex Magnesium 2.9 (L) 3.5 - 5.0 mmol/L    Chloride 102 98 - 107 mmol/L    CO2 20 (L) 22 - 29 mmol/L    Anion Gap 14 7 - 16 mmol/L    Glucose 76 74 - 99 mg/dL    BUN <2 (L) 6 - 20 mg/dL    CREATININE 0.3 (L) 0.5 - 1.0 mg/dL    GFR Non-African American >60 >=60 mL/min/1.73    GFR African American >60     Calcium 8.0 (L) 8.6 - 10.2 mg/dL   CBC auto differential    Collection Time: 11/09/21  4:48 AM   Result Value Ref Range    WBC 13.5 (H) 4.5 - 11.5 E9/L    RBC 2.55 (L) 3.50 - 5.50 E12/L    Hemoglobin 10.0 (L) 11.5 - 15.5 g/dL    Hematocrit 29.7 (L) 34.0 - 48.0 %    .5 (H) 80.0 - 99.9 fL    MCH 39.2 (H) 26.0 - 35.0 pg    MCHC 33.7 32.0 - 34.5 %    RDW 13.7 11.5 - 15.0 fL    Platelets 396 988 - 185 E9/L    MPV 10.0 7.0 - 12.0 fL    Neutrophils % 75.6 43.0 - 80.0 %    Immature Granulocytes % 0.4 0.0 - 5.0 %    Lymphocytes % 14.1 (L) 20.0 - 42.0 %    Monocytes % 8.0 2.0 - 12.0 %    Eosinophils % 1.5 0.0 - 6.0 %    Basophils % 0.4 0.0 - 2.0 %    Neutrophils Absolute 10.16 (H) 1.80 - 7.30 E9/L    Immature Granulocytes # 0.05 E9/L    Lymphocytes Absolute 1.90 1.50 - 4.00 E9/L    Monocytes Absolute 1.08 (H) 0.10 - 0.95 E9/L    Eosinophils Absolute 0.20 0.05 - 0.50 E9/L    Basophils Absolute 0.06 0.00 - 0.20 E9/L    Anisocytosis 1+     Polychromasia 1+     Poikilocytes 1+     Target Cells 1+    Magnesium    Collection Time: 11/09/21  4:48 AM   Result Value Ref Range    Magnesium 1.6 1.6 - 2.6 mg/dL       Imaging  XR CHEST (2 VW)    Result Date: 11/2/2021  EXAMINATION: TWO XRAY VIEWS OF THE CHEST 11/2/2021 12:51 pm COMPARISON: 08/06/2019 HISTORY: ORDERING SYSTEM PROVIDED HISTORY: cp TECHNOLOGIST PROVIDED HISTORY: Reason for exam:->cp Open reduction internal fixation of proximal left humerus fracture 05/12/2021 FINDINGS: There is new density in the lateral and posterior left costophrenic recess. This is most compatible with pleural effusion. No right pleural effusion. No pneumothorax. No new pulmonary consolidation. Normal cardiac silhouette size without vascular congestion. No acute displaced fracture.   New hardware partially visualized in proximal left humerus compatible with history of left humerus fracture repair. Findings most compatible with new small left pleural effusion     XR ACUTE ABD SERIES CHEST 1 VW    Result Date: 11/8/2021  EXAMINATION: TWO XRAY VIEWS OF THE ABDOMEN AND SINGLE  XRAY VIEW OF THE CHEST 11/8/2021 3:54 pm COMPARISON: Chest series from November 2, 2021 HISTORY: ORDERING SYSTEM PROVIDED HISTORY: abd pain TECHNOLOGIST PROVIDED HISTORY: Reason for exam:->abd pain FINDINGS: Adequate and symmetric aeration of the lungs. There are no formed consolidations, pleural effusions, or pneumothoraces. Trachea and central mainstem bronchi appear clear. The cardiomediastinal silhouette and pulmonary vascularity appear within normal limits. Osseous and thoracic soft tissue structures demonstrate no acute findings. No organomegaly nor intra-abdominal mass effect. No pathologic calcifications seen. No free intraperitoneal air. Mildly prominent air-filled bowel loops identified throughout the abdomen. No pathologic fluid levels. No pneumatosis or portal venous gas. Osseous structures imaged demonstrate no acute findings. 1.  No acute cardiopulmonary pathology. 2.  Mildly prominent air-filled bowel loops throughout the abdomen. No pathologic fluid levels identified. No significant stool burden. NM HEPATOBILIARY    Result Date: 11/4/2021  EXAMINATION: NUCLEAR MEDICINE HEPATOBILIARY SCINTIGRAPHY (HIDA SCAN). 11/4/2021 6:50 am TECHNIQUE: Approximately 5.8 [id number] Mebrofenin (Choletec) was administered IV. Then, dynamic images of the abdomen were obtained in the anterior projection for 60 mins. COMPARISON: Ultrasound 11/02/2021 HISTORY: ORDERING SYSTEM PROVIDED HISTORY: R/o acute juana TECHNOLOGIST PROVIDED HISTORY: Reason for exam:->R/o acute juana What reading provider will be dictating this exam?->CRC FINDINGS: Prompt, homogenous uptake by the liver is noted with normal appearance of radiotracer excretion into the biliary system. Clearance of bloodpool activity appears appropriate. Gallbladder and small bowel is visualized in appropriate sequence. Patency of the common bile duct and cystic duct. No acute cholecystitis. CT ABDOMEN PELVIS W IV CONTRAST    Result Date: 10/29/2021                                      [address]                                             [facility], [address] [place] [address] [address]kw[address]                                              [phone]                                               CT Scan Report                                                  Signed    Patient: Ceclia Hodgkins                                                                MR#: [id number]  [id number]          : 1985                                                                [de-identified]            Age/Sex: 43 / F                                                                Admit Date: 10/29/21            Loc: ER                                                                                     Attending Dr:     Ordering Physician: Barrera Moulton MD   Date of Service: 10/29/21  Procedure(s): CT abdomen pelvis w con  Accession Number(s): [id number]    cc: Barrera Moulton MD              PHYSICIAN INDICATIONS:  Jaundice, weight loss, abdominal pain       Technique: Axial images through the abdomen and pelvis with intravenous contrast. CT Dose Index:   4.2 mGy. DLP: 374 mGy-cm. Woodrow Sleet. k=0.015 mSv/(mGy-cm)       Findings: The heart size is normal. The lung bases are clear. Liver is enlarged measuring 18 cm in length with decreased attenuation compatible with fatty   infiltration. Considerable edema and fluid surrounding the pancreas and extending along the colic gutters and the   pelvis suggesting acute pancreatitis. No pseudocyst or abscess. Common bile duct measures 9 mm and appears enlarged. Recommend MRCP to evaluate for underlying   stone. Gallbladder is present with pericholecystic fluid. The kidneys are unremarkable. No renal or utereral calculi or hydronephrosis. The adrenal glands   are unremarkable. No adenopathy or mass lesion in the mesentery or retroperitoneum. The aorta is normal in caliber. Bowel is not distended. No inflammation around the appendix. Uterus is present. No adnexal mass. Bladder is unremarkable. No masses are seen in the pelvis. Impression:       CT Abdomen and Pelvis with contrast:   1. Considerable edema and fluid surrounding the pancreas and extending along the colic gutters and   the pelvis suggesting acute pancreatitis. No pseudocyst or abscess. 2. Common bile duct measures 9 mm and appears enlarged. Recommend MRCP to evaluate for underlying   stone. 3. Liver is enlarged measuring 18 cm in length with decreased attenuation compatible with fatty   infiltration. 4. Gallbladder is present with pericholecystic fluid. Dictated ByHilda Ormond MD   DD/DT: 10/29/21 2115               Signed By: Mookie Augustin MD 10/29/21 2115            : CHERY    US GALLBLADDER RUQ    Result Date: 11/2/2021  EXAMINATION: RIGHT UPPER QUADRANT ULTRASOUND 11/2/2021 3:46 pm COMPARISON: None. HISTORY: ORDERING SYSTEM PROVIDED HISTORY: ruq abdominal pain TECHNOLOGIST PROVIDED HISTORY: Reason for exam:->ruq abdominal pain What reading provider will be dictating this exam?->CRC FINDINGS: LIVER:  The liver demonstrates increased echogenicity without evidence of intrahepatic biliary ductal dilatation. BILIARY SYSTEM:  Gallbladder has wall thickening, significant sludge and some pericholecystic fluid. Vearl Pippins Positive sonographic Lemus's sign. Common bile duct is upper limits measuring 6 mm. RIGHT KIDNEY: The right kidney is grossly unremarkable without evidence of hydronephrosis. It measures 10.2 x 5.1 cm.  PANCREAS:  Visualized portions of the pancreas are organ? LIVER, GALLBLADDER, PANCREAS    Result Date: 10/29/2021  EXAMINATION: RIGHT UPPER QUADRANT ULTRASOUND 10/28/2021 6:14 pm COMPARISON: None. HISTORY: ORDERING SYSTEM PROVIDED HISTORY: Abnormal results of liver function studies TECHNOLOGIST PROVIDED HISTORY: Specify organ?->LIVER Specify organ?->GALLBLADDER Specify organ?->PANCREAS What reading provider will be dictating this exam?->CRC FINDINGS: LIVER:  Enlarged with increased echogenicity somewhat heterogeneous BILIARY SYSTEM:  Unremarkable gallbladder other than borderline wall thickening Common bile duct is within normal limits measuring 4 mm. RIGHT KIDNEY: The right kidney is grossly unremarkable without evidence of hydronephrosis. PANCREAS:  Visualized portions of the pancreas are unremarkable. OTHER: Question perihepatic ascites     1. Findings suggestive of diffuse hepatic steatosis. However also cirrhosis is possible with perihepatic ascites and gallbladder wall thickening possibly present 2. No cholelithiasis or biliary dilation RECOMMENDATIONS: Consider CT or MRI for further clinical concern or persistence of symptoms. Assessment and Plan  Patient is a 38 y. o. female on consult for cirrhosis.     1.  Cirrhosis / elevated LFTs  -Initial MELD = 8, MELD-Na = 12  -Consider secondary to alcohol abuse - denies  -Viral serology negative 10/28/2021  -Elevated ferritin - pending HFE genotype from prior admission  -Normal AFP 4 (10/28/2021)  -Imaging with no remark of hepatic mass  -Recent EGD in our office - will obtain records  -No evidence of encephalopathy  -New ascites - see below  -Increased LFTs since prior admission - muscle pain - check total CK     2.  History of pancreatitis  -No evidence of acute pancreatitis this admission  -Lipase in normal range  -Suspect secondary to alcohol  -Ultrasound 11/2/2021 indicating that distal CBD stone cannot be excluded  -MRI/MRCP 11/4/2021 with no evidence of ductal abnormalities and no evidence of pancreatitis  -Persistent abdominal pain - see below  -Non-elevated calcium  -Normal triglycerides 118 (11/4/2021)     3.  Ascites  -New onset ascites  -MRI 11/4/2021 with no evidence of PVT  -Paracentesis 11/9/2021  -Pending fluid evaluation  -Rocephin per ID     4.  Cholecystitis  -Pain questionable cholecystitis versus pancreatitis  -Poor candidate for cholecystectomy secondary to risk for hepatic decompensation  -Negative HIDA scan  -Agree with antibiotics     5.  GERD  -PPI daily  -Will obtain recent EGD reports from our office     6.  Abdominal pain  -No evidence of pancreatitis this admission  -Prior workup negative for cholecystitis  -Possible SBP - ID following  -Will obtain recent EGD reports from our office  -Pain management per admitting     7.  Anemia  -Chronic  -Macrocytic  -No overt GI bleeding  -No evidence of iron deficiency  -Will obtain recent EGD report from our office  -Consider colonoscopy - likely as outpatient  -Monitor CBC daily     8.  Lactic acidosis  -Associated abdominal pain  -Consider CTA abdomen  -Repeat lactic acid pending    9. Alcohol abuse  -Reports alcohol cessation about 3-4 weeks ago  -Advised on need for permanent alcohol cessation  -Withdrawal management per admitting    10. Comorbidities  -Hypokalemia, leg swelling, dysuria  -Per admitting / consultants      Increased LFTs since prior admission. Leg pain and swelling. Muscular pain. Consider rhabdomyolysis. Evaluate CK. Hydration per admitting. Monitor labs. Further orders will depend on patient course and results of testing. Thank you for the opportunity to participate in the care of Ms. Adela Sy.     Ivelisse Enciso, APRN - CNP  1:45 PM  11/9/2021

## 2021-11-09 NOTE — PROGRESS NOTES
Shaq Peralta Hospitalist   Progress Note    Admitting Date and Time: 11/8/2021  3:08 PM  Admit Dx: Abdominal pain [R10.9]  SBP (spontaneous bacterial peritonitis) ([facility] [address] [K65.2]    Subjective: Admitted on eighth, came with swelling, noted with alcoholic cirrhosis, smoker, initial impression of spontaneous bacterial peritonitis, on Rocephin, patient evaluated by GI, work-up in progress, patient with recent DC from here, when here with acalculous cholecystitis, evaluated by ID and general surgery also in that admission. Patient had paracentesis today. 1.8 L were removed. ID evaluated today, Rocephin is continued. Patient was admitted with Abdominal pain [R10.9]  SBP (spontaneous bacterial peritonitis) ([facility] [address] [K65.2]. Patient feels comfortable, at this time awake, alert, resting in bed, does communicate well, denies any complaints. Per RN: No new issues, did have paracentesis earlier today. ROS: denies fever, chills, cp, sob, n/v, HA unless stated above.      cefTRIAXone (ROCEPHIN) IV  2,000 mg IntraVENous T43O    folic acid  1 mg Oral Daily    nicotine  1 patch TransDERmal Daily    pantoprazole  40 mg Oral QAM AC    thiamine  100 mg Oral Daily    multivitamin  1 tablet Oral Daily    sodium chloride flush  5-40 mL IntraVENous 2 times per day    enoxaparin  40 mg SubCUTAneous Daily    furosemide  40 mg Oral Daily    spironolactone  100 mg Oral Daily     potassium chloride, 10 mEq, PRN  magnesium sulfate, 1,000 mg, PRN  HYDROmorphone, 0.5 mg, Q4H PRN  HYDROmorphone, 1 mg, Q4H PRN  melatonin, 3 mg, Nightly PRN  sodium chloride flush, 5-40 mL, PRN  sodium chloride, 25 mL, PRN  ondansetron, 4 mg, Q8H PRN   Or  ondansetron, 4 mg, Q6H PRN  magnesium sulfate, 1,000 mg, PRN  potassium chloride, 40 mEq, PRN   Or  potassium alternative oral replacement, 40 mEq, PRN   Or  potassium chloride, 10 mEq, PRN  polyethylene glycol, 17 g, Daily PRN  calcium carbonate, 500 mg, TID PRN Objective:    BP (!) 130/94   Pulse 116   Temp 98.4 °F (36.9 °C) (Oral)   Resp 20   Ht 5' 3\" (1.6 m)   Wt 130 lb (59 kg)   SpO2 97%   BMI 23.03 kg/m²   General Appearance: alert and oriented to person, place and time, well-developed and well-nourished, in no acute distress  Skin: warm and dry, no rash or erythema  Head: normocephalic and atraumatic  Eyes: pupils equal, round, and reactive to light, extraocular eye movements intact, conjunctivae normal  ENT: tympanic membrane, external ear and ear canal normal bilaterally, oropharynx clear and moist with normal mucous membranes  Neck: neck supple and non tender without mass, no thyromegaly or thyroid nodules, no cervical lymphadenopathy   Pulmonary/Chest: clear to auscultation bilaterally- no wheezes, rales or rhonchi, normal air movement, no respiratory distress  Cardiovascular: normal rate, normal S1 and S2, no gallops, intact distal pulses and no carotid bruits  Abdomen: soft, non-tender, non-distended, normal bowel sounds, no masses or organomegaly  Edema 3+      Recent Labs     11/08/21  1730 11/09/21  0448    136   K 2.8* 2.9*    102   CO2 21* 20*   BUN <2* <2*   CREATININE 0.3* 0.3*   GLUCOSE 122* 76   CALCIUM 8.4* 8.0*       Recent Labs     11/08/21  1730 11/09/21  0448   WBC 18.0* 13.5*   RBC 2.59* 2.55*   HGB 10.5* 10.0*   HCT 30.4* 29.7*   .4* 116.5*   MCH 40.5* 39.2*   MCHC 34.5 33.7   RDW 13.9 13.7    341   MPV 9.7 10.0     K2.9  WBC 13.5, down from 18 on eighth  INR 1.2    Radiology:   XR ACUTE ABD SERIES CHEST 1 VW   Final Result   1. No acute cardiopulmonary pathology. 2.  Mildly prominent air-filled bowel loops throughout the abdomen. No   pathologic fluid levels identified. No significant stool burden.          US GUIDED PARACENTESIS    (Results Pending)       Assessment:    Active Problems:    Abdominal pain    SBP (spontaneous bacterial peritonitis) (HCC)    Alcoholic hepatitis  Resolved Problems:    * No resolved hospital problems. *      Plan:  1. Suspected spontaneous bacterial peritonitis, patient on Rocephin, day 2, ID on board. 2. Cirrhosis, patient seen from GI side, work-up in progress, on Lasix as well as Aldactone, tolerating well. 3. Hypokalemia, wanted p.o. supplement, changed. Will recheck electrolytes with patient also being on potassium sparing diuretic. 4. Alcoholism, patient understands very well and seems to be inclined not to use it in future, for now remains on thiamine supplement. 5. Tobacco abuse: Does remain on transdermal nicotine supplement. 6. Neck discomfort, tenderness on mild bruising of left calf, will request bilateral DVT ultrasound.         Electronically signed by Brendan Bruce MD on 11/9/2021 at 11:46 AM

## 2021-11-09 NOTE — H&P
Patient Information:  Patient: Naren Mcdowell  MRN: [id number]   [place]: [de-identified]  YOB: 1985  Admit Date: 11/8/2021      Date of Service: 11/8/2021  Primary Care Physician: Mauro Serrato MD  Advance Directive: Full Code        SUBJECTIVE:    Chief Complaint   Patient presents with    Abdominal Pain     generalized swelling. just diagnosed with cirrosis of the liver. EP Sign Out:  Alcoholic cirrhosis of the liver  Acute abdominal Swelling with Tenderness    HPI:  Mrs. Naren Mcdowell is a pleasant 43year old  [place] lady from home. She was seen recently at [facility] for RUQ pain and was admitted for cholecystitis but left AMA. She returned [facility] for acalculous cholecystitis and was consulted to general surgery. She had a negative HIDA scan and was started on to IV ABx, PPI, and vitamin supplementation. She was then discharged on the 5th. She return today for diffused abdominal pain. She states that she did not follow her discharge instructions from when she left and was eating a lot of fast food and smoking. She states that she has swelling throughout her legs and has burning when she moves her ankles or feet. She states that her abdomen is the worst. She is adamant that she has had no alcohol since leaving. Review of Systems:   Review of Systems   Constitutional: Positive for chills and fatigue. Negative for diaphoresis and fever. Denies malaise   Respiratory: Positive for chest tightness. Cardiovascular: Negative for chest pain. Gastrointestinal: Positive for constipation, diarrhea and nausea. Negative for vomiting. HAS abdominal tightness   Endocrine: Positive for cold intolerance. Genitourinary:        HAS decreased urine output   Musculoskeletal:        HAS edema, HAS leg pain   Neurological: Positive for weakness, light-headedness and headaches. Negative for syncope. Psychiatric/Behavioral: Negative for confusion.         HAS anxiety Following subjective sections other than allergies as per chart review:    Past Medical History:   Diagnosis Date    Anemia     Bipolar disorder current episode depressed ([facility] [address]     Therapy    Depression     ETOH abuse     Humerus fracture 04/2021    left- For OR 5-12-21    Major depression, recurrent (HCC)     Therapy -stable per pat    Mild tetrahydrocannabinol (THC) abuse     chronic     Protein calorie malnutrition ([facility] [address]     severe     Past Psychiatric History:  As per above    Past Surgical History:   Procedure Laterality Date    HUMERUS FRACTURE SURGERY Left 5/12/2021    LEFT PROXIMAL HUMERUS OPEN REDUCTION INTERNAL FIXATION performed by Luz Bettencourt MD at [facility] Bilateral 2000    TONSILLECTOMY       Social History     Tobacco History     Smoking Status  Current Every Day Smoker Smoking Start Date  10/3/2003 Smoking Frequency  1 pack/day for 15 years (15 pk yrs) Smoking Tobacco Type  Cigarettes    Smokeless Tobacco Use  Former User          Alcohol History     Alcohol Use Status  Not Currently Drinks/Week  70 Cans of beer per week Amount  70.0 standard drinks of alcohol/wk Comment  12 packs   STATES SHE IS NO LONGER DRINKING on [id number]       Drug Use     Drug Use Status  Yes Types  Marijuana (Weed) Frequency   1 time/week Comment  daily          Sexual Activity     Sexually Active  Not Asked           Family History:  No family history on file. Allergies:   No Known Allergies    Home Medications:  Prior to Admission medications    Medication Sig Start Date End Date Taking?  Authorizing Provider   folic acid (FOLVITE) 1 MG tablet Take 1 tablet by mouth daily 11/5/21   Elizabeth Lerma MD   vitamin B-1 (THIAMINE) 100 MG tablet Take 1 tablet by mouth daily 11/5/21   Elizabeth Lerma MD   nicotine (NICODERM CQ) 21 MG/24HR Place 1 patch onto the skin daily 11/6/21 12/6/21  Elizabeth Lerma MD   pantoprazole (PROTONIX) 40 MG tablet Take 1 tablet by mouth every morning (before breakfast) 11/6/21   Yana Berg MD         OBJECTIVE:    Vitals:    11/08/21 1855   BP: 114/74   Pulse: 106   Resp: 16   Temp: 98.4 °F (36.9 °C)   SpO2: 96%   breathing room air    /74   Pulse 106   Temp 98.4 °F (36.9 °C) (Oral)   Resp 16   Ht 5' 3\" (1.6 m)   Wt 130 lb (59 kg)   SpO2 96%   BMI 23.03 kg/m²     No intake or output data in the 24 hours ending 11/08/21 2211    Physical Exam  Vitals reviewed. Constitutional:       General: She is not in acute distress. Appearance: She is not ill-appearing or toxic-appearing. HENT:      Head: Normocephalic and atraumatic. Nose: No congestion or rhinorrhea. Eyes:      General:         Right eye: No discharge. Left eye: No discharge. Neck:      Comments: Supple, trachea appears midline  Cardiovascular:      Rate and Rhythm: Normal rate and regular rhythm. Heart sounds: No murmur heard. No friction rub. No gallop. Pulmonary:      Effort: No respiratory distress. Breath sounds: No stridor. No wheezing, rhonchi or rales. Chest:      Chest wall: No tenderness. Abdominal:      General: There is distension. Tenderness: There is abdominal tenderness. Musculoskeletal:      Comments: Bilateral leg edema non-pitting, stated tenderness not apparent to examiner   Skin:     Comments: Warm, nondiaphoretic   Neurological:      Mental Status: She is alert. Cranial Nerves: No dysarthria. Motor: No tremor or seizure activity.    Psychiatric:         Mood and Affect: Mood normal.         Behavior: Behavior normal.       LABORATORY DATA:    CBC:   Recent Labs     11/08/21  1730   WBC 18.0*   HGB 10.5*   HCT 30.4*        BMP:   Recent Labs     11/08/21  1730      K 2.8*      CO2 21*   BUN <2*   CREATININE 0.3*   CALCIUM 8.4*     Hepatic Profile:   Recent Labs     11/08/21  1730   *   ALT 33*   BILITOT 1.3*   ALKPHOS 218*     Coag Panel:   Recent Labs     11/08/21  1730   INR 1.2 PROTIME 13.7*   APTT 29.2     Pro-BNP:   Recent Labs     11/08/21  1730   PROBNP 423*       IMAGING:  XR ACUTE ABD SERIES CHEST 1 VW  Result Date: 11/8/2021  1. No acute cardiopulmonary pathology. 2.  Mildly prominent air-filled bowel loops throughout the abdomen. No pathologic fluid levels identified. No significant stool burden. ASESSMENTS & PLANS:    Spontaneous Bacterial Peritonitis:  Admit to [facility]  Rocpehin 2g IV given in ED, will continue as Q24h  Follow up Cxx  Get Ascitic fluid sent for GS & Cx   [START ON 11/9/2021] furosemide  40 mg Oral Daily    [START ON 11/9/2021] spironolactone  100 mg Oral Daily   Strict Is and Os  Daily Weights  Elevate HoB  Elevate Legs to prevent sliding down in bed    Chronic Medical Problems:  Continue Home Regimen as indicated   [START ON 26/9/7623] folic acid  1 mg Oral Daily    [START ON 11/9/2021] nicotine  1 patch TransDERmal Daily    [START ON 11/9/2021] pantoprazole  40 mg Oral QAM AC    [START ON 11/9/2021] thiamine  100 mg Oral Daily    [START ON 11/9/2021] multivitamin  1 tablet Oral Daily     Supoportive and Prophylactic Txx:  DVT Prophylaxis: Lasix SQ  GI (PUD) Ppx: not indicated acutely but covered as per home regimen  PT consult for evalutation and Txx as indicated: deferred for now  ADULT DIET; Regular; Low Sodium (2 gm)      Care time of >40 minutes  Pt seen/examined and admitted to inpatient status. Inpatient status is used for patients with an expected LOS extending past two midnights due to medical therapy and or critical care needs, otherwise patients are placed to OBServation status. Signed:  Electronically signed by Lamont Mckeon MD on 11/8/21 at 22:45 EST.

## 2021-11-09 NOTE — CONSULTS
[address] [place] [address] [facility]  Consultation Note     Admit Date: 11/8/2021  3:08 PM    Reason for Consult:   Spontaneous bacterial peritonitis    Attending Physician:  Alex Smart*    HISTORY OF PRESENT ILLNESS:             The history is obtained from extensive review of available past medical records. The patient is a 43 y.o. female who is known to the ID service. The patient has a history of alcoholic hepatitis and liver cirrhosis. The patient was previously admitted to [facility] on 11/2/2021. She had been seen at [facility] with abdominal pain. CT suggested cholecystitis and she signed out [address] Seen by ID. We thought she had alcoholic hepatitis. HIDA scan was negative. Antibiotics were discontinued at our recommendation. She improved and was discharged after a couple of days. The patient comes back to the ED at [facility] with diffuse abdominal pain. She was not being compliant with dietary restrictions at home. She denies drinking alcohol. She was admitted with a diagnosis of spontaneous bacterial peritonitis and was treated with Ceftriaxone. White count was 18 and has come down to 13.5. She had an ultrasound-guided paracentesis today. Past Medical History:        Diagnosis Date    Anemia     Bipolar disorder current episode depressed ([address]     Therapy    Depression     ETOH abuse     Humerus fracture 04/2021    left- For OR 5-12-21    Major depression, recurrent (HCC)     Therapy -stable per pat    Mild tetrahydrocannabinol (THC) abuse     chronic     Protein calorie malnutrition ([address]     severe     11/3/2021. Admitted to [facility]. She had been seen previously at [facility] with abdominal pain. CT suggested cholecystitis and she signed out [address] Seen by ID. We thought she had alcoholic hepatitis. HIDA scan was negative. Antibiotics were discontinued at our recommendation. She improved and was discharged after a couple of days. Past Surgical History:        Procedure Laterality Date    HUMERUS FRACTURE SURGERY Left 5/12/2021    LEFT PROXIMAL HUMERUS OPEN REDUCTION INTERNAL FIXATION performed by Ld Garg MD at [address] [place] [address] Bilateral 2000    TONSILLECTOMY       Current Medications:   Scheduled Meds:   cefTRIAXone (ROCEPHIN) IV  2,000 mg IntraVENous C04E    folic acid  1 mg Oral Daily    nicotine  1 patch TransDERmal Daily    pantoprazole  40 mg Oral QAM AC    thiamine  100 mg Oral Daily    multivitamin  1 tablet Oral Daily    sodium chloride flush  5-40 mL IntraVENous 2 times per day    enoxaparin  40 mg SubCUTAneous Daily    furosemide  40 mg Oral Daily    spironolactone  100 mg Oral Daily     Continuous Infusions:   sodium chloride       PRN Meds:potassium chloride, magnesium sulfate, HYDROmorphone, HYDROmorphone, melatonin, sodium chloride flush, sodium chloride, ondansetron **OR** ondansetron, magnesium sulfate, potassium chloride **OR** potassium alternative oral replacement **OR** potassium chloride, polyethylene glycol, calcium carbonate    Allergies:  Patient has no known allergies.     Social History:   Social History     Socioeconomic History    Marital status: Single     Spouse name: Not on file    Number of children: 0    Years of education: 15    Highest education level: Not on file   Occupational History    Not on file   Tobacco Use    Smoking status: Current Every Day Smoker     Packs/day: 1.00     Years: 15.00     Pack years: 15.00     Types: Cigarettes     Start date: 10/3/2003    Smokeless tobacco: Former User   Vaping Use    Vaping Use: Former   Substance and Sexual Activity    Alcohol use: Not Currently     Alcohol/week: 70.0 standard drinks     Types: 70 Cans of beer per week     Comment: 12 packs    Drug use: Yes     Frequency: 1.0 times per week     Types: Marijuana [place])     Comment: daily    Sexual activity: Not on file   Other Topics Concern    Not on file   Social History Narrative    Not on file     Social Determinants of Health     Financial Resource Strain:     Difficulty of Paying Living Expenses: Not on file   Food Insecurity:     Worried About Running Out of Food in the Last Year: Not on file    Ann of Food in the Last Year: Not on file   Transportation Needs:     Lack of Transportation (Medical): Not on file    Lack of Transportation (Non-Medical): Not on file   Physical Activity:     Days of Exercise per Week: Not on file    Minutes of Exercise per Session: Not on file   Stress:     Feeling of Stress : Not on file   Social Connections:     Frequency of Communication with Friends and Family: Not on file    Frequency of Social Gatherings with Friends and Family: Not on file    Attends Latter-day Services: Not on file    Active Member of [address] [place] [address] [employer] or Organizations: Not on file    Attends Club or Organization Meetings: Not on file    Marital Status: Not on file   Intimate Partner Violence:     Fear of Current or Ex-Partner: Not on file    Emotionally Abused: Not on file    Physically Abused: Not on file    Sexually Abused: Not on file   Housing Stability:     Unable to Pay for Housing in the Last Year: Not on file    Number of [place] in the Last Year: Not on file    Unstable Housing in the Last Year: Not on file      Pets: No  Travel: No  The patient lives in with her father. She is currently not working. She used to work at [employer]    Family History:   No family history on file. . Otherwise non-pertinent to the chief complaint.     REVIEW OF SYSTEMS:    Constitutional: Negative for fevers, chills, diaphoresis  Neurologic: Negative   Psychiatric: Negative  Rheumatologic: Negative   Endocrine: Negative  Hematologic: Negative  Immunologic: Negative  ENT: Negative  Respiratory: Negative   Cardiovascular: Negative  GI: As in the HPI  : Negative  Musculoskeletal: Negative  Skin: No rashes. PHYSICAL EXAM:    Vitals:   BP (!) 133/92   Pulse 112   Temp 98.4 °F (36.9 °C) (Oral)   Resp 18   Ht 5' 3\" (1.6 m)   Wt 130 lb (59 kg)   SpO2 99%   BMI 23.03 kg/m²   Constitutional: The patient is awake, alert, and oriented. Lying in bed. No distress. Skin: Warm and dry. No rashes were noted. HEENT: Eyes show round, and reactive pupils. No jaundice. Moist mucous membranes, no ulcerations, no thrush. Neck: Supple to movements. No lymphadenopathy. Chest: No use of accessory muscles to breathe. Symmetrical expansion. Auscultation reveals no wheezing, crackles, or rhonchi. Cardiovascular: S1 and S2 are rhythmic and regular. No murmurs appreciated. Abdomen: Positive bowel sounds to auscultation. Minimally distended. Soft and slightly tender to palpation. Questionable rebound tenderness. Extremities: Bilateral feet and ankle edema. Lines: peripheral      CBC+dif:  Recent Labs     11/08/21  1730 11/08/21  1730 11/09/21  0448   WBC 18.0*  --  13.5*   HGB 10.5*   < > 10.0*   HCT 30.4*   < > 29.7*   .4*   < > 116.5*      < > 341   NEUTROABS 13.81*   < > 10.16*    < > = values in this interval not displayed.      Lab Results   Component Value Date    CRP 0.8 (H) 11/03/2021    CRP 1.1 (H) 10/28/2021      No results found for: [facility]  Lab Results   Component Value Date    SEDRATE 46 (H) 10/28/2021     Lab Results   Component Value Date    ALT 33 (H) 11/08/2021     (H) 11/08/2021    ALKPHOS 218 (H) 11/08/2021    BILITOT 1.3 (H) 11/08/2021     Lab Results   Component Value Date     11/09/2021    K 2.9 11/09/2021     11/09/2021    CO2 20 11/09/2021    BUN <2 11/09/2021    CREATININE 0.3 11/09/2021    GFRAA >60 11/09/2021    AGRATIO 0.6 10/29/2021    LABGLOM >60 11/09/2021    GLUCOSE 76 11/09/2021    GLUCOSE 166 05/04/2012    PROT 5.7 11/08/2021    LABALBU 2.2 11/08/2021    LABALBU 3.6 05/04/2012    CALCIUM 8.0 11/09/2021    BILITOT 1.3 11/08/2021 ALKPHOS 218 11/08/2021     11/08/2021    ALT 33 11/08/2021       Lab Results   Component Value Date    PROTIME 13.7 11/08/2021    PROTIME 10.1 05/04/2012    INR 1.2 11/08/2021       Lab Results   Component Value Date    TSH 10.950 10/28/2021       Lab Results   Component Value Date    COLORU Yellow 11/02/2021    PHUR 6.0 11/02/2021    WBCUA 1-3 11/02/2021    WBCUA NEGATIVE 10/29/2021    RBCUA NONE 11/02/2021    RBCUA NEGATIVE 10/29/2021    MUCUS Present 11/02/2021    BACTERIA MANY 11/02/2021    CLARITYU Clear 11/02/2021    SPECGRAV 1.015 11/02/2021    LEUKOCYTESUR SMALL 11/02/2021    UROBILINOGEN 0.2 11/02/2021    BILIRUBINUR MODERATE 11/02/2021    BLOODU TRACE-LYSED 11/02/2021    GLUCOSEU Negative 11/02/2021       No results found for: HCO3, BE, O2SAT, PH, THGB, PCO2, PO2, TCO2  Radiology:  Noted    Microbiology:  Pending  No results for input(s): BC in the last 72 hours. No results for input(s): ORG in the last 72 hours. No results for input(s): Elige Fajardo in the last 72 hours. No results for input(s): STREPNEUMAGU in the last 72 hours. No results for input(s): LP1UAG in the last 72 hours. No results for input(s): ASO in the last 72 hours. No results for input(s): CULTRESP in the last 72 hours. No results for input(s): PROCAL in the last 72 hours. Assessment:  · Liver cirrhosis with ascites  · SBP. Treated with antibiotics before paracentesis  · Leukocytosis associated to SBP, improved  · Elevation of transaminases    Plan:    · Continue Ceftriaxone  · Check cultures, baseline ESR, CRP  · Monitor labs  · Will follow with you    Thank you for having us see this patient in consultation. I will be discussing this case with the treating physicians.     Ruthie Espinoza MD  12:27 PM  11/9/2021

## 2021-11-09 NOTE — PROGRESS NOTES
Dr. Coreas Gum notified of consult via office.  [facility] Patti 11/9/2021 11:35 AM

## 2021-11-10 LAB
ALBUMIN SERPL-MCNC: 2.3 G/DL (ref 3.5–5.2)
ALP BLD-CCNC: 253 U/L (ref 35–104)
ALT SERPL-CCNC: 65 U/L (ref 0–32)
ANION GAP SERPL CALCULATED.3IONS-SCNC: 13 MMOL/L (ref 7–16)
ANISOCYTOSIS: ABNORMAL
AST SERPL-CCNC: 238 U/L (ref 0–31)
BASOPHILS ABSOLUTE: 0.07 E9/L (ref 0–0.2)
BASOPHILS RELATIVE PERCENT: 0.3 % (ref 0–2)
BILIRUB SERPL-MCNC: 1.9 MG/DL (ref 0–1.2)
BILIRUBIN DIRECT: 1.3 MG/DL (ref 0–0.3)
BILIRUBIN, INDIRECT: 0.6 MG/DL (ref 0–1)
BUN BLDV-MCNC: <2 MG/DL (ref 6–20)
CALCIUM SERPL-MCNC: 8.4 MG/DL (ref 8.6–10.2)
CHLORIDE BLD-SCNC: 96 MMOL/L (ref 98–107)
CO2: 23 MMOL/L (ref 22–29)
CREAT SERPL-MCNC: 0.3 MG/DL (ref 0.5–1)
D DIMER: 712 NG/ML DDU
EKG ATRIAL RATE: 136 BPM
EKG P AXIS: 55 DEGREES
EKG P-R INTERVAL: 120 MS
EKG Q-T INTERVAL: 296 MS
EKG QRS DURATION: 62 MS
EKG QTC CALCULATION (BAZETT): 445 MS
EKG R AXIS: 57 DEGREES
EKG T AXIS: 42 DEGREES
EKG VENTRICULAR RATE: 136 BPM
EOSINOPHILS ABSOLUTE: 0.03 E9/L (ref 0.05–0.5)
EOSINOPHILS RELATIVE PERCENT: 0.1 % (ref 0–6)
GFR AFRICAN AMERICAN: >60
GFR NON-AFRICAN AMERICAN: >60 ML/MIN/1.73
GLUCOSE BLD-MCNC: 78 MG/DL (ref 74–99)
GRAM STAIN ORDERABLE: NORMAL
HCT VFR BLD CALC: 33.2 % (ref 34–48)
HEMOGLOBIN: 11.4 G/DL (ref 11.5–15.5)
IMMATURE GRANULOCYTES #: 0.17 E9/L
IMMATURE GRANULOCYTES %: 0.6 % (ref 0–5)
INR BLD: 1.3
LACTIC ACID: 4.2 MMOL/L (ref 0.5–2.2)
LYMPHOCYTES ABSOLUTE: 2.31 E9/L (ref 1.5–4)
LYMPHOCYTES RELATIVE PERCENT: 8.6 % (ref 20–42)
MCH RBC QN AUTO: 39.4 PG (ref 26–35)
MCHC RBC AUTO-ENTMCNC: 34.3 % (ref 32–34.5)
MCV RBC AUTO: 114.9 FL (ref 80–99.9)
MONOCYTES ABSOLUTE: 1.46 E9/L (ref 0.1–0.95)
MONOCYTES RELATIVE PERCENT: 5.5 % (ref 2–12)
NEUTROPHILS ABSOLUTE: 22.68 E9/L (ref 1.8–7.3)
NEUTROPHILS RELATIVE PERCENT: 84.9 % (ref 43–80)
PDW BLD-RTO: 13.9 FL (ref 11.5–15)
PLATELET # BLD: 396 E9/L (ref 130–450)
PMV BLD AUTO: 10 FL (ref 7–12)
POTASSIUM REFLEX MAGNESIUM: 3.8 MMOL/L (ref 3.5–5)
PROTHROMBIN TIME: 14.6 SEC (ref 9.3–12.4)
RBC # BLD: 2.89 E12/L (ref 3.5–5.5)
SODIUM BLD-SCNC: 132 MMOL/L (ref 132–146)
TOTAL PROTEIN: 6.4 G/DL (ref 6.4–8.3)
WBC # BLD: 26.7 E9/L (ref 4.5–11.5)

## 2021-11-10 PROCEDURE — 85610 PROTHROMBIN TIME: CPT

## 2021-11-10 PROCEDURE — 1200000000 HC SEMI PRIVATE

## 2021-11-10 PROCEDURE — 6370000000 HC RX 637 (ALT 250 FOR IP): Performed by: HOSPITALIST

## 2021-11-10 PROCEDURE — 80076 HEPATIC FUNCTION PANEL: CPT

## 2021-11-10 PROCEDURE — 86694 HERPES SIMPLEX NES ANTBDY: CPT

## 2021-11-10 PROCEDURE — 86695 HERPES SIMPLEX TYPE 1 TEST: CPT

## 2021-11-10 PROCEDURE — 86665 EPSTEIN-BARR CAPSID VCA: CPT

## 2021-11-10 PROCEDURE — 85025 COMPLETE CBC W/AUTO DIFF WBC: CPT

## 2021-11-10 PROCEDURE — 93005 ELECTROCARDIOGRAM TRACING: CPT | Performed by: INTERNAL MEDICINE

## 2021-11-10 PROCEDURE — 80053 COMPREHEN METABOLIC PANEL: CPT

## 2021-11-10 PROCEDURE — 6370000000 HC RX 637 (ALT 250 FOR IP): Performed by: INTERNAL MEDICINE

## 2021-11-10 PROCEDURE — 6360000002 HC RX W HCPCS: Performed by: HOSPITALIST

## 2021-11-10 PROCEDURE — 2580000003 HC RX 258: Performed by: HOSPITALIST

## 2021-11-10 PROCEDURE — 36415 COLL VENOUS BLD VENIPUNCTURE: CPT

## 2021-11-10 PROCEDURE — 85378 FIBRIN DEGRADE SEMIQUANT: CPT

## 2021-11-10 PROCEDURE — 83605 ASSAY OF LACTIC ACID: CPT

## 2021-11-10 PROCEDURE — 99233 SBSQ HOSP IP/OBS HIGH 50: CPT | Performed by: INTERNAL MEDICINE

## 2021-11-10 PROCEDURE — 86696 HERPES SIMPLEX TYPE 2 TEST: CPT

## 2021-11-10 RX ADMIN — HYDROMORPHONE HYDROCHLORIDE 1 MG: 1 INJECTION, SOLUTION INTRAMUSCULAR; INTRAVENOUS; SUBCUTANEOUS at 03:44

## 2021-11-10 RX ADMIN — POTASSIUM CHLORIDE 40 MEQ: 20 TABLET, EXTENDED RELEASE ORAL at 09:11

## 2021-11-10 RX ADMIN — FOLIC ACID 1 MG: 1 TABLET ORAL at 08:57

## 2021-11-10 RX ADMIN — Medication 10 ML: at 09:44

## 2021-11-10 RX ADMIN — SPIRONOLACTONE 100 MG: 25 TABLET ORAL at 08:56

## 2021-11-10 RX ADMIN — HYDROMORPHONE HYDROCHLORIDE 0.5 MG: 1 INJECTION, SOLUTION INTRAMUSCULAR; INTRAVENOUS; SUBCUTANEOUS at 19:49

## 2021-11-10 RX ADMIN — PANTOPRAZOLE SODIUM 40 MG: 40 TABLET, DELAYED RELEASE ORAL at 08:57

## 2021-11-10 RX ADMIN — FUROSEMIDE 40 MG: 40 TABLET ORAL at 08:57

## 2021-11-10 RX ADMIN — MULTIVITAMIN TABLET 1 TABLET: TABLET at 08:57

## 2021-11-10 RX ADMIN — Medication 100 MG: at 13:47

## 2021-11-10 RX ADMIN — WATER 2000 MG: 1 INJECTION INTRAMUSCULAR; INTRAVENOUS; SUBCUTANEOUS at 21:01

## 2021-11-10 RX ADMIN — HYDROMORPHONE HYDROCHLORIDE 1 MG: 1 INJECTION, SOLUTION INTRAMUSCULAR; INTRAVENOUS; SUBCUTANEOUS at 23:54

## 2021-11-10 RX ADMIN — HYDROMORPHONE HYDROCHLORIDE 0.5 MG: 1 INJECTION, SOLUTION INTRAMUSCULAR; INTRAVENOUS; SUBCUTANEOUS at 10:35

## 2021-11-10 RX ADMIN — POTASSIUM CHLORIDE 40 MEQ: 20 TABLET, EXTENDED RELEASE ORAL at 18:14

## 2021-11-10 RX ADMIN — HYDROMORPHONE HYDROCHLORIDE 1 MG: 1 INJECTION, SOLUTION INTRAMUSCULAR; INTRAVENOUS; SUBCUTANEOUS at 15:05

## 2021-11-10 RX ADMIN — ENOXAPARIN SODIUM 40 MG: 100 INJECTION SUBCUTANEOUS at 08:57

## 2021-11-10 RX ADMIN — Medication 10 ML: at 08:57

## 2021-11-10 ASSESSMENT — PAIN DESCRIPTION - ORIENTATION
ORIENTATION: RIGHT;LEFT
ORIENTATION: RIGHT;ANTERIOR
ORIENTATION: RIGHT;LEFT

## 2021-11-10 ASSESSMENT — PAIN DESCRIPTION - DESCRIPTORS
DESCRIPTORS: ACHING;DISCOMFORT;HEAVINESS
DESCRIPTORS: ACHING;DISCOMFORT
DESCRIPTORS: ACHING;DISCOMFORT
DESCRIPTORS: ACHING;DISCOMFORT;SHARP
DESCRIPTORS: ACHING;DISCOMFORT;RADIATING

## 2021-11-10 ASSESSMENT — PAIN SCALES - GENERAL
PAINLEVEL_OUTOF10: 8
PAINLEVEL_OUTOF10: 7
PAINLEVEL_OUTOF10: 9
PAINLEVEL_OUTOF10: 0
PAINLEVEL_OUTOF10: 9
PAINLEVEL_OUTOF10: 10
PAINLEVEL_OUTOF10: 0
PAINLEVEL_OUTOF10: 9

## 2021-11-10 ASSESSMENT — PAIN DESCRIPTION - FREQUENCY
FREQUENCY: CONTINUOUS

## 2021-11-10 ASSESSMENT — PAIN DESCRIPTION - PAIN TYPE
TYPE: ACUTE PAIN

## 2021-11-10 ASSESSMENT — PAIN DESCRIPTION - LOCATION
LOCATION: LEG

## 2021-11-10 ASSESSMENT — PAIN DESCRIPTION - ONSET
ONSET: ON-GOING

## 2021-11-10 ASSESSMENT — PAIN - FUNCTIONAL ASSESSMENT
PAIN_FUNCTIONAL_ASSESSMENT: ACTIVITIES ARE NOT PREVENTED
PAIN_FUNCTIONAL_ASSESSMENT: PREVENTS OR INTERFERES SOME ACTIVE ACTIVITIES AND ADLS
PAIN_FUNCTIONAL_ASSESSMENT: ACTIVITIES ARE NOT PREVENTED
PAIN_FUNCTIONAL_ASSESSMENT: ACTIVITIES ARE NOT PREVENTED
PAIN_FUNCTIONAL_ASSESSMENT: PREVENTS OR INTERFERES SOME ACTIVE ACTIVITIES AND ADLS

## 2021-11-10 ASSESSMENT — PAIN DESCRIPTION - PROGRESSION
CLINICAL_PROGRESSION: GRADUALLY IMPROVING
CLINICAL_PROGRESSION: NOT CHANGED
CLINICAL_PROGRESSION: GRADUALLY WORSENING

## 2021-11-10 NOTE — PROGRESS NOTES
Called to Hospitalist for Sinus Tach, pt was 130s/ 120s.  Advised to place on [facility], EKG if abnormal other than s sinus tach or continued issue per Dr Ganesh Conti

## 2021-11-10 NOTE — CARE COORDINATION
Social Work:    Reviewed chart notes. Social service met with Cleveland Tinoco, advised her about social service &  roles, and we discussed discharge planning. Danyelle's PCP is Dr. Michael Sol and she uses [facility] in [facility]. Cleveland Tinoco acknowledged a long lived history of alcohol addiction and explained that she has been in in-patient rehab at [facility] in [facility] twice as well a number of other programs. Cleveland Tinoco denies both O.P. and in-patient rehab, as she feels her physical condition will help her mentally overcome her addiction, in which Cleveland Tinoco advises runs in her family. Cleveland Tinoco is currently living with her father who has been a recovered alcoholic x 22 yrs and states that she is also active a the [facility] center. Upon discharge from [facility], Cleveland Tinoco expects to return to her father's 2-story home. Her bedroom & bathroom are located upstairs and a half bath in on the main floor. Cleveland Tinoco has a wheeled walker and will accept [address] if recommended (no agency preference)  but declines in-patient rehab snf and alcohol rehab. Social work to continue to follow.     Electronically signed by MYAH Redman on 11/10/2021 at 2:31 PM

## 2021-11-10 NOTE — PLAN OF CARE
Problem: Falls - Risk of:  Goal: Will remain free from falls  Description: Will remain free from falls  Outcome: Met This Shift  Goal: Absence of physical injury  Description: Absence of physical injury  Outcome: Met This Shift     Problem: Nutritional:  Goal: Nutritional status will improve  Description: Nutritional status will improve  Outcome: Met This Shift     Problem: Physical Regulation:  Goal: Will remain free from infection  Description: Will remain free from infection  Outcome: Met This Shift     Problem: Respiratory:  Goal: Ability to maintain normal respiratory secretions will improve  Description: Ability to maintain normal respiratory secretions will improve  Outcome: Met This Shift     Problem: Skin Integrity:  Goal: Demonstration of wound healing without infection will improve  Description: Demonstration of wound healing without infection will improve  Outcome: Met This Shift  Goal: Complications related to intravenous access or infusion will be avoided or minimized  Description: Complications related to intravenous access or infusion will be avoided or minimized  Outcome: Met This Shift

## 2021-11-10 NOTE — PROGRESS NOTES
Shaq Peralta Hospitalist   Progress Note    Admitting Date and Time: 11/8/2021  3:08 PM  Admit Dx: Abdominal pain [R10.9]  SBP (spontaneous bacterial peritonitis) ([facility] [address] [K65.2]    Subjective: Admitted on eighth, came with swelling, noted with alcoholic cirrhosis, smoker, initial impression of spontaneous bacterial peritonitis, on Rocephin, patient evaluated by GI, work-up in progress, patient with recent DC from here, when here with acalculous cholecystitis, evaluated by ID and general surgery also in that admission. Patient had paracentesis today. 1.8 L were removed. ID evaluated today, Rocephin is continued. Lower extremity ultrasound did not show any evidence of DVT. Patient was admitted with Abdominal pain [R10.9]  SBP (spontaneous bacterial peritonitis) ([facility] [address] [K65.2]. Patient is awake, alert, does communicate well, does say she is feeling weak, does say she not feeling well and definitely not good enough to go home. Per RN: No new issues, did have paracentesis earlier today. ROS: denies fever, chills, cp, sob, n/v, HA unless stated above.      potassium chloride  40 mEq Oral BID WC    cefTRIAXone (ROCEPHIN) IV  2,000 mg IntraVENous V90H    folic acid  1 mg Oral Daily    nicotine  1 patch TransDERmal Daily    pantoprazole  40 mg Oral QAM AC    thiamine  100 mg Oral Daily    multivitamin  1 tablet Oral Daily    sodium chloride flush  5-40 mL IntraVENous 2 times per day    enoxaparin  40 mg SubCUTAneous Daily    furosemide  40 mg Oral Daily    spironolactone  100 mg Oral Daily     magnesium sulfate, 1,000 mg, PRN  HYDROmorphone, 0.5 mg, Q4H PRN  HYDROmorphone, 1 mg, Q4H PRN  melatonin, 3 mg, Nightly PRN  sodium chloride flush, 5-40 mL, PRN  sodium chloride, 25 mL, PRN  ondansetron, 4 mg, Q8H PRN   Or  ondansetron, 4 mg, Q6H PRN  magnesium sulfate, 1,000 mg, PRN  potassium chloride, 40 mEq, PRN   Or  potassium alternative oral replacement, 40 mEq, PRN   Or  potassium chloride, 10 mEq, PRN  polyethylene glycol, 17 g, Daily PRN  calcium carbonate, 500 mg, TID PRN         Objective:    /77   Pulse (P) 104   Temp (P) 98.2 °F (36.8 °C) (Oral)   Resp 18   Ht 5' 3\" (1.6 m)   Wt 130 lb (59 kg)   SpO2 98%   BMI 23.03 kg/m²   General Appearance: alert and oriented to person, place and time, well-developed and well-nourished, in no acute distress  Skin: warm and dry, no rash or erythema  Head: normocephalic and atraumatic  Eyes: pupils equal, round, and reactive to light, extraocular eye movements intact, conjunctivae normal  ENT: tympanic membrane, external ear and ear canal normal bilaterally, oropharynx clear and moist with normal mucous membranes  Neck: neck supple and non tender without mass, no thyromegaly or thyroid nodules, no cervical lymphadenopathy   Pulmonary/Chest: clear to auscultation bilaterally- no wheezes, rales or rhonchi, normal air movement, no respiratory distress  Cardiovascular: normal rate, normal S1 and S2, no gallops, intact distal pulses and no carotid bruits  Abdomen: soft, non-tender, non-distended, normal bowel sounds, no masses or organomegaly  Edema 3+, evidence of calf tenderness present      Recent Labs     11/08/21  1730 11/09/21  0448 11/10/21  0248    136 132   K 2.8* 2.9* 3.8    102 96*   CO2 21* 20* 23   BUN <2* <2* <2*   CREATININE 0.3* 0.3* 0.3*   GLUCOSE 122* 76 78   CALCIUM 8.4* 8.0* 8.4*       Recent Labs     11/08/21 1730 11/09/21  0448 11/10/21  0248   WBC 18.0* 13.5* 26.7*   RBC 2.59* 2.55* 2.89*   HGB 10.5* 10.0* 11.4*   HCT 30.4* 29.7* 33.2*   .4* 116.5* 114.9*   MCH 40.5* 39.2* 39.4*   MCHC 34.5 33.7 34.3   RDW 13.9 13.7 13.9    341 396   MPV 9.7 10.0 10.0     K2.9 /3.8  CRP 3.0  proBNP 423  WBC 26.7  Bilirubin 1.9 /AST improved to 238  INR 1.3  Triphasic CTA did not show any suspicious pigmented lesions. Evidence of partially imaged bilateral pleural effusions.     Radiology:   CTA TRIPHASIC LIVER   Final Result   No suspicious patent lesions identified. No evidence of acute pancreatitis. Partially imaged bilateral pleural effusions. Small to moderate amount of abdominal ascites. US DUP LOWER EXTREMITIES BILATERAL VENOUS   Final Result   No evidence of DVT in either lower extremity. XR ACUTE ABD SERIES CHEST 1 VW   Final Result   1. No acute cardiopulmonary pathology. 2.  Mildly prominent air-filled bowel loops throughout the abdomen. No   pathologic fluid levels identified. No significant stool burden. US GUIDED PARACENTESIS    (Results Pending)       Assessment:    Active Problems:    Abdominal pain    SBP (spontaneous bacterial peritonitis) ([address]    Alcoholic hepatitis  Resolved Problems:    * No resolved hospital problems. *      Plan:  1. Suspected spontaneous bacterial peritonitis, evaluated by ID, remains on Rocephin, 2 g daily, unfortunately patient did receive antibiotic prior to cultures, also patient did have  2. Cirrhosis, patient seen from GI side, work-up in progress, on Lasix as well as Aldactone, tolerating, slowly BP drifting down, still systolic more than 894, not on I & O, added. 3. Hypokalemia, improved. 4. Alcoholism, patient understands very well and seems to be inclined not to use it in future, for now remains on thiamine supplement. 5. Tobacco abuse: Does remain on transdermal nicotine supplement. 6. leg discomfort, tenderness of  left calf,  bilateral DVT ultrasound denies change in visual acuity, will get D-dimer. If abnormal then will need repeat ultrasound in 2 days. 7. DC planning, depending upon decision on antibiotics by ID, results of D-dimer, ability of patient to handle the diuretics, recheck of kidney functions and LFTs tomorrow, hopefully in a day or so.         Electronically signed by Yasmin Moore MD on 11/10/2021 at 8:44 AM

## 2021-11-10 NOTE — PROGRESS NOTES
P Quality Flow/Interdisciplinary Rounds Progress Note        Quality Flow Rounds held on November 10, 2021    Disciplines Attending:  Bedside Nurse, ,  and Nursing Unit [address] [facility] Preethi Laboy was admitted on 11/8/2021  3:08 PM    Anticipated Discharge Date:       Disposition:    Joshua Score:  Joshua Scale Score: 19    Readmission Risk              Risk of Unplanned Readmission:  20           Discussed patient goal for the day, patient clinical progression, and barriers to discharge.   The following Goal(s) of the Day/Commitment(s) have been identified:  pain management/comfort      Shawn Diehl RN  November 10, 2021

## 2021-11-10 NOTE — PROGRESS NOTES
bowel sounds to auscultation. tender to palpation. Soft, in no distress. No masses felt. Extremities: moderate leg edema. Lines: peripheral    Laboratory and Tests Review:  Lab Results   Component Value Date    WBC 26.7 (H) 11/10/2021    WBC 13.5 (H) 11/09/2021    WBC 18.0 (H) 11/08/2021    HGB 11.4 (L) 11/10/2021    HCT 33.2 (L) 11/10/2021    .9 (H) 11/10/2021     11/10/2021     Lab Results   Component Value Date    NEUTROABS 22.68 (H) 11/10/2021    NEUTROABS 10.16 (H) 11/09/2021    NEUTROABS 13.81 (H) 11/08/2021     No results found for: CRP  Lab Results   Component Value Date    ALT 65 (H) 11/10/2021     (H) 11/10/2021     (H) 11/09/2021    ALKPHOS 253 (H) 11/10/2021    BILITOT 1.9 (H) 11/10/2021     Lab Results   Component Value Date     11/10/2021    K 3.8 11/10/2021    CL 96 11/10/2021    CO2 23 11/10/2021    BUN <2 11/10/2021    CREATININE 0.3 11/10/2021    CREATININE 0.3 11/09/2021    CREATININE 0.3 11/08/2021    GFRAA >60 11/10/2021    AGRATIO 0.6 10/29/2021    LABGLOM >60 11/10/2021    GLUCOSE 78 11/10/2021    GLUCOSE 166 05/04/2012    PROT 6.4 11/10/2021    LABALBU 2.3 11/10/2021    LABALBU 3.6 05/04/2012    CALCIUM 8.4 11/10/2021    BILITOT 1.9 11/10/2021    ALKPHOS 253 11/10/2021     11/10/2021    ALT 65 11/10/2021     Lab Results   Component Value Date    CRP 3.0 (H) 11/09/2021    CRP 0.8 (H) 11/03/2021    CRP 1.1 (H) 10/28/2021     Lab Results   Component Value Date    SEDRATE 45 (H) 11/09/2021    SEDRATE 46 (H) 10/28/2021     Radiology:  Reviewed     Microbiology:   Ascitic fluid: no organisms seen so far    ASSESSMENT:  · Liver cirrhosis with ascites  · SBP.   Treated with antibiotics before paracentesis  · Leukocytosis associated to SBP  · Elevation of transaminases    PLAN:  · Continue Ceftriaxone   · Check final cultures  · Monitor labs-- WBC increased today 26.7 - could be reactive to pain, fluid overload, and paracentesis -- watch closely     Alfred Salcido Vesa, APRN - CNP  9:45 AM  11/10/2021     Patient seen and examined. I had a face to face encounter with the patient. Agree with exam.  Assessment and plan as outlined above and directed by me. Addition and corrections were done as deemed appropriate. My exam and plan include: The patient does not feel better. She thinks she has a urinary tract infection and the antibiotics are not helping for this. Also says that the pain is not better. She is resting comfortably. Father walked in. She is not yet wearing Tubigrips. Urine for culture.     Arnulfo Jenkins MD  11/10/2021  2:51 PM

## 2021-11-10 NOTE — PROGRESS NOTES
Name:  Umer Mcintosh  :  1985  MRN:  [id number]  Room:  [address] [place] [address]  DOS:  11/10/2021    [facility]  [facility]  Dr. Wanda Ambrocio M.D. Dr. Twila Sood M.D. Dr. Rivera Moctezuma D.O. Dr. Marleny Senior M.D. Dr. Jaret Thorpe D.O.    -NP Progress Note-    PCP:  Evelyne Liu MD  Admitting Physician:  Samantha Enriquez MD  Chief Complaint:    Chief Complaint   Patient presents with    Abdominal Pain     generalized swelling. just diagnosed with cirrosis of the liver. Subjective  Patient resting in bed. Persistent abdominal pain. Pain controlled with medications. Denies nausea. Tolerating diet. Formed, brown stool this morning. Denies diarrhea, blood, or melena.     Physical Examination  Vitals:  /60   Pulse 104   Temp 98.2 °F (36.8 °C) (Oral)   Resp 18   Ht 5' 3\" (1.6 m)   Wt 130 lb (59 kg)   SpO2 97%   BMI 23.03 kg/m²   General Appearance:  awake, alert, and oriented to person, place, time, and purpose; appears stated age and cooperative; no apparent distress no labored breathing  HEENT:  PERRL; EOMI; sclera clear; buccal mucosa moist  Neck:  supple; trachea midline; no thyromegaly; no JVD; no bruits  Heart:  rhythm regular; rate controlled; no murmurs  Lungs:  symmetrical; clear to auscultation bilaterally; no wheezes; no rhonchi; no rales  Abdomen:  soft, less tender, less distended; bowel sounds positive; no organomegaly or masses  Extremities:  peripheral pulses present; no peripheral edema; no ulcers  Neurologic:  alert and oriented x 3; no focal deficit; cranial nerves grossly intact  Skin:  no petechia; no hemorrhage; no wounds    Medications  Scheduled Meds    potassium chloride  40 mEq Oral BID WC    cefTRIAXone (ROCEPHIN) IV  2,000 mg IntraVENous [id number]    folic acid  1 mg Oral Daily    nicotine  1 patch TransDERmal Daily    pantoprazole  40 mg Oral QAM AC    thiamine  100 mg Oral Daily    multivitamin  1 tablet Oral Daily    sodium chloride flush 5-40 mL IntraVENous 2 times per day    enoxaparin  40 mg SubCUTAneous Daily    furosemide  40 mg Oral Daily    spironolactone  100 mg Oral Daily     Infusion Meds    sodium chloride       PRN Meds magnesium sulfate, HYDROmorphone, HYDROmorphone, melatonin, sodium chloride flush, sodium chloride, ondansetron **OR** ondansetron, magnesium sulfate, potassium chloride **OR** potassium alternative oral replacement **OR** potassium chloride, polyethylene glycol, calcium carbonate    Laboratory Data  Recent Results (from the past 24 hour(s))   Body fluid cell count with differential    Collection Time: 11/09/21 11:43 AM   Result Value Ref Range    Cell Count Fluid Type Ascites     Color, Fluid Yellow     Appearance, Fluid Clear     Nucl Cell, Fluid 73 /uL    RBC, Fluid <2,000 /uL    Neutrophil Count, Fluid 4 %    Monocyte Count, Fluid 96 %   Culture, Body Fluid    Collection Time: 11/09/21 11:43 AM    Specimen: Ascitic Fluid;  Body Fluid   Result Value Ref Range    Gram Stain Result Refer to ordered Gram stain for results    Gram stain    Collection Time: 11/09/21 11:43 AM    Specimen: Ascitic Fluid   Result Value Ref Range    Gram Stain Orderable       Gram stain performed on unspun fluid  Polymorphonuclear leukocytes not seen  Epithelial cells not seen  No organisms seen     Lactic acid, plasma    Collection Time: 11/09/21  3:10 PM   Result Value Ref Range    Lactic Acid 3.2 (H) 0.5 - 2.2 mmol/L   GAMMA GT    Collection Time: 11/09/21  3:10 PM   Result Value Ref Range     (H) 6 - 42 U/L   Ethanol    Collection Time: 11/09/21  3:10 PM   Result Value Ref Range    Ethanol Lvl <10 mg/dL   Hepatic function panel    Collection Time: 11/09/21  3:10 PM   Result Value Ref Range    Total Protein 5.4 (L) 6.4 - 8.3 g/dL    Albumin 1.9 (L) 3.5 - 5.2 g/dL    Alkaline Phosphatase 208 (H) 35 - 104 U/L    ALT 70 (H) 0 - 32 U/L     (H) 0 - 31 U/L    Total Bilirubin 1.7 (H) 0.0 - 1.2 mg/dL    Bilirubin, Direct 1.1 (H) 0.0 - 0.3 mg/dL    Bilirubin, Indirect 0.6 0.0 - 1.0 mg/dL   C-Reactive Protein    Collection Time: 11/09/21  3:10 PM   Result Value Ref Range    CRP 3.0 (H) 0.0 - 0.4 mg/dL   Sedimentation Rate    Collection Time: 11/09/21  3:10 PM   Result Value Ref Range    Sed Rate 45 (H) 0 - 20 mm/Hr   EKG 12 Lead    Collection Time: 11/10/21  2:38 AM   Result Value Ref Range    Ventricular Rate 136 BPM    Atrial Rate 136 BPM    P-R Interval 120 ms    QRS Duration 62 ms    Q-T Interval 296 ms    QTc Calculation (Bazett) 445 ms    P Axis 55 degrees    R Axis 57 degrees    T Axis 42 degrees   Comprehensive Metabolic Panel w/ Reflex to MG    Collection Time: 11/10/21  2:48 AM   Result Value Ref Range    Sodium 132 132 - 146 mmol/L    Potassium reflex Magnesium 3.8 3.5 - 5.0 mmol/L    Chloride 96 (L) 98 - 107 mmol/L    CO2 23 22 - 29 mmol/L    Anion Gap 13 7 - 16 mmol/L    Glucose 78 74 - 99 mg/dL    BUN <2 (L) 6 - 20 mg/dL    CREATININE 0.3 (L) 0.5 - 1.0 mg/dL    GFR Non-African American >60 >=60 mL/min/1.73    GFR African American >60     Calcium 8.4 (L) 8.6 - 10.2 mg/dL    Total Protein 6.4 6.4 - 8.3 g/dL    Albumin 2.3 (L) 3.5 - 5.2 g/dL    Total Bilirubin 1.9 (H) 0.0 - 1.2 mg/dL    Alkaline Phosphatase 253 (H) 35 - 104 U/L    ALT 65 (H) 0 - 32 U/L     (H) 0 - 31 U/L   CBC WITH AUTO DIFFERENTIAL    Collection Time: 11/10/21  2:48 AM   Result Value Ref Range    WBC 26.7 (H) 4.5 - 11.5 E9/L    RBC 2.89 (L) 3.50 - 5.50 E12/L    Hemoglobin 11.4 (L) 11.5 - 15.5 g/dL    Hematocrit 33.2 (L) 34.0 - 48.0 %    .9 (H) 80.0 - 99.9 fL    MCH 39.4 (H) 26.0 - 35.0 pg    MCHC 34.3 32.0 - 34.5 %    RDW 13.9 11.5 - 15.0 fL    Platelets 742 630 - 621 E9/L    MPV 10.0 7.0 - 12.0 fL    Neutrophils % 84.9 (H) 43.0 - 80.0 %    Immature Granulocytes % 0.6 0.0 - 5.0 %    Lymphocytes % 8.6 (L) 20.0 - 42.0 %    Monocytes % 5.5 2.0 - 12.0 %    Eosinophils % 0.1 0.0 - 6.0 %    Basophils % 0.3 0.0 - 2.0 %    Neutrophils Absolute 22.68 (H) 1.80 - 7.30 E9/L    Immature Granulocytes # 0.17 E9/L    Lymphocytes Absolute 2.31 1.50 - 4.00 E9/L    Monocytes Absolute 1.46 (H) 0.10 - 0.95 E9/L    Eosinophils Absolute 0.03 (L) 0.05 - 0.50 E9/L    Basophils Absolute 0.07 0.00 - 0.20 E9/L    Anisocytosis 1+    Hepatic function panel    Collection Time: 11/10/21  2:48 AM   Result Value Ref Range    Bilirubin, Direct 1.3 (H) 0.0 - 0.3 mg/dL    Bilirubin, Indirect 0.6 0.0 - 1.0 mg/dL   Protime-INR    Collection Time: 11/10/21  2:48 AM   Result Value Ref Range    Protime 14.6 (H) 9.3 - 12.4 sec    INR 1.3        Imaging  XR CHEST (2 VW)    Result Date: 11/2/2021  EXAMINATION: TWO XRAY VIEWS OF THE CHEST 11/2/2021 12:51 pm COMPARISON: 08/06/2019 HISTORY: ORDERING SYSTEM PROVIDED HISTORY: cp TECHNOLOGIST PROVIDED HISTORY: Reason for exam:->cp Open reduction internal fixation of proximal left humerus fracture 05/12/2021 FINDINGS: There is new density in the lateral and posterior left costophrenic recess. This is most compatible with pleural effusion. No right pleural effusion. No pneumothorax. No new pulmonary consolidation. Normal cardiac silhouette size without vascular congestion. No acute displaced fracture. New hardware partially visualized in proximal left humerus compatible with history of left humerus fracture repair. Findings most compatible with new small left pleural effusion     XR ACUTE ABD SERIES CHEST 1 VW    Result Date: 11/8/2021  EXAMINATION: TWO XRAY VIEWS OF THE ABDOMEN AND SINGLE  XRAY VIEW OF THE CHEST 11/8/2021 3:54 pm COMPARISON: Chest series from November 2, 2021 HISTORY: ORDERING SYSTEM PROVIDED HISTORY: abd pain TECHNOLOGIST PROVIDED HISTORY: Reason for exam:->abd pain FINDINGS: Adequate and symmetric aeration of the lungs. There are no formed consolidations, pleural effusions, or pneumothoraces. Trachea and central mainstem bronchi appear clear. The cardiomediastinal silhouette and pulmonary vascularity appear within normal limits.  Osseous and thoracic soft tissue structures demonstrate no acute findings. No organomegaly nor intra-abdominal mass effect. No pathologic calcifications seen. No free intraperitoneal air. Mildly prominent air-filled bowel loops identified throughout the abdomen. No pathologic fluid levels. No pneumatosis or portal venous gas. Osseous structures imaged demonstrate no acute findings. 1.  No acute cardiopulmonary pathology. 2.  Mildly prominent air-filled bowel loops throughout the abdomen. No pathologic fluid levels identified. No significant stool burden. NM HEPATOBILIARY    Result Date: 11/4/2021  EXAMINATION: NUCLEAR MEDICINE HEPATOBILIARY SCINTIGRAPHY (HIDA SCAN). 11/4/2021 6:50 am TECHNIQUE: Approximately 5.8 [id number] Mebrofenin (Choletec) was administered IV. Then, dynamic images of the abdomen were obtained in the anterior projection for 60 mins. COMPARISON: Ultrasound 11/02/2021 HISTORY: ORDERING SYSTEM PROVIDED HISTORY: R/o acute juana TECHNOLOGIST PROVIDED HISTORY: Reason for exam:->R/o acute juana What reading provider will be dictating this exam?->CRC FINDINGS: Prompt, homogenous uptake by the liver is noted with normal appearance of radiotracer excretion into the biliary system. Clearance of bloodpool activity appears appropriate. Gallbladder and small bowel is visualized in appropriate sequence. Patency of the common bile duct and cystic duct. No acute cholecystitis.      CT ABDOMEN PELVIS W IV CONTRAST    Result Date: 10/29/2021                                      [address]                                             [facility], [address] [place], [place] [address]y                                              [phone]                                               CT Scan Report                                                  Signed    Patient: Myha Regalado MR#: [id number]  [id number]          : 1985                                                                [de-identified]            Age/Sex: 43 / F                                                                Admit Date: 10/29/21            Loc: ER                                                                                     Attending Dr:     Ordering Physician: Juvencio Carr MD   Date of Service: 10/29/21  Procedure(s): CT abdomen pelvis w con  Accession Number(s): [id number]    cc: Juvencio Carr MD              PHYSICIAN INDICATIONS:  Jaundice, weight loss, abdominal pain       Technique: Axial images through the abdomen and pelvis with intravenous contrast. CT Dose Index:   4.2 mGy. DLP: 374 mGy-cm. Nelma Slight. k=0.015 mSv/(mGy-cm)       Findings: The heart size is normal. The lung bases are clear. Liver is enlarged measuring 18 cm in length with decreased attenuation compatible with fatty   infiltration. Considerable edema and fluid surrounding the pancreas and extending along the colic gutters and the   pelvis suggesting acute pancreatitis. No pseudocyst or abscess. Common bile duct measures 9 mm and appears enlarged. Recommend MRCP to evaluate for underlying   stone. Gallbladder is present with pericholecystic fluid. The kidneys are unremarkable. No renal or utereral calculi or hydronephrosis. The adrenal glands   are unremarkable. No adenopathy or mass lesion in the mesentery or retroperitoneum. The aorta is normal in caliber. Bowel is not distended. No inflammation around the appendix. Uterus is present. No adnexal mass. Bladder is unremarkable. No masses are seen in the pelvis. Impression:       CT Abdomen and Pelvis with contrast:   1. Considerable edema and fluid surrounding the pancreas and extending along the colic gutters and   the pelvis suggesting acute pancreatitis. No pseudocyst or abscess.        2. Common bile duct measures 9 mm and appears enlarged. Recommend MRCP to evaluate for underlying   stone. 3. Liver is enlarged measuring 18 cm in length with decreased attenuation compatible with fatty   infiltration. 4. Gallbladder is present with pericholecystic fluid. Dictated ByParker Suarez MD   DD/DT: 10/29/21 2115               Signed By: Christine Zimmer MD 10/29/21 2115            : CHERY JIMENEZ GALLBLADDER RUQ    Result Date: 11/2/2021  EXAMINATION: RIGHT UPPER QUADRANT ULTRASOUND 11/2/2021 3:46 pm COMPARISON: None. HISTORY: ORDERING SYSTEM PROVIDED HISTORY: ruq abdominal pain TECHNOLOGIST PROVIDED HISTORY: Reason for exam:->ruq abdominal pain What reading provider will be dictating this exam?->CRC FINDINGS: LIVER:  The liver demonstrates increased echogenicity without evidence of intrahepatic biliary ductal dilatation. BILIARY SYSTEM:  Gallbladder has wall thickening, significant sludge and some pericholecystic fluid. [place] Owen Positive sonographic Lemus's sign. Common bile duct is upper limits measuring 6 mm. RIGHT KIDNEY: The right kidney is grossly unremarkable without evidence of hydronephrosis. It measures 10.2 x 5.1 cm. PANCREAS:  Visualized portions of the pancreas are unremarkable. OTHER: There is small to moderate amount amount of right upper quadrant ascites. Findings most compatible with acalculus cholecystitis. A very distal CBD stone is not excluded. There is pericholecystic fluid and some upper abdominal ascites. There are no gallstones. . Fatty infiltration liver     MRI ABDOMEN W WO CONTRAST MRCP    Result Date: 11/4/2021  EXAMINATION: MRI OF THE ABDOMEN WITH AND WITHOUT CONTRAST AND MRCP 11/3/2021 9:33 pm TECHNIQUE: Multiplanar multisequence MRI of the abdomen was performed with and without the administration of intravenous contrast.  After initial T2 axial and coronal images, thick slab, thin slab and 3D coronal MRCP sequences were obtained without the administration of intravenous contrast.  MIP images are provided for review. COMPARISON: 11/04/2021, 11/02/2021 HISTORY: ORDERING SYSTEM PROVIDED HISTORY: Pancreatitis TECHNOLOGIST PROVIDED HISTORY: New onset ascites - rule out PVT. Reason for exam:->Pancreatitis FINDINGS: Lower Chest: Moderate bilateral pleural effusions. Organs: Fatty liver with diffusely heterogeneous enhancement of the parenchyma and widening of the fissures. Mild gallbladder wall thickening. Intrinsic T1 signal and enhancement of the pancreas are preserved. Adrenals are unremarkable. Spleen is normal in size. Kidneys are unremarkable. Vasculature is unremarkable. Portal vein is patent. GI/Bowel: Small hiatal hernia. Bowel is non-dilated without wall thickening. Peritoneum/Retroperitoneum:Moderate ascites. No free fluid, organized fluid collection or lymphadenopathy. Bones: Multilevel degenerative disc disease. Severe body wall edema. 1. No MR evidence of acute pancreatitis. 2. Fatty liver with changes of diffuse liver disease. The portal vein is patent. 3. Mild gallbladder wall thickening, likely related to underlying liver dysfunction. 4. Moderate ascites and moderate bilateral pleural effusions. US ABDOMEN LIMITED Specify organ? LIVER, GALLBLADDER, PANCREAS    Result Date: 10/29/2021  EXAMINATION: RIGHT UPPER QUADRANT ULTRASOUND 10/28/2021 6:14 pm COMPARISON: None. HISTORY: ORDERING SYSTEM PROVIDED HISTORY: Abnormal results of liver function studies TECHNOLOGIST PROVIDED HISTORY: Specify organ?->LIVER Specify organ?->GALLBLADDER Specify organ?->PANCREAS What reading provider will be dictating this exam?->CRC FINDINGS: LIVER:  Enlarged with increased echogenicity somewhat heterogeneous BILIARY SYSTEM:  Unremarkable gallbladder other than borderline wall thickening Common bile duct is within normal limits measuring 4 mm. RIGHT KIDNEY: The right kidney is grossly unremarkable without evidence of hydronephrosis.  PANCREAS: Visualized portions of the pancreas are unremarkable. OTHER: Question perihepatic ascites     1. Findings suggestive of diffuse hepatic steatosis. However also cirrhosis is possible with perihepatic ascites and gallbladder wall thickening possibly present 2. No cholelithiasis or biliary dilation RECOMMENDATIONS: Consider CT or MRI for further clinical concern or persistence of symptoms. CTA TRIPHASIC LIVER    Result Date: 11/10/2021  EXAMINATION: CT ABDOMEN WITH AND WITHOUT CONTRAST 11/9/2021 9:27 pm TECHNIQUE: CT of the abdomen was performed without and with the administration of intravenous contrast. Multiplanar reformatted images are provided for review. Dose modulation, iterative reconstruction, and/or weight based adjustment of the mA/kV was utilized to reduce the radiation dose to as low as reasonably achievable. COMPARISON: Abdominal MRI November 4, 2021 HISTORY: ORDERING SYSTEM PROVIDED HISTORY: Ascites TECHNOLOGIST PROVIDED HISTORY: IV contrast only Reason for exam:->Ascites FINDINGS: Lung Bases: Partially imaged moderate to large bilateral pleural effusions with compressive atelectasis Liver: No hepatic lesions identified. Bile ducts:  Gallbladder wall is thickened, likely due to generalized edema. No evidence of intrahepatic or extrahepatic biliary dilatation. Pancreas: No pancreatic lesions. The pancreatic duct is not dilated. Adrenal glands: Normal in appearance. Kidneys: No evidence of hydronephrosis. No abnormal renal lesions. Spleen: No enhancing lesions. Bowel: No evidence of bowel obstruction. Appendix is normal appearance. Peritoneum/Retroperitoneum: No abnormal pelvic lymphadenopathy. Small to moderate amount of abdominal ascites. Pelvis:   Bladder is unremarkable. Uterus is normal patient's age. Bones/Soft tissues: No aggressive osseous lesions     No suspicious patent lesions identified. No evidence of acute pancreatitis. Partially imaged bilateral pleural effusions.  Small to moderate amount of abdominal ascites. US DUP LOWER EXTREMITIES BILATERAL VENOUS    Result Date: 11/9/2021  EXAMINATION: DUPLEX VENOUS ULTRASOUND OF THE BILATERAL LOWER [id number]/9/2021 6:50 pm TECHNIQUE: Duplex ultrasound using B-mode/gray scaled imaging, Doppler spectral analysis and color flow Doppler was obtained of the deep venous structures of the lower bilateral extremities. COMPARISON: None used HISTORY: ORDERING SYSTEM PROVIDED HISTORY: Leg pain, left calf tenderness. TECHNOLOGIST PROVIDED HISTORY: Reason for exam:->Leg pain, left calf tenderness. What reading provider will be dictating this exam?->CRC FINDINGS: The visualized veins of the bilateral lower extremities are patent and free of echogenic thrombus. The veins demonstrate good compressibility with normal color flow study and spectral analysis. Superficial soft tissue edema noted bilaterally. No evidence of DVT in either lower extremity. Assessment and Plan  Patient is a 38 y. o. female on consult for cirrhosis.     1.  Cirrhosis / elevated LFTs  -Initial MELD = 8, MELD-Na = 12  -Consider secondary to alcohol abuse - denies  -Viral serology negative 10/28/2021  -Elevated ferritin - pending HFE genotype from prior admission  -Normal AFP 4 (10/28/2021)  -Imaging with no remark of hepatic mass  -Recent EGD in our office - will obtain records  -No evidence of encephalopathy  -New ascites - see below  -Increased LFTs since prior admission - improving now  -Check HSV / EBV     2.  History of pancreatitis  -No evidence of acute pancreatitis this admission  -Lipase in normal range  -Suspicion secondary to alcohol - patient denies and no withdrawal symptoms  -Ultrasound 11/2/2021 indicating that distal CBD stone cannot be excluded  -MRI/MRCP 11/4/2021 with no evidence of ductal abnormalities and no evidence of pancreatitis  -Persistent abdominal pain - see below  -Non-elevated calcium  -Normal triglycerides 118 (11/4/2021)     3.  Ascites  -New onset ascites  -MRI 11/4/2021 with no evidence of PVT  -Paracentesis 11/9/2021  -Fluid evaluation not consistent with SBP - gram stain / culture negative - was given antibiotics prior to paracentesis  -Rocephin per ID     4.  Cholecystitis  -Pain questionable cholecystitis versus pancreatitis vs SBP  -Poor candidate for cholecystectomy secondary to risk for hepatic decompensation  -Negative HIDA scan  -Triphasic CT showing unchanged exam - GB wall thickening, no significant pericholecystic fluid  -Antibiotics per ID     5.  GERD  -PPI daily  -Will obtain recent EGD reports from our office     6.  Abdominal pain  -No evidence of pancreatitis this admission  -Prior workup negative for cholecystitis  -Possible SBP - ID following  -Will obtain recent EGD reports from our office  -Pain management per admitting     7.  Anemia  -Chronic  -Macrocytic  -No overt GI bleeding  -No evidence of iron deficiency  -Will obtain recent EGD report from our office  -Consider colonoscopy - likely as outpatient  -Monitor CBC daily     8.  Lactic acidosis  -Associated abdominal pain  -Triphasic CT liver was completed - had ordered CTA abdomen to rule out ischemia (TO) - appears order was entered incorrectly  -Repeat lactic acid pending  -Consider ordering CT angio if persistently elevated     9. Alcohol abuse  -Reports alcohol cessation about 3-4 weeks ago  -Advised on need for permanent alcohol cessation  -Withdrawal management per admitting     10. Comorbidities  -Hypokalemia, leg swelling, dysuria  -Per admitting / consultants        Continue current. Repeat lactic. Check HSV / EBV. Consider CTA abdomen / pelvis to rule out ischemic causes. Significant increase in leukocytosis. ID following. Monitor labs. Will follow.       BASIL Goncalves - CNP  10:43 AM  11/10/2021

## 2021-11-11 ENCOUNTER — APPOINTMENT (OUTPATIENT)
Dept: CT IMAGING | Age: 36
DRG: 720 | End: 2021-11-11
Payer: MEDICAID

## 2021-11-11 ENCOUNTER — APPOINTMENT (OUTPATIENT)
Dept: ULTRASOUND IMAGING | Age: 36
DRG: 720 | End: 2021-11-11
Payer: MEDICAID

## 2021-11-11 LAB
ALBUMIN SERPL-MCNC: 1.8 G/DL (ref 3.5–5.2)
ALP BLD-CCNC: 191 U/L (ref 35–104)
ALT SERPL-CCNC: 36 U/L (ref 0–32)
ANION GAP SERPL CALCULATED.3IONS-SCNC: 11 MMOL/L (ref 7–16)
AST SERPL-CCNC: 108 U/L (ref 0–31)
BASOPHILS ABSOLUTE: 0.04 E9/L (ref 0–0.2)
BASOPHILS RELATIVE PERCENT: 0.3 % (ref 0–2)
BILIRUB SERPL-MCNC: 1.4 MG/DL (ref 0–1.2)
BILIRUBIN DIRECT: 1 MG/DL (ref 0–0.3)
BILIRUBIN, INDIRECT: 0.4 MG/DL (ref 0–1)
BUN BLDV-MCNC: <2 MG/DL (ref 6–20)
CALCIUM SERPL-MCNC: 7.7 MG/DL (ref 8.6–10.2)
CHLORIDE BLD-SCNC: 94 MMOL/L (ref 98–107)
CO2: 24 MMOL/L (ref 22–29)
CREAT SERPL-MCNC: 0.4 MG/DL (ref 0.5–1)
EOSINOPHILS ABSOLUTE: 0.04 E9/L (ref 0.05–0.5)
EOSINOPHILS RELATIVE PERCENT: 0.3 % (ref 0–6)
GFR AFRICAN AMERICAN: >60
GFR NON-AFRICAN AMERICAN: >60 ML/MIN/1.73
GLUCOSE BLD-MCNC: 94 MG/DL (ref 74–99)
HCT VFR BLD CALC: 26.9 % (ref 34–48)
HEMOGLOBIN: 9.2 G/DL (ref 11.5–15.5)
IMMATURE GRANULOCYTES #: 0.06 E9/L
IMMATURE GRANULOCYTES %: 0.4 % (ref 0–5)
LACTIC ACID: 1.8 MMOL/L (ref 0.5–2.2)
LYMPHOCYTES ABSOLUTE: 2.15 E9/L (ref 1.5–4)
LYMPHOCYTES RELATIVE PERCENT: 13.6 % (ref 20–42)
MAGNESIUM: 1.4 MG/DL (ref 1.6–2.6)
MCH RBC QN AUTO: 38.8 PG (ref 26–35)
MCHC RBC AUTO-ENTMCNC: 34.2 % (ref 32–34.5)
MCV RBC AUTO: 113.5 FL (ref 80–99.9)
MONOCYTES ABSOLUTE: 1.25 E9/L (ref 0.1–0.95)
MONOCYTES RELATIVE PERCENT: 7.9 % (ref 2–12)
NEUTROPHILS ABSOLUTE: 12.29 E9/L (ref 1.8–7.3)
NEUTROPHILS RELATIVE PERCENT: 77.5 % (ref 43–80)
PDW BLD-RTO: 14.1 FL (ref 11.5–15)
PLATELET # BLD: 325 E9/L (ref 130–450)
PMV BLD AUTO: 9.9 FL (ref 7–12)
POTASSIUM REFLEX MAGNESIUM: 3.4 MMOL/L (ref 3.5–5)
RBC # BLD: 2.37 E12/L (ref 3.5–5.5)
SODIUM BLD-SCNC: 129 MMOL/L (ref 132–146)
TOTAL PROTEIN: 5.2 G/DL (ref 6.4–8.3)
WBC # BLD: 15.8 E9/L (ref 4.5–11.5)

## 2021-11-11 PROCEDURE — 83735 ASSAY OF MAGNESIUM: CPT

## 2021-11-11 PROCEDURE — 71275 CT ANGIOGRAPHY CHEST: CPT

## 2021-11-11 PROCEDURE — 83605 ASSAY OF LACTIC ACID: CPT

## 2021-11-11 PROCEDURE — 6360000002 HC RX W HCPCS: Performed by: HOSPITALIST

## 2021-11-11 PROCEDURE — 74174 CTA ABD&PLVS W/CONTRAST: CPT

## 2021-11-11 PROCEDURE — 85025 COMPLETE CBC W/AUTO DIFF WBC: CPT

## 2021-11-11 PROCEDURE — 6370000000 HC RX 637 (ALT 250 FOR IP): Performed by: INTERNAL MEDICINE

## 2021-11-11 PROCEDURE — 6370000000 HC RX 637 (ALT 250 FOR IP): Performed by: HOSPITALIST

## 2021-11-11 PROCEDURE — 1200000000 HC SEMI PRIVATE

## 2021-11-11 PROCEDURE — 36415 COLL VENOUS BLD VENIPUNCTURE: CPT

## 2021-11-11 PROCEDURE — 76700 US EXAM ABDOM COMPLETE: CPT

## 2021-11-11 PROCEDURE — 80053 COMPREHEN METABOLIC PANEL: CPT

## 2021-11-11 PROCEDURE — 6360000004 HC RX CONTRAST MEDICATION: Performed by: RADIOLOGY

## 2021-11-11 PROCEDURE — 80076 HEPATIC FUNCTION PANEL: CPT

## 2021-11-11 PROCEDURE — 99233 SBSQ HOSP IP/OBS HIGH 50: CPT | Performed by: INTERNAL MEDICINE

## 2021-11-11 PROCEDURE — 2580000003 HC RX 258: Performed by: HOSPITALIST

## 2021-11-11 RX ORDER — ALPRAZOLAM 1 MG/1
1 TABLET ORAL EVERY 4 HOURS PRN
Status: DISCONTINUED | OUTPATIENT
Start: 2021-11-11 | End: 2021-11-16 | Stop reason: HOSPADM

## 2021-11-11 RX ORDER — ALBUMIN (HUMAN) 12.5 G/50ML
25 SOLUTION INTRAVENOUS
Status: COMPLETED | OUTPATIENT
Start: 2021-11-12 | End: 2021-11-12

## 2021-11-11 RX ADMIN — HYDROMORPHONE HYDROCHLORIDE 0.5 MG: 1 INJECTION, SOLUTION INTRAMUSCULAR; INTRAVENOUS; SUBCUTANEOUS at 04:08

## 2021-11-11 RX ADMIN — PANTOPRAZOLE SODIUM 40 MG: 40 TABLET, DELAYED RELEASE ORAL at 06:31

## 2021-11-11 RX ADMIN — ALPRAZOLAM 1 MG: 1 TABLET ORAL at 15:24

## 2021-11-11 RX ADMIN — ALPRAZOLAM 1 MG: 1 TABLET ORAL at 23:08

## 2021-11-11 RX ADMIN — FUROSEMIDE 40 MG: 40 TABLET ORAL at 09:07

## 2021-11-11 RX ADMIN — HYDROMORPHONE HYDROCHLORIDE 1 MG: 1 INJECTION, SOLUTION INTRAMUSCULAR; INTRAVENOUS; SUBCUTANEOUS at 20:50

## 2021-11-11 RX ADMIN — SODIUM CHLORIDE, PRESERVATIVE FREE 10 ML: 5 INJECTION INTRAVENOUS at 04:08

## 2021-11-11 RX ADMIN — POTASSIUM CHLORIDE 40 MEQ: 20 TABLET, EXTENDED RELEASE ORAL at 09:04

## 2021-11-11 RX ADMIN — IOPAMIDOL 100 ML: 755 INJECTION, SOLUTION INTRAVENOUS at 16:57

## 2021-11-11 RX ADMIN — SPIRONOLACTONE 100 MG: 25 TABLET ORAL at 09:07

## 2021-11-11 RX ADMIN — HYDROMORPHONE HYDROCHLORIDE 1 MG: 1 INJECTION, SOLUTION INTRAMUSCULAR; INTRAVENOUS; SUBCUTANEOUS at 09:08

## 2021-11-11 RX ADMIN — FOLIC ACID 1 MG: 1 TABLET ORAL at 09:08

## 2021-11-11 RX ADMIN — Medication 100 MG: at 13:04

## 2021-11-11 RX ADMIN — Medication 3 MG: at 23:08

## 2021-11-11 RX ADMIN — WATER 2000 MG: 1 INJECTION INTRAMUSCULAR; INTRAVENOUS; SUBCUTANEOUS at 20:51

## 2021-11-11 RX ADMIN — MULTIVITAMIN TABLET 1 TABLET: TABLET at 09:08

## 2021-11-11 RX ADMIN — Medication 3 MG: at 04:08

## 2021-11-11 RX ADMIN — ENOXAPARIN SODIUM 40 MG: 100 INJECTION SUBCUTANEOUS at 09:04

## 2021-11-11 RX ADMIN — HYDROMORPHONE HYDROCHLORIDE 1 MG: 1 INJECTION, SOLUTION INTRAMUSCULAR; INTRAVENOUS; SUBCUTANEOUS at 14:18

## 2021-11-11 RX ADMIN — Medication 10 ML: at 20:51

## 2021-11-11 ASSESSMENT — PAIN DESCRIPTION - PROGRESSION
CLINICAL_PROGRESSION: NOT CHANGED
CLINICAL_PROGRESSION: GRADUALLY WORSENING
CLINICAL_PROGRESSION: GRADUALLY IMPROVING
CLINICAL_PROGRESSION: GRADUALLY IMPROVING

## 2021-11-11 ASSESSMENT — PAIN DESCRIPTION - PAIN TYPE
TYPE: ACUTE PAIN

## 2021-11-11 ASSESSMENT — PAIN SCALES - GENERAL
PAINLEVEL_OUTOF10: 7
PAINLEVEL_OUTOF10: 0
PAINLEVEL_OUTOF10: 9
PAINLEVEL_OUTOF10: 0
PAINLEVEL_OUTOF10: 7
PAINLEVEL_OUTOF10: 9
PAINLEVEL_OUTOF10: 7

## 2021-11-11 ASSESSMENT — PAIN - FUNCTIONAL ASSESSMENT
PAIN_FUNCTIONAL_ASSESSMENT: ACTIVITIES ARE NOT PREVENTED
PAIN_FUNCTIONAL_ASSESSMENT: ACTIVITIES ARE NOT PREVENTED
PAIN_FUNCTIONAL_ASSESSMENT: PREVENTS OR INTERFERES SOME ACTIVE ACTIVITIES AND ADLS
PAIN_FUNCTIONAL_ASSESSMENT: ACTIVITIES ARE NOT PREVENTED

## 2021-11-11 ASSESSMENT — PAIN DESCRIPTION - ONSET
ONSET: ON-GOING

## 2021-11-11 ASSESSMENT — PAIN DESCRIPTION - FREQUENCY
FREQUENCY: CONTINUOUS

## 2021-11-11 ASSESSMENT — PAIN DESCRIPTION - ORIENTATION
ORIENTATION: RIGHT;LEFT

## 2021-11-11 ASSESSMENT — PAIN DESCRIPTION - LOCATION
LOCATION: LEG

## 2021-11-11 ASSESSMENT — PAIN DESCRIPTION - DESCRIPTORS
DESCRIPTORS: ACHING;DISCOMFORT
DESCRIPTORS: ACHING;DISCOMFORT;DULL

## 2021-11-11 NOTE — PROGRESS NOTES
[facility]) Hospitalist Progress Note    Admission Date  11/8/2021  3:08 PM  Chief Complaint Abd pain    Subjective  History of Present Illness  Seen at bedside  In no overt discomfort though pt says pain is severe  In stomach mainly  Also w diarrhea  Discussed w GI, for repeat paracentesis today  No fevers, chills, other new issues today  No family present during my visit  Case was discussed with charge nurse    Review of Systems - 12-point review of systems has been reviewed and is otherwise negative except as listed in the HPI    Objective  Physical Exam  Vitals: /75   Pulse 122   Temp 97.4 °F (36.3 °C) (Oral)   Resp 14   Ht 5' 3\" (1.6 m)   Wt 130 lb (59 kg)   SpO2 98%   BMI 23.03 kg/m²   General: well-developed, well-nourished, no acute distress, cooperative  Skin: warm, dry, intact, normal color without cyanosis  HEENT: normocephalic, atraumatic, mucous membranes normal  Respiratory: clear to auscultation bilaterally without respiratory distress  Cardiovascular: regular rate and rhythm without murmur / rub / gallop  Abdominal: distended, firm, nontender, nondistended, normoactive bowel sounds  Extremities: no mottling, pulses intact, no edema  Neurologic: awake, alert, no focal deficits  Psychiatric: normal affect, cooperative    Assessment / Plan  Abd pain, etiology unclear - Recent admission w normal HIDA and MRCP; not gallbladder. Question if this is just EtOH-related but claims no EtOH in ~4 weeks. No pancreatitis. Dimer was elevated in 700s, feel overall likelihood but discussed w GI and checking CTA survey to eval for VTE. S/p paracnetesis 11/10 w 1400 cc off and for repeat today. Question of SBP and pt is on Rocephin day 4 per ID    Elevated LFTs - most likely EtOH given hx. HFE genotype for iron overload pending. Imaging reports no liver mass. Checking HSV, EBV. Pt's PMH otherwise pertinent for anemia, depression.   Continue outpt med regimen; if any particular issues, will address and move to active problem list above. Please see orders for further plan of care.      Code status  Full   DVT prophylaxis Lovenox   Disposition  Home when ready    Electronically signed by Margy Joya DO on 11/11/2021 at 3:59 PM

## 2021-11-11 NOTE — PROGRESS NOTES
Spoke with floor RN, who is to speak with ordering provider about an ascites survey. Floor RN to obtain order.

## 2021-11-11 NOTE — PROGRESS NOTES
Name:  Mark Ovalle  :  1985  MRN:  [id number]  Room:  [facility]  DOS:  2021    [facility]  [facility]  Dr. Jeannie Arceo M.D. Dr. Chele Campos M.D. Dr. Micheal Mortensen D.O. Dr. Negar Vazquez M.D. Dr. Amauri Cleaning D.O.    -NP Progress Note-    PCP:  Oj Banegas MD  Admitting Physician:  Jaci Jensen MD  Chief Complaint:    Chief Complaint   Patient presents with    Abdominal Pain     generalized swelling. just diagnosed with cirrosis of the liver. Subjective  Increased abdominal distention. No nausea or vomiting today. Diarrhea today. Stools watery per patient report. Leg swelling. Skin is weeping some. Legs currently dressed.     Physical Examination  Vitals:  /75   Pulse 122   Temp 97.4 °F (36.3 °C) (Oral)   Resp 14   Ht 5' 3\" (1.6 m)   Wt 130 lb (59 kg)   SpO2 98%   BMI 23.03 kg/m²   General Appearance:  awake, alert, and oriented to person, place, time, and purpose; appears stated age and cooperative; no apparent distress no labored breathing  HEENT:  PERRL; EOMI; sclera clear; buccal mucosa moist  Neck:  supple; trachea midline; no thyromegaly; no JVD; no bruits  Heart:  rhythm regular; rate controlled; no murmurs  Lungs:  symmetrical; clear to auscultation bilaterally; no wheezes; no rhonchi; no rales  Abdomen:  soft, less tender, more distended today; bowel sounds positive; no organomegaly or masses  Extremities:  peripheral pulses present; BLE peripheral edema; no ulcers  Neurologic:  alert and oriented x 3; no focal deficit; cranial nerves grossly intact  Skin:  no petechia; no hemorrhage; no wounds    Medications  Scheduled Meds    potassium chloride  40 mEq Oral BID WC    cefTRIAXone (ROCEPHIN) IV  2,000 mg IntraVENous [id number]    folic acid  1 mg Oral Daily    nicotine  1 patch TransDERmal Daily    pantoprazole  40 mg Oral QAM AC    thiamine  100 mg Oral Daily    multivitamin  1 tablet Oral Daily    sodium chloride flush  5-40 mL IntraVENous 2 times per day    enoxaparin  40 mg SubCUTAneous Daily    furosemide  40 mg Oral Daily    spironolactone  100 mg Oral Daily     Infusion Meds    sodium chloride       PRN Meds magnesium sulfate, HYDROmorphone, HYDROmorphone, melatonin, sodium chloride flush, sodium chloride, ondansetron **OR** ondansetron, magnesium sulfate, potassium chloride **OR** potassium alternative oral replacement **OR** potassium chloride, polyethylene glycol, calcium carbonate    Laboratory Data  Recent Results (from the past 24 hour(s))   D-dimer, quantitative    Collection Time: 11/10/21  1:35 PM   Result Value Ref Range    D-Dimer, Quant 712 ng/mL DDU   LACTIC ACID, PLASMA    Collection Time: 11/10/21  1:35 PM   Result Value Ref Range    Lactic Acid 4.2 (HH) 0.5 - 2.2 mmol/L   Comprehensive Metabolic Panel w/ Reflex to MG    Collection Time: 11/11/21 10:52 AM   Result Value Ref Range    Sodium 129 (L) 132 - 146 mmol/L    Potassium reflex Magnesium 3.4 (L) 3.5 - 5.0 mmol/L    Chloride 94 (L) 98 - 107 mmol/L    CO2 24 22 - 29 mmol/L    Anion Gap 11 7 - 16 mmol/L    Glucose 94 74 - 99 mg/dL    BUN <2 (L) 6 - 20 mg/dL    CREATININE 0.4 (L) 0.5 - 1.0 mg/dL    GFR Non-African American >60 >=60 mL/min/1.73    GFR African American >60     Calcium 7.7 (L) 8.6 - 10.2 mg/dL    Total Protein 5.2 (L) 6.4 - 8.3 g/dL    Albumin 1.8 (L) 3.5 - 5.2 g/dL    Total Bilirubin 1.4 (H) 0.0 - 1.2 mg/dL    Alkaline Phosphatase 191 (H) 35 - 104 U/L    ALT 36 (H) 0 - 32 U/L     (H) 0 - 31 U/L   CBC WITH AUTO DIFFERENTIAL    Collection Time: 11/11/21 10:52 AM   Result Value Ref Range    WBC 15.8 (H) 4.5 - 11.5 E9/L    RBC 2.37 (L) 3.50 - 5.50 E12/L    Hemoglobin 9.2 (L) 11.5 - 15.5 g/dL    Hematocrit 26.9 (L) 34.0 - 48.0 %    .5 (H) 80.0 - 99.9 fL    MCH 38.8 (H) 26.0 - 35.0 pg    MCHC 34.2 32.0 - 34.5 %    RDW 14.1 11.5 - 15.0 fL    Platelets 072 004 - 219 E9/L    MPV 9.9 7.0 - 12.0 fL    Neutrophils % 77.5 43.0 - 80.0 %    Immature Granulocytes % 0.4 0.0 - 5.0 %    Lymphocytes % 13.6 (L) 20.0 - 42.0 %    Monocytes % 7.9 2.0 - 12.0 %    Eosinophils % 0.3 0.0 - 6.0 %    Basophils % 0.3 0.0 - 2.0 %    Neutrophils Absolute 12.29 (H) 1.80 - 7.30 E9/L    Immature Granulocytes # 0.06 E9/L    Lymphocytes Absolute 2.15 1.50 - 4.00 E9/L    Monocytes Absolute 1.25 (H) 0.10 - 0.95 E9/L    Eosinophils Absolute 0.04 (L) 0.05 - 0.50 E9/L    Basophils Absolute 0.04 0.00 - 0.20 E9/L   Hepatic function panel    Collection Time: 11/11/21 10:52 AM   Result Value Ref Range    Bilirubin, Direct 1.0 (H) 0.0 - 0.3 mg/dL    Bilirubin, Indirect 0.4 0.0 - 1.0 mg/dL   Lactic acid, plasma    Collection Time: 11/11/21 10:52 AM   Result Value Ref Range    Lactic Acid 1.8 0.5 - 2.2 mmol/L   Magnesium    Collection Time: 11/11/21 10:52 AM   Result Value Ref Range    Magnesium 1.4 (L) 1.6 - 2.6 mg/dL       Imaging  XR CHEST (2 VW)    Result Date: 11/2/2021  EXAMINATION: TWO XRAY VIEWS OF THE CHEST 11/2/2021 12:51 pm COMPARISON: 08/06/2019 HISTORY: ORDERING SYSTEM PROVIDED HISTORY: cp TECHNOLOGIST PROVIDED HISTORY: Reason for exam:->cp Open reduction internal fixation of proximal left humerus fracture 05/12/2021 FINDINGS: There is new density in the lateral and posterior left costophrenic recess. This is most compatible with pleural effusion. No right pleural effusion. No pneumothorax. No new pulmonary consolidation. Normal cardiac silhouette size without vascular congestion. No acute displaced fracture. New hardware partially visualized in proximal left humerus compatible with history of left humerus fracture repair.      Findings most compatible with new small left pleural effusion     XR ACUTE ABD SERIES CHEST 1 VW    Result Date: 11/8/2021  EXAMINATION: TWO XRAY VIEWS OF THE ABDOMEN AND SINGLE  XRAY VIEW OF THE CHEST 11/8/2021 3:54 pm COMPARISON: Chest series from November 2, 2021 HISTORY: ORDERING SYSTEM PROVIDED HISTORY: abd pain TECHNOLOGIST PROVIDED HISTORY: Reason for exam:->abd pain FINDINGS: Adequate and symmetric aeration of the lungs. There are no formed consolidations, pleural effusions, or pneumothoraces. Trachea and central mainstem bronchi appear clear. The cardiomediastinal silhouette and pulmonary vascularity appear within normal limits. Osseous and thoracic soft tissue structures demonstrate no acute findings. No organomegaly nor intra-abdominal mass effect. No pathologic calcifications seen. No free intraperitoneal air. Mildly prominent air-filled bowel loops identified throughout the abdomen. No pathologic fluid levels. No pneumatosis or portal venous gas. Osseous structures imaged demonstrate no acute findings. 1.  No acute cardiopulmonary pathology. 2.  Mildly prominent air-filled bowel loops throughout the abdomen. No pathologic fluid levels identified. No significant stool burden. NM HEPATOBILIARY    Result Date: 11/4/2021  EXAMINATION: NUCLEAR MEDICINE HEPATOBILIARY SCINTIGRAPHY (HIDA SCAN). 11/4/2021 6:50 am TECHNIQUE: Approximately 5.8 [id number] Mebrofenin (Choletec) was administered IV. Then, dynamic images of the abdomen were obtained in the anterior projection for 60 mins. COMPARISON: Ultrasound 11/02/2021 HISTORY: ORDERING SYSTEM PROVIDED HISTORY: R/o acute juana TECHNOLOGIST PROVIDED HISTORY: Reason for exam:->R/o acute juana What reading provider will be dictating this exam?->CRC FINDINGS: Prompt, homogenous uptake by the liver is noted with normal appearance of radiotracer excretion into the biliary system. Clearance of bloodpool activity appears appropriate. Gallbladder and small bowel is visualized in appropriate sequence. Patency of the common bile duct and cystic duct. No acute cholecystitis.      CT ABDOMEN PELVIS W IV CONTRAST    Result Date: 10/29/2021                                      [address]                                              [phone]                                               CT Scan Report                                                  Signed    Patient: Pratibha Young                                                                MR#: [id number]  [id number]          : 1985                                                                [de-identified]            Age/Sex: 43 / F                                                                Admit Date: 10/29/21            Loc: ER                                                                                     Attending Dr:     Ordering Physician: Joana Llanos MD   Date of Service: 10/29/21  Procedure(s): CT abdomen pelvis w con  Accession Number(s): [id number]    cc: Joana Llanos MD              PHYSICIAN INDICATIONS:  Jaundice, weight loss, abdominal pain       Technique: Axial images through the abdomen and pelvis with intravenous contrast. CT Dose Index:   4.2 mGy. DLP: 374 mGy-cm. Gita Taft. k=0.015 mSv/(mGy-cm)       Findings: The heart size is normal. The lung bases are clear. Liver is enlarged measuring 18 cm in length with decreased attenuation compatible with fatty   infiltration. Considerable edema and fluid surrounding the pancreas and extending along the colic gutters and the   pelvis suggesting acute pancreatitis. No pseudocyst or abscess. Common bile duct measures 9 mm and appears enlarged. Recommend MRCP to evaluate for underlying   stone. Gallbladder is present with pericholecystic fluid. The kidneys are unremarkable. No renal or utereral calculi or hydronephrosis. The adrenal glands   are unremarkable. No adenopathy or mass lesion in the mesentery or retroperitoneum. The aorta is normal in caliber. Bowel is not distended. No inflammation around the appendix. Uterus is present. No adnexal mass. Bladder is unremarkable. No masses are seen in the pelvis. Impression:       CT Abdomen and Pelvis with contrast:   1. Considerable edema and fluid surrounding the pancreas and extending along the colic gutters and   the pelvis suggesting acute pancreatitis. No pseudocyst or abscess. 2. Common bile duct measures 9 mm and appears enlarged. Recommend MRCP to evaluate for underlying   stone. 3. Liver is enlarged measuring 18 cm in length with decreased attenuation compatible with fatty   infiltration. 4. Gallbladder is present with pericholecystic fluid. Dictated ByStef Hall MD   DD/DT: 10/29/21 2115               Signed By: Ashley Matson MD 10/29/21 2115            : CHERY    US GALLBLADDER RUQ    Result Date: 11/2/2021  EXAMINATION: RIGHT UPPER QUADRANT ULTRASOUND 11/2/2021 3:46 pm COMPARISON: None. HISTORY: ORDERING SYSTEM PROVIDED HISTORY: ruq abdominal pain TECHNOLOGIST PROVIDED HISTORY: Reason for exam:->ruq abdominal pain What reading provider will be dictating this exam?->CRC FINDINGS: LIVER:  The liver demonstrates increased echogenicity without evidence of intrahepatic biliary ductal dilatation. BILIARY SYSTEM:  Gallbladder has wall thickening, significant sludge and some pericholecystic fluid. Alyssa Gull Positive sonographic Lemus's sign. Common bile duct is upper limits measuring 6 mm. RIGHT KIDNEY: The right kidney is grossly unremarkable without evidence of hydronephrosis. It measures 10.2 x 5.1 cm. PANCREAS:  Visualized portions of the pancreas are unremarkable. OTHER: There is small to moderate amount amount of right upper quadrant ascites. Findings most compatible with acalculus cholecystitis. A very distal CBD stone is not excluded. There is pericholecystic fluid and some upper abdominal ascites. There are no gallstones. . Fatty infiltration liver     MRI ABDOMEN W WO CONTRAST MRCP    Result Date: 11/4/2021  EXAMINATION: MRI OF THE ABDOMEN WITH AND WITHOUT CONTRAST AND MRCP 11/3/2021 9:33 pm TECHNIQUE: Multiplanar multisequence MRI of the abdomen was performed with and without the administration of intravenous contrast.  After initial T2 axial and coronal images, thick slab, thin slab and 3D coronal MRCP sequences were obtained without the administration of intravenous contrast.  MIP images are provided for review. COMPARISON: 11/04/2021, 11/02/2021 HISTORY: ORDERING SYSTEM PROVIDED HISTORY: Pancreatitis TECHNOLOGIST PROVIDED HISTORY: New onset ascites - rule out PVT. Reason for exam:->Pancreatitis FINDINGS: Lower Chest: Moderate bilateral pleural effusions. Organs: Fatty liver with diffusely heterogeneous enhancement of the parenchyma and widening of the fissures. Mild gallbladder wall thickening. Intrinsic T1 signal and enhancement of the pancreas are preserved. Adrenals are unremarkable. Spleen is normal in size. Kidneys are unremarkable. Vasculature is unremarkable. Portal vein is patent. GI/Bowel: Small hiatal hernia. Bowel is non-dilated without wall thickening. Peritoneum/Retroperitoneum:Moderate ascites. No free fluid, organized fluid collection or lymphadenopathy. Bones: Multilevel degenerative disc disease. Severe body wall edema. 1. No MR evidence of acute pancreatitis. 2. Fatty liver with changes of diffuse liver disease. The portal vein is patent. 3. Mild gallbladder wall thickening, likely related to underlying liver dysfunction. 4. Moderate ascites and moderate bilateral pleural effusions. US ABDOMEN LIMITED Specify organ? LIVER, GALLBLADDER, PANCREAS    Result Date: 10/29/2021  EXAMINATION: RIGHT UPPER QUADRANT ULTRASOUND 10/28/2021 6:14 pm COMPARISON: None.  HISTORY: ORDERING SYSTEM PROVIDED HISTORY: Abnormal results of liver function studies TECHNOLOGIST PROVIDED HISTORY: Specify organ?->LIVER Specify organ?->GALLBLADDER Specify organ?->PANCREAS What reading provider will be dictating this exam?->CRC FINDINGS: LIVER:  Enlarged with of abdominal ascites. Pelvis:   Bladder is unremarkable. Uterus is normal patient's age. Bones/Soft tissues: No aggressive osseous lesions     No suspicious patent lesions identified. No evidence of acute pancreatitis. Partially imaged bilateral pleural effusions. Small to moderate amount of abdominal ascites. US DUP LOWER EXTREMITIES BILATERAL VENOUS    Result Date: 11/9/2021  EXAMINATION: DUPLEX VENOUS ULTRASOUND OF THE BILATERAL LOWER [id number]/9/2021 6:50 pm TECHNIQUE: Duplex ultrasound using B-mode/gray scaled imaging, Doppler spectral analysis and color flow Doppler was obtained of the deep venous structures of the lower bilateral extremities. COMPARISON: None used HISTORY: ORDERING SYSTEM PROVIDED HISTORY: Leg pain, left calf tenderness. TECHNOLOGIST PROVIDED HISTORY: Reason for exam:->Leg pain, left calf tenderness. What reading provider will be dictating this exam?->CRC FINDINGS: The visualized veins of the bilateral lower extremities are patent and free of echogenic thrombus. The veins demonstrate good compressibility with normal color flow study and spectral analysis. Superficial soft tissue edema noted bilaterally. No evidence of DVT in either lower extremity. Assessment and Plan  Patient is a 38 y. o. female on consult for cirrhosis.     1.  Cirrhosis / elevated LFTs  -Initial MELD = 8, MELD-Na = 12  -Consider secondary to alcohol abuse - denies  -Viral serology negative 10/28/2021  -Elevated ferritin - pending HFE genotype from prior admission  -Normal AFP 4 (10/28/2021)  -Imaging with no remark of hepatic mass  -Recent EGD in our office - will obtain records  -No evidence of encephalopathy  -New ascites - see below  -Increased LFTs since prior admission - improving now  -Check HSV / EBV     2.  History of pancreatitis  -No evidence of acute pancreatitis this admission  -Lipase in normal range  -Suspicion secondary to alcohol - patient denies and no withdrawal symptoms  -Ultrasound 11/2/2021 indicating that distal CBD stone cannot be excluded  -MRI/MRCP 11/4/2021 with no evidence of ductal abnormalities and no evidence of pancreatitis  -Persistent abdominal pain - see below  -Non-elevated calcium  -Normal triglycerides 118 (11/4/2021)     3.  Ascites  -New onset ascites  -MRI 11/4/2021 with no evidence of PVT  -Paracentesis 11/9/2021  -Fluid evaluation not consistent with SBP - gram stain / culture negative - was given antibiotics prior to paracentesis  -Rocephin per ID  -Increased abdominal distention - repeat paracentesis     4.  Cholecystitis  -Pain questionable cholecystitis versus pancreatitis vs SBP  -Poor candidate for cholecystectomy secondary to risk for hepatic decompensation  -Negative HIDA scan  -Triphasic CT showing unchanged exam - GB wall thickening, no significant pericholecystic fluid  -Antibiotics per ID     5.  GERD  -PPI daily  -Will obtain recent EGD reports from our office     6.  Abdominal pain  -No evidence of pancreatitis this admission  -Prior workup negative for cholecystitis  -Possible SBP - ID following  -Will obtain recent EGD reports from our office  -Pain management per admitting     7.  Anemia  -Chronic  -Macrocytic  -No overt GI bleeding  -No evidence of iron deficiency  -Will obtain recent EGD report from our office  -Consider colonoscopy - likely as outpatient  -Monitor CBC daily     8.  Lactic acidosis  -Associated abdominal pain  -Triphasic CT liver was completed - had ordered CTA abdomen to rule out ischemia (TO) - appears order was entered incorrectly  -Repeat lactic acid pending  -CTA chest abdomen pelvis ordered     9. Alcohol abuse  -Reports alcohol cessation about 3-4 weeks ago  -Advised on need for permanent alcohol cessation  -Withdrawal management per admitting     10. Comorbidities  -Hypokalemia, leg swelling, dysuria  -Per admitting / consultants      Resolved lactic acidosis. Increased abdominal distention.   Plan for repeat paracentesis. Antibiotics per ID. Elevated D-dimer. Discussed with admitting. Plan for CTA chest, abdomen, pelvis. Repeat paracentesis secondary to increased abdominal distention. Will provide albumin with paracentesis. Monitor labs. Will follow.       Ivelisse Enciso, BASIL - CNP  12:18 PM  11/11/2021

## 2021-11-11 NOTE — PLAN OF CARE
Problem: Falls - Risk of:  Goal: Will remain free from falls  Description: Will remain free from falls  Outcome: Met This Shift  Goal: Absence of physical injury  Description: Absence of physical injury  Outcome: Met This Shift     Problem: Nutritional:  Goal: Nutritional status will improve  Description: Nutritional status will improve  Outcome: Met This Shift     Problem: Physical Regulation:  Goal: Will remain free from infection  Description: Will remain free from infection  Outcome: Met This Shift     Problem: Respiratory:  Goal: Ability to maintain normal respiratory secretions will improve  Description: Ability to maintain normal respiratory secretions will improve  Outcome: Met This Shift     Problem: Skin Integrity:  Goal: Demonstration of wound healing without infection will improve  Description: Demonstration of wound healing without infection will improve  Outcome: Met This Shift  Goal: Complications related to intravenous access or infusion will be avoided or minimized  Description: Complications related to intravenous access or infusion will be avoided or minimized  Outcome: Met This Shift     Problem: Pain:  Goal: Pain level will decrease  Description: Pain level will decrease  Outcome: Met This Shift  Goal: Control of acute pain  Description: Control of acute pain  Outcome: Met This Shift  Goal: Control of chronic pain  Description: Control of chronic pain  Outcome: Met This Shift

## 2021-11-11 NOTE — PROGRESS NOTES
[address] [place] [address] [facility]  [facility]  Progress Note    Face to face encounter   SUBJECTIVE:  Chief Complaint   Patient presents with    Abdominal Pain     generalized swelling. just diagnosed with cirrosis of the liver. Patient is tolerating medications. No nausea, vomiting, reports + diarrhea. Complaining of pain to abdomen and lower extremities   No fevers. In bed. Review of systems:  As stated above in the chief complaint, otherwise negative. Medications:  Scheduled Meds:   potassium chloride  40 mEq Oral BID WC    cefTRIAXone (ROCEPHIN) IV  2,000 mg IntraVENous K11G    folic acid  1 mg Oral Daily    nicotine  1 patch TransDERmal Daily    pantoprazole  40 mg Oral QAM AC    thiamine  100 mg Oral Daily    multivitamin  1 tablet Oral Daily    sodium chloride flush  5-40 mL IntraVENous 2 times per day    enoxaparin  40 mg SubCUTAneous Daily    furosemide  40 mg Oral Daily    spironolactone  100 mg Oral Daily     Continuous Infusions:   sodium chloride       PRN Meds:magnesium sulfate, HYDROmorphone, HYDROmorphone, melatonin, sodium chloride flush, sodium chloride, ondansetron **OR** ondansetron, magnesium sulfate, potassium chloride **OR** potassium alternative oral replacement **OR** potassium chloride, polyethylene glycol, calcium carbonate    OBJECTIVE:  /75   Pulse 122   Temp 97.4 °F (36.3 °C) (Oral)   Resp 14   Ht 5' 3\" (1.6 m)   Wt 130 lb (59 kg)   SpO2 98%   BMI 23.03 kg/m²   Temp  Av.5 °F (36.4 °C)  Min: 97.4 °F (36.3 °C)  Max: 97.6 °F (36.4 °C)  Constitutional: The patient is awake, alert, and oriented. Lying in bed, in no distress. + reports pain   Skin: Warm and dry. No rashes were noted. HEENT: Round and reactive pupils. Moist mucous membranes. No ulcerations or thrush. Neck: Supple to movements. Chest: No respiratory distress. Symmetrical expansion. No wheezing, crackles or rhonchi.  Room air   Cardiovascular: S1 and S2 are rhythmic and regular. No murmurs appreciated. Abdomen: Positive bowel sounds to auscultation. tender to palpation. Distended  Extremities: moderate leg edema. + serous drainage. Lines: peripheral    Laboratory and Tests Review:  Lab Results   Component Value Date    WBC 26.7 (H) 11/10/2021    WBC 13.5 (H) 11/09/2021    WBC 18.0 (H) 11/08/2021    HGB 11.4 (L) 11/10/2021    HCT 33.2 (L) 11/10/2021    .9 (H) 11/10/2021     11/10/2021     Lab Results   Component Value Date    NEUTROABS 22.68 (H) 11/10/2021    NEUTROABS 10.16 (H) 11/09/2021    NEUTROABS 13.81 (H) 11/08/2021     No results found for: [facility]  Lab Results   Component Value Date    ALT 65 (H) 11/10/2021     (H) 11/10/2021     (H) 11/09/2021    ALKPHOS 253 (H) 11/10/2021    BILITOT 1.9 (H) 11/10/2021     Lab Results   Component Value Date     11/10/2021    K 3.8 11/10/2021    CL 96 11/10/2021    CO2 23 11/10/2021    BUN <2 11/10/2021    CREATININE 0.3 11/10/2021    CREATININE 0.3 11/09/2021    CREATININE 0.3 11/08/2021    GFRAA >60 11/10/2021    AGRATIO 0.6 10/29/2021    LABGLOM >60 11/10/2021    GLUCOSE 78 11/10/2021    GLUCOSE 166 05/04/2012    PROT 6.4 11/10/2021    LABALBU 2.3 11/10/2021    LABALBU 3.6 05/04/2012    CALCIUM 8.4 11/10/2021    BILITOT 1.9 11/10/2021    ALKPHOS 253 11/10/2021     11/10/2021    ALT 65 11/10/2021     Lab Results   Component Value Date    CRP 3.0 (H) 11/09/2021    CRP 0.8 (H) 11/03/2021    CRP 1.1 (H) 10/28/2021     Lab Results   Component Value Date    SEDRATE 45 (H) 11/09/2021    SEDRATE 46 (H) 10/28/2021     Radiology:  Reviewed     Microbiology:   Ascitic fluid: no organisms seen so far    ASSESSMENT:  · Liver cirrhosis with ascites  · SBP.   Treated with antibiotics before paracentesis  · Leukocytosis associated to SBP  · Elevation of transaminases    PLAN:  · Continue Ceftriaxone   · Check final cultures  · Check stool for c.diff  · Monitor labs-- WBC increased --26.7 - could be reactive- will repeat in am   · Will order tubi- again to lower extremities  · Patient feels as though she needs another paracentesis   · GI following     BASIL Rearodn - CNS  9:29 AM  11/11/2021     Patient seen and examined. I had a face to face encounter with the patient. Agree with exam.  Assessment and plan as outlined above and directed by me. Addition and corrections were done as deemed appropriate. My exam and plan include: The patient still complaining of abdominal pain. Her white count is still elevated and she is having loose stools. We have asked for stools for C. difficile.     Marisol Mejia MD  11/11/2021  3:46 PM

## 2021-11-11 NOTE — CARE COORDINATION
Updated plan of care. Plan for ct scan of abdomen and repeat paracentesis. Continue iv antibiotics.  Plan remains home with no help for alcohol rehab per pt request. Will continue to follow-mjo

## 2021-11-12 ENCOUNTER — APPOINTMENT (OUTPATIENT)
Dept: ULTRASOUND IMAGING | Age: 36
DRG: 720 | End: 2021-11-12
Payer: MEDICAID

## 2021-11-12 LAB
ADENOVIRUS BY PCR: NOT DETECTED
ALBUMIN SERPL-MCNC: 2.4 G/DL (ref 3.5–5.2)
ALP BLD-CCNC: 166 U/L (ref 35–104)
ALT SERPL-CCNC: 22 U/L (ref 0–32)
ANION GAP SERPL CALCULATED.3IONS-SCNC: 14 MMOL/L (ref 7–16)
AST SERPL-CCNC: 56 U/L (ref 0–31)
BASOPHILS ABSOLUTE: 0.03 E9/L (ref 0–0.2)
BASOPHILS RELATIVE PERCENT: 0.3 % (ref 0–2)
BILIRUB SERPL-MCNC: 1.2 MG/DL (ref 0–1.2)
BODY FLUID CULTURE, STERILE: NORMAL
BORDETELLA PARAPERTUSSIS BY PCR: NOT DETECTED
BORDETELLA PERTUSSIS BY PCR: NOT DETECTED
BUN BLDV-MCNC: <2 MG/DL (ref 6–20)
C-REACTIVE PROTEIN: 4 MG/DL (ref 0–0.4)
CALCIUM SERPL-MCNC: 7.9 MG/DL (ref 8.6–10.2)
CHLAMYDOPHILIA PNEUMONIAE BY PCR: NOT DETECTED
CHLORIDE BLD-SCNC: 98 MMOL/L (ref 98–107)
CO2: 24 MMOL/L (ref 22–29)
CORONAVIRUS 229E BY PCR: NOT DETECTED
CORONAVIRUS HKU1 BY PCR: NOT DETECTED
CORONAVIRUS NL63 BY PCR: NOT DETECTED
CORONAVIRUS OC43 BY PCR: NOT DETECTED
CREAT SERPL-MCNC: 0.4 MG/DL (ref 0.5–1)
EOSINOPHILS ABSOLUTE: 0.1 E9/L (ref 0.05–0.5)
EOSINOPHILS RELATIVE PERCENT: 1 % (ref 0–6)
GFR AFRICAN AMERICAN: >60
GFR NON-AFRICAN AMERICAN: >60 ML/MIN/1.73
GLUCOSE BLD-MCNC: 89 MG/DL (ref 74–99)
GRAM STAIN RESULT: NORMAL
HCT VFR BLD CALC: 24.1 % (ref 34–48)
HEMOGLOBIN: 8.5 G/DL (ref 11.5–15.5)
HUMAN METAPNEUMOVIRUS BY PCR: NOT DETECTED
HUMAN RHINOVIRUS/ENTEROVIRUS BY PCR: NOT DETECTED
IMMATURE GRANULOCYTES #: 0.03 E9/L
IMMATURE GRANULOCYTES %: 0.3 % (ref 0–5)
INFLUENZA A BY PCR: NOT DETECTED
INFLUENZA B BY PCR: NOT DETECTED
LYMPHOCYTES ABSOLUTE: 2.32 E9/L (ref 1.5–4)
LYMPHOCYTES RELATIVE PERCENT: 22.2 % (ref 20–42)
MAGNESIUM: 1.5 MG/DL (ref 1.6–2.6)
MCH RBC QN AUTO: 39.9 PG (ref 26–35)
MCHC RBC AUTO-ENTMCNC: 35.3 % (ref 32–34.5)
MCV RBC AUTO: 113.1 FL (ref 80–99.9)
MONOCYTES ABSOLUTE: 0.94 E9/L (ref 0.1–0.95)
MONOCYTES RELATIVE PERCENT: 9 % (ref 2–12)
MYCOPLASMA PNEUMONIAE BY PCR: NOT DETECTED
NEUTROPHILS ABSOLUTE: 7.05 E9/L (ref 1.8–7.3)
NEUTROPHILS RELATIVE PERCENT: 67.2 % (ref 43–80)
PARAINFLUENZA VIRUS 1 BY PCR: NOT DETECTED
PARAINFLUENZA VIRUS 2 BY PCR: NOT DETECTED
PARAINFLUENZA VIRUS 3 BY PCR: NOT DETECTED
PARAINFLUENZA VIRUS 4 BY PCR: NOT DETECTED
PDW BLD-RTO: 13.9 FL (ref 11.5–15)
PLATELET # BLD: 236 E9/L (ref 130–450)
PMV BLD AUTO: 9.8 FL (ref 7–12)
POIKILOCYTES: ABNORMAL
POLYCHROMASIA: ABNORMAL
POTASSIUM REFLEX MAGNESIUM: 3 MMOL/L (ref 3.5–5)
PROCALCITONIN: 2.15 NG/ML (ref 0–0.08)
RBC # BLD: 2.13 E12/L (ref 3.5–5.5)
RESPIRATORY SYNCYTIAL VIRUS BY PCR: NOT DETECTED
SARS-COV-2, PCR: NOT DETECTED
SODIUM BLD-SCNC: 136 MMOL/L (ref 132–146)
TARGET CELLS: ABNORMAL
TOTAL PROTEIN: 5.2 G/DL (ref 6.4–8.3)
WBC # BLD: 10.5 E9/L (ref 4.5–11.5)

## 2021-11-12 PROCEDURE — 2580000003 HC RX 258: Performed by: HOSPITALIST

## 2021-11-12 PROCEDURE — 99233 SBSQ HOSP IP/OBS HIGH 50: CPT | Performed by: INTERNAL MEDICINE

## 2021-11-12 PROCEDURE — 0202U NFCT DS 22 TRGT SARS-COV-2: CPT

## 2021-11-12 PROCEDURE — 83735 ASSAY OF MAGNESIUM: CPT

## 2021-11-12 PROCEDURE — 6370000000 HC RX 637 (ALT 250 FOR IP): Performed by: SPECIALIST

## 2021-11-12 PROCEDURE — 36415 COLL VENOUS BLD VENIPUNCTURE: CPT

## 2021-11-12 PROCEDURE — 85025 COMPLETE CBC W/AUTO DIFF WBC: CPT

## 2021-11-12 PROCEDURE — 1200000000 HC SEMI PRIVATE

## 2021-11-12 PROCEDURE — 80053 COMPREHEN METABOLIC PANEL: CPT

## 2021-11-12 PROCEDURE — 6360000002 HC RX W HCPCS: Performed by: NURSE PRACTITIONER

## 2021-11-12 PROCEDURE — 6370000000 HC RX 637 (ALT 250 FOR IP): Performed by: HOSPITALIST

## 2021-11-12 PROCEDURE — P9047 ALBUMIN (HUMAN), 25%, 50ML: HCPCS | Performed by: NURSE PRACTITIONER

## 2021-11-12 PROCEDURE — 6370000000 HC RX 637 (ALT 250 FOR IP): Performed by: INTERNAL MEDICINE

## 2021-11-12 PROCEDURE — 49083 ABD PARACENTESIS W/IMAGING: CPT

## 2021-11-12 PROCEDURE — 84145 PROCALCITONIN (PCT): CPT

## 2021-11-12 PROCEDURE — 86140 C-REACTIVE PROTEIN: CPT

## 2021-11-12 PROCEDURE — 6360000002 HC RX W HCPCS: Performed by: HOSPITALIST

## 2021-11-12 RX ORDER — CEFUROXIME AXETIL 500 MG/1
500 TABLET ORAL EVERY 12 HOURS SCHEDULED
Status: COMPLETED | OUTPATIENT
Start: 2021-11-12 | End: 2021-11-16

## 2021-11-12 RX ADMIN — ALBUMIN (HUMAN) 25 G: 0.25 INJECTION, SOLUTION INTRAVENOUS at 09:44

## 2021-11-12 RX ADMIN — Medication 10 ML: at 22:25

## 2021-11-12 RX ADMIN — SPIRONOLACTONE 100 MG: 25 TABLET ORAL at 08:48

## 2021-11-12 RX ADMIN — MULTIVITAMIN TABLET 1 TABLET: TABLET at 08:48

## 2021-11-12 RX ADMIN — HYDROMORPHONE HYDROCHLORIDE 1 MG: 1 INJECTION, SOLUTION INTRAMUSCULAR; INTRAVENOUS; SUBCUTANEOUS at 17:32

## 2021-11-12 RX ADMIN — Medication 100 MG: at 08:54

## 2021-11-12 RX ADMIN — POTASSIUM CHLORIDE 40 MEQ: 20 TABLET, EXTENDED RELEASE ORAL at 08:49

## 2021-11-12 RX ADMIN — ENOXAPARIN SODIUM 40 MG: 100 INJECTION SUBCUTANEOUS at 08:52

## 2021-11-12 RX ADMIN — HYDROMORPHONE HYDROCHLORIDE 1 MG: 1 INJECTION, SOLUTION INTRAMUSCULAR; INTRAVENOUS; SUBCUTANEOUS at 22:23

## 2021-11-12 RX ADMIN — CEFUROXIME AXETIL 500 MG: 500 TABLET ORAL at 22:21

## 2021-11-12 RX ADMIN — HYDROMORPHONE HYDROCHLORIDE 1 MG: 1 INJECTION, SOLUTION INTRAMUSCULAR; INTRAVENOUS; SUBCUTANEOUS at 12:10

## 2021-11-12 RX ADMIN — Medication 10 ML: at 08:53

## 2021-11-12 RX ADMIN — FOLIC ACID 1 MG: 1 TABLET ORAL at 08:49

## 2021-11-12 RX ADMIN — POTASSIUM CHLORIDE 40 MEQ: 20 TABLET, EXTENDED RELEASE ORAL at 17:31

## 2021-11-12 RX ADMIN — HYDROMORPHONE HYDROCHLORIDE 1 MG: 1 INJECTION, SOLUTION INTRAMUSCULAR; INTRAVENOUS; SUBCUTANEOUS at 03:37

## 2021-11-12 RX ADMIN — FUROSEMIDE 40 MG: 40 TABLET ORAL at 08:49

## 2021-11-12 RX ADMIN — PANTOPRAZOLE SODIUM 40 MG: 40 TABLET, DELAYED RELEASE ORAL at 06:41

## 2021-11-12 ASSESSMENT — PAIN DESCRIPTION - DESCRIPTORS
DESCRIPTORS: ACHING;DISCOMFORT
DESCRIPTORS: ACHING;DISCOMFORT

## 2021-11-12 ASSESSMENT — PAIN SCALES - GENERAL
PAINLEVEL_OUTOF10: 9
PAINLEVEL_OUTOF10: 10
PAINLEVEL_OUTOF10: 9
PAINLEVEL_OUTOF10: 8
PAINLEVEL_OUTOF10: 8
PAINLEVEL_OUTOF10: 7

## 2021-11-12 ASSESSMENT — PAIN DESCRIPTION - FREQUENCY
FREQUENCY: CONTINUOUS
FREQUENCY: CONTINUOUS

## 2021-11-12 ASSESSMENT — PAIN DESCRIPTION - PAIN TYPE
TYPE: ACUTE PAIN
TYPE: ACUTE PAIN

## 2021-11-12 ASSESSMENT — PAIN - FUNCTIONAL ASSESSMENT
PAIN_FUNCTIONAL_ASSESSMENT: 0-10
PAIN_FUNCTIONAL_ASSESSMENT: PREVENTS OR INTERFERES SOME ACTIVE ACTIVITIES AND ADLS
PAIN_FUNCTIONAL_ASSESSMENT: 0-10
PAIN_FUNCTIONAL_ASSESSMENT: ACTIVITIES ARE NOT PREVENTED

## 2021-11-12 ASSESSMENT — PAIN DESCRIPTION - PROGRESSION
CLINICAL_PROGRESSION: NOT CHANGED

## 2021-11-12 ASSESSMENT — PAIN DESCRIPTION - ONSET
ONSET: ON-GOING
ONSET: ON-GOING

## 2021-11-12 ASSESSMENT — PAIN DESCRIPTION - ORIENTATION
ORIENTATION: RIGHT;LEFT
ORIENTATION: RIGHT;LEFT

## 2021-11-12 ASSESSMENT — PAIN DESCRIPTION - LOCATION
LOCATION: LEG
LOCATION: LEG

## 2021-11-12 NOTE — PROGRESS NOTES
[address] [place] [address] [facility]  Progress Note    Face to face encounter   SUBJECTIVE:  Chief Complaint   Patient presents with    Abdominal Pain     generalized swelling. just diagnosed with cirrosis of the liver. Patient just returned from paracentesis   Complaining of pain in her thighs/lower abdomen. No fevers. Tolerating current antibiotic  Denies diarrhea. Review of systems:  As stated above in the chief complaint, otherwise negative. Medications:  Scheduled Meds:   potassium chloride  40 mEq Oral BID WC    cefTRIAXone (ROCEPHIN) IV  2,000 mg IntraVENous B09A    folic acid  1 mg Oral Daily    nicotine  1 patch TransDERmal Daily    pantoprazole  40 mg Oral QAM AC    thiamine  100 mg Oral Daily    multivitamin  1 tablet Oral Daily    sodium chloride flush  5-40 mL IntraVENous 2 times per day    enoxaparin  40 mg SubCUTAneous Daily    furosemide  40 mg Oral Daily    spironolactone  100 mg Oral Daily     Continuous Infusions:   sodium chloride       PRN Meds:ALPRAZolam, magnesium sulfate, HYDROmorphone, HYDROmorphone, melatonin, sodium chloride flush, sodium chloride, ondansetron **OR** ondansetron, magnesium sulfate, potassium chloride **OR** potassium alternative oral replacement **OR** potassium chloride, polyethylene glycol, calcium carbonate    OBJECTIVE:  /68   Pulse 106   Temp 97.5 °F (36.4 °C)   Resp 18   Ht 5' 3\" (1.6 m)   Wt 130 lb (59 kg)   SpO2 99%   BMI 23.03 kg/m²   Temp  Av.7 °F (36.5 °C)  Min: 97.5 °F (36.4 °C)  Max: 97.8 °F (36.6 °C)  Constitutional: The patient is lying in bed, awake, alert. Reporting pain. Skin: Warm and dry. No rashes were noted. HEENT: Round and reactive pupils. Moist mucous membranes. No ulcerations or thrush. Neck: Supple to movements. Chest: No respiratory distress. Symmetrical expansion. No wheezing, crackles or rhonchi. Room air   Cardiovascular: S1 and S2 are rhythmic and regular.  No murmurs appreciated. Abdomen: Positive bowel sounds to auscultation. tender to palpation. Dressing to LLQ - tender around   Extremities: moderate ankle/foot edema. There are what appears to be scratches on the bilateral lower extremities up to the knees. The skin is very dry. Lines: peripheral    Laboratory and Tests Review:  Lab Results   Component Value Date    WBC 15.8 (H) 11/11/2021    WBC 26.7 (H) 11/10/2021    WBC 13.5 (H) 11/09/2021    HGB 9.2 (L) 11/11/2021    HCT 26.9 (L) 11/11/2021    .5 (H) 11/11/2021     11/11/2021     Lab Results   Component Value Date    NEUTROABS 12.29 (H) 11/11/2021    NEUTROABS 22.68 (H) 11/10/2021    NEUTROABS 10.16 (H) 11/09/2021     No results found for: [facility]  Lab Results   Component Value Date    ALT 36 (H) 11/11/2021     (H) 11/11/2021     (H) 11/09/2021    ALKPHOS 191 (H) 11/11/2021    BILITOT 1.4 (H) 11/11/2021     Lab Results   Component Value Date     11/11/2021    K 3.4 11/11/2021    CL 94 11/11/2021    CO2 24 11/11/2021    BUN <2 11/11/2021    CREATININE 0.4 11/11/2021    CREATININE 0.3 11/10/2021    CREATININE 0.3 11/09/2021    GFRAA >60 11/11/2021    AGRATIO 0.6 10/29/2021    LABGLOM >60 11/11/2021    GLUCOSE 94 11/11/2021    GLUCOSE 166 05/04/2012    PROT 5.2 11/11/2021    LABALBU 1.8 11/11/2021    LABALBU 3.6 05/04/2012    CALCIUM 7.7 11/11/2021    BILITOT 1.4 11/11/2021    ALKPHOS 191 11/11/2021     11/11/2021    ALT 36 11/11/2021     Lab Results   Component Value Date    CRP 3.0 (H) 11/09/2021    CRP 0.8 (H) 11/03/2021    CRP 1.1 (H) 10/28/2021     Lab Results   Component Value Date    SEDRATE 45 (H) 11/09/2021    SEDRATE 46 (H) 10/28/2021     Radiology:  Reviewed     Microbiology:   Ascitic fluid: no organisms seen so far  Respiratory Viral Panel: pending     ASSESSMENT:  · Liver cirrhosis with ascites  · SBP.   Treated with antibiotics before paracentesis  · Leukocytosis associated to SBP, improved   · Elevation of transaminases  · Status post paracentesis 11/12/2021- 1250 mL's pale yellow fluid drained    PLAN:  · Stop Ceftriaxone. Start Cefuroxime  · Labs reviewed - WBC 10.5 today   · Had another paracentesis today -1250 taken off  · Stool for c-diff cancelled - doesn't meet testing criteria. BASIL Hurst - CNP  12:15 PM  11/12/2021     Patient seen and examined. I had a face to face encounter with the patient. Agree with exam.  Assessment and plan as outlined above and directed by me. Addition and corrections were done as deemed appropriate. My exam and plan include: The patient had a paracentesis. She thinks it is too early to tell if the pain is better. She is not wearing compression hoses. Antibiotics and be changed over to Cefuroxime for another 4 days. She can be discharged from ID standpoint.   ID will sign off    Brandon Ortiz MD  11/12/2021  2:32 PM

## 2021-11-12 NOTE — PROGRESS NOTES
Name:  Umer Mcintosh  :  1985  MRN:  [id number]  Room:  7326/8459-G  DOS:  2021    [facility]  Dr. Wanda Ambrocio M.D. Dr. Twila Sood M.D. KISHORE Huber Dr., M.D. SOTERO WeeksO.    -NP Progress Note-    PCP:  Evelyne Liu MD  Admitting Physician:  Samantha Enriquez MD  Chief Complaint:    Chief Complaint   Patient presents with    Abdominal Pain     generalized swelling. just diagnosed with cirrosis of the liver. Subjective  S/p paracentesis this morning. Still with leg pain / swelling. Denies nausea or vomiting.       Physical Examination  Vitals:  /68   Pulse 106   Temp 97.5 °F (36.4 °C)   Resp 18   Ht 5' 3\" (1.6 m)   Wt 130 lb (59 kg)   SpO2 99%   BMI 23.03 kg/m²   General Appearance:  awake, alert, and oriented to person, place, time, and purpose; appears stated age and cooperative; no apparent distress no labored breathing  HEENT:  PERRL; EOMI; sclera clear; buccal mucosa moist  Neck:  supple; trachea midline; no thyromegaly; no JVD; no bruits  Heart:  rhythm regular; rate controlled; no murmurs  Lungs:  symmetrical; clear to auscultation bilaterally; no wheezes; no rhonchi; no rales  Abdomen:  soft, less tender, more distended today; bowel sounds positive; no organomegaly or masses  Extremities:  peripheral pulses present; BLE peripheral edema; no ulcers  Neurologic:  alert and oriented x 3; no focal deficit; cranial nerves grossly intact  Skin:  no petechia; no hemorrhage; no wounds    Medications  Scheduled Meds    albumin human  25 g IntraVENous On Call    potassium chloride  40 mEq Oral BID WC    cefTRIAXone (ROCEPHIN) IV  2,000 mg IntraVENous N68H    folic acid  1 mg Oral Daily    nicotine  1 patch TransDERmal Daily    pantoprazole  40 mg Oral QAM AC    thiamine  100 mg Oral Daily    multivitamin  1 tablet Oral Daily    sodium chloride flush  5-40 mL IntraVENous 2 times per day    enoxaparin  40 mg SubCUTAneous Daily    furosemide  40 mg Oral Daily    spironolactone  100 mg Oral Daily     Infusion Meds    sodium chloride       PRN Meds ALPRAZolam, magnesium sulfate, HYDROmorphone, HYDROmorphone, melatonin, sodium chloride flush, sodium chloride, ondansetron **OR** ondansetron, magnesium sulfate, potassium chloride **OR** potassium alternative oral replacement **OR** potassium chloride, polyethylene glycol, calcium carbonate    Laboratory Data  Recent Results (from the past 24 hour(s))   Comprehensive Metabolic Panel w/ Reflex to MG    Collection Time: 11/11/21 10:52 AM   Result Value Ref Range    Sodium 129 (L) 132 - 146 mmol/L    Potassium reflex Magnesium 3.4 (L) 3.5 - 5.0 mmol/L    Chloride 94 (L) 98 - 107 mmol/L    CO2 24 22 - 29 mmol/L    Anion Gap 11 7 - 16 mmol/L    Glucose 94 74 - 99 mg/dL    BUN <2 (L) 6 - 20 mg/dL    CREATININE 0.4 (L) 0.5 - 1.0 mg/dL    GFR Non-African American >60 >=60 mL/min/1.73    GFR African American >60     Calcium 7.7 (L) 8.6 - 10.2 mg/dL    Total Protein 5.2 (L) 6.4 - 8.3 g/dL    Albumin 1.8 (L) 3.5 - 5.2 g/dL    Total Bilirubin 1.4 (H) 0.0 - 1.2 mg/dL    Alkaline Phosphatase 191 (H) 35 - 104 U/L    ALT 36 (H) 0 - 32 U/L     (H) 0 - 31 U/L   CBC WITH AUTO DIFFERENTIAL    Collection Time: 11/11/21 10:52 AM   Result Value Ref Range    WBC 15.8 (H) 4.5 - 11.5 E9/L    RBC 2.37 (L) 3.50 - 5.50 E12/L    Hemoglobin 9.2 (L) 11.5 - 15.5 g/dL    Hematocrit 26.9 (L) 34.0 - 48.0 %    .5 (H) 80.0 - 99.9 fL    MCH 38.8 (H) 26.0 - 35.0 pg    MCHC 34.2 32.0 - 34.5 %    RDW 14.1 11.5 - 15.0 fL    Platelets 150 716 - 708 E9/L    MPV 9.9 7.0 - 12.0 fL    Neutrophils % 77.5 43.0 - 80.0 %    Immature Granulocytes % 0.4 0.0 - 5.0 %    Lymphocytes % 13.6 (L) 20.0 - 42.0 %    Monocytes % 7.9 2.0 - 12.0 %    Eosinophils % 0.3 0.0 - 6.0 %    Basophils % 0.3 0.0 - 2.0 %    Neutrophils Absolute 12.29 (H) 1.80 - 7.30 E9/L    Immature Granulocytes # 0.06 E9/L    Lymphocytes Absolute 2.15 1.50 - 4.00 E9/L    Monocytes Absolute 1.25 (H) 0.10 - 0.95 E9/L    Eosinophils Absolute 0.04 (L) 0.05 - 0.50 E9/L    Basophils Absolute 0.04 0.00 - 0.20 E9/L   Hepatic function panel    Collection Time: 11/11/21 10:52 AM   Result Value Ref Range    Bilirubin, Direct 1.0 (H) 0.0 - 0.3 mg/dL    Bilirubin, Indirect 0.4 0.0 - 1.0 mg/dL   Lactic acid, plasma    Collection Time: 11/11/21 10:52 AM   Result Value Ref Range    Lactic Acid 1.8 0.5 - 2.2 mmol/L   Magnesium    Collection Time: 11/11/21 10:52 AM   Result Value Ref Range    Magnesium 1.4 (L) 1.6 - 2.6 mg/dL       Imaging  XR CHEST (2 VW)    Result Date: 11/2/2021  EXAMINATION: TWO XRAY VIEWS OF THE CHEST 11/2/2021 12:51 pm COMPARISON: 08/06/2019 HISTORY: ORDERING SYSTEM PROVIDED HISTORY: cp TECHNOLOGIST PROVIDED HISTORY: Reason for exam:->cp Open reduction internal fixation of proximal left humerus fracture 05/12/2021 FINDINGS: There is new density in the lateral and posterior left costophrenic recess. This is most compatible with pleural effusion. No right pleural effusion. No pneumothorax. No new pulmonary consolidation. Normal cardiac silhouette size without vascular congestion. No acute displaced fracture. New hardware partially visualized in proximal left humerus compatible with history of left humerus fracture repair. Findings most compatible with new small left pleural effusion     XR ACUTE ABD SERIES CHEST 1 VW    Result Date: 11/8/2021  EXAMINATION: TWO XRAY VIEWS OF THE ABDOMEN AND SINGLE  XRAY VIEW OF THE CHEST 11/8/2021 3:54 pm COMPARISON: Chest series from November 2, 2021 HISTORY: ORDERING SYSTEM PROVIDED HISTORY: abd pain TECHNOLOGIST PROVIDED HISTORY: Reason for exam:->abd pain FINDINGS: Adequate and symmetric aeration of the lungs. There are no formed consolidations, pleural effusions, or pneumothoraces. Trachea and central mainstem bronchi appear clear.  The cardiomediastinal silhouette and pulmonary vascularity appear within normal limits. Osseous and thoracic soft tissue structures demonstrate no acute findings. No organomegaly nor intra-abdominal mass effect. No pathologic calcifications seen. No free intraperitoneal air. Mildly prominent air-filled bowel loops identified throughout the abdomen. No pathologic fluid levels. No pneumatosis or portal venous gas. Osseous structures imaged demonstrate no acute findings. 1.  No acute cardiopulmonary pathology. 2.  Mildly prominent air-filled bowel loops throughout the abdomen. No pathologic fluid levels identified. No significant stool burden. NM HEPATOBILIARY    Result Date: 11/4/2021  EXAMINATION: NUCLEAR MEDICINE HEPATOBILIARY SCINTIGRAPHY (HIDA SCAN). 11/4/2021 6:50 am TECHNIQUE: Approximately 5.8 [id number] Mebrofenin (Choletec) was administered IV. Then, dynamic images of the abdomen were obtained in the anterior projection for 60 mins. COMPARISON: Ultrasound 11/02/2021 HISTORY: ORDERING SYSTEM PROVIDED HISTORY: R/o acute juana TECHNOLOGIST PROVIDED HISTORY: Reason for exam:->R/o acute juana What reading provider will be dictating this exam?->CRC FINDINGS: Prompt, homogenous uptake by the liver is noted with normal appearance of radiotracer excretion into the biliary system. Clearance of bloodpool activity appears appropriate. Gallbladder and small bowel is visualized in appropriate sequence. Patency of the common bile duct and cystic duct. No acute cholecystitis.      CT ABDOMEN PELVIS W IV CONTRAST    Result Date: 10/29/2021                                      [address]                                             [facility], [address] [place] [address] [address]kw[address]                                              [phone]                                               CT Scan Report                                                  Signed    Patient: Tray Leyva MR#: Maco Zhou  [id number]          : 1985                                                                [de-identified]            Age/Sex: 43 / F                                                                Admit Date: 10/29/21            Loc: ER                                                                                     Attending Dr:     Ordering Physician: Teresa Mcnamara MD   Date of Service: 10/29/21  Procedure(s): CT abdomen pelvis w con  Accession Number(s): [id number]    cc: Teresa Mcnamara MD              PHYSICIAN INDICATIONS:  Jaundice, weight loss, abdominal pain       Technique: Axial images through the abdomen and pelvis with intravenous contrast. CT Dose Index:   4.2 mGy. DLP: 374 mGy-cm. Old[place] Jointer. k=0.015 mSv/(mGy-cm)       Findings: The heart size is normal. The lung bases are clear. Liver is enlarged measuring 18 cm in length with decreased attenuation compatible with fatty   infiltration. Considerable edema and fluid surrounding the pancreas and extending along the colic gutters and the   pelvis suggesting acute pancreatitis. No pseudocyst or abscess. Common bile duct measures 9 mm and appears enlarged. Recommend MRCP to evaluate for underlying   stone. Gallbladder is present with pericholecystic fluid. The kidneys are unremarkable. No renal or utereral calculi or hydronephrosis. The adrenal glands   are unremarkable. No adenopathy or mass lesion in the mesentery or retroperitoneum. The aorta is normal in caliber. Bowel is not distended. No inflammation around the appendix. Uterus is present. No adnexal mass. Bladder is unremarkable. No masses are seen in the pelvis. Impression:       CT Abdomen and Pelvis with contrast:   1. Considerable edema and fluid surrounding the pancreas and extending along the colic gutters and   the pelvis suggesting acute pancreatitis. No pseudocyst or abscess.        2. PANCREAS:  Visualized portions of the pancreas are unremarkable. OTHER: Question perihepatic ascites     1. Findings suggestive of diffuse hepatic steatosis. However also cirrhosis is possible with perihepatic ascites and gallbladder wall thickening possibly present 2. No cholelithiasis or biliary dilation RECOMMENDATIONS: Consider CT or MRI for further clinical concern or persistence of symptoms. CTA TRIPHASIC LIVER    Result Date: 11/10/2021  EXAMINATION: CT ABDOMEN WITH AND WITHOUT CONTRAST 11/9/2021 9:27 pm TECHNIQUE: CT of the abdomen was performed without and with the administration of intravenous contrast. Multiplanar reformatted images are provided for review. Dose modulation, iterative reconstruction, and/or weight based adjustment of the mA/kV was utilized to reduce the radiation dose to as low as reasonably achievable. COMPARISON: Abdominal MRI November 4, 2021 HISTORY: ORDERING SYSTEM PROVIDED HISTORY: Ascites TECHNOLOGIST PROVIDED HISTORY: IV contrast only Reason for exam:->Ascites FINDINGS: Lung Bases: Partially imaged moderate to large bilateral pleural effusions with compressive atelectasis Liver: No hepatic lesions identified. Bile ducts:  Gallbladder wall is thickened, likely due to generalized edema. No evidence of intrahepatic or extrahepatic biliary dilatation. Pancreas: No pancreatic lesions. The pancreatic duct is not dilated. Adrenal glands: Normal in appearance. Kidneys: No evidence of hydronephrosis. No abnormal renal lesions. Spleen: No enhancing lesions. Bowel: No evidence of bowel obstruction. Appendix is normal appearance. Peritoneum/Retroperitoneum: No abnormal pelvic lymphadenopathy. Small to moderate amount of abdominal ascites. Pelvis:   Bladder is unremarkable. Uterus is normal patient's age. Bones/Soft tissues: No aggressive osseous lesions     No suspicious patent lesions identified. No evidence of acute pancreatitis. Partially imaged bilateral pleural effusions.  Small to moderate amount of abdominal ascites. US DUP LOWER EXTREMITIES BILATERAL VENOUS    Result Date: 11/9/2021  EXAMINATION: DUPLEX VENOUS ULTRASOUND OF THE BILATERAL LOWER [id number]/9/2021 6:50 pm TECHNIQUE: Duplex ultrasound using B-mode/gray scaled imaging, Doppler spectral analysis and color flow Doppler was obtained of the deep venous structures of the lower bilateral extremities. COMPARISON: None used HISTORY: ORDERING SYSTEM PROVIDED HISTORY: Leg pain, left calf tenderness. TECHNOLOGIST PROVIDED HISTORY: Reason for exam:->Leg pain, left calf tenderness. What reading provider will be dictating this exam?->CRC FINDINGS: The visualized veins of the bilateral lower extremities are patent and free of echogenic thrombus. The veins demonstrate good compressibility with normal color flow study and spectral analysis. Superficial soft tissue edema noted bilaterally. No evidence of DVT in either lower extremity. Assessment and Plan  Patient is a 38 y. o. female on consult for cirrhosis.     1.  Cirrhosis / elevated LFTs  -Initial MELD = 8, MELD-Na = 12  -Consider secondary to alcohol abuse - denies  -Viral serology negative 10/28/2021  -Elevated ferritin - pending HFE genotype from prior admission  -Normal AFP 4 (10/28/2021)  -Imaging with no remark of hepatic mass  -Recent EGD in our office - will obtain records  -No evidence of encephalopathy  -New ascites - see below  -Increased LFTs since prior admission - improving now  -Check HSV / EBV     2.  History of pancreatitis  -No evidence of acute pancreatitis this admission  -Lipase in normal range  -Suspicion secondary to alcohol - patient denies and no withdrawal symptoms  -Ultrasound 11/2/2021 indicating that distal CBD stone cannot be excluded  -MRI/MRCP 11/4/2021 with no evidence of ductal abnormalities and no evidence of pancreatitis  -Persistent abdominal pain - see below  -Non-elevated calcium  -Normal triglycerides 118 (11/4/2021)     3.  Ascites  -New onset ascites  -MRI 11/4/2021 with no evidence of PVT  -Paracentesis 11/9/2021  -Fluid evaluation not consistent with SBP - gram stain / culture negative - was given antibiotics prior to paracentesis  -Rocephin per ID  -Increased abdominal distention - repeat paracentesis     4.  Cholecystitis  -Pain questionable cholecystitis versus pancreatitis vs SBP  -Poor candidate for cholecystectomy secondary to risk for hepatic decompensation  -Negative HIDA scan  -Triphasic CT showing unchanged exam - GB wall thickening, no significant pericholecystic fluid  -Antibiotics per ID     5.  GERD  -PPI daily  -Will obtain recent EGD reports from our office     6.  Abdominal pain  -No evidence of pancreatitis this admission  -Prior workup negative for cholecystitis  -Possible SBP - ID following  -Will obtain recent EGD reports from our office  -Pain management per admitting     7.  Anemia  -Chronic  -Macrocytic  -No overt GI bleeding  -No evidence of iron deficiency  -Will obtain recent EGD report from our office  -Consider colonoscopy - likely as outpatient  -Monitor CBC daily     8.  Lactic acidosis  -Associated abdominal pain  -Triphasic CT liver was completed - had ordered CTA abdomen to rule out ischemia (TO) - appears order was entered incorrectly  -CTA chest abdomen pelvis showing patient vasculature - no evidence of clots     9. Alcohol abuse  -Reports alcohol cessation about 3-4 weeks ago  -Advised on need for permanent alcohol cessation  -Withdrawal management per admitting     10. Comorbidities  -Hypokalemia, leg swelling, dysuria, pneumonia, pleural effusions  -Per admitting / consultants      CT chest abdomen pelvis showing bilateral pleural effusions, pneumonia, ascites. ID following. Repeat paracentesis completed today - report pending. Low protein and albumin. Add dietary supplements. Consider IV albumin. Monitor labs. Will follow.       Mima Lainez, APRN - CNP  10:38 AM  11/12/2021

## 2021-11-12 NOTE — PLAN OF CARE
Problem: Falls - Risk of:  Goal: Will remain free from falls  Description: Will remain free from falls  Outcome: Met This Shift     Problem: Nutritional:  Goal: Nutritional status will improve  Description: Nutritional status will improve  Outcome: Met This Shift     Problem: Physical Regulation:  Goal: Will remain free from infection  Description: Will remain free from infection  Outcome: Met This Shift     Problem: Respiratory:  Goal: Ability to maintain normal respiratory secretions will improve  Description: Ability to maintain normal respiratory secretions will improve  Outcome: Met This Shift     Problem: Skin Integrity:  Goal: Demonstration of wound healing without infection will improve  Description: Demonstration of wound healing without infection will improve  Outcome: Met This Shift

## 2021-11-12 NOTE — PROGRESS NOTES
IRFLOWSHEET    Date: 11/12/2021    Time: 9:10 AM     Exam: Ultrasound guided paracentesis    Radiologist Performing Procedure: Dr. Angel Rios    Nurse Reporting/Phone: [phone]     Nurse Receiving: Stanton Boeck, RN    Permit signed:      Yes    ID Band Checklist: Yes    Allergies: Patient has no known allergies. TIME OUT: 0922    PROCEDURE START TIME: 1783    Puncture Site: left lower quadrant    Puncture Time: 0924    Catheters: 5 Tajik pigtail     LABS:   Lab Results   Component Value Date    INR 1.3 11/10/2021    PROTIME 14.6 (H) 11/10/2021           Lab Results   Component Value Date    CREATININE 0.4 (L) 11/11/2021    BUN <2 (L) 11/11/2021          Lab Results   Component Value Date    HGB 9.2 (L) 11/11/2021    HCT 26.9 (L) 11/11/2021     11/11/2021         PROCEDURE END TIME: 1020    Total Contrast: N/A    Fluoroscopy Time: N/A    Complications:  None    Comments: Pt transported to IR room for paracentesis. Pt is alert and oriented, denies any pain prior to procedure. Ultrasound images taken prior to procedure. Procedure is explained to patient, including possible risks. Pt verbalizes understanding and consent from signed. Procedure done under sterile technique and guidance of ultrasound imaging. 1% Lidocaine is used during procedure for comfort measures. A total of 1250 ml's of pale yellow colored ascitic fluid drained during procedure. Catheter removed and op-site dressing applied to site with no bleeding noted. Albumin infusion given during procedure. Pt tolerated procedure well and denies any pain after. Pt given discharge instructions and pt verbalizes understanding of post procedure care/follow up. Pt transported out of department with no difficulties.

## 2021-11-13 ENCOUNTER — APPOINTMENT (OUTPATIENT)
Dept: GENERAL RADIOLOGY | Age: 36
DRG: 720 | End: 2021-11-13
Payer: MEDICAID

## 2021-11-13 LAB
ALBUMIN SERPL-MCNC: 2 G/DL (ref 3.5–5.2)
ALP BLD-CCNC: 193 U/L (ref 35–104)
ALT SERPL-CCNC: 23 U/L (ref 0–32)
ANION GAP SERPL CALCULATED.3IONS-SCNC: 10 MMOL/L (ref 7–16)
ANISOCYTOSIS: ABNORMAL
AST SERPL-CCNC: 90 U/L (ref 0–31)
BASOPHILS ABSOLUTE: 0.04 E9/L (ref 0–0.2)
BASOPHILS RELATIVE PERCENT: 0.4 % (ref 0–2)
BILIRUB SERPL-MCNC: 1 MG/DL (ref 0–1.2)
BILIRUBIN DIRECT: 0.7 MG/DL (ref 0–0.3)
BILIRUBIN, INDIRECT: 0.3 MG/DL (ref 0–1)
BUN BLDV-MCNC: <2 MG/DL (ref 6–20)
C282Y HEMOCHROMATOSIS MUT: NEGATIVE
CALCIUM SERPL-MCNC: 7.5 MG/DL (ref 8.6–10.2)
CHLORIDE BLD-SCNC: 100 MMOL/L (ref 98–107)
CO2: 26 MMOL/L (ref 22–29)
CREAT SERPL-MCNC: 0.4 MG/DL (ref 0.5–1)
EOSINOPHILS ABSOLUTE: 0.11 E9/L (ref 0.05–0.5)
EOSINOPHILS RELATIVE PERCENT: 1 % (ref 0–6)
GFR AFRICAN AMERICAN: >60
GFR NON-AFRICAN AMERICAN: >60 ML/MIN/1.73
GLUCOSE BLD-MCNC: 108 MG/DL (ref 74–99)
H63D HEMOCHROMATOSIS MUT: NEGATIVE
HCT VFR BLD CALC: 26.1 % (ref 34–48)
HEMOCHROMATOSIS GENE ANALYSIS: NORMAL
HEMOGLOBIN: 8.7 G/DL (ref 11.5–15.5)
HFE PCR SPECIMEN: NORMAL
IMMATURE GRANULOCYTES #: 0.04 E9/L
IMMATURE GRANULOCYTES %: 0.4 % (ref 0–5)
INR BLD: 1.4
LYMPHOCYTES ABSOLUTE: 3.28 E9/L (ref 1.5–4)
LYMPHOCYTES RELATIVE PERCENT: 28.9 % (ref 20–42)
MCH RBC QN AUTO: 38.2 PG (ref 26–35)
MCHC RBC AUTO-ENTMCNC: 33.3 % (ref 32–34.5)
MCV RBC AUTO: 114.5 FL (ref 80–99.9)
MONOCYTES ABSOLUTE: 0.68 E9/L (ref 0.1–0.95)
MONOCYTES RELATIVE PERCENT: 6 % (ref 2–12)
NEUTROPHILS ABSOLUTE: 7.18 E9/L (ref 1.8–7.3)
NEUTROPHILS RELATIVE PERCENT: 63.3 % (ref 43–80)
PAPPENHEIMER BODIES: ABNORMAL
PDW BLD-RTO: 14.6 FL (ref 11.5–15)
PLATELET # BLD: 248 E9/L (ref 130–450)
PMV BLD AUTO: 10.5 FL (ref 7–12)
POIKILOCYTES: ABNORMAL
POLYCHROMASIA: ABNORMAL
POTASSIUM REFLEX MAGNESIUM: 3.6 MMOL/L (ref 3.5–5)
PROTHROMBIN TIME: 15.8 SEC (ref 9.3–12.4)
RBC # BLD: 2.28 E12/L (ref 3.5–5.5)
S65C HEMOCHROMATOSIS MUT: NEGATIVE
SODIUM BLD-SCNC: 136 MMOL/L (ref 132–146)
STOMATOCYTES: ABNORMAL
TARGET CELLS: ABNORMAL
TOTAL PROTEIN: 4.5 G/DL (ref 6.4–8.3)
WBC # BLD: 11.3 E9/L (ref 4.5–11.5)

## 2021-11-13 PROCEDURE — 36415 COLL VENOUS BLD VENIPUNCTURE: CPT

## 2021-11-13 PROCEDURE — 6370000000 HC RX 637 (ALT 250 FOR IP): Performed by: INTERNAL MEDICINE

## 2021-11-13 PROCEDURE — 6360000002 HC RX W HCPCS: Performed by: HOSPITALIST

## 2021-11-13 PROCEDURE — 6370000000 HC RX 637 (ALT 250 FOR IP): Performed by: SPECIALIST

## 2021-11-13 PROCEDURE — 73610 X-RAY EXAM OF ANKLE: CPT

## 2021-11-13 PROCEDURE — 85610 PROTHROMBIN TIME: CPT

## 2021-11-13 PROCEDURE — 80076 HEPATIC FUNCTION PANEL: CPT

## 2021-11-13 PROCEDURE — 6370000000 HC RX 637 (ALT 250 FOR IP): Performed by: HOSPITALIST

## 2021-11-13 PROCEDURE — 1200000000 HC SEMI PRIVATE

## 2021-11-13 PROCEDURE — 2580000003 HC RX 258: Performed by: HOSPITALIST

## 2021-11-13 PROCEDURE — 80053 COMPREHEN METABOLIC PANEL: CPT

## 2021-11-13 PROCEDURE — 99233 SBSQ HOSP IP/OBS HIGH 50: CPT | Performed by: INTERNAL MEDICINE

## 2021-11-13 PROCEDURE — 87088 URINE BACTERIA CULTURE: CPT

## 2021-11-13 PROCEDURE — 85025 COMPLETE CBC W/AUTO DIFF WBC: CPT

## 2021-11-13 RX ORDER — FUROSEMIDE 40 MG/1
40 TABLET ORAL ONCE
Status: DISCONTINUED | OUTPATIENT
Start: 2021-11-13 | End: 2021-11-16 | Stop reason: HOSPADM

## 2021-11-13 RX ADMIN — FOLIC ACID 1 MG: 1 TABLET ORAL at 08:05

## 2021-11-13 RX ADMIN — CEFUROXIME AXETIL 500 MG: 500 TABLET ORAL at 08:04

## 2021-11-13 RX ADMIN — ALPRAZOLAM 1 MG: 1 TABLET ORAL at 08:04

## 2021-11-13 RX ADMIN — Medication 100 MG: at 08:04

## 2021-11-13 RX ADMIN — HYDROMORPHONE HYDROCHLORIDE 0.5 MG: 1 INJECTION, SOLUTION INTRAMUSCULAR; INTRAVENOUS; SUBCUTANEOUS at 10:52

## 2021-11-13 RX ADMIN — POTASSIUM CHLORIDE 40 MEQ: 20 TABLET, EXTENDED RELEASE ORAL at 08:00

## 2021-11-13 RX ADMIN — HYDROMORPHONE HYDROCHLORIDE 1 MG: 1 INJECTION, SOLUTION INTRAMUSCULAR; INTRAVENOUS; SUBCUTANEOUS at 16:09

## 2021-11-13 RX ADMIN — SODIUM CHLORIDE, PRESERVATIVE FREE 10 ML: 5 INJECTION INTRAVENOUS at 05:11

## 2021-11-13 RX ADMIN — HYDROMORPHONE HYDROCHLORIDE 0.5 MG: 1 INJECTION, SOLUTION INTRAMUSCULAR; INTRAVENOUS; SUBCUTANEOUS at 20:59

## 2021-11-13 RX ADMIN — ALPRAZOLAM 1 MG: 1 TABLET ORAL at 18:36

## 2021-11-13 RX ADMIN — HYDROMORPHONE HYDROCHLORIDE 1 MG: 1 INJECTION, SOLUTION INTRAMUSCULAR; INTRAVENOUS; SUBCUTANEOUS at 05:10

## 2021-11-13 RX ADMIN — FUROSEMIDE 40 MG: 40 TABLET ORAL at 08:00

## 2021-11-13 RX ADMIN — CEFUROXIME AXETIL 500 MG: 500 TABLET ORAL at 20:43

## 2021-11-13 RX ADMIN — PANTOPRAZOLE SODIUM 40 MG: 40 TABLET, DELAYED RELEASE ORAL at 05:59

## 2021-11-13 RX ADMIN — ALPRAZOLAM 1 MG: 1 TABLET ORAL at 23:52

## 2021-11-13 RX ADMIN — Medication 10 ML: at 20:43

## 2021-11-13 RX ADMIN — MULTIVITAMIN TABLET 1 TABLET: TABLET at 08:00

## 2021-11-13 RX ADMIN — ENOXAPARIN SODIUM 40 MG: 100 INJECTION SUBCUTANEOUS at 08:00

## 2021-11-13 RX ADMIN — Medication 10 ML: at 08:52

## 2021-11-13 RX ADMIN — SPIRONOLACTONE 100 MG: 25 TABLET ORAL at 08:00

## 2021-11-13 RX ADMIN — POTASSIUM CHLORIDE 40 MEQ: 20 TABLET, EXTENDED RELEASE ORAL at 16:14

## 2021-11-13 ASSESSMENT — PAIN DESCRIPTION - PROGRESSION
CLINICAL_PROGRESSION: NOT CHANGED

## 2021-11-13 ASSESSMENT — PAIN DESCRIPTION - ONSET
ONSET: ON-GOING

## 2021-11-13 ASSESSMENT — PAIN DESCRIPTION - ORIENTATION
ORIENTATION: RIGHT;LEFT

## 2021-11-13 ASSESSMENT — PAIN SCALES - GENERAL
PAINLEVEL_OUTOF10: 9
PAINLEVEL_OUTOF10: 8
PAINLEVEL_OUTOF10: 9
PAINLEVEL_OUTOF10: 8
PAINLEVEL_OUTOF10: 9
PAINLEVEL_OUTOF10: 8
PAINLEVEL_OUTOF10: 7

## 2021-11-13 ASSESSMENT — PAIN DESCRIPTION - PAIN TYPE
TYPE: ACUTE PAIN

## 2021-11-13 ASSESSMENT — PAIN - FUNCTIONAL ASSESSMENT
PAIN_FUNCTIONAL_ASSESSMENT: PREVENTS OR INTERFERES SOME ACTIVE ACTIVITIES AND ADLS
PAIN_FUNCTIONAL_ASSESSMENT: ACTIVITIES ARE NOT PREVENTED
PAIN_FUNCTIONAL_ASSESSMENT: ACTIVITIES ARE NOT PREVENTED

## 2021-11-13 ASSESSMENT — PAIN DESCRIPTION - LOCATION
LOCATION: LEG

## 2021-11-13 ASSESSMENT — PAIN DESCRIPTION - DESCRIPTORS
DESCRIPTORS: ACHING;DISCOMFORT;PRESSURE
DESCRIPTORS: ACHING;DISCOMFORT
DESCRIPTORS: ACHING;PRESSURE

## 2021-11-13 ASSESSMENT — PAIN DESCRIPTION - FREQUENCY
FREQUENCY: CONTINUOUS

## 2021-11-13 ASSESSMENT — PAIN DESCRIPTION - DIRECTION: RADIATING_TOWARDS: BILATERAL BREAST

## 2021-11-13 NOTE — PROGRESS NOTES
Patient call light on and  upon entering patient room patient was found on bathroom floor. Patient states \" I was trying to pull up my pants and my leg got caught and I fell on to my elbows\" Patient assisted up and back to bed. Vital signs stable. Assessed and no injury noted. Patient alert and oriented x 4. Patient denies hitting her head. Educated to put the light on and ask for assistant to the bathroom. Bed alarm applied and call light within reach. Hourly rounding continues.

## 2021-11-13 NOTE — PROGRESS NOTES
Name:  Maicol Lam  :  1985  MRN:  [id number]  Room:  [address] [place] [address]  DOS:  2021    [facility]  [facility]  Dr. Netta De La Torre M.D. Dr. Mary Ceja M.D. SOTERO StovallO. Dr. Charlene Mera M.D. Dr. Ori Ortiz D.O.    -NP Progress Note-    PCP:  Guerry Skiff, MD  Admitting Physician:  Chema Altman MD  Chief Complaint:    Chief Complaint   Patient presents with    Abdominal Pain     generalized swelling. just diagnosed with cirrosis of the liver. Subjective  Patient resting in bed. Fall overnight. Denies any significant injury from her fall. States that her ankle gave out and she fell down in the bathroom. She denies any nausea or vomiting. Bowel movements remain loose and brown. Denies any blood or melena. Improving edema.     Physical Examination  Vitals:  /76   Pulse 111   Temp 97.7 °F (36.5 °C) (Oral)   Resp 16   Ht 5' 3\" (1.6 m)   Wt 130 lb (59 kg)   SpO2 99%   BMI 23.03 kg/m²   General Appearance:  awake, alert, and oriented to person, place, time, and purpose; appears stated age and cooperative; no apparent distress no labored breathing  HEENT:  PERRL; EOMI; sclera clear; buccal mucosa moist  Neck:  supple; trachea midline; no thyromegaly; no JVD; no bruits  Heart:  rhythm regular; rate controlled; no murmurs  Lungs:  symmetrical; clear to auscultation bilaterally; no wheezes; no rhonchi; no rales  Abdomen:  soft, less tender, less distended today; bowel sounds positive; no organomegaly or masses  Extremities:  peripheral pulses present; BLE peripheral edema; no ulcers  Neurologic:  alert and oriented x 3; no focal deficit; cranial nerves grossly intact  Skin:  no petechia; no hemorrhage; no wounds    Medications  Scheduled Meds    cefUROXime  500 mg Oral 2 times per day    potassium chloride  40 mEq Oral BID WC    folic acid  1 mg Oral Daily    nicotine  1 patch TransDERmal Daily    pantoprazole  40 mg Oral QAM AC    thiamine  100 mg Oral Daily    multivitamin  1 tablet Oral Daily    sodium chloride flush  5-40 mL IntraVENous 2 times per day    enoxaparin  40 mg SubCUTAneous Daily    furosemide  40 mg Oral Daily    spironolactone  100 mg Oral Daily     Infusion Meds    sodium chloride       PRN Meds ALPRAZolam, magnesium sulfate, HYDROmorphone, HYDROmorphone, melatonin, sodium chloride flush, sodium chloride, ondansetron **OR** ondansetron, magnesium sulfate, potassium chloride **OR** potassium alternative oral replacement **OR** potassium chloride, polyethylene glycol, calcium carbonate    Laboratory Data  Recent Results (from the past 24 hour(s))   Comprehensive Metabolic Panel w/ Reflex to MG    Collection Time: 11/12/21 12:30 PM   Result Value Ref Range    Sodium 136 132 - 146 mmol/L    Potassium reflex Magnesium 3.0 (L) 3.5 - 5.0 mmol/L    Chloride 98 98 - 107 mmol/L    CO2 24 22 - 29 mmol/L    Anion Gap 14 7 - 16 mmol/L    Glucose 89 74 - 99 mg/dL    BUN <2 (L) 6 - 20 mg/dL    CREATININE 0.4 (L) 0.5 - 1.0 mg/dL    GFR Non-African American >60 >=60 mL/min/1.73    GFR African American >60     Calcium 7.9 (L) 8.6 - 10.2 mg/dL    Total Protein 5.2 (L) 6.4 - 8.3 g/dL    Albumin 2.4 (L) 3.5 - 5.2 g/dL    Total Bilirubin 1.2 0.0 - 1.2 mg/dL    Alkaline Phosphatase 166 (H) 35 - 104 U/L    ALT 22 0 - 32 U/L    AST 56 (H) 0 - 31 U/L   CBC auto differential    Collection Time: 11/12/21 12:30 PM   Result Value Ref Range    WBC 10.5 4.5 - 11.5 E9/L    RBC 2.13 (L) 3.50 - 5.50 E12/L    Hemoglobin 8.5 (L) 11.5 - 15.5 g/dL    Hematocrit 24.1 (L) 34.0 - 48.0 %    .1 (H) 80.0 - 99.9 fL    MCH 39.9 (H) 26.0 - 35.0 pg    MCHC 35.3 (H) 32.0 - 34.5 %    RDW 13.9 11.5 - 15.0 fL    Platelets 662 764 - 092 E9/L    MPV 9.8 7.0 - 12.0 fL    Neutrophils % 67.2 43.0 - 80.0 %    Immature Granulocytes % 0.3 0.0 - 5.0 %    Lymphocytes % 22.2 20.0 - 42.0 %    Monocytes % 9.0 2.0 - 12.0 %    Eosinophils % 1.0 0.0 - 6.0 %    Basophils % 0.3 0.0 - 2.0 % VW)    Result Date: 11/2/2021  EXAMINATION: TWO XRAY VIEWS OF THE CHEST 11/2/2021 12:51 pm COMPARISON: 08/06/2019 HISTORY: ORDERING SYSTEM PROVIDED HISTORY: cp TECHNOLOGIST PROVIDED HISTORY: Reason for exam:->cp Open reduction internal fixation of proximal left humerus fracture 05/12/2021 FINDINGS: There is new density in the lateral and posterior left costophrenic recess. This is most compatible with pleural effusion. No right pleural effusion. No pneumothorax. No new pulmonary consolidation. Normal cardiac silhouette size without vascular congestion. No acute displaced fracture. New hardware partially visualized in proximal left humerus compatible with history of left humerus fracture repair. Findings most compatible with new small left pleural effusion     XR ACUTE ABD SERIES CHEST 1 VW    Result Date: 11/8/2021  EXAMINATION: TWO XRAY VIEWS OF THE ABDOMEN AND SINGLE  XRAY VIEW OF THE CHEST 11/8/2021 3:54 pm COMPARISON: Chest series from November 2, 2021 HISTORY: ORDERING SYSTEM PROVIDED HISTORY: abd pain TECHNOLOGIST PROVIDED HISTORY: Reason for exam:->abd pain FINDINGS: Adequate and symmetric aeration of the lungs. There are no formed consolidations, pleural effusions, or pneumothoraces. Trachea and central mainstem bronchi appear clear. The cardiomediastinal silhouette and pulmonary vascularity appear within normal limits. Osseous and thoracic soft tissue structures demonstrate no acute findings. No organomegaly nor intra-abdominal mass effect. No pathologic calcifications seen. No free intraperitoneal air. Mildly prominent air-filled bowel loops identified throughout the abdomen. No pathologic fluid levels. No pneumatosis or portal venous gas. Osseous structures imaged demonstrate no acute findings. 1.  No acute cardiopulmonary pathology. 2.  Mildly prominent air-filled bowel loops throughout the abdomen. No pathologic fluid levels identified. No significant stool burden.      NM HEPATOBILIARY    Result Date: 2021  EXAMINATION: NUCLEAR MEDICINE HEPATOBILIARY SCINTIGRAPHY (HIDA SCAN). 2021 6:50 am TECHNIQUE: Approximately 5.8 [id number] Mebrofenin (Choletec) was administered IV. Then, dynamic images of the abdomen were obtained in the anterior projection for 60 mins. COMPARISON: Ultrasound 2021 HISTORY: ORDERING SYSTEM PROVIDED HISTORY: R/o acute juana TECHNOLOGIST PROVIDED HISTORY: Reason for exam:->R/o acute juana What reading provider will be dictating this exam?->CRC FINDINGS: Prompt, homogenous uptake by the liver is noted with normal appearance of radiotracer excretion into the biliary system. Clearance of bloodpool activity appears appropriate. Gallbladder and small bowel is visualized in appropriate sequence. Patency of the common bile duct and cystic duct. No acute cholecystitis.      CT ABDOMEN PELVIS W IV CONTRAST    Result Date: 10/29/2021                                      [address] [facility], [address] [place] [address]                                              [phone]                                               CT Scan Report                                                  Signed    Patient: Shanta Velazquez                                                                MR#: [id number]  [id number]          : 1985                                                                [de-identified]            Age/Sex: 43 / F                                                                Admit Date: 10/29/21            Loc: ER                                                                                     Attending Dr:     Ordering Physician: Laureen Gamboa MD   Date of Service: 10/29/21  Procedure(s): CT abdomen pelvis w con  Accession Number(s): [id number]    cc: Laureen Gamboa MD              PHYSICIAN INDICATIONS:  Jaundice, weight loss, abdominal pain       Technique: Axial images through the abdomen and pelvis with intravenous contrast. CT Dose Index:   4.2 mGy. DLP: 374 mGy-cm. Darral Foots. k=0.015 mSv/(mGy-cm)       Findings: The heart size is normal. The lung bases are clear. Liver is enlarged measuring 18 cm in length with decreased attenuation compatible with fatty   infiltration. Considerable edema and fluid surrounding the pancreas and extending along the colic gutters and the   pelvis suggesting acute pancreatitis. No pseudocyst or abscess. Common bile duct measures 9 mm and appears enlarged. Recommend MRCP to evaluate for underlying   stone. Gallbladder is present with pericholecystic fluid. The kidneys are unremarkable. No renal or utereral calculi or hydronephrosis. The adrenal glands   are unremarkable. No adenopathy or mass lesion in the mesentery or retroperitoneum. The aorta is normal in caliber. Bowel is not distended. No inflammation around the appendix. Uterus is present. No adnexal mass. Bladder is unremarkable. No masses are seen in the pelvis. Impression:       CT Abdomen and Pelvis with contrast:   1. Considerable edema and fluid surrounding the pancreas and extending along the colic gutters and   the pelvis suggesting acute pancreatitis. No pseudocyst or abscess. 2. Common bile duct measures 9 mm and appears enlarged. Recommend MRCP to evaluate for underlying   stone. 3. Liver is enlarged measuring 18 cm in length with decreased attenuation compatible with fatty   infiltration. 4. Gallbladder is present with pericholecystic fluid. Dictated ByFelipe Casper MD   DD/DT: 10/29/21 2115               Signed By: Neal Grant MD 10/29/21 2115            : CHERY JIMENEZ GALLBLADDER RUQ    Result Date: 11/2/2021  EXAMINATION: RIGHT UPPER QUADRANT ULTRASOUND 11/2/2021 3:46 pm COMPARISON: None.  HISTORY: ORDERING SYSTEM PROVIDED HISTORY: ruq abdominal pain TECHNOLOGIST PROVIDED HISTORY: Reason for exam:->ruq abdominal pain What reading provider will be dictating this exam?->CRC FINDINGS: LIVER:  The liver demonstrates increased echogenicity without evidence of intrahepatic biliary ductal dilatation. BILIARY SYSTEM:  Gallbladder has wall thickening, significant sludge and some pericholecystic fluid. Kia Karst Positive sonographic Lemus's sign. Common bile duct is upper limits measuring 6 mm. RIGHT KIDNEY: The right kidney is grossly unremarkable without evidence of hydronephrosis. It measures 10.2 x 5.1 cm. PANCREAS:  Visualized portions of the pancreas are unremarkable. OTHER: There is small to moderate amount amount of right upper quadrant ascites. Findings most compatible with acalculus cholecystitis. A very distal CBD stone is not excluded. There is pericholecystic fluid and some upper abdominal ascites. There are no gallstones. . Fatty infiltration liver     MRI ABDOMEN W WO CONTRAST MRCP    Result Date: 11/4/2021  EXAMINATION: MRI OF THE ABDOMEN WITH AND WITHOUT CONTRAST AND MRCP 11/3/2021 9:33 pm TECHNIQUE: Multiplanar multisequence MRI of the abdomen was performed with and without the administration of intravenous contrast.  After initial T2 axial and coronal images, thick slab, thin slab and 3D coronal MRCP sequences were obtained without the administration of intravenous contrast.  MIP images are provided for review. COMPARISON: 11/04/2021, 11/02/2021 HISTORY: ORDERING SYSTEM PROVIDED HISTORY: Pancreatitis TECHNOLOGIST PROVIDED HISTORY: New onset ascites - rule out PVT. Reason for exam:->Pancreatitis FINDINGS: Lower Chest: Moderate bilateral pleural effusions. Organs: Fatty liver with diffusely heterogeneous enhancement of the parenchyma and widening of the fissures. Mild gallbladder wall thickening. Intrinsic T1 signal and enhancement of the pancreas are preserved. Adrenals are unremarkable.   Spleen is normal in size. Kidneys are unremarkable. Vasculature is unremarkable. Portal vein is patent. GI/Bowel: Small hiatal hernia. Bowel is non-dilated without wall thickening. Peritoneum/Retroperitoneum:Moderate ascites. No free fluid, organized fluid collection or lymphadenopathy. Bones: Multilevel degenerative disc disease. Severe body wall edema. 1. No MR evidence of acute pancreatitis. 2. Fatty liver with changes of diffuse liver disease. The portal vein is patent. 3. Mild gallbladder wall thickening, likely related to underlying liver dysfunction. 4. Moderate ascites and moderate bilateral pleural effusions. US ABDOMEN LIMITED Specify organ? LIVER, GALLBLADDER, PANCREAS    Result Date: 10/29/2021  EXAMINATION: RIGHT UPPER QUADRANT ULTRASOUND 10/28/2021 6:14 pm COMPARISON: None. HISTORY: ORDERING SYSTEM PROVIDED HISTORY: Abnormal results of liver function studies TECHNOLOGIST PROVIDED HISTORY: Specify organ?->LIVER Specify organ?->GALLBLADDER Specify organ?->PANCREAS What reading provider will be dictating this exam?->CRC FINDINGS: LIVER:  Enlarged with increased echogenicity somewhat heterogeneous BILIARY SYSTEM:  Unremarkable gallbladder other than borderline wall thickening Common bile duct is within normal limits measuring 4 mm. RIGHT KIDNEY: The right kidney is grossly unremarkable without evidence of hydronephrosis. PANCREAS:  Visualized portions of the pancreas are unremarkable. OTHER: Question perihepatic ascites     1. Findings suggestive of diffuse hepatic steatosis. However also cirrhosis is possible with perihepatic ascites and gallbladder wall thickening possibly present 2. No cholelithiasis or biliary dilation RECOMMENDATIONS: Consider CT or MRI for further clinical concern or persistence of symptoms.      CTA TRIPHASIC LIVER    Result Date: 11/10/2021  EXAMINATION: CT ABDOMEN WITH AND WITHOUT CONTRAST 11/9/2021 9:27 pm TECHNIQUE: CT of the abdomen was performed without and with the administration of intravenous contrast. Multiplanar reformatted images are provided for review. Dose modulation, iterative reconstruction, and/or weight based adjustment of the mA/kV was utilized to reduce the radiation dose to as low as reasonably achievable. COMPARISON: Abdominal MRI November 4, 2021 HISTORY: ORDERING SYSTEM PROVIDED HISTORY: Ascites TECHNOLOGIST PROVIDED HISTORY: IV contrast only Reason for exam:->Ascites FINDINGS: Lung Bases: Partially imaged moderate to large bilateral pleural effusions with compressive atelectasis Liver: No hepatic lesions identified. Bile ducts:  Gallbladder wall is thickened, likely due to generalized edema. No evidence of intrahepatic or extrahepatic biliary dilatation. Pancreas: No pancreatic lesions. The pancreatic duct is not dilated. Adrenal glands: Normal in appearance. Kidneys: No evidence of hydronephrosis. No abnormal renal lesions. Spleen: No enhancing lesions. Bowel: No evidence of bowel obstruction. Appendix is normal appearance. Peritoneum/Retroperitoneum: No abnormal pelvic lymphadenopathy. Small to moderate amount of abdominal ascites. Pelvis:   Bladder is unremarkable. Uterus is normal patient's age. Bones/Soft tissues: No aggressive osseous lesions     No suspicious patent lesions identified. No evidence of acute pancreatitis. Partially imaged bilateral pleural effusions. Small to moderate amount of abdominal ascites. US DUP LOWER EXTREMITIES BILATERAL VENOUS    Result Date: 11/9/2021  EXAMINATION: DUPLEX VENOUS ULTRASOUND OF THE BILATERAL LOWER [id number]/9/2021 6:50 pm TECHNIQUE: Duplex ultrasound using B-mode/gray scaled imaging, Doppler spectral analysis and color flow Doppler was obtained of the deep venous structures of the lower bilateral extremities. COMPARISON: None used HISTORY: ORDERING SYSTEM PROVIDED HISTORY: Leg pain, left calf tenderness. TECHNOLOGIST PROVIDED HISTORY: Reason for exam:->Leg pain, left calf tenderness.  What reading provider will be dictating this exam?->CRC FINDINGS: The visualized veins of the bilateral lower extremities are patent and free of echogenic thrombus. The veins demonstrate good compressibility with normal color flow study and spectral analysis. Superficial soft tissue edema noted bilaterally. No evidence of DVT in either lower extremity. Assessment and Plan  Patient is a 38 y. o. female on consult for cirrhosis.     1.  Cirrhosis / elevated LFTs  -Initial MELD = 8, MELD-Na = 12  -Consider secondary to alcohol abuse - denies  -Viral serology negative 10/28/2021  -Elevated ferritin - pending HFE genotype from prior admission  -Normal AFP 4 (10/28/2021)  -Imaging with no remark of hepatic mass  -Recent EGD in our office - will obtain records  -No evidence of encephalopathy  -New ascites - see below  -Increased LFTs since prior admission - improving now  -Check HSV / EBV     2.  History of pancreatitis  -No evidence of acute pancreatitis this admission  -Lipase in normal range  -Suspicion secondary to alcohol - patient denies and no withdrawal symptoms  -Ultrasound 11/2/2021 indicating that distal CBD stone cannot be excluded  -MRI/MRCP 11/4/2021 with no evidence of ductal abnormalities and no evidence of pancreatitis  -Persistent abdominal pain - see below  -Non-elevated calcium  -Normal triglycerides 118 (11/4/2021)     3.  Ascites  -New onset ascites  -MRI 11/4/2021 with no evidence of PVT  -Paracentesis 11/9/2021 with 1850mL removed  -Paracentesis 11/12/2021 with 1250mL removed  -Fluid evaluation of initial paracentesis not consistent with SBP - gram stain / culture negative - was given antibiotics prior to paracentesis  -Rocephin per ID  -Increased abdominal distention - repeat paracentesis     4.  Cholecystitis  -Pain questionable cholecystitis versus pancreatitis vs SBP  -Poor candidate for cholecystectomy secondary to risk for hepatic decompensation  -Negative HIDA scan  -Triphasic CT showing

## 2021-11-13 NOTE — PLAN OF CARE
Problem: Falls - Risk of:  Goal: Will remain free from falls  Description: Will remain free from falls  Outcome: Met This Shift  Goal: Absence of physical injury  Description: Absence of physical injury  Outcome: Met This Shift     Problem: Nutritional:  Goal: Nutritional status will improve  Description: Nutritional status will improve  Outcome: Met This Shift     Problem: Respiratory:  Goal: Ability to maintain normal respiratory secretions will improve  Description: Ability to maintain normal respiratory secretions will improve  Outcome: Met This Shift

## 2021-11-13 NOTE — PROGRESS NOTES
fatty infiltration on CTs  · AST > ALT indicates more slow progression of some dz  · Predominantly direct hyperbilirubinemia is improving, alk phos up as well  · Ratio consistent w EtOH liver dz though pt denies recently  · Hepatitis panel negative  · Ferritin was elevated; HFE genotype pending per GI note  · AFP normal  · Not much utility in checking elastography currently; would want to wait until acute issues have resolved  · Perhaps would ultimately benefit from biopsy  · Checking HSV and EBV     Pt's PMH otherwise pertinent for anemia, depression. Continue outpt med regimen; if any particular issues, will address and move to active problem list above. Please see orders for further plan of care.      Code status  Full   DVT prophylaxis Lovenox   Disposition  Home when ready    Electronically signed by Melchor Love DO on 11/13/2021 at 11:09 AM

## 2021-11-13 NOTE — PLAN OF CARE
Problem: Falls - Risk of:  Goal: Will remain free from falls  Description: Will remain free from falls  Outcome: Not Met This Shift     Problem: Nutritional:  Goal: Nutritional status will improve  Description: Nutritional status will improve  Outcome: Met This Shift     Problem: Physical Regulation:  Goal: Will remain free from infection  Description: Will remain free from infection  Outcome: Met This Shift     Problem: Respiratory:  Goal: Ability to maintain normal respiratory secretions will improve  Description: Ability to maintain normal respiratory secretions will improve  Outcome: Met This Shift     Problem: Skin Integrity:  Goal: Demonstration of wound healing without infection will improve  Description: Demonstration of wound healing without infection will improve  Outcome: Met This Shift     Problem: Pain:  Goal: Pain level will decrease  Description: Pain level will decrease  Outcome: Met This Shift

## 2021-11-14 LAB
ALBUMIN SERPL-MCNC: 2.2 G/DL (ref 3.5–5.2)
ALP BLD-CCNC: 196 U/L (ref 35–104)
ALT SERPL-CCNC: 26 U/L (ref 0–32)
ANION GAP SERPL CALCULATED.3IONS-SCNC: 8 MMOL/L (ref 7–16)
AST SERPL-CCNC: 98 U/L (ref 0–31)
BACTERIA: NORMAL /HPF
BASOPHILS ABSOLUTE: 0.05 E9/L (ref 0–0.2)
BASOPHILS RELATIVE PERCENT: 0.4 % (ref 0–2)
BILIRUB SERPL-MCNC: 1 MG/DL (ref 0–1.2)
BILIRUBIN DIRECT: 0.6 MG/DL (ref 0–0.3)
BILIRUBIN URINE: NEGATIVE
BILIRUBIN, INDIRECT: 0.4 MG/DL (ref 0–1)
BLOOD, URINE: NEGATIVE
BUN BLDV-MCNC: <2 MG/DL (ref 6–20)
CALCIUM SERPL-MCNC: 7.4 MG/DL (ref 8.6–10.2)
CHLORIDE BLD-SCNC: 100 MMOL/L (ref 98–107)
CLARITY: CLEAR
CO2: 26 MMOL/L (ref 22–29)
COLOR: YELLOW
CREAT SERPL-MCNC: 0.4 MG/DL (ref 0.5–1)
EOSINOPHILS ABSOLUTE: 0.14 E9/L (ref 0.05–0.5)
EOSINOPHILS RELATIVE PERCENT: 1.2 % (ref 0–6)
EPSTEIN-BARR VCA IGG: >750 U/ML (ref 0–21.9)
EPSTEIN-BARR VCA IGM: <10 U/ML (ref 0–43.9)
GFR AFRICAN AMERICAN: >60
GFR NON-AFRICAN AMERICAN: >60 ML/MIN/1.73
GLUCOSE BLD-MCNC: 97 MG/DL (ref 74–99)
GLUCOSE URINE: NEGATIVE MG/DL
HCT VFR BLD CALC: 24.3 % (ref 34–48)
HEMOGLOBIN: 8.4 G/DL (ref 11.5–15.5)
IMMATURE GRANULOCYTES #: 0.05 E9/L
IMMATURE GRANULOCYTES %: 0.4 % (ref 0–5)
INR BLD: 1.2
KETONES, URINE: NEGATIVE MG/DL
LEUKOCYTE ESTERASE, URINE: ABNORMAL
LYMPHOCYTES ABSOLUTE: 3 E9/L (ref 1.5–4)
LYMPHOCYTES RELATIVE PERCENT: 26.2 % (ref 20–42)
MCH RBC QN AUTO: 39.4 PG (ref 26–35)
MCHC RBC AUTO-ENTMCNC: 34.6 % (ref 32–34.5)
MCV RBC AUTO: 114.1 FL (ref 80–99.9)
MONOCYTES ABSOLUTE: 1.04 E9/L (ref 0.1–0.95)
MONOCYTES RELATIVE PERCENT: 9.1 % (ref 2–12)
NEUTROPHILS ABSOLUTE: 7.18 E9/L (ref 1.8–7.3)
NEUTROPHILS RELATIVE PERCENT: 62.7 % (ref 43–80)
NITRITE, URINE: NEGATIVE
PDW BLD-RTO: 14.8 FL (ref 11.5–15)
PH UA: 7 (ref 5–9)
PLATELET # BLD: 222 E9/L (ref 130–450)
PMV BLD AUTO: 10.5 FL (ref 7–12)
POIKILOCYTES: ABNORMAL
POLYCHROMASIA: ABNORMAL
POTASSIUM REFLEX MAGNESIUM: 4.3 MMOL/L (ref 3.5–5)
PROTEIN UA: NEGATIVE MG/DL
PROTHROMBIN TIME: 13.5 SEC (ref 9.3–12.4)
RBC # BLD: 2.13 E12/L (ref 3.5–5.5)
RBC UA: NORMAL /HPF (ref 0–2)
SODIUM BLD-SCNC: 134 MMOL/L (ref 132–146)
SPECIFIC GRAVITY UA: 1.01 (ref 1–1.03)
TARGET CELLS: ABNORMAL
TOTAL PROTEIN: 4.5 G/DL (ref 6.4–8.3)
UROBILINOGEN, URINE: 0.2 E.U./DL
WBC # BLD: 11.5 E9/L (ref 4.5–11.5)
WBC UA: NORMAL /HPF (ref 0–5)

## 2021-11-14 PROCEDURE — 85025 COMPLETE CBC W/AUTO DIFF WBC: CPT

## 2021-11-14 PROCEDURE — 81001 URINALYSIS AUTO W/SCOPE: CPT

## 2021-11-14 PROCEDURE — 36415 COLL VENOUS BLD VENIPUNCTURE: CPT

## 2021-11-14 PROCEDURE — 85610 PROTHROMBIN TIME: CPT

## 2021-11-14 PROCEDURE — 6370000000 HC RX 637 (ALT 250 FOR IP): Performed by: INTERNAL MEDICINE

## 2021-11-14 PROCEDURE — 80053 COMPREHEN METABOLIC PANEL: CPT

## 2021-11-14 PROCEDURE — 6360000002 HC RX W HCPCS: Performed by: HOSPITALIST

## 2021-11-14 PROCEDURE — 6370000000 HC RX 637 (ALT 250 FOR IP): Performed by: NURSE PRACTITIONER

## 2021-11-14 PROCEDURE — 2580000003 HC RX 258: Performed by: HOSPITALIST

## 2021-11-14 PROCEDURE — 6370000000 HC RX 637 (ALT 250 FOR IP): Performed by: HOSPITALIST

## 2021-11-14 PROCEDURE — 80076 HEPATIC FUNCTION PANEL: CPT

## 2021-11-14 PROCEDURE — 6370000000 HC RX 637 (ALT 250 FOR IP): Performed by: SPECIALIST

## 2021-11-14 PROCEDURE — 87088 URINE BACTERIA CULTURE: CPT

## 2021-11-14 PROCEDURE — 1200000000 HC SEMI PRIVATE

## 2021-11-14 PROCEDURE — 99233 SBSQ HOSP IP/OBS HIGH 50: CPT | Performed by: INTERNAL MEDICINE

## 2021-11-14 RX ORDER — DICYCLOMINE HYDROCHLORIDE 10 MG/1
10 CAPSULE ORAL 2 TIMES DAILY
Status: DISCONTINUED | OUTPATIENT
Start: 2021-11-14 | End: 2021-11-16 | Stop reason: HOSPADM

## 2021-11-14 RX ADMIN — HYDROMORPHONE HYDROCHLORIDE 0.5 MG: 1 INJECTION, SOLUTION INTRAMUSCULAR; INTRAVENOUS; SUBCUTANEOUS at 18:59

## 2021-11-14 RX ADMIN — SODIUM CHLORIDE, PRESERVATIVE FREE 10 ML: 5 INJECTION INTRAVENOUS at 23:10

## 2021-11-14 RX ADMIN — PANTOPRAZOLE SODIUM 40 MG: 40 TABLET, DELAYED RELEASE ORAL at 06:18

## 2021-11-14 RX ADMIN — Medication 10 ML: at 21:53

## 2021-11-14 RX ADMIN — HYDROMORPHONE HYDROCHLORIDE 0.5 MG: 1 INJECTION, SOLUTION INTRAMUSCULAR; INTRAVENOUS; SUBCUTANEOUS at 09:17

## 2021-11-14 RX ADMIN — Medication 100 MG: at 09:19

## 2021-11-14 RX ADMIN — SODIUM CHLORIDE, PRESERVATIVE FREE 10 ML: 5 INJECTION INTRAVENOUS at 14:45

## 2021-11-14 RX ADMIN — DICYCLOMINE HYDROCHLORIDE 10 MG: 10 CAPSULE ORAL at 21:27

## 2021-11-14 RX ADMIN — SODIUM CHLORIDE, PRESERVATIVE FREE 10 ML: 5 INJECTION INTRAVENOUS at 18:59

## 2021-11-14 RX ADMIN — CEFUROXIME AXETIL 500 MG: 500 TABLET ORAL at 09:19

## 2021-11-14 RX ADMIN — HYDROMORPHONE HYDROCHLORIDE 0.5 MG: 1 INJECTION, SOLUTION INTRAMUSCULAR; INTRAVENOUS; SUBCUTANEOUS at 01:41

## 2021-11-14 RX ADMIN — FUROSEMIDE 40 MG: 40 TABLET ORAL at 09:16

## 2021-11-14 RX ADMIN — POTASSIUM CHLORIDE 40 MEQ: 20 TABLET, EXTENDED RELEASE ORAL at 09:16

## 2021-11-14 RX ADMIN — HYDROMORPHONE HYDROCHLORIDE 0.5 MG: 1 INJECTION, SOLUTION INTRAMUSCULAR; INTRAVENOUS; SUBCUTANEOUS at 04:43

## 2021-11-14 RX ADMIN — POTASSIUM CHLORIDE 40 MEQ: 20 TABLET, EXTENDED RELEASE ORAL at 19:03

## 2021-11-14 RX ADMIN — HYDROMORPHONE HYDROCHLORIDE 0.5 MG: 1 INJECTION, SOLUTION INTRAMUSCULAR; INTRAVENOUS; SUBCUTANEOUS at 23:09

## 2021-11-14 RX ADMIN — DICYCLOMINE HYDROCHLORIDE 10 MG: 10 CAPSULE ORAL at 14:50

## 2021-11-14 RX ADMIN — Medication 10 ML: at 09:36

## 2021-11-14 RX ADMIN — HYDROMORPHONE HYDROCHLORIDE 0.5 MG: 1 INJECTION, SOLUTION INTRAMUSCULAR; INTRAVENOUS; SUBCUTANEOUS at 14:45

## 2021-11-14 RX ADMIN — CEFUROXIME AXETIL 500 MG: 500 TABLET ORAL at 21:27

## 2021-11-14 RX ADMIN — FOLIC ACID 1 MG: 1 TABLET ORAL at 09:16

## 2021-11-14 RX ADMIN — ENOXAPARIN SODIUM 40 MG: 100 INJECTION SUBCUTANEOUS at 09:17

## 2021-11-14 RX ADMIN — MULTIVITAMIN TABLET 1 TABLET: TABLET at 09:16

## 2021-11-14 RX ADMIN — SPIRONOLACTONE 100 MG: 25 TABLET ORAL at 09:16

## 2021-11-14 ASSESSMENT — PAIN DESCRIPTION - ORIENTATION
ORIENTATION: RIGHT;LEFT
ORIENTATION: RIGHT;LEFT

## 2021-11-14 ASSESSMENT — PAIN DESCRIPTION - ONSET
ONSET: ON-GOING
ONSET: ON-GOING

## 2021-11-14 ASSESSMENT — PAIN DESCRIPTION - DESCRIPTORS
DESCRIPTORS: ACHING;PRESSURE
DESCRIPTORS: ACHING;PRESSURE

## 2021-11-14 ASSESSMENT — PAIN DESCRIPTION - PAIN TYPE
TYPE: ACUTE PAIN
TYPE: ACUTE PAIN

## 2021-11-14 ASSESSMENT — PAIN SCALES - GENERAL
PAINLEVEL_OUTOF10: 5
PAINLEVEL_OUTOF10: 7
PAINLEVEL_OUTOF10: 9
PAINLEVEL_OUTOF10: 8
PAINLEVEL_OUTOF10: 5
PAINLEVEL_OUTOF10: 8
PAINLEVEL_OUTOF10: 6
PAINLEVEL_OUTOF10: 9
PAINLEVEL_OUTOF10: 9

## 2021-11-14 ASSESSMENT — PAIN DESCRIPTION - LOCATION
LOCATION: LEG
LOCATION: LEG

## 2021-11-14 ASSESSMENT — PAIN DESCRIPTION - FREQUENCY
FREQUENCY: CONTINUOUS
FREQUENCY: CONTINUOUS

## 2021-11-14 ASSESSMENT — PAIN - FUNCTIONAL ASSESSMENT
PAIN_FUNCTIONAL_ASSESSMENT: ACTIVITIES ARE NOT PREVENTED
PAIN_FUNCTIONAL_ASSESSMENT: ACTIVITIES ARE NOT PREVENTED

## 2021-11-14 ASSESSMENT — PAIN DESCRIPTION - PROGRESSION
CLINICAL_PROGRESSION: NOT CHANGED
CLINICAL_PROGRESSION: NOT CHANGED

## 2021-11-14 NOTE — PROGRESS NOTES
Name:  Jamey Haywood  :  1985  MRN:  [id number]  Room:  [facility]3114-  DOS:  2021    [facility]  [facility]  Dr. Ede Goyal M.D. Dr. Dilcia Figueredo M.D. Dr. Lori Soliman D.O. Dr. Yelena Corcoran M.D. Dr. Vernell Gibbons D.O.    -NP Progress Note-    PCP:  Bridger Rowe MD  Admitting Physician:  Nikhil Tapia MD  Chief Complaint:    Chief Complaint   Patient presents with    Abdominal Pain     generalized swelling. just diagnosed with cirrosis of the liver. Subjective  Still with pain. Requiring regular IV pain medications. Borderline BP. Leg swelling improving. Complains of ankle pain.       Physical Examination  Vitals:  /80   Pulse 98   Temp 98.2 °F (36.8 °C)   Resp 16   Ht 5' 3\" (1.6 m)   Wt 130 lb (59 kg)   SpO2 99%   BMI 23.03 kg/m²   General Appearance:  awake, alert, and oriented to person, place, time, and purpose; appears stated age and cooperative; no apparent distress no labored breathing  HEENT:  PERRL; EOMI; sclera clear; buccal mucosa moist  Neck:  supple; trachea midline; no thyromegaly; no JVD; no bruits  Heart:  rhythm regular; rate controlled; no murmurs  Lungs:  symmetrical; clear to auscultation bilaterally; no wheezes; no rhonchi; no rales  Abdomen:  soft, less tender, less distended today; bowel sounds positive; no organomegaly or masses  Extremities:  peripheral pulses present; BLE peripheral edema; no ulcers  Neurologic:  alert and oriented x 3; no focal deficit; cranial nerves grossly intact  Skin:  no petechia; no hemorrhage; no wounds    Medications  Scheduled Meds    furosemide  40 mg Oral Once    cefUROXime  500 mg Oral 2 times per day    potassium chloride  40 mEq Oral BID WC    folic acid  1 mg Oral Daily    nicotine  1 patch TransDERmal Daily    pantoprazole  40 mg Oral QAM AC    thiamine  100 mg Oral Daily    multivitamin  1 tablet Oral Daily    sodium chloride flush  5-40 mL IntraVENous 2 times per day    enoxaparin 40 mg SubCUTAneous Daily    furosemide  40 mg Oral Daily    spironolactone  100 mg Oral Daily     Infusion Meds    sodium chloride       PRN Meds ALPRAZolam, magnesium sulfate, HYDROmorphone, HYDROmorphone, melatonin, sodium chloride flush, sodium chloride, ondansetron **OR** ondansetron, magnesium sulfate, potassium chloride **OR** potassium alternative oral replacement **OR** potassium chloride, polyethylene glycol, calcium carbonate    Laboratory Data  Recent Results (from the past 24 hour(s))   Comprehensive Metabolic Panel w/ Reflex to MG    Collection Time: 11/14/21  3:15 AM   Result Value Ref Range    Sodium 134 132 - 146 mmol/L    Potassium reflex Magnesium 4.3 3.5 - 5.0 mmol/L    Chloride 100 98 - 107 mmol/L    CO2 26 22 - 29 mmol/L    Anion Gap 8 7 - 16 mmol/L    Glucose 97 74 - 99 mg/dL    BUN <2 (L) 6 - 20 mg/dL    CREATININE 0.4 (L) 0.5 - 1.0 mg/dL    GFR Non-African American >60 >=60 mL/min/1.73    GFR African American >60     Calcium 7.4 (L) 8.6 - 10.2 mg/dL    Total Protein 4.5 (L) 6.4 - 8.3 g/dL    Albumin 2.2 (L) 3.5 - 5.2 g/dL    Total Bilirubin 1.0 0.0 - 1.2 mg/dL    Alkaline Phosphatase 196 (H) 35 - 104 U/L    ALT 26 0 - 32 U/L    AST 98 (H) 0 - 31 U/L   CBC auto differential    Collection Time: 11/14/21  3:15 AM   Result Value Ref Range    WBC 11.5 4.5 - 11.5 E9/L    RBC 2.13 (L) 3.50 - 5.50 E12/L    Hemoglobin 8.4 (L) 11.5 - 15.5 g/dL    Hematocrit 24.3 (L) 34.0 - 48.0 %    .1 (H) 80.0 - 99.9 fL    MCH 39.4 (H) 26.0 - 35.0 pg    MCHC 34.6 (H) 32.0 - 34.5 %    RDW 14.8 11.5 - 15.0 fL    Platelets 418 491 - 349 E9/L    MPV 10.5 7.0 - 12.0 fL    Neutrophils % 62.7 43.0 - 80.0 %    Immature Granulocytes % 0.4 0.0 - 5.0 %    Lymphocytes % 26.2 20.0 - 42.0 %    Monocytes % 9.1 2.0 - 12.0 %    Eosinophils % 1.2 0.0 - 6.0 %    Basophils % 0.4 0.0 - 2.0 %    Neutrophils Absolute 7.18 1.80 - 7.30 E9/L    Immature Granulocytes # 0.05 E9/L    Lymphocytes Absolute 3.00 1.50 - 4.00 E9/L Monocytes Absolute 1.04 (H) 0.10 - 0.95 E9/L    Eosinophils Absolute 0.14 0.05 - 0.50 E9/L    Basophils Absolute 0.05 0.00 - 0.20 E9/L    Polychromasia 1+     Poikilocytes 2+     Target Cells 2+    Hepatic function panel    Collection Time: 11/14/21  3:15 AM   Result Value Ref Range    Bilirubin, Direct 0.6 (H) 0.0 - 0.3 mg/dL    Bilirubin, Indirect 0.4 0.0 - 1.0 mg/dL   Protime-INR    Collection Time: 11/14/21  3:15 AM   Result Value Ref Range    Protime 13.5 (H) 9.3 - 12.4 sec    INR 1.2        Imaging  XR CHEST (2 VW)    Result Date: 11/2/2021  EXAMINATION: TWO XRAY VIEWS OF THE CHEST 11/2/2021 12:51 pm COMPARISON: 08/06/2019 HISTORY: ORDERING SYSTEM PROVIDED HISTORY: cp TECHNOLOGIST PROVIDED HISTORY: Reason for exam:->cp Open reduction internal fixation of proximal left humerus fracture 05/12/2021 FINDINGS: There is new density in the lateral and posterior left costophrenic recess. This is most compatible with pleural effusion. No right pleural effusion. No pneumothorax. No new pulmonary consolidation. Normal cardiac silhouette size without vascular congestion. No acute displaced fracture. New hardware partially visualized in proximal left humerus compatible with history of left humerus fracture repair. Findings most compatible with new small left pleural effusion     XR ACUTE ABD SERIES CHEST 1 VW    Result Date: 11/8/2021  EXAMINATION: TWO XRAY VIEWS OF THE ABDOMEN AND SINGLE  XRAY VIEW OF THE CHEST 11/8/2021 3:54 pm COMPARISON: Chest series from November 2, 2021 HISTORY: ORDERING SYSTEM PROVIDED HISTORY: abd pain TECHNOLOGIST PROVIDED HISTORY: Reason for exam:->abd pain FINDINGS: Adequate and symmetric aeration of the lungs. There are no formed consolidations, pleural effusions, or pneumothoraces. Trachea and central mainstem bronchi appear clear. The cardiomediastinal silhouette and pulmonary vascularity appear within normal limits.  Osseous and thoracic soft tissue structures demonstrate no acute findings. No organomegaly nor intra-abdominal mass effect. No pathologic calcifications seen. No free intraperitoneal air. Mildly prominent air-filled bowel loops identified throughout the abdomen. No pathologic fluid levels. No pneumatosis or portal venous gas. Osseous structures imaged demonstrate no acute findings. 1.  No acute cardiopulmonary pathology. 2.  Mildly prominent air-filled bowel loops throughout the abdomen. No pathologic fluid levels identified. No significant stool burden. NM HEPATOBILIARY    Result Date: 2021  EXAMINATION: NUCLEAR MEDICINE HEPATOBILIARY SCINTIGRAPHY (HIDA SCAN). 2021 6:50 am TECHNIQUE: Approximately 5.8 [id number] Mebrofenin (Choletec) was administered IV. Then, dynamic images of the abdomen were obtained in the anterior projection for 60 mins. COMPARISON: Ultrasound 2021 HISTORY: ORDERING SYSTEM PROVIDED HISTORY: R/o acute juana TECHNOLOGIST PROVIDED HISTORY: Reason for exam:->R/o acute juana What reading provider will be dictating this exam?->CRC FINDINGS: Prompt, homogenous uptake by the liver is noted with normal appearance of radiotracer excretion into the biliary system. Clearance of bloodpool activity appears appropriate. Gallbladder and small bowel is visualized in appropriate sequence. Patency of the common bile duct and cystic duct. No acute cholecystitis.      CT ABDOMEN PELVIS W IV CONTRAST    Result Date: 10/29/2021                                      [address]                                             [address]                                              [phone]                                               CT Scan Report                                                  Signed    Patient: Ca Amezcua                                                                MR#: [id number]  [id number]          : 1985 [de-identified]            Age/Sex: 43 / F                                                                Admit Date: 10/29/21            Loc: ER                                                                                     Attending Dr:     Ordering Physician: Patricia Meza MD   Date of Service: 10/29/21  Procedure(s): CT abdomen pelvis w con  Accession Number(s): [id number]    cc: Patricia Meza MD              PHYSICIAN INDICATIONS:  Jaundice, weight loss, abdominal pain       Technique: Axial images through the abdomen and pelvis with intravenous contrast. CT Dose Index:   4.2 mGy. DLP: 374 mGy-cm. Franciso Bertune. k=0.015 mSv/(mGy-cm)       Findings: The heart size is normal. The lung bases are clear. Liver is enlarged measuring 18 cm in length with decreased attenuation compatible with fatty   infiltration. Considerable edema and fluid surrounding the pancreas and extending along the colic gutters and the   pelvis suggesting acute pancreatitis. No pseudocyst or abscess. Common bile duct measures 9 mm and appears enlarged. Recommend MRCP to evaluate for underlying   stone. Gallbladder is present with pericholecystic fluid. The kidneys are unremarkable. No renal or utereral calculi or hydronephrosis. The adrenal glands   are unremarkable. No adenopathy or mass lesion in the mesentery or retroperitoneum. The aorta is normal in caliber. Bowel is not distended. No inflammation around the appendix. Uterus is present. No adnexal mass. Bladder is unremarkable. No masses are seen in the pelvis. Impression:       CT Abdomen and Pelvis with contrast:   1. Considerable edema and fluid surrounding the pancreas and extending along the colic gutters and   the pelvis suggesting acute pancreatitis. No pseudocyst or abscess. 2. Common bile duct measures 9 mm and appears enlarged.   Recommend MRCP to evaluate for underlying   stone. 3. Liver is enlarged measuring 18 cm in length with decreased attenuation compatible with fatty   infiltration. 4. Gallbladder is present with pericholecystic fluid. Dictated ByHoward Sweet MD   DD/DT: 10/29/21 2115               Signed By: Samy Harry MD 10/29/21 2115            : CHERY JIMENEZ GALLBLADDER RUQ    Result Date: 11/2/2021  EXAMINATION: RIGHT UPPER QUADRANT ULTRASOUND 11/2/2021 3:46 pm COMPARISON: None. HISTORY: ORDERING SYSTEM PROVIDED HISTORY: ruq abdominal pain TECHNOLOGIST PROVIDED HISTORY: Reason for exam:->ruq abdominal pain What reading provider will be dictating this exam?->CRC FINDINGS: LIVER:  The liver demonstrates increased echogenicity without evidence of intrahepatic biliary ductal dilatation. BILIARY SYSTEM:  Gallbladder has wall thickening, significant sludge and some pericholecystic fluid. Romina Gip Positive sonographic Lemus's sign. Common bile duct is upper limits measuring 6 mm. RIGHT KIDNEY: The right kidney is grossly unremarkable without evidence of hydronephrosis. It measures 10.2 x 5.1 cm. PANCREAS:  Visualized portions of the pancreas are unremarkable. OTHER: There is small to moderate amount amount of right upper quadrant ascites. Findings most compatible with acalculus cholecystitis. A very distal CBD stone is not excluded. There is pericholecystic fluid and some upper abdominal ascites. There are no gallstones. . Fatty infiltration liver     MRI ABDOMEN W WO CONTRAST MRCP    Result Date: 11/4/2021  EXAMINATION: MRI OF THE ABDOMEN WITH AND WITHOUT CONTRAST AND MRCP 11/3/2021 9:33 pm TECHNIQUE: Multiplanar multisequence MRI of the abdomen was performed with and without the administration of intravenous contrast.  After initial T2 axial and coronal images, thick slab, thin slab and 3D coronal MRCP sequences were obtained without the administration of intravenous contrast.  MIP images are provided for review. COMPARISON: 11/04/2021, 11/02/2021 HISTORY: ORDERING SYSTEM PROVIDED HISTORY: Pancreatitis TECHNOLOGIST PROVIDED HISTORY: New onset ascites - rule out PVT. Reason for exam:->Pancreatitis FINDINGS: Lower Chest: Moderate bilateral pleural effusions. Organs: Fatty liver with diffusely heterogeneous enhancement of the parenchyma and widening of the fissures. Mild gallbladder wall thickening. Intrinsic T1 signal and enhancement of the pancreas are preserved. Adrenals are unremarkable. Spleen is normal in size. Kidneys are unremarkable. Vasculature is unremarkable. Portal vein is patent. GI/Bowel: Small hiatal hernia. Bowel is non-dilated without wall thickening. Peritoneum/Retroperitoneum:Moderate ascites. No free fluid, organized fluid collection or lymphadenopathy. Bones: Multilevel degenerative disc disease. Severe body wall edema. 1. No MR evidence of acute pancreatitis. 2. Fatty liver with changes of diffuse liver disease. The portal vein is patent. 3. Mild gallbladder wall thickening, likely related to underlying liver dysfunction. 4. Moderate ascites and moderate bilateral pleural effusions. US ABDOMEN LIMITED Specify organ? LIVER, GALLBLADDER, PANCREAS    Result Date: 10/29/2021  EXAMINATION: RIGHT UPPER QUADRANT ULTRASOUND 10/28/2021 6:14 pm COMPARISON: None. HISTORY: ORDERING SYSTEM PROVIDED HISTORY: Abnormal results of liver function studies TECHNOLOGIST PROVIDED HISTORY: Specify organ?->LIVER Specify organ?->GALLBLADDER Specify organ?->PANCREAS What reading provider will be dictating this exam?->CRC FINDINGS: LIVER:  Enlarged with increased echogenicity somewhat heterogeneous BILIARY SYSTEM:  Unremarkable gallbladder other than borderline wall thickening Common bile duct is within normal limits measuring 4 mm. RIGHT KIDNEY: The right kidney is grossly unremarkable without evidence of hydronephrosis. PANCREAS:  Visualized portions of the pancreas are unremarkable.  OTHER: Question perihepatic ascites     1. Findings suggestive of diffuse hepatic steatosis. However also cirrhosis is possible with perihepatic ascites and gallbladder wall thickening possibly present 2. No cholelithiasis or biliary dilation RECOMMENDATIONS: Consider CT or MRI for further clinical concern or persistence of symptoms. CTA TRIPHASIC LIVER    Result Date: 11/10/2021  EXAMINATION: CT ABDOMEN WITH AND WITHOUT CONTRAST 11/9/2021 9:27 pm TECHNIQUE: CT of the abdomen was performed without and with the administration of intravenous contrast. Multiplanar reformatted images are provided for review. Dose modulation, iterative reconstruction, and/or weight based adjustment of the mA/kV was utilized to reduce the radiation dose to as low as reasonably achievable. COMPARISON: Abdominal MRI November 4, 2021 HISTORY: ORDERING SYSTEM PROVIDED HISTORY: Ascites TECHNOLOGIST PROVIDED HISTORY: IV contrast only Reason for exam:->Ascites FINDINGS: Lung Bases: Partially imaged moderate to large bilateral pleural effusions with compressive atelectasis Liver: No hepatic lesions identified. Bile ducts:  Gallbladder wall is thickened, likely due to generalized edema. No evidence of intrahepatic or extrahepatic biliary dilatation. Pancreas: No pancreatic lesions. The pancreatic duct is not dilated. Adrenal glands: Normal in appearance. Kidneys: No evidence of hydronephrosis. No abnormal renal lesions. Spleen: No enhancing lesions. Bowel: No evidence of bowel obstruction. Appendix is normal appearance. Peritoneum/Retroperitoneum: No abnormal pelvic lymphadenopathy. Small to moderate amount of abdominal ascites. Pelvis:   Bladder is unremarkable. Uterus is normal patient's age. Bones/Soft tissues: No aggressive osseous lesions     No suspicious patent lesions identified. No evidence of acute pancreatitis. Partially imaged bilateral pleural effusions. Small to moderate amount of abdominal ascites.      US DUP LOWER EXTREMITIES BILATERAL VENOUS    Result Date: 11/9/2021  EXAMINATION: DUPLEX VENOUS ULTRASOUND OF THE BILATERAL LOWER [id number]/9/2021 6:50 pm TECHNIQUE: Duplex ultrasound using B-mode/gray scaled imaging, Doppler spectral analysis and color flow Doppler was obtained of the deep venous structures of the lower bilateral extremities. COMPARISON: None used HISTORY: ORDERING SYSTEM PROVIDED HISTORY: Leg pain, left calf tenderness. TECHNOLOGIST PROVIDED HISTORY: Reason for exam:->Leg pain, left calf tenderness. What reading provider will be dictating this exam?->CRC FINDINGS: The visualized veins of the bilateral lower extremities are patent and free of echogenic thrombus. The veins demonstrate good compressibility with normal color flow study and spectral analysis. Superficial soft tissue edema noted bilaterally. No evidence of DVT in either lower extremity. Assessment and Plan  Patient is a 38 y. o. female on consult for cirrhosis.     1.  Cirrhosis / elevated LFTs  -Initial MELD = 8, MELD-Na = 12  -Consider secondary to alcohol abuse - denies  -Viral serology negative 10/28/2021  -Elevated ferritin - negative HFE testing  -Normal AFP 4 (10/28/2021)  -Imaging with no remark of hepatic mass  -Recent EGD in our office - will obtain records  -No evidence of encephalopathy  -New ascites - see below  -Increased LFTs since prior admission - improving now  -Pending HSV / EBV     2.  History of pancreatitis  -No evidence of acute pancreatitis this admission  -Lipase in normal range  -Suspicion secondary to alcohol - patient denies and no withdrawal symptoms  -Ultrasound 11/2/2021 indicating that distal CBD stone cannot be excluded  -MRI/MRCP 11/4/2021 with no evidence of ductal abnormalities and no evidence of pancreatitis  -Persistent abdominal pain - see below  -Non-elevated calcium  -Normal triglycerides 118 (11/4/2021)     3.  Ascites  -New onset ascites  -MRI 11/4/2021 with no evidence of PVT  -Paracentesis 11/9/2021 with 1850mL removed  -Paracentesis 11/12/2021 with 1250mL removed  -Fluid evaluation of initial paracentesis not consistent with SBP - gram stain / culture negative - was given antibiotics prior to paracentesis  -Rocephin per ID     4.  Cholecystitis  -Pain questionable cholecystitis versus pancreatitis vs SBP  -Poor candidate for cholecystectomy secondary to risk for hepatic decompensation  -Negative HIDA scan  -Triphasic CT showing unchanged exam - GB wall thickening, no significant pericholecystic fluid  -Antibiotics per ID     5.  GERD  -PPI daily  -Will obtain recent EGD reports from our office     6.  Abdominal pain  -No evidence of pancreatitis this admission  -Prior workup negative for cholecystitis  -Possible SBP - ID following  -Will obtain recent EGD reports from our office  -Pain management per admitting     7.  Anemia  -Chronic  -Macrocytic  -No overt GI bleeding  -No evidence of iron deficiency  -Will obtain recent EGD report from our office  -Consider colonoscopy - likely as outpatient  -Monitor CBC daily     8.  Lactic acidosis  -Associated abdominal pain  -Triphasic CT liver was completed - had ordered CTA abdomen to rule out ischemia (TO) - appears order was entered incorrectly  -CTA chest abdomen pelvis showing patient vasculature - no evidence of clots     9. Alcohol abuse  -Reports alcohol cessation about 3-4 weeks ago  -Advised on need for permanent alcohol cessation  -Withdrawal management per admitting     10. Comorbidities  -Hypokalemia, leg swelling, dysuria, pneumonia, pleural effusions  -Per admitting / consultants      LFTs stable. Consider transitioning to oral pain medications. Continue dietary supplements. Consider IV albumin - defer to admitting. Decreasing hgb without over bleeding. Repeat FOBT. Check reticulocytes. Consider hematology consult.       BASIL Winchester - CNP  11:58 AM  11/14/2021

## 2021-11-14 NOTE — PLAN OF CARE
Problem: Falls - Risk of:  Goal: Will remain free from falls  Description: Will remain free from falls  Outcome: Met This Shift  Goal: Absence of physical injury  Description: Absence of physical injury  Outcome: Met This Shift     Problem: Respiratory:  Goal: Ability to maintain normal respiratory secretions will improve  Description: Ability to maintain normal respiratory secretions will improve  Outcome: Met This Shift

## 2021-11-14 NOTE — PROGRESS NOTES
Spoke to Dr. Reji Allred, orders received to send another urine and its okay to draw the labs ordered this am tomorrow due to patient being a bad stick.

## 2021-11-15 LAB
ALBUMIN SERPL-MCNC: 2.2 G/DL (ref 3.5–5.2)
ALP BLD-CCNC: 214 U/L (ref 35–104)
ALT SERPL-CCNC: 24 U/L (ref 0–32)
ANION GAP SERPL CALCULATED.3IONS-SCNC: 11 MMOL/L (ref 7–16)
ANISOCYTOSIS: ABNORMAL
AST SERPL-CCNC: 76 U/L (ref 0–31)
BASOPHILIC STIPPLING: ABNORMAL
BASOPHILS ABSOLUTE: 0.07 E9/L (ref 0–0.2)
BASOPHILS RELATIVE PERCENT: 0.7 % (ref 0–2)
BILIRUB SERPL-MCNC: 1.1 MG/DL (ref 0–1.2)
BILIRUBIN DIRECT: 0.6 MG/DL (ref 0–0.3)
BILIRUBIN, INDIRECT: 0.5 MG/DL (ref 0–1)
BUN BLDV-MCNC: <2 MG/DL (ref 6–20)
CALCIUM SERPL-MCNC: 8.2 MG/DL (ref 8.6–10.2)
CHLORIDE BLD-SCNC: 98 MMOL/L (ref 98–107)
CO2: 26 MMOL/L (ref 22–29)
CREAT SERPL-MCNC: 0.4 MG/DL (ref 0.5–1)
EOSINOPHILS ABSOLUTE: 0.07 E9/L (ref 0.05–0.5)
EOSINOPHILS RELATIVE PERCENT: 0.7 % (ref 0–6)
GFR AFRICAN AMERICAN: >60
GFR NON-AFRICAN AMERICAN: >60 ML/MIN/1.73
GLUCOSE BLD-MCNC: 99 MG/DL (ref 74–99)
HCT VFR BLD CALC: 30.5 % (ref 34–48)
HEMOGLOBIN: 10.1 G/DL (ref 11.5–15.5)
HERPES TYPE 1/2 IGM COMBINED: 0.7 IV
HERPES TYPE I/II IGG COMBINED: >22.4 IV
HSV 1 GLYCOPROTEIN G AB IGG: 27.1 IV
HSV 2 GLYCOPROTEIN G AB IGG: 0.16 IV
IMMATURE GRANULOCYTES #: 0.07 E9/L
IMMATURE GRANULOCYTES %: 0.7 % (ref 0–5)
IMMATURE RETIC FRACT: 35.9 % (ref 3–15.9)
INR BLD: 1.2
LYMPHOCYTES ABSOLUTE: 3.26 E9/L (ref 1.5–4)
LYMPHOCYTES RELATIVE PERCENT: 32 % (ref 20–42)
MCH RBC QN AUTO: 38.3 PG (ref 26–35)
MCHC RBC AUTO-ENTMCNC: 33.1 % (ref 32–34.5)
MCV RBC AUTO: 115.5 FL (ref 80–99.9)
MONOCYTES ABSOLUTE: 1.01 E9/L (ref 0.1–0.95)
MONOCYTES RELATIVE PERCENT: 9.9 % (ref 2–12)
NEUTROPHILS ABSOLUTE: 5.71 E9/L (ref 1.8–7.3)
NEUTROPHILS RELATIVE PERCENT: 56 % (ref 43–80)
PDW BLD-RTO: 14.8 FL (ref 11.5–15)
PLATELET # BLD: 280 E9/L (ref 130–450)
PMV BLD AUTO: 10.7 FL (ref 7–12)
POIKILOCYTES: ABNORMAL
POLYCHROMASIA: ABNORMAL
POTASSIUM REFLEX MAGNESIUM: 3.9 MMOL/L (ref 3.5–5)
PROTHROMBIN TIME: 13 SEC (ref 9.3–12.4)
RBC # BLD: 2.64 E12/L (ref 3.5–5.5)
RETIC HGB EQUIVALENT: 37.2 PG (ref 28.2–36.6)
RETICULOCYTE ABSOLUTE COUNT: 0.14 E12/L
RETICULOCYTE COUNT PCT: 5.2 % (ref 0.4–1.9)
SODIUM BLD-SCNC: 135 MMOL/L (ref 132–146)
TARGET CELLS: ABNORMAL
TOTAL PROTEIN: 5.5 G/DL (ref 6.4–8.3)
URINE CULTURE, ROUTINE: NORMAL
VACUOLATED NEUTROPHILS: ABNORMAL
WBC # BLD: 10.2 E9/L (ref 4.5–11.5)

## 2021-11-15 PROCEDURE — 6360000002 HC RX W HCPCS: Performed by: HOSPITALIST

## 2021-11-15 PROCEDURE — 6370000000 HC RX 637 (ALT 250 FOR IP): Performed by: INTERNAL MEDICINE

## 2021-11-15 PROCEDURE — 86255 FLUORESCENT ANTIBODY SCREEN: CPT

## 2021-11-15 PROCEDURE — 6370000000 HC RX 637 (ALT 250 FOR IP): Performed by: NURSE PRACTITIONER

## 2021-11-15 PROCEDURE — 6370000000 HC RX 637 (ALT 250 FOR IP): Performed by: SPECIALIST

## 2021-11-15 PROCEDURE — 99233 SBSQ HOSP IP/OBS HIGH 50: CPT | Performed by: INTERNAL MEDICINE

## 2021-11-15 PROCEDURE — 2580000003 HC RX 258: Performed by: HOSPITALIST

## 2021-11-15 PROCEDURE — 6370000000 HC RX 637 (ALT 250 FOR IP): Performed by: HOSPITALIST

## 2021-11-15 PROCEDURE — 36415 COLL VENOUS BLD VENIPUNCTURE: CPT

## 2021-11-15 PROCEDURE — 1200000000 HC SEMI PRIVATE

## 2021-11-15 PROCEDURE — 85025 COMPLETE CBC W/AUTO DIFF WBC: CPT

## 2021-11-15 PROCEDURE — 80076 HEPATIC FUNCTION PANEL: CPT

## 2021-11-15 PROCEDURE — 85610 PROTHROMBIN TIME: CPT

## 2021-11-15 PROCEDURE — 80053 COMPREHEN METABOLIC PANEL: CPT

## 2021-11-15 PROCEDURE — 86038 ANTINUCLEAR ANTIBODIES: CPT

## 2021-11-15 PROCEDURE — 85045 AUTOMATED RETICULOCYTE COUNT: CPT

## 2021-11-15 RX ORDER — OXYCODONE HYDROCHLORIDE AND ACETAMINOPHEN 5; 325 MG/1; MG/1
2 TABLET ORAL EVERY 6 HOURS PRN
Status: DISCONTINUED | OUTPATIENT
Start: 2021-11-15 | End: 2021-11-16 | Stop reason: HOSPADM

## 2021-11-15 RX ADMIN — HYDROMORPHONE HYDROCHLORIDE 0.5 MG: 1 INJECTION, SOLUTION INTRAMUSCULAR; INTRAVENOUS; SUBCUTANEOUS at 03:33

## 2021-11-15 RX ADMIN — Medication 100 MG: at 08:01

## 2021-11-15 RX ADMIN — OXYCODONE AND ACETAMINOPHEN 2 TABLET: 5; 325 TABLET ORAL at 19:12

## 2021-11-15 RX ADMIN — CEFUROXIME AXETIL 500 MG: 500 TABLET ORAL at 22:35

## 2021-11-15 RX ADMIN — FOLIC ACID 1 MG: 1 TABLET ORAL at 08:01

## 2021-11-15 RX ADMIN — PANTOPRAZOLE SODIUM 40 MG: 40 TABLET, DELAYED RELEASE ORAL at 07:09

## 2021-11-15 RX ADMIN — CEFUROXIME AXETIL 500 MG: 500 TABLET ORAL at 08:01

## 2021-11-15 RX ADMIN — Medication 3 MG: at 22:35

## 2021-11-15 RX ADMIN — MULTIVITAMIN TABLET 1 TABLET: TABLET at 08:00

## 2021-11-15 RX ADMIN — DICYCLOMINE HYDROCHLORIDE 10 MG: 10 CAPSULE ORAL at 22:35

## 2021-11-15 RX ADMIN — ENOXAPARIN SODIUM 40 MG: 100 INJECTION SUBCUTANEOUS at 08:01

## 2021-11-15 RX ADMIN — FUROSEMIDE 40 MG: 40 TABLET ORAL at 08:01

## 2021-11-15 RX ADMIN — Medication 10 ML: at 08:02

## 2021-11-15 RX ADMIN — DICYCLOMINE HYDROCHLORIDE 10 MG: 10 CAPSULE ORAL at 08:01

## 2021-11-15 RX ADMIN — OXYCODONE AND ACETAMINOPHEN 2 TABLET: 5; 325 TABLET ORAL at 13:17

## 2021-11-15 RX ADMIN — ALPRAZOLAM 1 MG: 1 TABLET ORAL at 22:35

## 2021-11-15 RX ADMIN — POTASSIUM CHLORIDE 40 MEQ: 20 TABLET, EXTENDED RELEASE ORAL at 16:56

## 2021-11-15 RX ADMIN — POTASSIUM CHLORIDE 40 MEQ: 20 TABLET, EXTENDED RELEASE ORAL at 08:08

## 2021-11-15 RX ADMIN — HYDROMORPHONE HYDROCHLORIDE 0.5 MG: 1 INJECTION, SOLUTION INTRAMUSCULAR; INTRAVENOUS; SUBCUTANEOUS at 08:02

## 2021-11-15 RX ADMIN — SPIRONOLACTONE 100 MG: 25 TABLET ORAL at 08:00

## 2021-11-15 RX ADMIN — ALPRAZOLAM 1 MG: 1 TABLET ORAL at 16:14

## 2021-11-15 ASSESSMENT — PAIN DESCRIPTION - DESCRIPTORS
DESCRIPTORS: ACHING;PRESSURE
DESCRIPTORS: ACHING;PRESSURE

## 2021-11-15 ASSESSMENT — PAIN SCALES - GENERAL
PAINLEVEL_OUTOF10: 6
PAINLEVEL_OUTOF10: 8
PAINLEVEL_OUTOF10: 6
PAINLEVEL_OUTOF10: 4
PAINLEVEL_OUTOF10: 8
PAINLEVEL_OUTOF10: 5

## 2021-11-15 ASSESSMENT — PAIN DESCRIPTION - ORIENTATION
ORIENTATION: RIGHT;LEFT
ORIENTATION: RIGHT;LEFT

## 2021-11-15 ASSESSMENT — PAIN DESCRIPTION - LOCATION
LOCATION: LEG
LOCATION: LEG

## 2021-11-15 ASSESSMENT — PAIN DESCRIPTION - ONSET
ONSET: ON-GOING
ONSET: ON-GOING

## 2021-11-15 ASSESSMENT — PAIN DESCRIPTION - PAIN TYPE
TYPE: ACUTE PAIN
TYPE: ACUTE PAIN

## 2021-11-15 ASSESSMENT — PAIN DESCRIPTION - FREQUENCY
FREQUENCY: CONTINUOUS
FREQUENCY: CONTINUOUS

## 2021-11-15 ASSESSMENT — PAIN DESCRIPTION - PROGRESSION
CLINICAL_PROGRESSION: NOT CHANGED
CLINICAL_PROGRESSION: NOT CHANGED

## 2021-11-15 NOTE — PROGRESS NOTES
Comprehensive Nutrition Assessment    Type and Reason for Visit:  Initial, RD Nutrition Re-Screen/LOS    Nutrition Recommendations/Plan: Continue current diet and ONS to help optimize nutrient needs. Nutrition Assessment:  Pt was seen recently at [facility] for RUQ pain and was admitted for cholecystitis but left AMA. She returned 11/2 for acalculous cholecystitis and was consulted to general surgery. She return to ED on 11/8 for diffused abdominal pain. Pt adm with Alcoholic hepatitis and spontaneous bacterial peritonitis. S/p paracentesis 11/9 and  1.8 L were removed    Malnutrition Assessment:  Malnutrition Status:  No malnutrition    Context:  Chronic Illness     Findings of the 6 clinical characteristics of malnutrition:  Energy Intake:  No significant decrease in energy intake  Weight Loss:  No significant weight loss     Body Fat Loss:  No significant body fat loss     Muscle Mass Loss:  No significant muscle mass loss    Fluid Accumulation:  No significant fluid accumulation     Strength:  Not Performed    Estimated Daily Nutrient Needs:  Energy (kcal):   (MSJx1. 2SF); Weight Used for Energy Requirements:  Current     Protein (g):  70-80g (1.2-1.4g/kg CBW as tolerated d/t cirrohis of liver); Weight Used for Protein Requirements:  Current        Fluid (ml/day):  ; Method Used for Fluid Requirements:  1 ml/kcal      Nutrition Related Findings:  A&Ox4, BS active, Abd distended, -I/Os, Edema BLE +1 pitting      Wounds:  None (redness abd and BLE)       Current Nutrition Therapies:    ADULT DIET; Regular;  Low Sodium (2 gm)  ADULT ORAL NUTRITION SUPPLEMENT; Breakfast, Lunch, Dinner, AM Snack, PM Snack, HS Snack; Standard High Calorie/High Protein Oral Supplement    Anthropometric Measures:  · Height: 5' 3\" (160 cm)  · Current Body Weight: 130 lb (59 kg) (11/3)     · Usual Body Weight: 104 lb (47.2 kg) (11/2/21)     · Ideal Body Weight: 115 lbs; % Ideal Body Weight 113 %   · BMI: 23  · Adjusted Body Weight:  ; No Adjustment     · BMI Categories: Normal Weight (BMI 18.5-24. 9)       Nutrition Diagnosis:   · Inadequate oral intake related to catabolic illness (2/2 ETOH abuse and cirrohis liver) as evidenced by intake 51-75%, GI abnormality    Nutrition Interventions:   Food and/or Nutrient Delivery:  Continue Current Diet, Continue Oral Nutrition Supplement  Nutrition Education/Counseling:  No recommendation at this time   Coordination of Nutrition Care:  Continue to monitor while inpatient    Goals:  >75% of meals and ONS consumed       Nutrition Monitoring and Evaluation:   Behavioral-Environmental Outcomes:  None Identified   Food/Nutrient Intake Outcomes:  Food and Nutrient Intake, Supplement Intake  Physical Signs/Symptoms Outcomes:  Biochemical Data, GI Status, Fluid Status or Edema, Nutrition Focused Physical Findings, Skin, Weight     Discharge Planning:     Too soon to determine     Electronically signed by Mekhi Hawkins RD, LD on 11/15/21 at 10:03 AM EST    Contact: [phone]

## 2021-11-15 NOTE — PROGRESS NOTES
[facility]) Hospitalist Progress Note    Admission Date  11/8/2021  3:08 PM  Chief Complaint Abd pain    Subjective  History of Present Illness  Seen at bedside this AM  Resting comfortably but awakens easily  Still w abd pain but she notes some improvement  Appetite a bit better  LEs less swollen  Nearing DC, will transition to PO pain control  No new issues  No family present  Discussed w pt's nurse and charge nurse    Review of Systems - 12-point review of systems has been reviewed and is otherwise negative except as listed in the HPI    Objective  Physical Exam  Vitals: /76   Pulse 92   Temp 98.4 °F (36.9 °C)   Resp 16   Ht 5' 3\" (1.6 m)   Wt 130 lb (59 kg)   SpO2 100%   BMI 23.03 kg/m²   General: well-developed, well-nourished, no acute distress, cooperative  Skin: warm, dry, intact, normal color without cyanosis  HEENT: normocephalic, atraumatic, mucous membranes normal  Respiratory: clear to auscultation bilaterally without respiratory distress  Cardiovascular: regular rate and rhythm without murmur / rub / gallop  Abdominal: distended, firm, nontender, nondistended, normoactive bowel sounds  Extremities: no mottling, pulses intact, no edema, bl ankle TTP  Neurologic: awake, alert, no focal deficits  Psychiatric: normal affect, cooperative    Assessment / Plan  Abd pain, etiology unclear  · Recent admission w normal HIDA and MRCP; not gallbladder  · Lipase normal / CT showed poss some enhancement of pancreatic head but could be d/t the ascites (see below)  · CT did show hepatic steatosis which could cause pain through capsular involvement - currently I feel this is the most likely source  · Pt denies EtOH use in past month  · Initially concerned for SBP but cx neg; Rocephin switched per ID to cefuroxime.     · CTA neg for VTE  · S/p paracentesis x2 on 11/10 and 11/11  · Getting Dilaudid q4h - transitioning to PO pain control today in anticipation of potential DC tomorrow  · Reports continued urinary sx - UA showed trace LE only - sx resolved today    Elevated LFTs / ?cirrhosis  · Liver enlarged w fatty infiltration on CTs  · AST > ALT indicates more slow progression of some dz  · Predominantly direct hyperbilirubinemia is improving, alk phos up as well  · Ratio consistent w EtOH liver dz though pt denies recently  · Hepatitis panel negative  · Ferritin was elevated; HFE genotype pending per GI note  · AFP normal  · Not much utility in checking elastography currently; would want to wait until acute issues have resolved  · Perhaps would ultimately benefit from biopsy  · Checking HSV and EBV - IgGs both positive  · Checking DENIS, anti-smooth muscle, antimitochondrial Abs - sent today    Pt's PMH otherwise pertinent for anemia, depression. Continue outpt med regimen; if any particular issues, will address and move to active problem list above. Please see orders for further plan of care.      Code status  Full   DVT prophylaxis Lovenox   Disposition  Home when ready    Electronically signed by Rubin Diane DO on 11/15/2021 at 11:17 AM

## 2021-11-15 NOTE — PROGRESS NOTES
PROGRESS NOTE  By Mohini Awad M.D. [facility]  Dr. Jeannie Arceo M.D.,  Dr. Chele Campos M.D.,   Dr. Micheal Mortensen D.O.,  Dr. Negar Vazquez M.D.,  Dr. Amauri Cleaning D.O.,  Talya Batista D.O.         Mark Batters  43 y.o.  female    SUBJECTIVE:  Complains of persistent but improving abdominal cramping pain    OBJECTIVE:    /76   Pulse 92   Temp 98.4 °F (36.9 °C)   Resp 16   Ht 5' 3\" (1.6 m)   Wt 130 lb (59 kg)   SpO2 100%   BMI 23.03 kg/m²     General: NAD/ female  HEENT: Anicteric sclera/moist oral mucosa  Neck: Supple  Chest: Symmetric excursion/nonlabored respirations  Abd.: Soft and mildly distended  Extr.:  Decreased muscle tone and bulk throughout  Skin: Warm and dry        DATA:       Lab Results   Component Value Date    WBC 10.2 11/15/2021    RBC 2.64 11/15/2021    HGB 10.1 11/15/2021    HCT 30.5 11/15/2021    .5 11/15/2021    MCH 38.3 11/15/2021    MCHC 33.1 11/15/2021    RDW 14.8 11/15/2021     11/15/2021    MPV 10.7 11/15/2021     Lab Results   Component Value Date     11/15/2021    K 3.9 11/15/2021    CL 98 11/15/2021    CO2 26 11/15/2021    BUN <2 11/15/2021    CREATININE 0.4 11/15/2021    CALCIUM 8.2 11/15/2021    PROT 5.5 11/15/2021    LABALBU 2.2 11/15/2021    LABALBU 3.6 05/04/2012    BILITOT 1.1 11/15/2021    ALKPHOS 214 11/15/2021    AST 76 11/15/2021    ALT 24 11/15/2021     Lab Results   Component Value Date    LIPASE 10 11/08/2021     Lab Results   Component Value Date    AMYLASE 41 05/04/2012         ASSESSMENT/PLAN:    1.  Cirrhosis / elevated LFTs  -Initial MELD = 8, MELD-Na = 12  -Consider secondary to alcohol abuse - denies  -Viral serology negative 10/28/2021  -Elevated ferritin - negative HFE testing  -Normal AFP 4 (10/28/2021)  -Imaging with no remark of hepatic mass  -Recent EGD in our office - will obtain records  -No evidence of encephalopathy  -New ascites - see below  -Increased LFTs since prior admission - improving now  -Pending HSV / EBV     2.  History of pancreatitis  -No evidence of acute pancreatitis this admission  -Lipase in normal range  -Suspicion secondary to alcohol - patient denies and no withdrawal symptoms  -Ultrasound 11/2/2021 indicating that distal CBD stone cannot be excluded  -MRI/MRCP 11/4/2021 with no evidence of ductal abnormalities and no evidence of pancreatitis  -Persistent abdominal pain - see below  -Non-elevated calcium  -Normal triglycerides 118 (11/4/2021)     3.  Ascites  -New onset ascites  -MRI 11/4/2021 with no evidence of PVT  -Paracentesis 11/9/2021 with 1850mL removed  -Paracentesis 11/12/2021 with 1250mL removed  -Fluid evaluation of initial paracentesis not consistent with SBP - gram stain / culture negative - was given antibiotics prior to paracentesis  -Antibiotics per ID     4.  Cholecystitis  -Pain questionable cholecystitis versus pancreatitis vs SBP  -Poor candidate for cholecystectomy secondary to risk for worsening hepatic decompensation  -Negative HIDA scan  -Triphasic CT showing unchanged exam - GB wall thickening, no significant pericholecystic fluid -possible relation to intra-abdominal ascites  -Antibiotics per ID     5.  GERD  -PPI daily  -Recent outpatient EGD  -No immediate plan for inpatient endoscopy     6.  Abdominal pain  -As above  -Pain management per admitting     7.  Anemia  -Chronic  -Improved hemoglobin without transfusion  -Macrocytic likely related to alcohol abuse  -No overt GI bleeding  -Pending FOBT  -No evidence of iron deficiency  -Previous recent outpatient EGD  -Consider colonoscopy - likely as outpatient  -Monitor CBC daily     8.  Lactic acidosis  -Resolved  -Associated abdominal pain  -Triphasic CT liver was completed - had ordered CTA abdomen to rule out ischemia (TO) - appears order was entered incorrectly  -CTA chest abdomen pelvis showing patient vasculature - no evidence of clots     9.  Alcohol abuse  -Reports alcohol cessation about 3-4 weeks ago  -Advised on need for permanent alcohol cessation  -Withdrawal management per admitting     10.  Comorbidities  -Hypokalemia, leg swelling, dysuria, pneumonia, pleural effusions  -Per admitting / consultants      Above has been discussed in detail with the patient all questions answered to her satisfaction. As patient laboratory work remained stable or improving, she can be discharged from [facility] likely tomorrow. Patient was again advised to completely abstain from alcohol and utilize all resources available to achieve this goal.  Patient is to follow-up in the office in 2 weeks after discharge -patient to call for appointment [phone]. Patient verbalizes understanding and agreement with the plan as delineated. Renita Billy MD  11/15/2021  5:25 PM    NOTE:  This report was transcribed using voice recognition software. Every effort was made to ensure accuracy; however, inadvertent computerized transcription errors may be present.

## 2021-11-15 NOTE — PLAN OF CARE
Problem: Falls - Risk of:  Goal: Will remain free from falls  Description: Will remain free from falls  11/15/2021 0228 by Danny Vasquez RN  Outcome: Met This Shift     Problem: Nutritional:  Goal: Nutritional status will improve  Description: Nutritional status will improve  Outcome: Ongoing     Problem: Physical Regulation:  Goal: Will remain free from infection  Description: Will remain free from infection  Outcome: Met This Shift     Problem: Respiratory:  Goal: Ability to maintain normal respiratory secretions will improve  Description: Ability to maintain normal respiratory secretions will improve  11/15/2021 0228 by Danny Vasquez RN  Outcome: Met This Shift     Problem: Skin Integrity:  Goal: Demonstration of wound healing without infection will improve  Description: Demonstration of wound healing without infection will improve  Outcome: Met This Shift     Problem: Pain:  Goal: Pain level will decrease  Description: Pain level will decrease  Outcome: Met This Shift

## 2021-11-16 VITALS
HEIGHT: 63 IN | BODY MASS INDEX: 23.04 KG/M2 | WEIGHT: 130 LBS | DIASTOLIC BLOOD PRESSURE: 70 MMHG | HEART RATE: 85 BPM | OXYGEN SATURATION: 98 % | RESPIRATION RATE: 15 BRPM | TEMPERATURE: 98.2 F | SYSTOLIC BLOOD PRESSURE: 109 MMHG

## 2021-11-16 LAB
ANTI-MITOCHONDRIAL AB, IFA: NEGATIVE
ANTI-NUCLEAR ANTIBODY (ANA): NEGATIVE
HERPES TYPE 1/2 IGM COMBINED: 0.46 IV
HERPES TYPE I/II IGG COMBINED: 21 IV
HSV 1 GLYCOPROTEIN G AB IGG: 20.8 IV
HSV 2 GLYCOPROTEIN G AB IGG: 0.14 IV
SMOOTH MUSCLE ANTIBODY: NEGATIVE
URINE CULTURE, ROUTINE: NORMAL

## 2021-11-16 PROCEDURE — 6370000000 HC RX 637 (ALT 250 FOR IP): Performed by: INTERNAL MEDICINE

## 2021-11-16 PROCEDURE — 99239 HOSP IP/OBS DSCHRG MGMT >30: CPT | Performed by: INTERNAL MEDICINE

## 2021-11-16 PROCEDURE — 6370000000 HC RX 637 (ALT 250 FOR IP): Performed by: HOSPITALIST

## 2021-11-16 PROCEDURE — 6360000002 HC RX W HCPCS: Performed by: HOSPITALIST

## 2021-11-16 PROCEDURE — 6370000000 HC RX 637 (ALT 250 FOR IP): Performed by: SPECIALIST

## 2021-11-16 PROCEDURE — 6370000000 HC RX 637 (ALT 250 FOR IP): Performed by: NURSE PRACTITIONER

## 2021-11-16 PROCEDURE — 2580000003 HC RX 258: Performed by: HOSPITALIST

## 2021-11-16 RX ORDER — OXYCODONE AND ACETAMINOPHEN 10; 325 MG/1; MG/1
1 TABLET ORAL EVERY 6 HOURS PRN
Qty: 56 TABLET | Refills: 0 | Status: SHIPPED | OUTPATIENT
Start: 2021-11-16 | End: 2021-11-30

## 2021-11-16 RX ORDER — FUROSEMIDE 40 MG/1
40 TABLET ORAL DAILY
Qty: 60 TABLET | Refills: 0 | Status: ON HOLD | OUTPATIENT
Start: 2021-11-17 | End: 2022-02-25

## 2021-11-16 RX ORDER — LANOLIN ALCOHOL/MO/W.PET/CERES
3 CREAM (GRAM) TOPICAL NIGHTLY
Qty: 30 TABLET | Refills: 0 | Status: SHIPPED | OUTPATIENT
Start: 2021-11-16 | End: 2021-12-10 | Stop reason: SDUPTHER

## 2021-11-16 RX ORDER — ALPRAZOLAM 1 MG/1
1 TABLET ORAL NIGHTLY PRN
Qty: 14 TABLET | Refills: 0 | Status: SHIPPED | OUTPATIENT
Start: 2021-11-16 | End: 2021-12-16

## 2021-11-16 RX ORDER — SPIRONOLACTONE 100 MG/1
100 TABLET, FILM COATED ORAL DAILY
Qty: 30 TABLET | Refills: 0 | Status: ON HOLD | OUTPATIENT
Start: 2021-11-17 | End: 2022-02-25

## 2021-11-16 RX ORDER — DICYCLOMINE HYDROCHLORIDE 10 MG/1
10 CAPSULE ORAL 2 TIMES DAILY
Qty: 60 CAPSULE | Refills: 0 | Status: ON HOLD | OUTPATIENT
Start: 2021-11-16 | End: 2022-02-25

## 2021-11-16 RX ADMIN — DICYCLOMINE HYDROCHLORIDE 10 MG: 10 CAPSULE ORAL at 08:51

## 2021-11-16 RX ADMIN — OXYCODONE AND ACETAMINOPHEN 2 TABLET: 5; 325 TABLET ORAL at 02:20

## 2021-11-16 RX ADMIN — ENOXAPARIN SODIUM 40 MG: 100 INJECTION SUBCUTANEOUS at 08:50

## 2021-11-16 RX ADMIN — FOLIC ACID 1 MG: 1 TABLET ORAL at 08:52

## 2021-11-16 RX ADMIN — PANTOPRAZOLE SODIUM 40 MG: 40 TABLET, DELAYED RELEASE ORAL at 08:11

## 2021-11-16 RX ADMIN — SPIRONOLACTONE 100 MG: 25 TABLET ORAL at 08:51

## 2021-11-16 RX ADMIN — MULTIVITAMIN TABLET 1 TABLET: TABLET at 08:51

## 2021-11-16 RX ADMIN — Medication 100 MG: at 08:52

## 2021-11-16 RX ADMIN — POTASSIUM CHLORIDE 40 MEQ: 20 TABLET, EXTENDED RELEASE ORAL at 08:50

## 2021-11-16 RX ADMIN — OXYCODONE AND ACETAMINOPHEN 2 TABLET: 5; 325 TABLET ORAL at 08:54

## 2021-11-16 RX ADMIN — Medication 10 ML: at 08:55

## 2021-11-16 RX ADMIN — FUROSEMIDE 40 MG: 40 TABLET ORAL at 08:52

## 2021-11-16 RX ADMIN — ALPRAZOLAM 1 MG: 1 TABLET ORAL at 14:29

## 2021-11-16 RX ADMIN — CEFUROXIME AXETIL 500 MG: 500 TABLET ORAL at 08:52

## 2021-11-16 ASSESSMENT — PAIN DESCRIPTION - FREQUENCY: FREQUENCY: INTERMITTENT

## 2021-11-16 ASSESSMENT — PAIN DESCRIPTION - PROGRESSION: CLINICAL_PROGRESSION: GRADUALLY WORSENING

## 2021-11-16 ASSESSMENT — PAIN SCALES - GENERAL
PAINLEVEL_OUTOF10: 3
PAINLEVEL_OUTOF10: 7
PAINLEVEL_OUTOF10: 7

## 2021-11-16 ASSESSMENT — PAIN DESCRIPTION - ORIENTATION: ORIENTATION: MID

## 2021-11-16 ASSESSMENT — PAIN - FUNCTIONAL ASSESSMENT: PAIN_FUNCTIONAL_ASSESSMENT: ACTIVITIES ARE NOT PREVENTED

## 2021-11-16 ASSESSMENT — PAIN DESCRIPTION - DESCRIPTORS: DESCRIPTORS: ACHING;DISCOMFORT

## 2021-11-16 ASSESSMENT — PAIN DESCRIPTION - LOCATION: LOCATION: ABDOMEN

## 2021-11-16 ASSESSMENT — PAIN DESCRIPTION - PAIN TYPE: TYPE: CHRONIC PAIN

## 2021-11-16 NOTE — PROGRESS NOTES
CLINICAL PHARMACY NOTE: MEDS TO BEDS    Total # of Prescriptions Filled: 5   The following medications were delivered to the patient:  · Dicyclomine 10  · Lasix 40  · Spironolactone 100  · Alprazolam 1  · Percocet     Additional Documentation:

## 2021-11-16 NOTE — PLAN OF CARE
Discharge orders noted. Patient is to follow-up in the office in 2 weeks  - patient to call for appointment and with questions/concerns at [phone].   Thank you for the opportunity to participate in the care of  Belle Hermosillo MD

## 2021-11-16 NOTE — DISCHARGE SUMMARY
[facility] Physician Discharge Summary     Roman Moise [address] [place] [id number]  [phone]    Schedule an appointment as soon as possible for a visit  follow up on hepatic work-up    Activity level   As tolerated    Disposition   Home      Condition on discharge Stable    Patient ID   Laura Kohler, 43 y. o.female /  1985  / MRN [id number]    Admit date   2021    Discharge date  2021  10:11 AM    Admission diagnoses Active Problems:    Abdominal pain    SBP (spontaneous bacterial peritonitis) ([address]    Alcoholic hepatitis  Resolved Problems:    * No resolved hospital problems. *    Discharge diagnoses Same    Consults   IP CONSULT TO GI  IP CONSULT TO INFECTIOUS DISEASES  IP CONSULT TO IV TEAM  IP CONSULT TO IV TEAM  IP CONSULT TO IV TEAM  IP CONSULT TO IV TEAM  IP CONSULT TO IV TEAM  IP CONSULT TO IV TEAM    Procedures   See hospital course     Hospital Course  36F PMH EtOH abuse w fatty liver vs cirrhosis, anemia, bipolar d/o admitted initially  -  for abd pain:    Patient was admitted for further evaluation and treatment. She was seen by General Surgery, GI, Infectious Disease. General Surgery recommended HIDA scan which was negative. GI recommended MRCP/MRI of abdomen. There was no evidence of acute pancreatitis, fatty liver with diffuse liver disease, gallbladder thickening and moderate ascites and bilateral pleural effusions. ID recommended no antibiotics. GI recommended PPI daily and vitamin supplementation. F/U with GI in 1 - 2 wks.     She was re-admitted  w persistent abd pain    Abd pain, etiology unclear  · Recent admission w normal HIDA and MRCP; not gallbladder  · Lipase normal / CT showed poss some enhancement of pancreatic head but could be d/t the ascites (see below)  · CT did show hepatic steatosis which could cause pain through capsular involvement - currently I feel this is the most likely source  · Pt denies EtOH use in past month  · Initially concerned for SBP but cx neg; Rocephin switched per ID to cefuroxime. · CTA neg for VTE  · S/p paracentesis x2 on 11/10 and 11/11  · Dilaudid transitioned to Percocet  · Reports continued urinary sx - UA showed trace LE only - sx now resolved     Elevated LFTs / ?cirrhosis  · Liver enlarged w fatty infiltration on CTs  · AST > ALT indicates more slow progression of some dz  · Predominantly direct hyperbilirubinemia is improving, alk phos up as well  · Ratio consistent w EtOH liver dz though pt denies recently  · Hepatitis panel negative  · Ferritin was elevated; HFE genotype pending per GI note  · AFP normal  · Not much utility in checking elastography currently; would want to wait until acute issues have resolved  · Perhaps would ultimately benefit from biopsy  · HSV and EBV - IgGs both positive  · DENIS, anti-smooth muscle, antimitochondrial Abs - all neg    Discharge Exam  /70   Pulse 85   Temp 98.2 °F (36.8 °C) (Oral)   Resp 15   Ht 5' 3\" (1.6 m)   Wt 130 lb (59 kg)   SpO2 98%   BMI 23.03 kg/m²   General Appearance: alert and oriented to person, place and time and in no acute distress  Skin: warm and dry  Head: normocephalic and atraumatic  Eyes: pupils equal, round, and reactive to light, extraocular eye movements intact, conjunctivae normal  Neck: neck supple and non tender without mass   Pulmonary/Chest: clear to auscultation bilaterally- no wheezes, rales or rhonchi, normal air movement, no respiratory distress  Cardiovascular: normal rate, normal S1 and S2 and no carotid bruits  Abdomen: soft, non-tender, non-distended, normal bowel sounds, no masses or organomegaly  Extremities: no cyanosis, no clubbing and no edema  Neurologic: no cranial nerve deficit and speech normal    No intake/output data recorded. No intake/output data recorded.     Labs  Recent Labs     11/14/21  0315 11/15/21  0930    135   K 4.3 3.9    98   CO2 26 26   BUN <2* <2* known as: FOLVITE  Take 1 tablet by mouth daily     nicotine 21 MG/24HR  Commonly known as: NICODERM CQ  Place 1 patch onto the skin daily     pantoprazole 40 MG tablet  Commonly known as: PROTONIX  Take 1 tablet by mouth every morning (before breakfast)     vitamin B-1 100 MG tablet  Commonly known as: THIAMINE  Take 1 tablet by mouth daily           Where to Get Your Medications      You can get these medications from any pharmacy    Bring a paper prescription for each of these medications  · ALPRAZolam 1 MG tablet  · dicyclomine 10 MG capsule  · furosemide 40 MG tablet  · melatonin 3 MG Tabs tablet  · oxyCODONE-acetaminophen  MG per tablet  · spironolactone 100 MG tablet       Note that more than 30 minutes was spent in preparing discharge papers, discussing discharge with patient, medication review, etc.    Electronically signed by Cathy Adler DO on 11/16/2021 at 10:11 AM

## 2021-11-17 ENCOUNTER — TELEPHONE (OUTPATIENT)
Dept: FAMILY MEDICINE CLINIC | Age: 36
End: 2021-11-17

## 2021-11-17 NOTE — TELEPHONE ENCOUNTER
Home [facility] Transitions Initial Follow Up Call    Outreach made within 2 business days of discharge: Yes    Patient: Nichelle Rivera Patient : 1985   MRN: [id number]  Reason for Admission: Abdominal pain  Discharge Date: 21       Spoke with: Paco Patrick    Discharge department/facility: [address] [place] [address] Interactive Patient Contact:  Was patient able to fill all prescriptions: Yes  Was patient instructed to bring all medications to the follow-up visit: Yes  Is patient taking all medications as directed in the discharge summary?  Yes  Does patient understand their discharge instructions: Yes  Does patient have questions or concerns that need addressed prior to 7-14 day follow up office visit: no    Scheduled appointment with PCP within 7-14 days    Follow Up  Future Appointments   Date Time Provider Chente Klein   2021 11:45 AM [facility] [address]

## 2021-11-30 ENCOUNTER — TELEPHONE (OUTPATIENT)
Dept: FAMILY MEDICINE CLINIC | Age: 36
End: 2021-11-30

## 2021-11-30 ENCOUNTER — NURSE TRIAGE (OUTPATIENT)
Dept: OTHER | Facility: CLINIC | Age: 36
End: 2021-11-30

## 2021-11-30 DIAGNOSIS — K74.60 CIRRHOSIS OF LIVER WITH ASCITES, UNSPECIFIED HEPATIC CIRRHOSIS TYPE (HCC): Primary | ICD-10-CM

## 2021-11-30 DIAGNOSIS — K65.2 SBP (SPONTANEOUS BACTERIAL PERITONITIS) (HCC): ICD-10-CM

## 2021-11-30 DIAGNOSIS — G47.00 INSOMNIA, UNSPECIFIED TYPE: ICD-10-CM

## 2021-11-30 DIAGNOSIS — K70.11 ALCOHOLIC HEPATITIS WITH ASCITES: ICD-10-CM

## 2021-11-30 DIAGNOSIS — R18.8 CIRRHOSIS OF LIVER WITH ASCITES, UNSPECIFIED HEPATIC CIRRHOSIS TYPE (HCC): Primary | ICD-10-CM

## 2021-11-30 DIAGNOSIS — F31.11 BIPOLAR AFFECTIVE DISORDER, CURRENTLY MANIC, MILD (HCC): ICD-10-CM

## 2021-11-30 DIAGNOSIS — R10.84 GENERALIZED ABDOMINAL PAIN: ICD-10-CM

## 2021-11-30 RX ORDER — TRAZODONE HYDROCHLORIDE 50 MG/1
50 TABLET ORAL NIGHTLY PRN
Qty: 90 TABLET | Refills: 0 | Status: SHIPPED
Start: 2021-11-30 | End: 2022-01-05

## 2021-11-30 RX ORDER — ALPRAZOLAM 1 MG/1
1 TABLET ORAL NIGHTLY PRN
Qty: 14 TABLET | Refills: 0 | OUTPATIENT
Start: 2021-11-30 | End: 2021-12-30

## 2021-11-30 RX ORDER — OXYCODONE AND ACETAMINOPHEN 10; 325 MG/1; MG/1
1 TABLET ORAL EVERY 6 HOURS PRN
Qty: 56 TABLET | Refills: 0 | OUTPATIENT
Start: 2021-11-30 | End: 2021-12-14

## 2021-11-30 NOTE — TELEPHONE ENCOUNTER
Received call from Ivett at [facility], caller not on line. Complaint: Edema    Market: [address] Name: Rajeev Gomez    Caller's telephone number verified as [phone]    Connected with caller via phone, please see below triage             Reason for Disposition  Chelita Manzanares has already spoken with the PCP (or office), and has no further questions    Protocols used: NO CONTACT OR DUPLICATE CONTACT CALL-ADULT-[place]        Brief description of triage: No triage. [facility] at [place] office only. No new/worsening symptoms r/t liver disease/edema/ascites. Needed f/u within 7-14 days from ED 11/8/21. Writer provided warm transfer to Carlota Contreras at [facility] for appointment scheduling. Attention Provider: Thank you for allowing me to participate in the care of your patient. The patient was connected to triage in response to information provided to the ECC/PSC. Please do not respond through this encounter as the response is not directed to a shared pool.

## 2021-11-30 NOTE — TELEPHONE ENCOUNTER
Notified patient. Patient verbalized understanding. Pt is in agreement with Trazodone and a referral to GI. She states she is having diarrhea, not constipation. She is asking if she could have a referral to pain management. She states she is wasting away, she can't live a normal life because of the pain.

## 2021-11-30 NOTE — TELEPHONE ENCOUNTER
Why does she think she needs a potassium supplement? She needs to follow up with a GI - does she have an appointment? Also, I am not going to refill Percocet and Xanax as I do not Rx and these were designed to be temporary medications from the hospital. Jesus Yao will also make her constipation even worse and I do not feel comfortable having her on these meds chronically when her liver is not working. Let me know if she needs a GI referral and if she wants to try Trazodone to sleep.

## 2021-11-30 NOTE — TELEPHONE ENCOUNTER
Last Appointment:  5/20/2021  Future Appointments   Date Time Provider Chente Klein   12/9/2021  1:15 PM Marj Yuan  [address] [place] [address]

## 2021-11-30 NOTE — TELEPHONE ENCOUNTER
Pt wanted to know if you wanted her on a potassium supplement? She is also asking if there is something she can take for sleep? She reports having constipation. She has an upcoming appointment with you. There was no availability to move her appointment up and she had to cancel her appointment today for lack of transportation. Please advise.

## 2021-12-10 ENCOUNTER — HOSPITAL ENCOUNTER (EMERGENCY)
Age: 36
Discharge: HOME | End: 2021-12-10
Attending: EMERGENCY MEDICINE
Payer: MEDICAID

## 2021-12-10 VITALS
TEMPERATURE: 99.1 F | OXYGEN SATURATION: 99 % | SYSTOLIC BLOOD PRESSURE: 125 MMHG | RESPIRATION RATE: 16 BRPM | WEIGHT: 74 LBS | HEIGHT: 63 IN | DIASTOLIC BLOOD PRESSURE: 95 MMHG | BODY MASS INDEX: 13.11 KG/M2 | HEART RATE: 98 BPM

## 2021-12-10 DIAGNOSIS — R10.84 GENERALIZED ABDOMINAL PAIN: Primary | ICD-10-CM

## 2021-12-10 DIAGNOSIS — K70.30 ALCOHOLIC CIRRHOSIS, UNSPECIFIED WHETHER ASCITES PRESENT (HCC): ICD-10-CM

## 2021-12-10 LAB
ALBUMIN SERPL-MCNC: 3 G/DL (ref 3.5–5.2)
ALP BLD-CCNC: 136 U/L (ref 35–104)
ALT SERPL-CCNC: 7 U/L (ref 0–32)
AMMONIA: 32.3 UMOL/L (ref 11–51)
ANION GAP SERPL CALCULATED.3IONS-SCNC: 19 MMOL/L (ref 7–16)
AST SERPL-CCNC: 29 U/L (ref 0–31)
BASOPHILS ABSOLUTE: 0.05 E9/L (ref 0–0.2)
BASOPHILS RELATIVE PERCENT: 0.5 % (ref 0–2)
BILIRUB SERPL-MCNC: 0.6 MG/DL (ref 0–1.2)
BILIRUBIN URINE: NEGATIVE
BLOOD, URINE: NEGATIVE
BUN BLDV-MCNC: 4 MG/DL (ref 6–20)
CALCIUM SERPL-MCNC: 8.9 MG/DL (ref 8.6–10.2)
CHLORIDE BLD-SCNC: 85 MMOL/L (ref 98–107)
CLARITY: CLEAR
CO2: 24 MMOL/L (ref 22–29)
COLOR: YELLOW
CREAT SERPL-MCNC: 0.4 MG/DL (ref 0.5–1)
EOSINOPHILS ABSOLUTE: 0.02 E9/L (ref 0.05–0.5)
EOSINOPHILS RELATIVE PERCENT: 0.2 % (ref 0–6)
GFR AFRICAN AMERICAN: >60
GFR NON-AFRICAN AMERICAN: >60 ML/MIN/1.73
GLUCOSE BLD-MCNC: 112 MG/DL (ref 74–99)
GLUCOSE URINE: NEGATIVE MG/DL
HCT VFR BLD CALC: 37.9 % (ref 34–48)
HEMOGLOBIN: 13.5 G/DL (ref 11.5–15.5)
IMMATURE GRANULOCYTES #: 0.03 E9/L
IMMATURE GRANULOCYTES %: 0.3 % (ref 0–5)
KETONES, URINE: ABNORMAL MG/DL
LACTIC ACID, SEPSIS: 1 MMOL/L (ref 0.5–1.9)
LEUKOCYTE ESTERASE, URINE: NEGATIVE
LIPASE: 18 U/L (ref 13–60)
LYMPHOCYTES ABSOLUTE: 2.77 E9/L (ref 1.5–4)
LYMPHOCYTES RELATIVE PERCENT: 25.4 % (ref 20–42)
MAGNESIUM: 1.5 MG/DL (ref 1.6–2.6)
MCH RBC QN AUTO: 36.4 PG (ref 26–35)
MCHC RBC AUTO-ENTMCNC: 35.6 % (ref 32–34.5)
MCV RBC AUTO: 102.2 FL (ref 80–99.9)
MONOCYTES ABSOLUTE: 1.11 E9/L (ref 0.1–0.95)
MONOCYTES RELATIVE PERCENT: 10.2 % (ref 2–12)
NEUTROPHILS ABSOLUTE: 6.92 E9/L (ref 1.8–7.3)
NEUTROPHILS RELATIVE PERCENT: 63.4 % (ref 43–80)
NITRITE, URINE: NEGATIVE
PDW BLD-RTO: 11.6 FL (ref 11.5–15)
PH UA: 6.5 (ref 5–9)
PLATELET # BLD: 457 E9/L (ref 130–450)
PMV BLD AUTO: 9.5 FL (ref 7–12)
POTASSIUM REFLEX MAGNESIUM: 3.4 MMOL/L (ref 3.5–5)
PRO-BNP: 96 PG/ML (ref 0–125)
PROTEIN UA: NEGATIVE MG/DL
RBC # BLD: 3.71 E12/L (ref 3.5–5.5)
SODIUM BLD-SCNC: 128 MMOL/L (ref 132–146)
SPECIFIC GRAVITY UA: 1.01 (ref 1–1.03)
TOTAL PROTEIN: 7.1 G/DL (ref 6.4–8.3)
TROPONIN, HIGH SENSITIVITY: 13 NG/L (ref 0–9)
TROPONIN, HIGH SENSITIVITY: 13 NG/L (ref 0–9)
UROBILINOGEN, URINE: 0.2 E.U./DL
WBC # BLD: 10.9 E9/L (ref 4.5–11.5)

## 2021-12-10 PROCEDURE — 96372 THER/PROPH/DIAG INJ SC/IM: CPT

## 2021-12-10 PROCEDURE — 93005 ELECTROCARDIOGRAM TRACING: CPT | Performed by: GENERAL PRACTICE

## 2021-12-10 PROCEDURE — 83605 ASSAY OF LACTIC ACID: CPT

## 2021-12-10 PROCEDURE — 82140 ASSAY OF AMMONIA: CPT

## 2021-12-10 PROCEDURE — 83735 ASSAY OF MAGNESIUM: CPT

## 2021-12-10 PROCEDURE — 81003 URINALYSIS AUTO W/O SCOPE: CPT

## 2021-12-10 PROCEDURE — 2580000003 HC RX 258: Performed by: GENERAL PRACTICE

## 2021-12-10 PROCEDURE — 80053 COMPREHEN METABOLIC PANEL: CPT

## 2021-12-10 PROCEDURE — 84484 ASSAY OF TROPONIN QUANT: CPT

## 2021-12-10 PROCEDURE — 6360000002 HC RX W HCPCS: Performed by: EMERGENCY MEDICINE

## 2021-12-10 PROCEDURE — 85025 COMPLETE CBC W/AUTO DIFF WBC: CPT

## 2021-12-10 PROCEDURE — 83690 ASSAY OF LIPASE: CPT

## 2021-12-10 PROCEDURE — 96374 THER/PROPH/DIAG INJ IV PUSH: CPT

## 2021-12-10 PROCEDURE — 83880 ASSAY OF NATRIURETIC PEPTIDE: CPT

## 2021-12-10 PROCEDURE — 36415 COLL VENOUS BLD VENIPUNCTURE: CPT

## 2021-12-10 PROCEDURE — 6360000002 HC RX W HCPCS: Performed by: GENERAL PRACTICE

## 2021-12-10 PROCEDURE — 99285 EMERGENCY DEPT VISIT HI MDM: CPT

## 2021-12-10 RX ORDER — HYDROCODONE BITARTRATE AND ACETAMINOPHEN 5; 325 MG/1; MG/1
1 TABLET ORAL EVERY 6 HOURS PRN
Qty: 4 TABLET | Refills: 0 | Status: SHIPPED | OUTPATIENT
Start: 2021-12-10 | End: 2021-12-13

## 2021-12-10 RX ORDER — 0.9 % SODIUM CHLORIDE 0.9 %
1000 INTRAVENOUS SOLUTION INTRAVENOUS ONCE
Status: COMPLETED | OUTPATIENT
Start: 2021-12-10 | End: 2021-12-10

## 2021-12-10 RX ORDER — POTASSIUM CHLORIDE 20 MEQ/1
40 TABLET, EXTENDED RELEASE ORAL ONCE
Status: DISCONTINUED | OUTPATIENT
Start: 2021-12-10 | End: 2021-12-10 | Stop reason: CLARIF

## 2021-12-10 RX ORDER — LANOLIN ALCOHOL/MO/W.PET/CERES
3 CREAM (GRAM) TOPICAL NIGHTLY
Qty: 30 TABLET | Refills: 0 | Status: ON HOLD | OUTPATIENT
Start: 2021-12-10 | End: 2022-02-25

## 2021-12-10 RX ADMIN — HYDROMORPHONE HYDROCHLORIDE 1 MG: 1 INJECTION, SOLUTION INTRAMUSCULAR; INTRAVENOUS; SUBCUTANEOUS at 16:54

## 2021-12-10 RX ADMIN — HYDROMORPHONE HYDROCHLORIDE 0.5 MG: 1 INJECTION, SOLUTION INTRAMUSCULAR; INTRAVENOUS; SUBCUTANEOUS at 14:34

## 2021-12-10 RX ADMIN — SODIUM CHLORIDE 1000 ML: 9 INJECTION, SOLUTION INTRAVENOUS at 16:53

## 2021-12-10 ASSESSMENT — ENCOUNTER SYMPTOMS
SORE THROAT: 0
COUGH: 0
CHEST TIGHTNESS: 0
VOMITING: 0
DIARRHEA: 0
SINUS PAIN: 0
ABDOMINAL PAIN: 1
CHOKING: 0
EYE PAIN: 0
SHORTNESS OF BREATH: 0
WHEEZING: 0
NAUSEA: 1

## 2021-12-10 ASSESSMENT — PAIN SCALES - GENERAL
PAINLEVEL_OUTOF10: 10
PAINLEVEL_OUTOF10: 10
PAINLEVEL_OUTOF10: 8
PAINLEVEL_OUTOF10: 10

## 2021-12-10 ASSESSMENT — PAIN DESCRIPTION - LOCATION: LOCATION: ABDOMEN

## 2021-12-10 ASSESSMENT — PAIN DESCRIPTION - PROGRESSION: CLINICAL_PROGRESSION: GRADUALLY IMPROVING

## 2021-12-10 ASSESSMENT — PAIN DESCRIPTION - PAIN TYPE
TYPE: ACUTE PAIN
TYPE: ACUTE PAIN

## 2021-12-10 ASSESSMENT — PAIN DESCRIPTION - DESCRIPTORS: DESCRIPTORS: CRAMPING

## 2021-12-10 ASSESSMENT — PAIN DESCRIPTION - FREQUENCY: FREQUENCY: CONTINUOUS

## 2021-12-10 NOTE — ED NOTES
Bed:  CARTWRIGHT-02  Expected date:   Expected time:   Means of arrival:   Comments:  EMS     Federico Graff RN  12/10/21 [id number]

## 2021-12-10 NOTE — ED PROVIDER NOTES
ED  Provider Note  Admit Date/RoomTime: 12/10/2021  1:06 PM  ED Room: [facility] [place] [id number]     HPI:   Sondra Correa is a 43 y.o. female presenting to the ED for abdominal pain, beginning days ago. History comes primarily from the patient. Past medical history includes alcohol abuse, cirrhosis with ascites, bipolar disorder, protein calorie malnutrition. The complaint has been persistent, moderate in severity, improved by nothing and worsened by nothing. Associated symptoms include chills and fatigue. Any states that she has had body aches with abdominal pain, fever, fatigue, chills at the course the last several days. Patient was recently admitted to the hospital for liver cirrhosis with ascites in the setting of alcohol abuse. She states that she is no longer swollen or distended the way that she was, however she has persistent aches and pains that make it difficult for her to do things such as ambulate to the bathroom. For this reason she returned to [facility] emergency department for further evaluation and treatment. On arrival, the patient was assessed with history, physical exam, laboratory studies, vital signs. Vital signs were stable on arrival and the patient was afebrile. Review of Systems   Constitutional: Positive for activity change, appetite change, chills and fatigue. Negative for fever. HENT: Negative for sinus pain and sore throat. Eyes: Negative for pain and visual disturbance. Respiratory: Negative for cough, choking, chest tightness, shortness of breath and wheezing. Cardiovascular: Negative for chest pain and palpitations. Gastrointestinal: Positive for abdominal pain and nausea. Negative for diarrhea and vomiting. Genitourinary: Negative for hematuria. Musculoskeletal: Negative for neck pain and neck stiffness. Skin: Negative for rash. Neurological: Negative for tremors, syncope and weakness.    Psychiatric/Behavioral: Negative for confusion. Physical Exam  Vitals and nursing note reviewed. Constitutional:       Appearance: She is well-developed. She is not ill-appearing. Comments: Cachectic body habitus   HENT:      Head: Normocephalic and atraumatic. Eyes:      Pupils: Pupils are equal, round, and reactive to light. Cardiovascular:      Rate and Rhythm: Regular rhythm. Tachycardia present. Pulmonary:      Effort: Pulmonary effort is normal. No respiratory distress. Breath sounds: Normal breath sounds. No wheezing or rales. Abdominal:      General: Bowel sounds are normal.      Palpations: Abdomen is soft. Tenderness: There is generalized abdominal tenderness. There is no guarding or rebound. Musculoskeletal:      Cervical back: Normal range of motion and neck supple. Skin:     General: Skin is warm and dry. Neurological:      Mental Status: She is alert and oriented to person, place, and time. Cranial Nerves: No cranial nerve deficit. Coordination: Coordination normal.          Procedures     MDM  Number of Diagnoses or Management Options  Alcoholic cirrhosis, unspecified whether ascites present [facility])  Generalized abdominal pain  Diagnosis management comments: Emergency Department evaluation was notable for generally reassuring work-up in the setting of abdominal pain. The patient has mildly abnormal electrolytes which were effectively repleted in the emergency department. The patient was treated with numerous doses of narcotic pain medication including Dilaudid, which she insisted were not sufficient to treat her pain. The patient's pain management became the primary source of her visit as the day wore on, and by the end of the day the patient was considered stable for discharge to home with outpatient follow-up with her gastroenterologist.  Patient will be given some pain medication to help her manage her pain until such time as she can follow-up in the outpatient setting.     They were advised to return to the emergency department should they develop fever, chills, night sweats, nausea, vomiting, diarrhea, chest pain, shortness of breath, or worsening of their symptoms despite treatment from this emergency department visit. They were instructed to follow-up with their primary care provider in 2 days. This information was relayed to the patient who understood this plan of care and was amenable to the plan. ED Course as of 12/11/21 1819   Fri Dec 10, 2021   2122 I took over for the previous resident. Introduced myself to the patient. She is noticed to have a mildly low sodium and potassium. Given 40 potassium PO and a liter of saline. Patient's pain is inadequately treated with dilauded but patient says no other medicine has helped either. Patient will be discharged home. [KS]   2103 Patient was being prepared for discharge. She then began complaining that she was having too much pain. She told me a very convoluted story and said that she was sent here by her GI doctor Dr. Paradise Liu. He stated that he told her that she needed to come here to have her pain managed to be evaluated. See Kianna here and was requesting more. At this point I do not feel that narcotic pain medications are in her best interest but she has been obsessive and demanding and saying that she cannot go home unless she has something for pain and sleep because she would end up in a \"psych umaña\" patient does not appear to be in any acute distress she has been in and out of the bed without any issue. I really think that her symptoms are all chronic and and a lot is hinging on pain management. She does have a slightly low sodium but has had that in the past as well. Her other labs are unremarkable. I spoke at length with Dr. Paradise Liu who actually does not recall seeing the patient.   The patient tells me that she did not actually see him in the office today but that he told the  to tell her to go to the emergency department. Her story is rapidly changing and very difficult to follow. At this time she had her melatonin renewed. I gave her for Norco and she is to call for follow-up. She claims to have a pain management appointment in January and she is advised to try to get that moved up. Is also advised not to drink alcohol. [LS]      ED Course User Index  [KS] Rosie Palacios MD  [LS] Alcira Chacon MD       ED Course as of 12/11/21 1819   Fri Dec 10, 2021   1953 I took over for the previous resident. Introduced myself to the patient. She is noticed to have a mildly low sodium and potassium. Given 40 potassium PO and a liter of saline. Patient's pain is inadequately treated with dilauded but patient says no other medicine has helped either. Patient will be discharged home. [KS]   2103 Patient was being prepared for discharge. She then began complaining that she was having too much pain. She told me a very convoluted story and said that she was sent here by her GI doctor Dr. Meryl Freeman. He stated that he told her that she needed to come here to have her pain managed to be evaluated. See Kianna here and was requesting more. At this point I do not feel that narcotic pain medications are in her best interest but she has been obsessive and demanding and saying that she cannot go home unless she has something for pain and sleep because she would end up in a \"psych umaña\" patient does not appear to be in any acute distress she has been in and out of the bed without any issue. I really think that her symptoms are all chronic and and a lot is hinging on pain management. She does have a slightly low sodium but has had that in the past as well. Her other labs are unremarkable. I spoke at length with Dr. Meryl Freeman who actually does not recall seeing the patient. The patient tells me that she did not actually see him in the office today but that he told the  to tell her to go to the emergency department. Her story is rapidly changing and very difficult to follow. At this time she had her melatonin renewed. I gave her for Norco and she is to call for follow-up. She claims to have a pain management appointment in January and she is advised to try to get that moved up. Is also advised not to drink alcohol. [LS]      ED Course User Index  [KS] Rosie Palacios MD  [LS] Alcira Chacon MD       --------------------------------------------- PAST HISTORY ---------------------------------------------  Past Medical History:  has a past medical history of Anemia, Bipolar disorder current episode depressed ([facility] [address] Depression, ETOH abuse, Humerus fracture, Major depression, recurrent ([facility] [address] Mild tetrahydrocannabinol (THC) abuse, and Protein calorie malnutrition ([facility] [address] Past Surgical History:  has a past surgical history that includes Pelvic fracture surgery (Bilateral, 2000); Tonsillectomy; and Humerus fracture surgery (Left, 5/12/2021). Social History:  reports that she has been smoking cigarettes. She started smoking about 18 years ago. She has a 15.00 pack-year smoking history. She has quit using smokeless tobacco. She reports previous alcohol use of about 70.0 standard drinks of alcohol per week. She reports current drug use. Frequency: 1.00 time per week. Drug: Marijuana [employer]). Family History: family history is not on file. The patients home medications have been reviewed. Allergies: Patient has no known allergies.     -------------------------------------------------- RESULTS -------------------------------------------------  Labs:  Results for orders placed or performed during the hospital encounter of 12/10/21   CBC Auto Differential   Result Value Ref Range    WBC 10.9 4.5 - 11.5 E9/L    RBC 3.71 3.50 - 5.50 E12/L    Hemoglobin 13.5 11.5 - 15.5 g/dL    Hematocrit 37.9 34.0 - 48.0 %    .2 (H) 80.0 - 99.9 fL    MCH 36.4 (H) 26.0 - 35.0 pg    MCHC 35.6 (H) 32.0 - 34.5 %    RDW 11.6 11.5 - 15.0 fL Platelets 570 (H) 343 - 450 E9/L    MPV 9.5 7.0 - 12.0 fL    Neutrophils % 63.4 43.0 - 80.0 %    Immature Granulocytes % 0.3 0.0 - 5.0 %    Lymphocytes % 25.4 20.0 - 42.0 %    Monocytes % 10.2 2.0 - 12.0 %    Eosinophils % 0.2 0.0 - 6.0 %    Basophils % 0.5 0.0 - 2.0 %    Neutrophils Absolute 6.92 1.80 - 7.30 E9/L    Immature Granulocytes # 0.03 E9/L    Lymphocytes Absolute 2.77 1.50 - 4.00 E9/L    Monocytes Absolute 1.11 (H) 0.10 - 0.95 E9/L    Eosinophils Absolute 0.02 (L) 0.05 - 0.50 E9/L    Basophils Absolute 0.05 0.00 - 0.20 E9/L   Comprehensive Metabolic Panel w/ Reflex to MG   Result Value Ref Range    Sodium 128 (L) 132 - 146 mmol/L    Potassium reflex Magnesium 3.4 (L) 3.5 - 5.0 mmol/L    Chloride 85 (L) 98 - 107 mmol/L    CO2 24 22 - 29 mmol/L    Anion Gap 19 (H) 7 - 16 mmol/L    Glucose 112 (H) 74 - 99 mg/dL    BUN 4 (L) 6 - 20 mg/dL    CREATININE 0.4 (L) 0.5 - 1.0 mg/dL    GFR Non-African American >60 >=60 mL/min/1.73    GFR African American >60     Calcium 8.9 8.6 - 10.2 mg/dL    Total Protein 7.1 6.4 - 8.3 g/dL    Albumin 3.0 (L) 3.5 - 5.2 g/dL    Total Bilirubin 0.6 0.0 - 1.2 mg/dL    Alkaline Phosphatase 136 (H) 35 - 104 U/L    ALT 7 0 - 32 U/L    AST 29 0 - 31 U/L   Lipase   Result Value Ref Range    Lipase 18 13 - 60 U/L   Troponin   Result Value Ref Range    Troponin, High Sensitivity 13 (H) 0 - 9 ng/L   Brain Natriuretic Peptide   Result Value Ref Range    Pro-BNP 96 0 - 125 pg/mL   Lactate, Sepsis   Result Value Ref Range    Lactic Acid, Sepsis 1.0 0.5 - 1.9 mmol/L   Urinalysis, reflex to microscopic   Result Value Ref Range    Color, UA Yellow Straw/Yellow    Clarity, UA Clear Clear    Glucose, Ur Negative Negative mg/dL    Bilirubin Urine Negative Negative    Ketones, Urine TRACE (A) Negative mg/dL    Specific Gravity, UA 1.010 1.005 - 1.030    Blood, Urine Negative Negative    pH, UA 6.5 5.0 - 9.0    Protein, UA Negative Negative mg/dL    Urobilinogen, Urine 0.2 <2.0 E.U./dL    Nitrite, Urine Negative Negative    Leukocyte Esterase, Urine Negative Negative   Ammonia   Result Value Ref Range    Ammonia 32.3 11.0 - 51.0 umol/L   Magnesium   Result Value Ref Range    Magnesium 1.5 (L) 1.6 - 2.6 mg/dL   Troponin   Result Value Ref Range    Troponin, High Sensitivity 13 (H) 0 - 9 ng/L   EKG 12 Lead   Result Value Ref Range    Ventricular Rate 101 BPM    Atrial Rate 101 BPM    P-R Interval 112 ms    QRS Duration 66 ms    Q-T Interval 396 ms    QTc Calculation (Bazett) 513 ms    P Axis 78 degrees    R Axis 107 degrees    T Axis 74 degrees       Radiology:  No orders to display       ------------------------- NURSING NOTES AND VITALS REVIEWED ---------------------------  Date / Time Roomed:  12/10/2021  1:06 PM  ED Bed Assignment:  [facility]\""[id number]    The nursing notes within the ED encounter and vital signs as below have been reviewed. BP (!) 125/95   Pulse 98   Temp 99.1 °F (37.3 °C) (Oral)   Resp 16   Ht 5' 3\" (1.6 m)   Wt 74 lb (33.6 kg)   SpO2 99%   BMI 13.11 kg/m²   Oxygen Saturation Interpretation: Normal      ------------------------------------------ PROGRESS NOTES ------------------------------------------  4:38 PM EST  I have spoken with the patient and discussed todays results, in addition to providing specific details for the plan of care and counseling regarding the diagnosis and prognosis. Their questions are answered at this time and they are agreeable with the plan. I discussed at length with them reasons for immediate return here for re evaluation. They will followup with their primary care physician by calling their office on Monday.      --------------------------------- ADDITIONAL PROVIDER NOTES ---------------------------------  At this time the patient is without objective evidence of an acute process requiring hospitalization or inpatient management. They have remained hemodynamically stable throughout their entire ED visit and are stable for discharge with outpatient follow-up.

## 2021-12-11 LAB
EKG ATRIAL RATE: 101 BPM
EKG P AXIS: 78 DEGREES
EKG P-R INTERVAL: 112 MS
EKG Q-T INTERVAL: 396 MS
EKG QRS DURATION: 66 MS
EKG QTC CALCULATION (BAZETT): 513 MS
EKG R AXIS: 107 DEGREES
EKG T AXIS: 74 DEGREES
EKG VENTRICULAR RATE: 101 BPM

## 2021-12-11 PROCEDURE — 93010 ELECTROCARDIOGRAM REPORT: CPT | Performed by: INTERNAL MEDICINE

## 2021-12-14 ENCOUNTER — TELEPHONE (OUTPATIENT)
Dept: FAMILY MEDICINE CLINIC | Age: 36
End: 2021-12-14

## 2021-12-14 NOTE — TELEPHONE ENCOUNTER
Patient called to make an appointment. She was in the hospital on 12/10/21. When an appointment was trying to be made a message popped up stating she was dismissed for no showing to her appointments with you. Please advise on what to do. I did see a letter was sent out to her but she probably did not receive it yet.

## 2022-01-05 ENCOUNTER — OFFICE VISIT (OUTPATIENT)
Dept: PAIN MANAGEMENT | Age: 37
End: 2022-01-05
Payer: MEDICAID

## 2022-01-05 VITALS
BODY MASS INDEX: 13.11 KG/M2 | WEIGHT: 74 LBS | HEIGHT: 63 IN | RESPIRATION RATE: 16 BRPM | DIASTOLIC BLOOD PRESSURE: 66 MMHG | TEMPERATURE: 97.6 F | OXYGEN SATURATION: 98 % | SYSTOLIC BLOOD PRESSURE: 94 MMHG | HEART RATE: 111 BPM

## 2022-01-05 DIAGNOSIS — R10.84 GENERALIZED ABDOMINAL PAIN: ICD-10-CM

## 2022-01-05 DIAGNOSIS — G89.4 CHRONIC PAIN SYNDROME: ICD-10-CM

## 2022-01-05 DIAGNOSIS — M79.7 FIBROMYALGIA: ICD-10-CM

## 2022-01-05 DIAGNOSIS — F10.10 ALCOHOL ABUSE: ICD-10-CM

## 2022-01-05 DIAGNOSIS — G89.4 CHRONIC PAIN SYNDROME: Primary | ICD-10-CM

## 2022-01-05 DIAGNOSIS — E43 SEVERE PROTEIN-CALORIE MALNUTRITION (HCC): ICD-10-CM

## 2022-01-05 PROCEDURE — 99204 OFFICE O/P NEW MOD 45 MIN: CPT

## 2022-01-05 PROCEDURE — 99205 OFFICE O/P NEW HI 60 MIN: CPT | Performed by: PAIN MEDICINE

## 2022-01-05 PROCEDURE — 4004F PT TOBACCO SCREEN RCVD TLK: CPT | Performed by: PAIN MEDICINE

## 2022-01-05 PROCEDURE — G8484 FLU IMMUNIZE NO ADMIN: HCPCS | Performed by: PAIN MEDICINE

## 2022-01-05 PROCEDURE — G8427 DOCREV CUR MEDS BY ELIG CLIN: HCPCS | Performed by: PAIN MEDICINE

## 2022-01-05 PROCEDURE — G8419 CALC BMI OUT NRM PARAM NOF/U: HCPCS | Performed by: PAIN MEDICINE

## 2022-01-05 RX ORDER — PREGABALIN 25 MG/1
CAPSULE ORAL
Qty: 69 CAPSULE | Refills: 0 | Status: SHIPPED
Start: 2022-01-05 | End: 2022-02-21

## 2022-01-05 RX ORDER — DRONABINOL 5 MG/1
CAPSULE ORAL
COMMUNITY
Start: 2021-12-27 | End: 2022-04-18 | Stop reason: SDUPTHER

## 2022-01-05 RX ORDER — ONDANSETRON 4 MG/1
TABLET, FILM COATED ORAL
Status: ON HOLD | COMMUNITY
Start: 2021-12-07 | End: 2022-02-25

## 2022-01-05 NOTE — PROGRESS NOTES
[facility]        [facility]  [facility] - [facility], [address] [place] [address]  Dept: [phone]        Patient:  KEVIN Nix 1985    Date of Service:  22     Requesting Physician:  Nikkie Parnell MD    Reason for Consult:      Patient presents with complaints of diffuse joint pain that started 2 months ago    HISTORY OF PRESENT ILLNESS:      Pain is constant and is described as burning, sharp and shooting. Pain does not radiate to both lower extremities. She  has numbness, tingling, weakness that is diffuse and does report bladder dysfunction as she describes intermittent numbness of the perneal region and cannot make it to the bathroom in time. Alleviating factors include: sleep. Aggravating factors include: Movement, touching. She has not been on anticoagulation medications to include ASA, NSAIDS, Plavix, heparin, LMW heparin and warfarin and has not been on herbal supplements. She is not diabetic. Imagin/2021 CT a/p -  CT ANGIOGRAPHY OF THE CHEST, ABDOMEN AND PELVIS:       1. Unremarkable CTA of the chest, abdomen and pelvis. 2. No major arterial aneurysm, dissection, stenosis or occlusion. 3. No evidence of pulmonary embolism. CT OF THE CHEST (NON VASCULAR):       1. Patchy ground-glass opacities in the lungs which may be   infectious/inflammatory. 2. Large bilateral pleural effusions with overlying compressive atelectasis. 3. The heart is normal in size.  No pericardial effusion. CT OF THE ABDOMEN AND PELVIS (NON VASCULAR):       1. Moderate to large volume ascites. 2. Somewhat heterogeneous enhancement of the pancreatic head may be due to   surrounding ascites.  Acute pancreatitis cannot be excluded. 3. Mild apparent mural thickening of the colon may be due to under distension   or colitis.    4. Prominent hepatic steatosis.         2019 CT cervical -  FINDINGS:     There there is mild reversal of the cervical lordosis centered at C4-5. There is no evidence of acute fracture or facet dislocation. Vertebral body height and alignment are normally maintained. There is   mild loss of disc height at C4-5 with a moderate-sized broad-based   central to right paracentral disc protrusion/extrusion that is causing   effacement of the anterior subarachnoid space and moderate canal   stenosis to the right of midline as well as significant encroachment   on the right lateral recess. The disc protrusion may extend into the   proximal right neural foramen. There is mild bulging of the discs at   the C3-4 level and also at the C5-6 level where there is mild central   canal narrowing.       The odontoid is intact. The C1-C2 articulation appears normal. The   bones are normally mineralized. No osteoblastic or osteolytic lesions   are seen. No prevertebral soft tissue swelling is seen. The visible   left lung apex is expanded. The right lung apex is not well seen. There are scattered small bilateral cervical lymph nodes with a   submandibular/submental node on the right measuring approximately 1 cm   and on the left measuring approximately 13 mm. These are nonspecific   and most likely reactive.       CONCLUSION:   1. No evidence of acute fracture or subluxation. 2. Relatively large broad-based central to right paracentral disc   protrusion or extrusion at C4-5 causing encroachment on the spinal   cord and right lateral recess with moderate canal stenosis to the   right of midline. 3. Small broad-based disc bulges at C3-4 and C5-6 with mild central   canal narrowing at C5-6.   4. Mild reversal of the cervical lordosis centered at C4-5.   5. Shotty bilateral cervical lymph nodes, nonspecific and most likely   reactive. Previous treatments: medications. .      Past Medical History:   Diagnosis Date    Anemia     Bipolar disorder current episode depressed ([facility] [address]     Therapy    Depression     ETOH abuse     Humerus fracture 04/2021    left- For OR 5-12-21    Major depression, recurrent (HCC)     Therapy -stable per pat    Mild tetrahydrocannabinol (THC) abuse     chronic     Protein calorie malnutrition ([facility] [address]     severe       Past Surgical History:   Procedure Laterality Date    HUMERUS FRACTURE SURGERY Left 5/12/2021    LEFT PROXIMAL HUMERUS OPEN REDUCTION INTERNAL FIXATION performed by Alex Box MD at [address] [facility] Bilateral 2000   [address]         Prior to Admission medications    Medication Sig Start Date End Date Taking?  Authorizing Provider   dronabinol (MARINOL) 5 MG capsule take 1 capsule by mouth twice a day 12/27/21  Yes Historical Provider, MD   ondansetron (ZOFRAN) 4 MG tablet take 1 tablet by mouth twice a day 12/7/21  Yes Historical Provider, MD   pregabalin (LYRICA) 25 MG capsule qHS x 7 days then BID x7 days then TID thereafter 1/5/22 2/4/22 Yes Getachew Clifford DO   dicyclomine (BENTYL) 10 MG capsule Take 1 capsule by mouth 2 times daily 11/16/21  Yes Bhanu Dee DO   furosemide (LASIX) 40 MG tablet Take 1 tablet by mouth daily 11/17/21  Yes Bhanu Dee DO   spironolactone (ALDACTONE) 100 MG tablet Take 1 tablet by mouth daily 11/17/21  Yes Bhanu Dee DO   folic acid (FOLVITE) 1 MG tablet Take 1 tablet by mouth daily 11/5/21  Yes Vannessa Cao MD   melatonin 3 MG TABS tablet Take 1 tablet by mouth nightly  Patient not taking: Reported on 1/5/2022 12/10/21   Karlos Talbot MD   vitamin B-1 (THIAMINE) 100 MG tablet Take 1 tablet by mouth daily  Patient not taking: Reported on 1/5/2022 11/5/21   Vannessa Cao MD   pantoprazole (PROTONIX) 40 MG tablet Take 1 tablet by mouth every morning (before breakfast)  Patient not taking: Reported on 1/5/2022 11/6/21   Vannessa Cao MD       No Known Allergies    Social History     Socioeconomic History    Marital status: Single     Spouse name: Not on file    Number of children: 0    Years of education: 15    Highest education level: Not on file   Occupational History    Not on file   Tobacco Use    Smoking status: Current Every Day Smoker     Packs/day: 1.00     Years: 15.00     Pack years: 15.00     Types: Cigarettes     Start date: 10/3/2003    Smokeless tobacco: Former User   Vaping Use    Vaping Use: Former   Substance and Sexual Activity    Alcohol use: Not Currently     Alcohol/week: 42.0 standard drinks     Types: 42 Cans of beer per week     Comment: 6 packs    Drug use: Yes     Frequency: 1.0 times per week     Types: Marijuana Edna Peals)     Comment: last used 12/31/2021    Sexual activity: Not on file   Other Topics Concern    Not on file   Social History Narrative    Not on file     Social Determinants of Health     Financial Resource Strain:     Difficulty of Paying Living Expenses: Not on file   Food Insecurity:     Worried About [address] in the Last Year: Not on file    Ann of Food in the Last Year: Not on file   Transportation Needs:     Lack of Transportation (Medical): Not on file    Lack of Transportation (Non-Medical):  Not on file   Physical Activity:     Days of Exercise per Week: Not on file    Minutes of Exercise per Session: Not on file   Stress:     Feeling of Stress : Not on file   Social Connections:     Frequency of Communication with Friends and Family: Not on file    Frequency of Social Gatherings with Friends and Family: Not on file    Attends [employer] Services: Not on file    Active Member of [address] [place] [address] or Organizations: Not on file    Attends Club or Organization Meetings: Not on file    Marital Status: Not on file   Intimate Partner Violence:     Fear of Current or Ex-Partner: Not on file    Emotionally Abused: Not on file    Physically Abused: Not on file    Sexually Abused: Not on file   Housing Stability:     Unable to Pay for Housing in the Last Year: Not on file    Number of [place] in the Last Year: Not on file    Unstable Housing in the Last Year: Not on file reflex2+  Right Achilles reflex2+  Left Achilles reflex2+  Gait:normal station    Dermatology:    Skin:no rashes or lesions noted    Impression:    Diffuse pain complaints, mostly joint symptoms, hands, legs, feet, ankles for the past 2 months  Has been admitted to the hospital several times over the last few months prior to consultation related to effects of alcohol cirrhosis  Pt reported in 11/2021 hospital documentation that she had discontinued alcohol use one month prior  No drug screens in the system since 2019, all prior drug screens + high levels of ETOH, from 3154-8442  Has difficulty sleeping due to pain  Chronic abdominal pain due to alcoholic cirrhosis, follows with [facility])  + history of depression with suicidal ideation, bipolar d/o  + severe malnutrition, current weight is 74 lbs  Both parents were heavy drinks and per pt are currently in recovery    PT/INR 11/15/21 = 13.0/1.2  CBC 12/2021 plts 457, WBC 10.9, H/H 13.5/37.9  ESR 11/9/21 = 45, CRP 11/9/21 =3.0    Has appt next this week w rheumatologist for intake form completion and bloodwork with a consultation and to go over bloodwork the week following  Pt cannot tell me who her rheumatologist is that she will be seeing    Pt feels the best course of treatment would be \"to get back on my percocet\"  Pt became frustrated when we discussed avoidance of chronic opioid therapy and utilizing membrane stabilizers to treat the neuropathic pain component  She stated that I needed to treat her insomnia, discussed that my hope is that the membrane stabilizer improves her pain which would then improve her insomnia  She then became more upset stating \"it is not going to help my pain and it's not going to help me sleep, I have to get going, my girlfriend brought me and I didn't know this was going to take this long\" (of note the time was 1:25, her appt time was 12:30 and in that time she had her intake forms completed, her nursing assessment, a urine drug screen, and a consultation with the specialist after a full chart review; all done in under one hour)    I discussed this case with Dr. Sameer Little (listed as current PCP), who states the patient was discharged from her office after multiple no shows to back to back appts that were made for her with Dr. Sameer Little and the GI physician in that office    Overall there are signs of drug-seeking behavior involved in this case    Plan:    Given pt's descriptions of numbness and weakness of various parts of her body which is intermittent in nature, I recommend a consultation with a neurologist (pt reports her GI provider is coordiating her care and wanted her to see the rheumatologist first before referring to neurology)  Reviewed hospital records related to recent admissions  Reviewed all prior drug screens  Reviewed referral documents and imaging  Urine screen today   OARRS report reviewed  Pt is not a candidate for chronic opioid therapy  Pregabalin titration  Patient encouraged to stay active as toleratedand pt is working with GI in regards to weight gain  Treatment plan discussed with the patient including medication and procedure side effects     Spent >60 minutes on this case with >50% of that time spent in face to face contact    CC:  Referring physician    YARIEL Holley.

## 2022-01-05 NOTE — PROGRESS NOTES
Do you currently have any of the following:    Fever: No  Headache:  No  Cough: No  Shortness of breath: No  Exposed to anyone with these symptoms: Alicia Vinson Jeannie presents to the [facility] on 1/5/2022. So Marin is complaining of pain in her hands, legs, ankles, feet, and joints. The pain is constant. The pain is described as shooting, sharp, burning, numb and tight. Pain is rated on her best day at a 6, on her worst day at a 8, and on average at a 7 on the VAS scale. She took her last dose of Percocet 2 days ago, Drobinal yesterday. Pacemaker or defibrillator: No managed by n/a. She is not on NSAIDS and is not on anticoagulation medications. BP 94/66   Pulse 111   Temp 97.6 °F (36.4 °C) (Infrared)   Resp 16   Ht 5' 3\" (1.6 m)   Wt 74 lb (33.6 kg)   SpO2 98%   BMI 13.11 kg/m²      No LMP recorded. Patient has had an injection.

## 2022-01-06 LAB
6-MONOACETYLMORPHINE, URINE: NOT DETECTED
ALCOHOL URINE: NOT DETECTED
AMPHETAMINE SCREEN, URINE: NOT DETECTED
BARBITURATE SCREEN URINE: NOT DETECTED
BENZODIAZEPINE SCREEN, URINE: NOT DETECTED
BUPRENORPHINE URINE: NOT DETECTED
CANNABINOID SCREEN URINE: POSITIVE
COCAINE METABOLITE SCREEN URINE: NOT DETECTED
FENTANYL SCREEN, URINE: NOT DETECTED
INTEGRITY CHECK, CREATININE, URINE: 5.5
INTEGRITY CHECK, OXIDANT, URINE: <40
INTEGRITY CHECK, PH, URINE: 6.3 (ref 4.5–9)
INTEGRITY CHECK, SPECIFIC GRAVITY, URINE: 1.01 (ref 1–1.03)
INTEGRITY CHECK, SPECIMEN INTEGRITY, URINE: ABNORMAL
Lab: ABNORMAL
METHADONE SCREEN, URINE: NOT DETECTED
OPIATE SCREEN URINE: NOT DETECTED
OXYCODONE URINE: NOT DETECTED
PHENCYCLIDINE SCREEN URINE: NOT DETECTED
TRAMADOL SCREEN URINE: NOT DETECTED

## 2022-01-07 LAB
6-MAM, QUANTITATIVE, URINE: <10
7-AMINOCLONAZEPAM, QUANTITATIVE, URINE: <50
ALPHA-HYDROXYALPRAZOLAM, QUANTITATIVE, URINE: <50
ALPHA-HYDROXYMIDAZOLAM, QUANTITATIVE, URINE: <50
ALPHA-HYDROXYTRIAZOLAM, QUANTITATIVE, URINE: <50
ALPRAZOLAM URINE QUANT: <50
CHLORDIAZEPOXIDE, QUANTITATIVE, URINE: <50
CLONAZEPAM, QUANTITATIVE, URINE: <50
CODEINE, QUANTITATIVE, URINE: <50
COMMENT: NORMAL
DIAZEPAM URINE QUANT: <50
FLUNITRAZEPAM, QUANTITATIVE, URINE: <50
FLURAZEPAM, QUANTITATIVE, URINE: <50
HYDROCODONE, QUANTITATIVE, URINE: 503.9
HYDROMORPHONE, QUANTITATIVE, URINE: <50
LORAZEPAM URINE QUANT: <50
MIDAZOLAM URINE QUANT: <50
MORPHINE, QUANTITATIVE, URINE: <50
NORDIAZEPAM URINE QUANT: <50
NORHYDROCODONE, QUANTITATIVE, URINE: 254.2
NOROXYCODONE, QUANTITATIVE, URINE: <50
OXAZEPAM URINE QUANT: <50
OXYCODONE URINE, QUANTITATIVE: <50
OXYMORPHONE, QUANTITATIVE, URINE: <50
TEMAZEPAM, QUANTITATIVE, URINE: <50
THC NORMALIZED, QUANTITIATIVE, URINE: 6494.5
THC-COOH, QUANTITATIVE, URINE: 357.2

## 2022-02-21 ENCOUNTER — OFFICE VISIT (OUTPATIENT)
Dept: PRIMARY CARE CLINIC | Age: 37
End: 2022-02-21
Payer: MEDICAID

## 2022-02-21 VITALS
SYSTOLIC BLOOD PRESSURE: 118 MMHG | BODY MASS INDEX: 14.18 KG/M2 | OXYGEN SATURATION: 98 % | HEART RATE: 101 BPM | DIASTOLIC BLOOD PRESSURE: 72 MMHG | HEIGHT: 63 IN | TEMPERATURE: 98.7 F | WEIGHT: 80 LBS

## 2022-02-21 DIAGNOSIS — G47.01 INSOMNIA DUE TO MEDICAL CONDITION: ICD-10-CM

## 2022-02-21 DIAGNOSIS — R10.84 GENERALIZED ABDOMINAL PAIN: ICD-10-CM

## 2022-02-21 DIAGNOSIS — K70.11 ALCOHOLIC HEPATITIS WITH ASCITES: ICD-10-CM

## 2022-02-21 DIAGNOSIS — E43 SEVERE PROTEIN-CALORIE MALNUTRITION (HCC): ICD-10-CM

## 2022-02-21 DIAGNOSIS — G89.4 CHRONIC PAIN SYNDROME: ICD-10-CM

## 2022-02-21 DIAGNOSIS — F12.10 TETRAHYDROCANNABINOL (THC) USE DISORDER, MILD, ABUSE: Chronic | ICD-10-CM

## 2022-02-21 DIAGNOSIS — M79.7 FIBROMYALGIA: ICD-10-CM

## 2022-02-21 DIAGNOSIS — F31.4 BIPOLAR DISORDER WITH SEVERE DEPRESSION (HCC): ICD-10-CM

## 2022-02-21 DIAGNOSIS — F31.10 BIPOLAR AFFECTIVE DISORDER, CURRENT EPISODE MANIC, CURRENT EPISODE SEVERITY UNSPECIFIED (HCC): ICD-10-CM

## 2022-02-21 DIAGNOSIS — K70.31 ALCOHOLIC CIRRHOSIS OF LIVER WITH ASCITES (HCC): Primary | ICD-10-CM

## 2022-02-21 PROBLEM — F32.A DEPRESSION WITH SUICIDAL IDEATION: Status: RESOLVED | Noted: 2018-10-24 | Resolved: 2022-02-21

## 2022-02-21 PROBLEM — K21.9 GERD (GASTROESOPHAGEAL REFLUX DISEASE): Status: ACTIVE | Noted: 2021-12-06

## 2022-02-21 PROBLEM — F10.10 ALCOHOL ABUSE: Chronic | Status: RESOLVED | Noted: 2018-08-24 | Resolved: 2022-02-21

## 2022-02-21 PROBLEM — R45.851 DEPRESSION WITH SUICIDAL IDEATION: Status: RESOLVED | Noted: 2018-10-24 | Resolved: 2022-02-21

## 2022-02-21 PROBLEM — K65.2 SBP (SPONTANEOUS BACTERIAL PERITONITIS) (HCC): Status: RESOLVED | Noted: 2021-11-08 | Resolved: 2022-02-21

## 2022-02-21 PROBLEM — F33.2 MAJOR DEPRESSIVE DISORDER, RECURRENT EPISODE, SEVERE (HCC): Chronic | Status: RESOLVED | Noted: 2018-08-24 | Resolved: 2022-02-21

## 2022-02-21 PROCEDURE — G8419 CALC BMI OUT NRM PARAM NOF/U: HCPCS | Performed by: FAMILY MEDICINE

## 2022-02-21 PROCEDURE — 4004F PT TOBACCO SCREEN RCVD TLK: CPT | Performed by: FAMILY MEDICINE

## 2022-02-21 PROCEDURE — 99213 OFFICE O/P EST LOW 20 MIN: CPT | Performed by: FAMILY MEDICINE

## 2022-02-21 PROCEDURE — G8484 FLU IMMUNIZE NO ADMIN: HCPCS | Performed by: FAMILY MEDICINE

## 2022-02-21 PROCEDURE — G8427 DOCREV CUR MEDS BY ELIG CLIN: HCPCS | Performed by: FAMILY MEDICINE

## 2022-02-21 RX ORDER — HYDROXYZINE HYDROCHLORIDE 25 MG/1
25 TABLET, FILM COATED ORAL NIGHTLY
Qty: 90 TABLET | Refills: 3 | Status: ON HOLD
Start: 2022-02-21 | End: 2022-02-25

## 2022-02-21 RX ORDER — PREGABALIN 50 MG/1
50 CAPSULE ORAL 2 TIMES DAILY
Qty: 60 CAPSULE | Refills: 0 | Status: SHIPPED
Start: 2022-02-21 | End: 2022-04-18 | Stop reason: SDUPTHER

## 2022-02-21 ASSESSMENT — ENCOUNTER SYMPTOMS
CONSTIPATION: 1
DIARRHEA: 1
NAUSEA: 1
CHEST TIGHTNESS: 1
VOMITING: 0
BACK PAIN: 1

## 2022-02-21 ASSESSMENT — PATIENT HEALTH QUESTIONNAIRE - PHQ9
5. POOR APPETITE OR OVEREATING: 3
SUM OF ALL RESPONSES TO PHQ9 QUESTIONS 1 & 2: 2
SUM OF ALL RESPONSES TO PHQ QUESTIONS 1-9: 13
8. MOVING OR SPEAKING SO SLOWLY THAT OTHER PEOPLE COULD HAVE NOTICED. OR THE OPPOSITE, BEING SO FIGETY OR RESTLESS THAT YOU HAVE BEEN MOVING AROUND A LOT MORE THAN USUAL: 2
7. TROUBLE CONCENTRATING ON THINGS, SUCH AS READING THE NEWSPAPER OR WATCHING TELEVISION: 2
SUM OF ALL RESPONSES TO PHQ QUESTIONS 1-9: 13
SUM OF ALL RESPONSES TO PHQ QUESTIONS 1-9: 13
1. LITTLE INTEREST OR PLEASURE IN DOING THINGS: 0
9. THOUGHTS THAT YOU WOULD BE BETTER OFF DEAD, OR OF HURTING YOURSELF: 0
3. TROUBLE FALLING OR STAYING ASLEEP: 2
2. FEELING DOWN, DEPRESSED OR HOPELESS: 2
4. FEELING TIRED OR HAVING LITTLE ENERGY: 2
SUM OF ALL RESPONSES TO PHQ QUESTIONS 1-9: 13
10. IF YOU CHECKED OFF ANY PROBLEMS, HOW DIFFICULT HAVE THESE PROBLEMS MADE IT FOR YOU TO DO YOUR WORK, TAKE CARE OF THINGS AT HOME, OR GET ALONG WITH OTHER PEOPLE: 2

## 2022-02-21 NOTE — PROGRESS NOTES
Abhishek Khalil (:  1985) is a 43 y.o. female,New patient, here for evaluation of the following chief complaint(s):  New Patient, Establish Care (cirrhosis of liver), and Weight Loss (25 lb weight loss in last 2 months)         ASSESSMENT/PLAN:  1. Alcoholic cirrhosis of liver with ascites (HCC)  -     C-Reactive Protein; Future  -     Rheumatoid Factor; Future  -     DENIS; Future  2. Generalized abdominal pain  -     C-Reactive Protein; Future  -     Rheumatoid Factor; Future  -     DENIS; Future  3. Alcoholic hepatitis with ascites  -     C-Reactive Protein; Future  -     Rheumatoid Factor; Future  -     DENIS; Future  4. Chronic pain syndrome  -     C-Reactive Protein; Future  -     Rheumatoid Factor; Future  -     DENIS; Future  -     pregabalin (LYRICA) 50 MG capsule; Take 1 capsule by mouth 2 times daily for 30 days. , Disp-60 capsule, R-0Normal  5. Fibromyalgia  -     C-Reactive Protein; Future  -     Rheumatoid Factor; Future  -     DENIS; Future  -     pregabalin (LYRICA) 50 MG capsule; Take 1 capsule by mouth 2 times daily for 30 days. , Disp-60 capsule, R-0Normal  6. Bipolar affective disorder, current episode manic, current episode severity unspecified ([facility] [address]  -     C-Reactive Protein; Future  -     Rheumatoid Factor; Future  -     DENIS; Future  7. Severe protein-calorie malnutrition (HCC)  -     C-Reactive Protein; Future  -     Rheumatoid Factor; Future  -     DENIS; Future  8. Insomnia due to medical condition  -     C-Reactive Protein; Future  -     Rheumatoid Factor; Future  -     DENIS; Future  -     hydrOXYzine (ATARAX) 25 MG tablet; Take 1 tablet by mouth nightly, Disp-90 tablet, R-3Normal  At this time we will send over hydroxyzine as there is some benefit and insomnia with patients with cirrhosis. Patient will continue to follow with all of her specialist as indicated. She is supposed to have a large amount of blood work done up with a gastroenterologist shortly.   She also has follow-up appointments with several other specialist.  See her back in 2 to 3 months or sooner if needed. Return in about 3 months (around 5/21/2022). Subjective   SUBJECTIVE/OBJECTIVE:  HPI  Patient presents today to establish with new PCP. Recently dismissed from practice from previous provider due to no-shows. Patient also no showed for referral to gastroenterology. Patient states she already follows with gastroenterology. She is also seeing pain management for chronic pain issues. All available notes were reviewed today while patient was in office. Patient's main concern today is continued weight loss and loss of appetite in addition to what appears to be sequela of chronic liver disease. She is supposed to follow-up with vascular surgery for circulatory assessment due to worsening pain in the hands and feet bilaterally. Patient relates worsening insomnia due to anxiety/pain. No other concerns or complaints noted today. Review of Systems   Constitutional: Positive for chills and fatigue. Negative for diaphoresis and fever. Denies malaise   Respiratory: Positive for chest tightness. Cardiovascular: Negative for chest pain. Gastrointestinal: Positive for constipation, diarrhea and nausea. Negative for vomiting. HAS abdominal tightness   Endocrine: Positive for cold intolerance. Genitourinary:        HAS decreased urine output   Musculoskeletal: Positive for arthralgias, back pain, joint swelling and myalgias. HAS edema, HAS leg pain   Neurological: Positive for weakness, light-headedness, numbness and headaches. Negative for syncope. Psychiatric/Behavioral: Positive for agitation, dysphoric mood and sleep disturbance. Negative for confusion. The patient is nervous/anxious. HAS anxiety   All other systems reviewed and are negative.          Current Outpatient Medications:     hydrOXYzine (ATARAX) 25 MG tablet, Take 1 tablet by mouth nightly, Disp: 90 tablet, Rfl: 3   pregabalin (LYRICA) 50 MG capsule, Take 1 capsule by mouth 2 times daily for 30 days. , Disp: 60 capsule, Rfl: 0    dronabinol (MARINOL) 5 MG capsule, take 1 capsule by mouth twice a day, Disp: , Rfl:     spironolactone (ALDACTONE) 100 MG tablet, Take 1 tablet by mouth daily, Disp: 30 tablet, Rfl: 0    folic acid (FOLVITE) 1 MG tablet, Take 1 tablet by mouth daily, Disp: 30 tablet, Rfl: 0    ondansetron (ZOFRAN) 4 MG tablet, take 1 tablet by mouth twice a day (Patient not taking: Reported on 2/21/2022), Disp: , Rfl:     melatonin 3 MG TABS tablet, Take 1 tablet by mouth nightly (Patient not taking: Reported on 1/5/2022), Disp: 30 tablet, Rfl: 0    dicyclomine (BENTYL) 10 MG capsule, Take 1 capsule by mouth 2 times daily (Patient not taking: Reported on 2/21/2022), Disp: 60 capsule, Rfl: 0    furosemide (LASIX) 40 MG tablet, Take 1 tablet by mouth daily (Patient not taking: Reported on 2/21/2022), Disp: 60 tablet, Rfl: 0    vitamin B-1 (THIAMINE) 100 MG tablet, Take 1 tablet by mouth daily (Patient not taking: Reported on 1/5/2022), Disp: 30 tablet, Rfl: 0    pantoprazole (PROTONIX) 40 MG tablet, Take 1 tablet by mouth every morning (before breakfast) (Patient not taking: Reported on 1/5/2022), Disp: 30 tablet, Rfl: 0   Patient Active Problem List   Diagnosis    Acute alcoholic intoxication with complication (HCC)    Tetrahydrocannabinol (THC) use disorder, mild, abuse    Major depressive disorder, recurrent episode, severe (HCC)    Alcohol abuse    Severe protein-calorie malnutrition ([address]    Bipolar affective disorder, manic ([address]    Depression with suicidal ideation    Bipolar disorder with severe depression ([address]    Closed fracture of left proximal humerus    Cirrhosis ([address]    Generalized abdominal pain    SBP (spontaneous bacterial peritonitis) ([address]    Alcoholic hepatitis    Chronic pain syndrome    Fibromyalgia    GERD (gastroesophageal reflux disease)     Past Medical History: Diagnosis Date    Anemia     Bipolar disorder current episode depressed ([facility] [address]     Therapy    Depression     ETOH abuse     Humerus fracture 04/2021    left- For OR 5-12-21    Liver disease     Major depression, recurrent (HCC)     Therapy -stable per pat    Mild tetrahydrocannabinol (THC) abuse     chronic     Protein calorie malnutrition (HCC)     severe     Past Surgical History:   Procedure Laterality Date    HUMERUS FRACTURE SURGERY Left 5/12/2021    LEFT PROXIMAL HUMERUS OPEN REDUCTION INTERNAL FIXATION performed by Cheryl Gordon MD at [facility]   [facility]  08/2021    TONSILLECTOMY       Social History     Socioeconomic History    Marital status: Single     Spouse name: Not on file    Number of children: 0    Years of education: 15    Highest education level: Not on file   Occupational History    Not on file   Tobacco Use    Smoking status: Current Every Day Smoker     Packs/day: 1.00     Years: 15.00     Pack years: 15.00     Types: Cigarettes     Start date: 10/3/2003    Smokeless tobacco: Former User   Vaping Use    Vaping Use: Former   Substance and Sexual Activity    Alcohol use: Not Currently     Alcohol/week: 42.0 standard drinks     Types: 42 Cans of beer per week     Comment: 6 packs    Drug use: Yes     Frequency: 1.0 times per week     Types: Marijuana Lanis Simone)     Comment: last used 12/31/2021    Sexual activity: Not on file   Other Topics Concern    Not on file   Social History Narrative    Not on file     Social Determinants of Health     Financial Resource Strain:     Difficulty of Paying Living Expenses: Not on file   Food Insecurity:     Worried About [address] in the Last Year: Not on file    [address] in the Last Year: Not on file   Transportation Needs:     Lack of Transportation (Medical): Not on file    Lack of Transportation (Non-Medical):  Not on file   Physical Activity:     Days of Exercise per Week: Not on file    Minutes of Exercise per Session: Not on file   Stress:     Feeling of Stress : Not on file   Social Connections:     Frequency of Communication with Friends and Family: Not on file    Frequency of Social Gatherings with Friends and Family: Not on file    Attends Zoroastrian Services: Not on file    Active Member of [address] [place] [address] [employer] or Organizations: Not on file    Attends Club or Organization Meetings: Not on file    Marital Status: Not on file   Intimate Partner Violence:     Fear of Current or Ex-Partner: Not on file    Emotionally Abused: Not on file    Physically Abused: Not on file    Sexually Abused: Not on file   Housing Stability:     Unable to Pay for Housing in the Last Year: Not on file    Number of [place] in the Last Year: Not on file    Unstable Housing in the Last Year: Not on file     Family History   Problem Relation Age of Onset    No Known Problems Mother     No Known Problems Father       There are no preventive care reminders to display for this patient. There are no preventive care reminders to display for this patient. There are no preventive care reminders to display for this patient.    Health Maintenance Due   Topic    DTaP/Tdap/Td vaccine (1 - Tdap)      Health Maintenance   Topic Date Due    Hepatitis A vaccine (1 of 2 - Risk 2-dose series) Never done    Varicella vaccine (1 of 2 - 2-dose childhood series) Never done    Pneumococcal 0-64 years Vaccine (1 of 2 - PPSV23) Never done    Hepatitis B vaccine (1 of 3 - Risk 3-dose series) Never done    DTaP/Tdap/Td vaccine (1 - Tdap) Never done    Cervical cancer screen  Never done    Flu vaccine (1) Never done    Potassium monitoring  12/10/2022    Creatinine monitoring  12/10/2022    Depression Monitoring  02/21/2023    COVID-19 Vaccine  Completed    Hepatitis C screen  Completed    HIV screen  Completed    Hib vaccine  Aged Out    Meningococcal (ACWY) vaccine  Aged Out      There are no preventive care reminders to display for this patient. There are no preventive care reminders to display for this patient. /72   Pulse 101   Temp 98.7 °F (37.1 °C)   Ht 5' 3\" (1.6 m)   Wt 80 lb (36.3 kg)   SpO2 98%   BMI 14.17 kg/m²     Objective   Physical Exam  Vitals reviewed. Constitutional:       Appearance: She is cachectic. She is ill-appearing. HENT:      Head: Normocephalic and atraumatic. Eyes:      General: No scleral icterus. Conjunctiva/sclera: Conjunctivae normal.      Pupils: Pupils are equal, round, and reactive to light. Neck:      Thyroid: No thyromegaly. Cardiovascular:      Rate and Rhythm: Regular rhythm. Tachycardia present. Heart sounds: Normal heart sounds. No murmur heard. Pulmonary:      Effort: Pulmonary effort is normal.      Breath sounds: Decreased air movement present. Decreased breath sounds present. No rales. Abdominal:      General: Bowel sounds are normal. There is distension. Palpations: Abdomen is soft. Tenderness: There is abdominal tenderness. Musculoskeletal:         General: Normal range of motion. Cervical back: Neck supple. Lymphadenopathy:      Cervical: No cervical adenopathy. Skin:     General: Skin is warm and dry. Coloration: Skin is jaundiced. Findings: No erythema or rash. Comments: Scattered xerosis noted to the extremities. Neurological:      Mental Status: She is alert and oriented to person, place, and time. Cranial Nerves: No cranial nerve deficit. Psychiatric:         Attention and Perception: Attention normal.         Mood and Affect: Mood is anxious. Speech: Speech is rapid and pressured. Judgment: Judgment normal.                  An electronic signature was used to authenticate this note.     --Jaun Garsia DO

## 2022-02-22 ENCOUNTER — TELEPHONE (OUTPATIENT)
Dept: FAMILY MEDICINE CLINIC | Age: 37
End: 2022-02-22

## 2022-02-22 NOTE — TELEPHONE ENCOUNTER
----- Message from Clem Patel sent at 2/21/2022  4:30 PM EST -----  Subject: Message to Provider    QUESTIONS  Information for Provider? pt would like a call to discuss her weight loss   and see if anything can be prescribed  ---------------------------------------------------------------------------  --------------  CALL BACK INFO  What is the best way for the office to contact you? OK to leave message on   voicemail  Preferred Call Back Phone Number? [phone]  ---------------------------------------------------------------------------  --------------  SCRIPT ANSWERS  Relationship to Patient?  Self

## 2022-02-23 DIAGNOSIS — K70.11 ALCOHOLIC HEPATITIS WITH ASCITES: ICD-10-CM

## 2022-02-23 DIAGNOSIS — F31.10 BIPOLAR AFFECTIVE DISORDER, CURRENT EPISODE MANIC, CURRENT EPISODE SEVERITY UNSPECIFIED (HCC): ICD-10-CM

## 2022-02-23 DIAGNOSIS — R10.84 GENERALIZED ABDOMINAL PAIN: ICD-10-CM

## 2022-02-23 DIAGNOSIS — G89.4 CHRONIC PAIN SYNDROME: ICD-10-CM

## 2022-02-23 DIAGNOSIS — M79.7 FIBROMYALGIA: ICD-10-CM

## 2022-02-23 DIAGNOSIS — G47.01 INSOMNIA DUE TO MEDICAL CONDITION: ICD-10-CM

## 2022-02-23 DIAGNOSIS — K70.31 ALCOHOLIC CIRRHOSIS OF LIVER WITH ASCITES (HCC): ICD-10-CM

## 2022-02-23 DIAGNOSIS — E43 SEVERE PROTEIN-CALORIE MALNUTRITION (HCC): ICD-10-CM

## 2022-02-24 LAB
ALBUMIN SERPL-MCNC: 3.2 G/DL (ref 3.5–5.2)
ALP BLD-CCNC: 149 U/L (ref 35–104)
ALT SERPL-CCNC: 15 U/L (ref 0–32)
AMMONIA: 85 UMOL/L (ref 11–51)
ANION GAP SERPL CALCULATED.3IONS-SCNC: 15 MMOL/L (ref 7–16)
APTT: 29.1 SEC (ref 24.5–35.1)
AST SERPL-CCNC: 62 U/L (ref 0–31)
BASOPHILS ABSOLUTE: 0.07 E9/L (ref 0–0.2)
BASOPHILS RELATIVE PERCENT: 0.7 % (ref 0–2)
BILIRUB SERPL-MCNC: 0.4 MG/DL (ref 0–1.2)
BUN BLDV-MCNC: 3 MG/DL (ref 6–20)
C-REACTIVE PROTEIN: 0.3 MG/DL (ref 0–0.4)
CALCIUM SERPL-MCNC: 8.7 MG/DL (ref 8.6–10.2)
CHLORIDE BLD-SCNC: 99 MMOL/L (ref 98–107)
CO2: 26 MMOL/L (ref 22–29)
CREAT SERPL-MCNC: 0.4 MG/DL (ref 0.5–1)
EOSINOPHILS ABSOLUTE: 0.06 E9/L (ref 0.05–0.5)
EOSINOPHILS RELATIVE PERCENT: 0.6 % (ref 0–6)
FERRITIN: 379 NG/ML
FOLATE: >20 NG/ML (ref 4.8–24.2)
GFR AFRICAN AMERICAN: >60
GFR NON-AFRICAN AMERICAN: >60 ML/MIN/1.73
GLUCOSE BLD-MCNC: 92 MG/DL (ref 74–99)
HCT VFR BLD CALC: 39.1 % (ref 34–48)
HEMOGLOBIN: 13.2 G/DL (ref 11.5–15.5)
IMMATURE GRANULOCYTES #: 0.04 E9/L
IMMATURE GRANULOCYTES %: 0.4 % (ref 0–5)
INR BLD: 1
IRON SATURATION: 30 % (ref 15–50)
IRON: 69 MCG/DL (ref 37–145)
LIPASE: 46 U/L (ref 13–60)
LYMPHOCYTES ABSOLUTE: 3.32 E9/L (ref 1.5–4)
LYMPHOCYTES RELATIVE PERCENT: 32.4 % (ref 20–42)
MAGNESIUM: 1.8 MG/DL (ref 1.6–2.6)
MCH RBC QN AUTO: 34.5 PG (ref 26–35)
MCHC RBC AUTO-ENTMCNC: 33.8 % (ref 32–34.5)
MCV RBC AUTO: 102.1 FL (ref 80–99.9)
MONOCYTES ABSOLUTE: 0.8 E9/L (ref 0.1–0.95)
MONOCYTES RELATIVE PERCENT: 7.8 % (ref 2–12)
NEUTROPHILS ABSOLUTE: 5.95 E9/L (ref 1.8–7.3)
NEUTROPHILS RELATIVE PERCENT: 58.1 % (ref 43–80)
PDW BLD-RTO: 14.3 FL (ref 11.5–15)
PHOSPHORUS: 4.1 MG/DL (ref 2.5–4.5)
PLATELET # BLD: 400 E9/L (ref 130–450)
PMV BLD AUTO: 9.5 FL (ref 7–12)
POTASSIUM SERPL-SCNC: 2.7 MMOL/L (ref 3.5–5)
PROTHROMBIN TIME: 11.2 SEC (ref 9.3–12.4)
RBC # BLD: 3.83 E12/L (ref 3.5–5.5)
RHEUMATOID FACTOR: <10 IU/ML (ref 0–13)
SODIUM BLD-SCNC: 140 MMOL/L (ref 132–146)
TOTAL IRON BINDING CAPACITY: 230 MCG/DL (ref 250–450)
TOTAL PROTEIN: 6.7 G/DL (ref 6.4–8.3)
VITAMIN B-12: 482 PG/ML (ref 211–946)
WBC # BLD: 10.2 E9/L (ref 4.5–11.5)

## 2022-02-25 ENCOUNTER — APPOINTMENT (OUTPATIENT)
Dept: CT IMAGING | Age: 37
DRG: 425 | End: 2022-02-25
Payer: MEDICAID

## 2022-02-25 ENCOUNTER — TELEPHONE (OUTPATIENT)
Dept: PRIMARY CARE CLINIC | Age: 37
End: 2022-02-25

## 2022-02-25 ENCOUNTER — HOSPITAL ENCOUNTER (INPATIENT)
Age: 37
LOS: 2 days | Discharge: HOME | DRG: 425 | End: 2022-02-27
Attending: EMERGENCY MEDICINE | Admitting: INTERNAL MEDICINE
Payer: MEDICAID

## 2022-02-25 DIAGNOSIS — K52.9 COLITIS: ICD-10-CM

## 2022-02-25 DIAGNOSIS — E87.6 HYPOKALEMIA: ICD-10-CM

## 2022-02-25 DIAGNOSIS — M79.7 FIBROMYALGIA: Primary | ICD-10-CM

## 2022-02-25 DIAGNOSIS — K74.69 OTHER CIRRHOSIS OF LIVER (HCC): ICD-10-CM

## 2022-02-25 LAB
ALBUMIN SERPL-MCNC: 3.6 G/DL (ref 3.5–5.2)
ALP BLD-CCNC: 161 U/L (ref 35–104)
ALT SERPL-CCNC: 18 U/L (ref 0–32)
AMMONIA: 37.6 UMOL/L (ref 11–51)
ANION GAP SERPL CALCULATED.3IONS-SCNC: 15 MMOL/L (ref 7–16)
ANISOCYTOSIS: ABNORMAL
ANTI-NUCLEAR ANTIBODY (ANA): NEGATIVE
AST SERPL-CCNC: 59 U/L (ref 0–31)
ATYPICAL LYMPHOCYTE RELATIVE PERCENT: 0.9 % (ref 0–4)
BACTERIA: ABNORMAL /HPF
BASOPHILS ABSOLUTE: 0.06 E9/L (ref 0–0.2)
BASOPHILS RELATIVE PERCENT: 0.9 % (ref 0–2)
BILIRUB SERPL-MCNC: 0.4 MG/DL (ref 0–1.2)
BILIRUBIN DIRECT: <0.2 MG/DL (ref 0–0.3)
BILIRUBIN URINE: ABNORMAL
BILIRUBIN, INDIRECT: ABNORMAL MG/DL (ref 0–1)
BLOOD, URINE: NEGATIVE
BUN BLDV-MCNC: <2 MG/DL (ref 6–20)
CALCIUM SERPL-MCNC: 8.7 MG/DL (ref 8.6–10.2)
CHLORIDE BLD-SCNC: 98 MMOL/L (ref 98–107)
CLARITY: CLEAR
CO2: 28 MMOL/L (ref 22–29)
COLOR: YELLOW
CREAT SERPL-MCNC: 0.4 MG/DL (ref 0.5–1)
EKG ATRIAL RATE: 95 BPM
EKG P AXIS: 74 DEGREES
EKG P-R INTERVAL: 136 MS
EKG Q-T INTERVAL: 386 MS
EKG QRS DURATION: 70 MS
EKG QTC CALCULATION (BAZETT): 485 MS
EKG R AXIS: 91 DEGREES
EKG T AXIS: 72 DEGREES
EKG VENTRICULAR RATE: 95 BPM
EOSINOPHILS ABSOLUTE: 0.06 E9/L (ref 0.05–0.5)
EOSINOPHILS RELATIVE PERCENT: 0.9 % (ref 0–6)
EPITHELIAL CELLS, UA: ABNORMAL /HPF
GFR AFRICAN AMERICAN: >60
GFR NON-AFRICAN AMERICAN: >60 ML/MIN/1.73
GLUCOSE BLD-MCNC: 128 MG/DL (ref 74–99)
GLUCOSE URINE: NEGATIVE MG/DL
HCG(URINE) PREGNANCY TEST: NEGATIVE
HCT VFR BLD CALC: 41.3 % (ref 34–48)
HEMOGLOBIN: 14.5 G/DL (ref 11.5–15.5)
HYPOCHROMIA: ABNORMAL
KETONES, URINE: NEGATIVE MG/DL
LACTIC ACID: 4 MMOL/L (ref 0.5–2.2)
LEUKOCYTE ESTERASE, URINE: NEGATIVE
LYMPHOCYTES ABSOLUTE: 3.33 E9/L (ref 1.5–4)
LYMPHOCYTES RELATIVE PERCENT: 48.2 % (ref 20–42)
MAGNESIUM: 1.8 MG/DL (ref 1.6–2.6)
MCH RBC QN AUTO: 35 PG (ref 26–35)
MCHC RBC AUTO-ENTMCNC: 35.1 % (ref 32–34.5)
MCV RBC AUTO: 99.8 FL (ref 80–99.9)
MONOCYTES ABSOLUTE: 0.41 E9/L (ref 0.1–0.95)
MONOCYTES RELATIVE PERCENT: 6.1 % (ref 2–12)
NEUTROPHILS ABSOLUTE: 2.92 E9/L (ref 1.8–7.3)
NEUTROPHILS RELATIVE PERCENT: 43 % (ref 43–80)
NITRITE, URINE: NEGATIVE
NUCLEATED RED BLOOD CELLS: 0 /100 WBC
OVALOCYTES: ABNORMAL
PDW BLD-RTO: 13.8 FL (ref 11.5–15)
PH UA: 6.5 (ref 5–9)
PLATELET # BLD: 409 E9/L (ref 130–450)
PMV BLD AUTO: 8.9 FL (ref 7–12)
POIKILOCYTES: ABNORMAL
POLYCHROMASIA: ABNORMAL
POTASSIUM SERPL-SCNC: 2.3 MMOL/L (ref 3.5–5)
PROTEIN UA: 100 MG/DL
RBC # BLD: 4.14 E12/L (ref 3.5–5.5)
RBC UA: ABNORMAL /HPF (ref 0–2)
SODIUM BLD-SCNC: 141 MMOL/L (ref 132–146)
SPECIFIC GRAVITY UA: 1.01 (ref 1–1.03)
TOTAL PROTEIN: 6.9 G/DL (ref 6.4–8.3)
TROPONIN, HIGH SENSITIVITY: 20 NG/L (ref 0–9)
TROPONIN, HIGH SENSITIVITY: 28 NG/L (ref 0–9)
UROBILINOGEN, URINE: 0.2 E.U./DL
WBC # BLD: 6.8 E9/L (ref 4.5–11.5)
WBC UA: ABNORMAL /HPF (ref 0–5)

## 2022-02-25 PROCEDURE — 83735 ASSAY OF MAGNESIUM: CPT

## 2022-02-25 PROCEDURE — 2060000000 HC ICU INTERMEDIATE R&B

## 2022-02-25 PROCEDURE — 80048 BASIC METABOLIC PNL TOTAL CA: CPT

## 2022-02-25 PROCEDURE — 82140 ASSAY OF AMMONIA: CPT

## 2022-02-25 PROCEDURE — 6360000004 HC RX CONTRAST MEDICATION: Performed by: RADIOLOGY

## 2022-02-25 PROCEDURE — 74177 CT ABD & PELVIS W/CONTRAST: CPT

## 2022-02-25 PROCEDURE — 93010 ELECTROCARDIOGRAM REPORT: CPT | Performed by: INTERNAL MEDICINE

## 2022-02-25 PROCEDURE — 6360000002 HC RX W HCPCS

## 2022-02-25 PROCEDURE — 2500000003 HC RX 250 WO HCPCS: Performed by: INTERNAL MEDICINE

## 2022-02-25 PROCEDURE — 83605 ASSAY OF LACTIC ACID: CPT

## 2022-02-25 PROCEDURE — 84484 ASSAY OF TROPONIN QUANT: CPT

## 2022-02-25 PROCEDURE — 2500000003 HC RX 250 WO HCPCS

## 2022-02-25 PROCEDURE — 6370000000 HC RX 637 (ALT 250 FOR IP)

## 2022-02-25 PROCEDURE — 81001 URINALYSIS AUTO W/SCOPE: CPT

## 2022-02-25 PROCEDURE — 81025 URINE PREGNANCY TEST: CPT

## 2022-02-25 PROCEDURE — 36415 COLL VENOUS BLD VENIPUNCTURE: CPT

## 2022-02-25 PROCEDURE — 93005 ELECTROCARDIOGRAM TRACING: CPT | Performed by: PHYSICIAN ASSISTANT

## 2022-02-25 PROCEDURE — 2580000003 HC RX 258: Performed by: EMERGENCY MEDICINE

## 2022-02-25 PROCEDURE — 99285 EMERGENCY DEPT VISIT HI MDM: CPT

## 2022-02-25 PROCEDURE — 6360000002 HC RX W HCPCS: Performed by: INTERNAL MEDICINE

## 2022-02-25 PROCEDURE — 80076 HEPATIC FUNCTION PANEL: CPT

## 2022-02-25 PROCEDURE — 85025 COMPLETE CBC W/AUTO DIFF WBC: CPT

## 2022-02-25 PROCEDURE — 2580000003 HC RX 258

## 2022-02-25 PROCEDURE — 6370000000 HC RX 637 (ALT 250 FOR IP): Performed by: INTERNAL MEDICINE

## 2022-02-25 PROCEDURE — 6360000002 HC RX W HCPCS: Performed by: EMERGENCY MEDICINE

## 2022-02-25 PROCEDURE — 6370000000 HC RX 637 (ALT 250 FOR IP): Performed by: EMERGENCY MEDICINE

## 2022-02-25 RX ORDER — DRONABINOL 2.5 MG/1
5 CAPSULE ORAL DAILY
Status: DISCONTINUED | OUTPATIENT
Start: 2022-02-25 | End: 2022-02-27 | Stop reason: HOSPADM

## 2022-02-25 RX ORDER — FOLIC ACID 1 MG/1
1 TABLET ORAL DAILY
Status: DISCONTINUED | OUTPATIENT
Start: 2022-02-25 | End: 2022-02-27 | Stop reason: HOSPADM

## 2022-02-25 RX ORDER — CEFTRIAXONE 1 G/1
INJECTION, POWDER, FOR SOLUTION INTRAMUSCULAR; INTRAVENOUS
Status: DISPENSED
Start: 2022-02-25 | End: 2022-02-26

## 2022-02-25 RX ORDER — POTASSIUM CHLORIDE 7.45 MG/ML
10 INJECTION INTRAVENOUS ONCE
Status: DISCONTINUED | OUTPATIENT
Start: 2022-02-25 | End: 2022-02-25

## 2022-02-25 RX ORDER — 0.9 % SODIUM CHLORIDE 0.9 %
1000 INTRAVENOUS SOLUTION INTRAVENOUS ONCE
Status: COMPLETED | OUTPATIENT
Start: 2022-02-25 | End: 2022-02-25

## 2022-02-25 RX ORDER — HYDROXYZINE PAMOATE 25 MG/1
25 CAPSULE ORAL ONCE
Status: COMPLETED | OUTPATIENT
Start: 2022-02-25 | End: 2022-02-25

## 2022-02-25 RX ORDER — ONDANSETRON 2 MG/ML
4 INJECTION INTRAMUSCULAR; INTRAVENOUS EVERY 6 HOURS PRN
Status: DISCONTINUED | OUTPATIENT
Start: 2022-02-25 | End: 2022-02-27 | Stop reason: HOSPADM

## 2022-02-25 RX ORDER — THIAMINE MONONITRATE (VIT B1) 100 MG
100 TABLET ORAL DAILY
Status: DISCONTINUED | OUTPATIENT
Start: 2022-02-26 | End: 2022-02-26 | Stop reason: SDUPTHER

## 2022-02-25 RX ORDER — HYDROXYZINE HYDROCHLORIDE 10 MG/1
25 TABLET, FILM COATED ORAL NIGHTLY PRN
Status: DISCONTINUED | OUTPATIENT
Start: 2022-02-25 | End: 2022-02-25 | Stop reason: SDUPTHER

## 2022-02-25 RX ORDER — FENTANYL CITRATE 50 UG/ML
25 INJECTION, SOLUTION INTRAMUSCULAR; INTRAVENOUS ONCE
Status: COMPLETED | OUTPATIENT
Start: 2022-02-25 | End: 2022-02-25

## 2022-02-25 RX ORDER — MORPHINE SULFATE 2 MG/ML
2 INJECTION, SOLUTION INTRAMUSCULAR; INTRAVENOUS ONCE
Status: DISCONTINUED | OUTPATIENT
Start: 2022-02-25 | End: 2022-02-25

## 2022-02-25 RX ORDER — POTASSIUM CHLORIDE 20 MEQ/1
40 TABLET, EXTENDED RELEASE ORAL ONCE
Status: COMPLETED | OUTPATIENT
Start: 2022-02-25 | End: 2022-02-25

## 2022-02-25 RX ORDER — MORPHINE SULFATE 2 MG/ML
2 INJECTION, SOLUTION INTRAMUSCULAR; INTRAVENOUS EVERY 4 HOURS PRN
Status: DISCONTINUED | OUTPATIENT
Start: 2022-02-25 | End: 2022-02-26 | Stop reason: ALTCHOICE

## 2022-02-25 RX ORDER — POTASSIUM CHLORIDE AND SODIUM CHLORIDE 900; 300 MG/100ML; MG/100ML
INJECTION, SOLUTION INTRAVENOUS CONTINUOUS
Status: DISCONTINUED | OUTPATIENT
Start: 2022-02-25 | End: 2022-02-26 | Stop reason: ALTCHOICE

## 2022-02-25 RX ORDER — METRONIDAZOLE 500 MG/1
500 TABLET ORAL ONCE
Status: DISCONTINUED | OUTPATIENT
Start: 2022-02-25 | End: 2022-02-25

## 2022-02-25 RX ORDER — HYDROXYZINE HYDROCHLORIDE 10 MG/1
25 TABLET, FILM COATED ORAL NIGHTLY PRN
Status: ON HOLD | COMMUNITY
End: 2022-02-27 | Stop reason: HOSPADM

## 2022-02-25 RX ORDER — PREGABALIN 50 MG/1
50 CAPSULE ORAL 2 TIMES DAILY
Status: DISCONTINUED | OUTPATIENT
Start: 2022-02-25 | End: 2022-02-27 | Stop reason: HOSPADM

## 2022-02-25 RX ORDER — HYDROXYZINE PAMOATE 25 MG/1
25 CAPSULE ORAL NIGHTLY PRN
Status: DISCONTINUED | OUTPATIENT
Start: 2022-02-25 | End: 2022-02-27 | Stop reason: HOSPADM

## 2022-02-25 RX ORDER — DEXTROSE, SODIUM CHLORIDE, AND POTASSIUM CHLORIDE 5; .45; .3 G/100ML; G/100ML; G/100ML
INJECTION INTRAVENOUS CONTINUOUS
Status: DISCONTINUED | OUTPATIENT
Start: 2022-02-25 | End: 2022-02-27

## 2022-02-25 RX ADMIN — IOPAMIDOL 75 ML: 755 INJECTION, SOLUTION INTRAVENOUS at 16:43

## 2022-02-25 RX ADMIN — DRONABINOL 5 MG: 2.5 CAPSULE ORAL at 21:16

## 2022-02-25 RX ADMIN — POTASSIUM CHLORIDE AND SODIUM CHLORIDE: 900; 300 INJECTION, SOLUTION INTRAVENOUS at 19:28

## 2022-02-25 RX ADMIN — FENTANYL CITRATE 25 MCG: 50 INJECTION INTRAMUSCULAR; INTRAVENOUS at 19:52

## 2022-02-25 RX ADMIN — PREGABALIN 50 MG: 50 CAPSULE ORAL at 21:16

## 2022-02-25 RX ADMIN — MORPHINE SULFATE 2 MG: 2 INJECTION, SOLUTION INTRAMUSCULAR; INTRAVENOUS at 21:16

## 2022-02-25 RX ADMIN — CEFTRIAXONE 1000 MG: 1 INJECTION, POWDER, FOR SOLUTION INTRAMUSCULAR; INTRAVENOUS at 19:44

## 2022-02-25 RX ADMIN — POTASSIUM CHLORIDE, DEXTROSE MONOHYDRATE AND SODIUM CHLORIDE: 300; 5; 450 INJECTION, SOLUTION INTRAVENOUS at 21:17

## 2022-02-25 RX ADMIN — HYDROXYZINE PAMOATE 25 MG: 25 CAPSULE ORAL at 17:33

## 2022-02-25 RX ADMIN — FOLIC ACID 1 MG: 1 TABLET ORAL at 21:16

## 2022-02-25 RX ADMIN — POTASSIUM CHLORIDE 40 MEQ: 1500 TABLET, EXTENDED RELEASE ORAL at 17:33

## 2022-02-25 RX ADMIN — METRONIDAZOLE 500 MG: 500 INJECTION, SOLUTION INTRAVENOUS at 19:45

## 2022-02-25 RX ADMIN — SODIUM CHLORIDE 1000 ML: 9 INJECTION, SOLUTION INTRAVENOUS at 19:39

## 2022-02-25 RX ADMIN — HYDROXYZINE PAMOATE 25 MG: 25 CAPSULE ORAL at 21:16

## 2022-02-25 ASSESSMENT — PAIN SCALES - GENERAL
PAINLEVEL_OUTOF10: 10
PAINLEVEL_OUTOF10: 5

## 2022-02-25 ASSESSMENT — PAIN DESCRIPTION - LOCATION: LOCATION: GENERALIZED

## 2022-02-25 ASSESSMENT — PAIN DESCRIPTION - PAIN TYPE: TYPE: ACUTE PAIN

## 2022-02-25 NOTE — ED PROVIDER NOTES
HPI     Patient is a 43 y.o. female with a PMHx of alcohol abuse, cirrhosis with ascites, bipolar disorder, protein calorie malnutrition presents today ED after being notified by her PCP critical labs. Patient was found to have an ammonia level of 85 and a potassium level of 2.7. Patient complaining of intermittent confusion, numbness, tingling and burning in her upper and lower extremities for the past 2 months. Patient states that she regularly takes her lactulose. Patient denies blood in stools. Patient states that she takes gabapentin which only improved symptoms. Patient states that she has been abstinent from alcohol for the past 3 months. Denies chest pain, SOB, palpitations, recent URI, nausea/ vomiting. Review of Systems   Constitutional: Negative for chills, fatigue and fever. HENT: Negative for congestion, hearing loss, rhinorrhea and sore throat. Eyes: Negative for pain and visual disturbance. Respiratory: Negative for cough, chest tightness, shortness of breath and wheezing. Cardiovascular: Negative for chest pain, palpitations and leg swelling. Gastrointestinal: Negative for abdominal pain, constipation, diarrhea, nausea and vomiting. Genitourinary: Negative for difficulty urinating, dysuria and hematuria. Musculoskeletal: Negative for arthralgias and myalgias. Skin: Negative for rash and wound. Neurological: Positive for weakness and numbness. Negative for dizziness, light-headedness and headaches. Psychiatric/Behavioral: Negative for dysphoric mood. The patient is not nervous/anxious. Physical Exam  Constitutional:       General: She is awake. She is not in acute distress. Appearance: She is cachectic. She is not toxic-appearing. HENT:      Head: Normocephalic and atraumatic. Nose: Nose normal. No congestion or rhinorrhea. Mouth/Throat:      Mouth: Mucous membranes are dry. Pharynx: Oropharynx is clear.  No oropharyngeal exudate or posterior oropharyngeal erythema. Eyes:      General: No scleral icterus. Extraocular Movements: Extraocular movements intact. Pupils: Pupils are equal, round, and reactive to light. Cardiovascular:      Rate and Rhythm: Normal rate and regular rhythm. Pulses: Normal pulses. Heart sounds: Normal heart sounds. Pulmonary:      Effort: Pulmonary effort is normal. No respiratory distress. Breath sounds: No wheezing. Abdominal:      General: Abdomen is flat. Palpations: Abdomen is soft. Tenderness: There is abdominal tenderness. There is no right CVA tenderness, left CVA tenderness or rebound. Hernia: No hernia is present. Musculoskeletal:         General: No swelling or deformity. Normal range of motion. Cervical back: Normal range of motion and neck supple. Right lower leg: No edema. Left lower leg: No edema. Skin:     General: Skin is warm and dry. Capillary Refill: Capillary refill takes less than 2 seconds. Neurological:      General: No focal deficit present. Mental Status: She is alert and oriented to person, place, and time. Motor: No weakness. Psychiatric:         Mood and Affect: Mood normal.         Behavior: Behavior normal. Behavior is cooperative. Procedures     MDM  Number of Diagnoses or Management Options     Amount and/or Complexity of Data Reviewed  Clinical lab tests: reviewed and ordered  Tests in the radiology section of CPT®: reviewed and ordered  Tests in the medicine section of CPT®: reviewed         Patient is a 43 y.o. female with a PMHx of alcohol abuse, cirrhosis with ascites, protein calorie malnutrition presents today ED after being notified by her PCP critical labs. On admission patients vitals were stable. Patients labs were remarkable for a potassium of 2.3, lactate of 4.0 and transaminitis. EKG showed normal sinus rhythm with no T wave abnormalities or ST elevations or depressions.  CT abdomen showed diffuse wall thickening throughout the colon with fatty liver and nonspecific gallbladder wall thickening. Patient was given fluids, started on flagyl and rocephin, potassium repletion and fentanyl for pain control. Spoke to Dr. Rishi Murrell to admit patient for further workup for hypokalemia and colitis.    --------------------------------------------- PAST HISTORY ---------------------------------------------  Past Medical History:  has a past medical history of Acute alcoholic intoxication with complication ([address] Alcohol abuse, Anemia, Bipolar disorder current episode depressed ([address] Closed fracture of left proximal humerus, Depression, Depression with suicidal ideation, ETOH abuse, Humerus fracture, Liver disease, Major depression, recurrent ([address] Major depressive disorder, recurrent episode, severe ([address] Mild tetrahydrocannabinol (THC) abuse, Protein calorie malnutrition ([address] and SBP (spontaneous bacterial peritonitis) ([address] Past Surgical History:  has a past surgical history that includes Pelvic fracture surgery (Bilateral, 2000); Tonsillectomy; Humerus fracture surgery (Left, 5/12/2021); and Rotator cuff repair (08/2021). Social History:  reports that she has been smoking cigarettes. She started smoking about 18 years ago. She has a 15.00 pack-year smoking history. She has quit using smokeless tobacco. She reports previous alcohol use of about 42.0 standard drinks of alcohol per week. She reports current drug use. Frequency: 1.00 time per week. Drug: Marijuana Darius Free). Family History: family history includes No Known Problems in her father and mother. The patients home medications have been reviewed. Allergies: Patient has no known allergies. -------------------------------------------------- RESULTS -------------------------------------------------    LABS:     Ref.  Range 2/25/2022 14:34   Sodium Latest Ref Range: 132 - 146 mmol/L 141   Potassium Latest Ref Range: 3.5 - 5.0 mmol/L 2.3 (LL)   Chloride Latest Ref Range: 98 - 107 mmol/L 98   CO2 Latest Ref Range: 22 - 29 mmol/L 28   BUN,BUNPL Latest Ref Range: 6 - 20 mg/dL <2 (L)   Creatinine Latest Ref Range: 0.5 - 1.0 mg/dL 0.4 (L)   Anion Gap Latest Ref Range: 7 - 16 mmol/L 15   GFR Non- Latest Ref Range: >=60 mL/min/1.73 >60   GFR African American Unknown >60   Magnesium Latest Ref Range: 1.6 - 2.6 mg/dL 1.8   Lactic Acid Latest Ref Range: 0.5 - 2.2 mmol/L 4.0 (HH)   GLUCOSE, FASTING,GF Latest Ref Range: 74 - 99 mg/dL 128 (H)   CALCIUM, SERUM, [id number] Latest Ref Range: 8.6 - 10.2 mg/dL 8.7   Total Protein Latest Ref Range: 6.4 - 8.3 g/dL 6.9   Troponin, High Sensitivity Latest Ref Range: 0 - 9 ng/L 28 (H)   Albumin Latest Ref Range: 3.5 - 5.2 g/dL 3.6   Alk Phos Latest Ref Range: 35 - 104 U/L 161 (H)   ALT Latest Ref Range: 0 - 32 U/L 18   AMMONIA, PLASMA, [id number] Latest Ref Range: 11.0 - 51.0 umol/L 37.6   AST Latest Ref Range: 0 - 31 U/L 59 (H)   Bilirubin Latest Ref Range: 0.0 - 1.2 mg/dL 0.4   Bilirubin, Direct Latest Ref Range: 0.0 - 0.3 mg/dL <0.2   Bilirubin, Indirect Latest Ref Range: 0.0 - 1.0 mg/dL see below   WBC Latest Ref Range: 4.5 - 11.5 E9/L 6.8   RBC Latest Ref Range: 3.50 - 5.50 E12/L 4.14   Hemoglobin Quant Latest Ref Range: 11.5 - 15.5 g/dL 14.5   Hematocrit Latest Ref Range: 34.0 - 48.0 % 41.3   MCV Latest Ref Range: 80.0 - 99.9 fL 99.8   MCH Latest Ref Range: 26.0 - 35.0 pg 35.0   MCHC Latest Ref Range: 32.0 - 34.5 % 35.1 (H)   MPV Latest Ref Range: 7.0 - 12.0 fL 8.9   RDW Latest Ref Range: 11.5 - 15.0 fL 13.8   Platelet Count Latest Ref Range: 130 - 450 E9/L 409   Neutrophils % Latest Ref Range: 43.0 - 80.0 % 43.0   Lymphocyte % Latest Ref Range: 20.0 - 42.0 % 48.2 (H)   Monocytes % Latest Ref Range: 2.0 - 12.0 % 6.1   Eosinophils % Latest Ref Range: 0.0 - 6.0 % 0.9   Basophils % Latest Ref Range: 0.0 - 2.0 % 0.9   Neutrophils Absolute Latest Ref Range: 1.80 - 7.30 E9/L 2.92   Lymphocytes Absolute Latest Ref Range: 1.50 - 4.00 E9/L 3.33   Monocytes Absolute Latest Ref Range: 0.10 - 0.95 E9/L 0.41   Eosinophils Absolute Latest Ref Range: 0.05 - 0.50 E9/L 0.06   Basophils Absolute Latest Ref Range: 0.00 - 0.20 E9/L 0.06   Atypical Lymphocytes Relative Latest Ref Range: 0.0 - 4.0 % 0.9   Nucleated Red Blood Cells Latest Units: /100 WBC 0.0   Poikilocytes Unknown 1+   Polychromasia Unknown 1+   Anisocytosis Unknown 1+   Hypochromia Unknown 1+   Ovalocytes Unknown 1+       RADIOLOGY:  CT ABDOMEN PELVIS W IV CONTRAST Additional Contrast? None   Final Result   1. There is rather diffuse wall thickening throughout the colon which may   represent infectious or inflammatory colitis. 2. Possible fatty liver. 3. There is suggestion of nonspecific gallbladder wall thickening. No   evidence of biliary ductal dilatation. EKG:  Normal sinus rhythm  Possible Left atrial enlargement  Rightward axis  Low voltage QRS  Cannot rule out Anteroseptal infarct , age undetermined  Abnormal ECG  When compared with ECG of 10-DEC-2021 17:04,  Minimal criteria for Anteroseptal infarct are now present    ------------------------- NURSING NOTES AND VITALS REVIEWED ---------------------------  Date / Time Roomed:  2/25/2022  1:31 PM  ED Bed Assignment:  [id number]    The nursing notes within the ED encounter and vital signs as below have been reviewed.      Patient Vitals   02/25 BP Pulse Resp SpO2 Temp MAP (mmHg)   1958 96/88 93 16 -- 97.5 °F (36.4 °C) 93 1957 -- -- -- 95 % -- --   1948 96/88 -- -- -- -- 93         Oxygen Saturation Interpretation: Normal    ------------------------------------------ PROGRESS NOTES ------------------------------------------  Re-evaluation(s):  Time: 1500  Patients symptoms show no change  Repeat physical examination is not changed    Counseling:  I have spoken with the patient and discussed todays results, in addition to providing specific details for the plan of care and counseling regarding the diagnosis and prognosis. Their questions are answered at this time and they are agreeable with the plan of admission.    --------------------------------- ADDITIONAL PROVIDER NOTES ---------------------------------  Consultations:  Time: 1640. Spoke with Dr. Fitz Ferrari. Discussed case. They will admit the patient. This patient's ED course included: a personal history and physicial examination, re-evaluation prior to disposition, multiple bedside re-evaluations, IV medications, cardiac monitoring and continuous pulse oximetry    This patient has remained hemodynamically stable during their ED course. Diagnosis:  1. Fibromyalgia    2. Hypokalemia    3. Other cirrhosis of liver ([facility]    4. Colitis        Disposition:  Patient's disposition: Admit to telemetry  Patient's condition is stable. Patient was seen and evaluated by myself and my attending Shant Gallegos MD. Assessment and Plan discussed with attending provider, please see attestation for final plan of care. This note was done using dictation software and there may be some grammatical errors associated with this.     Spencer Villatoro MD PGY-1     Damon Mahan MD  Resident  02/27/22 [id number]

## 2022-02-25 NOTE — ED NOTES
Department of Emergency Medicine  FIRST PROVIDER TRIAGE NOTE             Independent MLP           2/25/22  1:32 PM EST    Date of Encounter: 2/25/22   MRN: [id number]      HPI: Cony Fernandez is a 43 y.o. female who presents to the ED for Other (labs drawn yesterday: potassium 2.7, ammonia 85)     Patient is a 38-year-old with known cirrhosis of the liver that saw  2 days ago and had labs drawn. She was called today due to a critical result. Patient was found to have an ammonia level of 85 and a potassium level of 2.7. Patient states she is tingling and numb all over. ROS: Negative for cp, sob, fever, cough, vomiting or diarrhea. PE: Gen Appearance/Constitutional: alert  HEENT: NC/NT. PERRLA,  Airway patent. Neck: supple     Initial Plan of Care: All treatment areas with department are currently occupied. Plan to order/Initiate the following while awaiting opening in ED: labs and EKG.   Initiate Treatment-Testing, Proceed toTreatment Area When Bed Available for ED Attending/MLP to Continue Care    Electronically signed by Paul Pardo PA-C   DD: 2/25/22         Paul Pardo PA-C  02/25/22 [id number]

## 2022-02-25 NOTE — TELEPHONE ENCOUNTER
Safe care was filed because I was not the person that ordered the labs. They did not notify the person that did order labs in this regard. She is supposed to go directly to the hospital secondary to her potassium level.

## 2022-02-26 LAB
ALBUMIN SERPL-MCNC: 2.5 G/DL (ref 3.5–5.2)
ALP BLD-CCNC: 138 U/L (ref 35–104)
ALT SERPL-CCNC: 16 U/L (ref 0–32)
ANION GAP SERPL CALCULATED.3IONS-SCNC: 12 MMOL/L (ref 7–16)
AST SERPL-CCNC: 58 U/L (ref 0–31)
BASOPHILS ABSOLUTE: 0.05 E9/L (ref 0–0.2)
BASOPHILS RELATIVE PERCENT: 0.4 % (ref 0–2)
BILIRUB SERPL-MCNC: <0.2 MG/DL (ref 0–1.2)
BUN BLDV-MCNC: <2 MG/DL (ref 6–20)
CALCIUM SERPL-MCNC: 7.7 MG/DL (ref 8.6–10.2)
CHLORIDE BLD-SCNC: 104 MMOL/L (ref 98–107)
CO2: 21 MMOL/L (ref 22–29)
CREAT SERPL-MCNC: 0.4 MG/DL (ref 0.5–1)
EOSINOPHILS ABSOLUTE: 0.21 E9/L (ref 0.05–0.5)
EOSINOPHILS RELATIVE PERCENT: 1.8 % (ref 0–6)
GFR AFRICAN AMERICAN: >60
GFR NON-AFRICAN AMERICAN: >60 ML/MIN/1.73
GLUCOSE BLD-MCNC: 95 MG/DL (ref 74–99)
HCT VFR BLD CALC: 31.6 % (ref 34–48)
HEMOGLOBIN: 10.7 G/DL (ref 11.5–15.5)
IMMATURE GRANULOCYTES #: 0.03 E9/L
IMMATURE GRANULOCYTES %: 0.3 % (ref 0–5)
LYMPHOCYTES ABSOLUTE: 3.8 E9/L (ref 1.5–4)
LYMPHOCYTES RELATIVE PERCENT: 32.2 % (ref 20–42)
MCH RBC QN AUTO: 34.7 PG (ref 26–35)
MCHC RBC AUTO-ENTMCNC: 33.9 % (ref 32–34.5)
MCV RBC AUTO: 102.6 FL (ref 80–99.9)
MONOCYTES ABSOLUTE: 1.1 E9/L (ref 0.1–0.95)
MONOCYTES RELATIVE PERCENT: 9.3 % (ref 2–12)
NEUTROPHILS ABSOLUTE: 6.6 E9/L (ref 1.8–7.3)
NEUTROPHILS RELATIVE PERCENT: 56 % (ref 43–80)
PDW BLD-RTO: 14.2 FL (ref 11.5–15)
PLATELET # BLD: 294 E9/L (ref 130–450)
PMV BLD AUTO: 9 FL (ref 7–12)
POTASSIUM SERPL-SCNC: 3.3 MMOL/L (ref 3.5–5)
RBC # BLD: 3.08 E12/L (ref 3.5–5.5)
SODIUM BLD-SCNC: 137 MMOL/L (ref 132–146)
TOTAL PROTEIN: 4.7 G/DL (ref 6.4–8.3)
WBC # BLD: 11.8 E9/L (ref 4.5–11.5)

## 2022-02-26 PROCEDURE — 2500000003 HC RX 250 WO HCPCS: Performed by: INTERNAL MEDICINE

## 2022-02-26 PROCEDURE — 36415 COLL VENOUS BLD VENIPUNCTURE: CPT

## 2022-02-26 PROCEDURE — 6360000002 HC RX W HCPCS: Performed by: INTERNAL MEDICINE

## 2022-02-26 PROCEDURE — 2060000000 HC ICU INTERMEDIATE R&B

## 2022-02-26 PROCEDURE — 6370000000 HC RX 637 (ALT 250 FOR IP): Performed by: INTERNAL MEDICINE

## 2022-02-26 PROCEDURE — 85025 COMPLETE CBC W/AUTO DIFF WBC: CPT

## 2022-02-26 PROCEDURE — 2580000003 HC RX 258: Performed by: INTERNAL MEDICINE

## 2022-02-26 PROCEDURE — 80053 COMPREHEN METABOLIC PANEL: CPT

## 2022-02-26 RX ORDER — LANOLIN ALCOHOL/MO/W.PET/CERES
100 CREAM (GRAM) TOPICAL DAILY
Status: DISCONTINUED | OUTPATIENT
Start: 2022-02-27 | End: 2022-02-27 | Stop reason: HOSPADM

## 2022-02-26 RX ORDER — NICOTINE 21 MG/24HR
1 PATCH, TRANSDERMAL 24 HOURS TRANSDERMAL DAILY
Status: DISCONTINUED | OUTPATIENT
Start: 2022-02-26 | End: 2022-02-27 | Stop reason: HOSPADM

## 2022-02-26 RX ORDER — POTASSIUM CHLORIDE 20 MEQ/1
20 TABLET, EXTENDED RELEASE ORAL 2 TIMES DAILY WITH MEALS
Status: DISCONTINUED | OUTPATIENT
Start: 2022-02-26 | End: 2022-02-27

## 2022-02-26 RX ORDER — SODIUM CHLORIDE 9 MG/ML
INJECTION, SOLUTION INTRAVENOUS EVERY 8 HOURS
Status: DISCONTINUED | OUTPATIENT
Start: 2022-02-26 | End: 2022-02-27 | Stop reason: HOSPADM

## 2022-02-26 RX ADMIN — PIPERACILLIN AND TAZOBACTAM 3375 MG: 3; .375 INJECTION, POWDER, LYOPHILIZED, FOR SOLUTION INTRAVENOUS at 17:01

## 2022-02-26 RX ADMIN — MORPHINE SULFATE 2 MG: 2 INJECTION, SOLUTION INTRAMUSCULAR; INTRAVENOUS at 05:41

## 2022-02-26 RX ADMIN — POTASSIUM CHLORIDE, DEXTROSE MONOHYDRATE AND SODIUM CHLORIDE: 300; 5; 450 INJECTION, SOLUTION INTRAVENOUS at 18:37

## 2022-02-26 RX ADMIN — PREGABALIN 50 MG: 50 CAPSULE ORAL at 19:27

## 2022-02-26 RX ADMIN — HYDROMORPHONE HYDROCHLORIDE 0.5 MG: 1 INJECTION, SOLUTION INTRAMUSCULAR; INTRAVENOUS; SUBCUTANEOUS at 15:17

## 2022-02-26 RX ADMIN — MORPHINE SULFATE 2 MG: 2 INJECTION, SOLUTION INTRAMUSCULAR; INTRAVENOUS at 01:38

## 2022-02-26 RX ADMIN — POTASSIUM CHLORIDE 20 MEQ: 1500 TABLET, EXTENDED RELEASE ORAL at 11:02

## 2022-02-26 RX ADMIN — FOLIC ACID 1 MG: 1 TABLET ORAL at 08:27

## 2022-02-26 RX ADMIN — Medication 100 MG: at 08:37

## 2022-02-26 RX ADMIN — HYDROMORPHONE HYDROCHLORIDE 0.5 MG: 1 INJECTION, SOLUTION INTRAMUSCULAR; INTRAVENOUS; SUBCUTANEOUS at 11:02

## 2022-02-26 RX ADMIN — DRONABINOL 5 MG: 2.5 CAPSULE ORAL at 08:32

## 2022-02-26 RX ADMIN — ENOXAPARIN SODIUM 40 MG: 100 INJECTION SUBCUTANEOUS at 08:28

## 2022-02-26 RX ADMIN — HYDROMORPHONE HYDROCHLORIDE 0.5 MG: 1 INJECTION, SOLUTION INTRAMUSCULAR; INTRAVENOUS; SUBCUTANEOUS at 23:30

## 2022-02-26 RX ADMIN — HYDROMORPHONE HYDROCHLORIDE 0.5 MG: 1 INJECTION, SOLUTION INTRAMUSCULAR; INTRAVENOUS; SUBCUTANEOUS at 19:28

## 2022-02-26 RX ADMIN — POTASSIUM CHLORIDE, DEXTROSE MONOHYDRATE AND SODIUM CHLORIDE: 300; 5; 450 INJECTION, SOLUTION INTRAVENOUS at 08:33

## 2022-02-26 RX ADMIN — PREGABALIN 50 MG: 50 CAPSULE ORAL at 08:27

## 2022-02-26 RX ADMIN — POTASSIUM CHLORIDE 20 MEQ: 1500 TABLET, EXTENDED RELEASE ORAL at 16:55

## 2022-02-26 ASSESSMENT — PAIN DESCRIPTION - ORIENTATION
ORIENTATION: OTHER (COMMENT)
ORIENTATION: RIGHT;LEFT

## 2022-02-26 ASSESSMENT — PAIN SCALES - GENERAL
PAINLEVEL_OUTOF10: 7
PAINLEVEL_OUTOF10: 4
PAINLEVEL_OUTOF10: 6
PAINLEVEL_OUTOF10: 10
PAINLEVEL_OUTOF10: 6
PAINLEVEL_OUTOF10: 0
PAINLEVEL_OUTOF10: 9
PAINLEVEL_OUTOF10: 4
PAINLEVEL_OUTOF10: 9
PAINLEVEL_OUTOF10: 4
PAINLEVEL_OUTOF10: 0
PAINLEVEL_OUTOF10: 10
PAINLEVEL_OUTOF10: 6

## 2022-02-26 ASSESSMENT — PAIN DESCRIPTION - PAIN TYPE
TYPE: CHRONIC PAIN
TYPE: ACUTE PAIN

## 2022-02-26 ASSESSMENT — PAIN DESCRIPTION - LOCATION
LOCATION: GENERALIZED
LOCATION: ARM;LEG

## 2022-02-26 ASSESSMENT — PAIN DESCRIPTION - FREQUENCY: FREQUENCY: CONTINUOUS

## 2022-02-26 ASSESSMENT — PAIN DESCRIPTION - DESCRIPTORS: DESCRIPTORS: BURNING;SHOOTING

## 2022-02-26 NOTE — PROGRESS NOTES
Called Dr. Marcela Cates regarding Pt's low potassium level of 3.3mg and the Pt is in pain and states she needs dilaudid instead of morphine. New orders for oral potassium, and Dilaudid to replace the morphine.

## 2022-02-26 NOTE — H&P
Dafne Alcazar MD [address]  Internal medicine   History and Physical      CHIEF COMPLAINT: Low potassium      HISTORY OF PRESENT ILLNESS:    64-year-old woman with known history of cirrhosis of the liver related to alcohol abuse  She claims that her last drink was 3 months ago  Ongoing tobacco use was reported as well  She was sent into ER with abnormal labs of potassium 2.3  She denied any abdominal distress emesis  Incidentally found to have sigmoid colon inflammatory changes  Admitted for further management  Data reviewed in detail    Past Medical History:    Past Medical History:   Diagnosis Date    Acute alcoholic intoxication with complication ([address] 5/7/1332    Alcohol abuse 8/24/2018    Anemia     Bipolar disorder current episode depressed ([address]     Therapy    Closed fracture of left proximal humerus     Depression     Depression with suicidal ideation 10/24/2018    ETOH abuse     Humerus fracture 04/2021    left- For OR 5-12-21    Liver disease     Major depression, recurrent ([address]     Therapy -stable per pat    Major depressive disorder, recurrent episode, severe ([address] 8/24/2018    Mild tetrahydrocannabinol (THC) abuse     chronic     Protein calorie malnutrition ([address]     severe    SBP (spontaneous bacterial peritonitis) ([address] 11/8/2021       Past Surgical History:    Past Surgical History:   Procedure Laterality Date    HUMERUS FRACTURE SURGERY Left 5/12/2021    LEFT PROXIMAL HUMERUS OPEN REDUCTION INTERNAL FIXATION performed by Jose Ordoñez MD at [address] [place] [address]  08/2021    TONSILLECTOMY         Medications Prior to Admission:    Medications Prior to Admission: hydrOXYzine (ATARAX) 10 MG tablet, Take 25 mg by mouth nightly as needed for Anxiety  pregabalin (LYRICA) 50 MG capsule, Take 1 capsule by mouth 2 times daily for 30 days.   dronabinol (MARINOL) 5 MG capsule, take 1 capsule by mouth twice a day  folic acid (FOLVITE) 1 MG tablet, Take 1 tablet by mouth daily  vitamin B-1 (THIAMINE) 100 MG tablet, Take 1 tablet by mouth daily  [DISCONTINUED] hydrOXYzine (ATARAX) 25 MG tablet, Take 1 tablet by mouth nightly  [DISCONTINUED] ondansetron (ZOFRAN) 4 MG tablet, take 1 tablet by mouth twice a day (Patient not taking: Reported on 2/21/2022)  [DISCONTINUED] melatonin 3 MG TABS tablet, Take 1 tablet by mouth nightly (Patient not taking: Reported on 1/5/2022)  [DISCONTINUED] dicyclomine (BENTYL) 10 MG capsule, Take 1 capsule by mouth 2 times daily (Patient not taking: Reported on 2/21/2022)  [DISCONTINUED] furosemide (LASIX) 40 MG tablet, Take 1 tablet by mouth daily (Patient not taking: Reported on 2/21/2022)  [DISCONTINUED] spironolactone (ALDACTONE) 100 MG tablet, Take 1 tablet by mouth daily  [DISCONTINUED] pantoprazole (PROTONIX) 40 MG tablet, Take 1 tablet by mouth every morning (before breakfast) (Patient not taking: Reported on 1/5/2022)    Allergies:    Patient has no known allergies. Social History:    reports that she has been smoking cigarettes. She started smoking about 18 years ago. She has a 15.00 pack-year smoking history. She has quit using smokeless tobacco. She reports previous alcohol use of about 42.0 standard drinks of alcohol per week. She reports current drug use. Frequency: 1.00 time per week. Drug: Marijuana Lukasz Piper). Family History:   family history includes No Known Problems in her father and mother. REVIEW OF SYSTEMS:  As above in the HPI, otherwise negative    PHYSICAL EXAM:    Vitals:  BP 92/74   Pulse 78   Temp 96 °F (35.6 °C) (Oral)   Resp 17   Ht 5' 3\" (1.6 m)   Wt 80 lb 9.6 oz (36.6 kg)   SpO2 100%   BMI 14.28 kg/m²     General:  Awake, alert, oriented X 3. Thin built and chronically ill-appearing   HEENT:  Normocephalic, atraumatic. Pupils equal, round, reactive to light. No scleral icterus. No conjunctival injection. Normal lips, teeth, and gums. No nasal discharge.   Neck:  Supple  Heart: RRR, no murmurs, gallops, rubs  Lungs:  CTA bilaterally, bilat symmetrical expansion, no wheeze, rales, or rhonchi  Abdomen:   Bowel sounds present, soft, nontender, no masses, no organomegaly, no peritoneal signs  Distended with what appears to be ascites  Extremities:  No clubbing, cyanosis, or edema  Generalized muscle atrophy noted in all 4 extremities  Skin:  Warm and dry, no open lesions or rash  Neuro:  Cranial nerves 2-12 intact, no focal deficits  Breast: deferred  Rectal: deferred  Genitalia:  deferred    LABS:  Data reviewed      ASSESSMENT:      Principal Problem:    Hypokalemia  Acute colitis based on CT  Alcoholic liver disease with cirrhosis  Ongoing tobacco use  Alcoholic polyneuropathy  Patient Active Problem List   Diagnosis    Tetrahydrocannabinol (THC) use disorder, mild, abuse    Severe protein-calorie malnutrition ([address]    Bipolar affective disorder, manic ([address]    Bipolar disorder with severe depression ([address]    Cirrhosis ([address]    Generalized abdominal pain    Alcoholic hepatitis    Chronic pain syndrome    Fibromyalgia    GERD (gastroesophageal reflux disease)    Hypokalemia           PLAN:  Zosyn empirically  IV fluids with potassium supplementation  Consult general surgery  Nicotine replacement  Questions answered to her satisfaction    Kathryn Lima MD  4:22 PM  2/26/2022

## 2022-02-27 VITALS
DIASTOLIC BLOOD PRESSURE: 88 MMHG | WEIGHT: 81.2 LBS | HEART RATE: 86 BPM | HEIGHT: 63 IN | OXYGEN SATURATION: 100 % | BODY MASS INDEX: 14.39 KG/M2 | RESPIRATION RATE: 20 BRPM | TEMPERATURE: 98.1 F | SYSTOLIC BLOOD PRESSURE: 127 MMHG

## 2022-02-27 LAB
ALBUMIN SERPL-MCNC: 2.4 G/DL (ref 3.5–5.2)
ALP BLD-CCNC: 130 U/L (ref 35–104)
ALT SERPL-CCNC: 19 U/L (ref 0–32)
ANION GAP SERPL CALCULATED.3IONS-SCNC: 10 MMOL/L (ref 7–16)
ANION GAP SERPL CALCULATED.3IONS-SCNC: 9 MMOL/L (ref 7–16)
AST SERPL-CCNC: 82 U/L (ref 0–31)
BASOPHILS ABSOLUTE: 0.04 E9/L (ref 0–0.2)
BASOPHILS RELATIVE PERCENT: 0.5 % (ref 0–2)
BILIRUB SERPL-MCNC: 0.4 MG/DL (ref 0–1.2)
BUN BLDV-MCNC: 2 MG/DL (ref 6–20)
BUN BLDV-MCNC: <2 MG/DL (ref 6–20)
CALCIUM SERPL-MCNC: 7.8 MG/DL (ref 8.6–10.2)
CALCIUM SERPL-MCNC: 8.6 MG/DL (ref 8.6–10.2)
CHLORIDE BLD-SCNC: 103 MMOL/L (ref 98–107)
CHLORIDE BLD-SCNC: 110 MMOL/L (ref 98–107)
CO2: 20 MMOL/L (ref 22–29)
CO2: 23 MMOL/L (ref 22–29)
CREAT SERPL-MCNC: 0.3 MG/DL (ref 0.5–1)
CREAT SERPL-MCNC: 0.3 MG/DL (ref 0.5–1)
EOSINOPHILS ABSOLUTE: 0.28 E9/L (ref 0.05–0.5)
EOSINOPHILS RELATIVE PERCENT: 3.2 % (ref 0–6)
GFR AFRICAN AMERICAN: >60
GFR AFRICAN AMERICAN: >60
GFR NON-AFRICAN AMERICAN: >60 ML/MIN/1.73
GFR NON-AFRICAN AMERICAN: >60 ML/MIN/1.73
GLUCOSE BLD-MCNC: 82 MG/DL (ref 74–99)
GLUCOSE BLD-MCNC: 84 MG/DL (ref 74–99)
HCT VFR BLD CALC: 33.2 % (ref 34–48)
HEMOGLOBIN: 11 G/DL (ref 11.5–15.5)
IMMATURE GRANULOCYTES #: 0.02 E9/L
IMMATURE GRANULOCYTES %: 0.2 % (ref 0–5)
LYMPHOCYTES ABSOLUTE: 2.81 E9/L (ref 1.5–4)
LYMPHOCYTES RELATIVE PERCENT: 32.3 % (ref 20–42)
MCH RBC QN AUTO: 34.8 PG (ref 26–35)
MCHC RBC AUTO-ENTMCNC: 33.1 % (ref 32–34.5)
MCV RBC AUTO: 105.1 FL (ref 80–99.9)
MONOCYTES ABSOLUTE: 0.66 E9/L (ref 0.1–0.95)
MONOCYTES RELATIVE PERCENT: 7.6 % (ref 2–12)
NEUTROPHILS ABSOLUTE: 4.9 E9/L (ref 1.8–7.3)
NEUTROPHILS RELATIVE PERCENT: 56.2 % (ref 43–80)
PDW BLD-RTO: 13.9 FL (ref 11.5–15)
PLATELET # BLD: 297 E9/L (ref 130–450)
PMV BLD AUTO: 9.5 FL (ref 7–12)
POTASSIUM SERPL-SCNC: 5.1 MMOL/L (ref 3.5–5)
POTASSIUM SERPL-SCNC: 5.8 MMOL/L (ref 3.5–5)
RBC # BLD: 3.16 E12/L (ref 3.5–5.5)
SODIUM BLD-SCNC: 136 MMOL/L (ref 132–146)
SODIUM BLD-SCNC: 139 MMOL/L (ref 132–146)
TOTAL PROTEIN: 4.8 G/DL (ref 6.4–8.3)
WBC # BLD: 8.7 E9/L (ref 4.5–11.5)

## 2022-02-27 PROCEDURE — 85025 COMPLETE CBC W/AUTO DIFF WBC: CPT

## 2022-02-27 PROCEDURE — 99254 IP/OBS CNSLTJ NEW/EST MOD 60: CPT | Performed by: SURGERY

## 2022-02-27 PROCEDURE — 6360000002 HC RX W HCPCS: Performed by: INTERNAL MEDICINE

## 2022-02-27 PROCEDURE — 36415 COLL VENOUS BLD VENIPUNCTURE: CPT

## 2022-02-27 PROCEDURE — 6370000000 HC RX 637 (ALT 250 FOR IP): Performed by: INTERNAL MEDICINE

## 2022-02-27 PROCEDURE — 2580000003 HC RX 258: Performed by: INTERNAL MEDICINE

## 2022-02-27 PROCEDURE — 80048 BASIC METABOLIC PNL TOTAL CA: CPT

## 2022-02-27 PROCEDURE — 80053 COMPREHEN METABOLIC PANEL: CPT

## 2022-02-27 RX ORDER — PANTOPRAZOLE SODIUM 40 MG/1
40 TABLET, DELAYED RELEASE ORAL
Status: DISCONTINUED | OUTPATIENT
Start: 2022-02-28 | End: 2022-02-27 | Stop reason: HOSPADM

## 2022-02-27 RX ORDER — OXYCODONE AND ACETAMINOPHEN 7.5; 325 MG/1; MG/1
1 TABLET ORAL 2 TIMES DAILY
Qty: 20 TABLET | Refills: 0 | Status: SHIPPED | OUTPATIENT
Start: 2022-02-27 | End: 2022-03-09

## 2022-02-27 RX ORDER — PANTOPRAZOLE SODIUM 40 MG/1
40 TABLET, DELAYED RELEASE ORAL
Qty: 30 TABLET | Refills: 3 | Status: SHIPPED | OUTPATIENT
Start: 2022-02-28

## 2022-02-27 RX ORDER — LANOLIN ALCOHOL/MO/W.PET/CERES
100 CREAM (GRAM) TOPICAL DAILY
Qty: 30 TABLET | Refills: 3 | Status: SHIPPED | OUTPATIENT
Start: 2022-02-28

## 2022-02-27 RX ORDER — NICOTINE 21 MG/24HR
1 PATCH, TRANSDERMAL 24 HOURS TRANSDERMAL DAILY
Qty: 30 PATCH | Refills: 3 | Status: SHIPPED | OUTPATIENT
Start: 2022-02-27

## 2022-02-27 RX ADMIN — PIPERACILLIN AND TAZOBACTAM 3375 MG: 3; .375 INJECTION, POWDER, LYOPHILIZED, FOR SOLUTION INTRAVENOUS at 02:57

## 2022-02-27 RX ADMIN — PIPERACILLIN AND TAZOBACTAM 3375 MG: 3; .375 INJECTION, POWDER, LYOPHILIZED, FOR SOLUTION INTRAVENOUS at 08:10

## 2022-02-27 RX ADMIN — HYDROMORPHONE HYDROCHLORIDE 0.5 MG: 1 INJECTION, SOLUTION INTRAMUSCULAR; INTRAVENOUS; SUBCUTANEOUS at 08:11

## 2022-02-27 RX ADMIN — ENOXAPARIN SODIUM 30 MG: 100 INJECTION SUBCUTANEOUS at 08:12

## 2022-02-27 RX ADMIN — DRONABINOL 5 MG: 2.5 CAPSULE ORAL at 08:12

## 2022-02-27 RX ADMIN — PREGABALIN 50 MG: 50 CAPSULE ORAL at 08:11

## 2022-02-27 RX ADMIN — ONDANSETRON 4 MG: 2 INJECTION INTRAMUSCULAR; INTRAVENOUS at 12:44

## 2022-02-27 RX ADMIN — Medication 100 MG: at 08:12

## 2022-02-27 RX ADMIN — SODIUM CHLORIDE: 9 INJECTION, SOLUTION INTRAVENOUS at 08:14

## 2022-02-27 RX ADMIN — FOLIC ACID 1 MG: 1 TABLET ORAL at 08:12

## 2022-02-27 RX ADMIN — ONDANSETRON 4 MG: 2 INJECTION INTRAMUSCULAR; INTRAVENOUS at 08:11

## 2022-02-27 RX ADMIN — SODIUM CHLORIDE: 9 INJECTION, SOLUTION INTRAVENOUS at 03:24

## 2022-02-27 RX ADMIN — HYDROMORPHONE HYDROCHLORIDE 0.5 MG: 1 INJECTION, SOLUTION INTRAMUSCULAR; INTRAVENOUS; SUBCUTANEOUS at 12:44

## 2022-02-27 RX ADMIN — HYDROMORPHONE HYDROCHLORIDE 0.5 MG: 1 INJECTION, SOLUTION INTRAMUSCULAR; INTRAVENOUS; SUBCUTANEOUS at 03:30

## 2022-02-27 ASSESSMENT — PAIN DESCRIPTION - PAIN TYPE: TYPE: ACUTE PAIN

## 2022-02-27 ASSESSMENT — PAIN DESCRIPTION - PROGRESSION: CLINICAL_PROGRESSION: NOT CHANGED

## 2022-02-27 ASSESSMENT — PAIN DESCRIPTION - ONSET: ONSET: ON-GOING

## 2022-02-27 ASSESSMENT — PAIN SCALES - GENERAL
PAINLEVEL_OUTOF10: 5
PAINLEVEL_OUTOF10: 5
PAINLEVEL_OUTOF10: 9
PAINLEVEL_OUTOF10: 6
PAINLEVEL_OUTOF10: 6
PAINLEVEL_OUTOF10: 3

## 2022-02-27 ASSESSMENT — ENCOUNTER SYMPTOMS
RHINORRHEA: 0
COUGH: 0
DIARRHEA: 0
NAUSEA: 0
ABDOMINAL PAIN: 0
EYE PAIN: 0
WHEEZING: 0
VOMITING: 0
CHEST TIGHTNESS: 0
SORE THROAT: 0
CONSTIPATION: 0
SHORTNESS OF BREATH: 0

## 2022-02-27 ASSESSMENT — PAIN DESCRIPTION - LOCATION: LOCATION: ABDOMEN

## 2022-02-27 ASSESSMENT — PAIN DESCRIPTION - FREQUENCY: FREQUENCY: CONTINUOUS

## 2022-02-27 ASSESSMENT — PAIN - FUNCTIONAL ASSESSMENT: PAIN_FUNCTIONAL_ASSESSMENT: ACTIVITIES ARE NOT PREVENTED

## 2022-02-27 NOTE — PROGRESS NOTES
Dr. Isiah Oro MD,    Your patient is on a medication that requires a renal and/or weight dose adjustment. Renal/Body Weight Function Assessment:    Date Body Weight IBW  Adjusted BW SCr  CrCl Dialysis status   2/27/2022 36.8 kg Female patients must weigh at least 45.5 kg to calculate ideal body weight 74 ml/min N/a       Pharmacy has dose-adjusted the following medication(s):    Date Previous Order Adjusted Order   2/27/2022 Lovenox 40 mg daily Lovenox 30 mg daily       These changes were made per protocol according to the [facility] Renal Dosing Policy/ [facility] Pharmacist Anticoagulant Review. *Please note this dose may need readjusted if your patient's condition changes. Please contact pharmacy with any questions regarding these changes.     DASIA Eckert [facility]  2/27/2022  6:42 AM

## 2022-02-27 NOTE — CONSULTS
Surgery History and Physical/Consult Note    CC:    Colitis on CT    HPI:  This is a 43 y.o. female with PMH cirrhosis admitted 2/25/2022 for outpatient labs showing hypokalemia. She denies any other new symptoms. She does have chronic diffuse wide body pain. Including abdominal discomfort. This is not changed from her baseline. Does have a history of paracentesis x2 in the past.  She follows with [place] [id number] for her cirrhosis. She has had EGD which she reports did not show any varices or require any banding. She has never had colonoscopy before. Her bowels are moving normally for her she states. There is no blood in her stools. She has been abstinent from alcohol for over 3 months she states. She has never been seen by transplant center.       PMH:  Past Medical History:   Diagnosis Date    Acute alcoholic intoxication with complication ([address] 5/7/8938    Alcohol abuse 8/24/2018    Anemia     Bipolar disorder current episode depressed ([address]     Therapy    Closed fracture of left proximal humerus     Depression     Depression with suicidal ideation 10/24/2018    ETOH abuse     Humerus fracture 04/2021    left- For OR 5-12-21    Liver disease     Major depression, recurrent (HCC)     Therapy -stable per pat    Major depressive disorder, recurrent episode, severe ([address] 8/24/2018    Mild tetrahydrocannabinol (THC) abuse     chronic     Protein calorie malnutrition ([address]     severe    SBP (spontaneous bacterial peritonitis) ([address] 11/8/2021       PSH:  Past Surgical History:   Procedure Laterality Date    HUMERUS FRACTURE SURGERY Left 5/12/2021    LEFT PROXIMAL HUMERUS OPEN REDUCTION INTERNAL FIXATION performed by Cordell Lara MD at [address] [place] [address] Bilateral 2000    ROTATOR CUFF REPAIR  08/2021    TONSILLECTOMY         Family History:  Family History   Problem Relation Age of Onset    No Known Problems Mother     No Known Problems Father        Social History: reports that she has been smoking cigarettes. She started smoking about 18 years ago. She has a 15.00 pack-year smoking history. She has quit using smokeless tobacco. She reports previous alcohol use of about 42.0 standard drinks of alcohol per week. She reports current drug use. Frequency: 1.00 time per week. Drug: Marijuana Lanis Simone). Review of Systems:  A 14pt complete review of systems was performed, and all pertinent positives and negatives are listed in the HPI. All other systems are negative. Allergies:  No Known Allergies    Medications:  Home medications:   Prior to Admission medications    Medication Sig Start Date End Date Taking? Authorizing Provider   hydrOXYzine (ATARAX) 10 MG tablet Take 25 mg by mouth nightly as needed for Anxiety   Yes Historical Provider, MD   pregabalin (LYRICA) 50 MG capsule Take 1 capsule by mouth 2 times daily for 30 days.  2/21/22 3/23/22 Yes Dao Garsia DO   dronabinol (MARINOL) 5 MG capsule take 1 capsule by mouth twice a day 12/27/21  Yes Historical Provider, MD   folic acid (FOLVITE) 1 MG tablet Take 1 tablet by mouth daily 11/5/21  Yes Heidi Isbell MD   vitamin B-1 (THIAMINE) 100 MG tablet Take 1 tablet by mouth daily 11/5/21  Yes Heidi Isbell MD       Scheduled medications:   enoxaparin  30 mg SubCUTAneous Daily    thiamine  100 mg Oral Daily    nicotine  1 patch TransDERmal Daily    dronabinol  5 mg Oral Daily    folic acid  1 mg Oral Daily    pregabalin  50 mg Oral BID       PRN medications: HYDROmorphone, ondansetron, hydrOXYzine    Objective:    Physical Examination:  Vitals:    02/26/22 2215 02/27/22 0253 02/27/22 0650 02/27/22 0748   BP: 122/65  (!) 129/102 127/88   Pulse: 80  77 86   Resp: 18  14 20   Temp: 97 °F (36.1 °C)  98.2 °F (36.8 °C) 98.1 °F (36.7 °C)   TempSrc: Oral  Oral Oral   SpO2: 97%  100% 100%   Weight:  81 lb 3.2 oz (36.8 kg)     Height:           General - alert, well appearing, and in no distress  HEENT - Normocephalic, Atraumatic. No Scleral Icterus  Neck - supple, trachea midline  Respiratory - Breathing comfortably, no respiratory distress  Heart - Regular Rate  Abdomen - soft, mild diffuse tenderness without rebound or guarding, nondistended  Neurological - alert, oriented x 3  Psych - affect normal  Musculoskeletal - moves all extremities, no gross deficit  Skin - warm, pink    Labs:    CBC  Recent Labs     02/27/22  0338   WBC 8.7   HGB 11.0*   HCT 33.2*        BMP  Recent Labs     02/27/22  0338      K 5.8*   *   CO2 20*   BUN <2*   CREATININE 0.3*   CALCIUM 7.8*     Liver Function  Recent Labs     02/24/22  1415 02/24/22  1415 02/25/22  1434 02/26/22  0547 02/27/22  0338   LIPASE 46  --   --   --   --    BILITOT 0.4   < > 0.4   < > 0.4   BILIDIR  --   --  <0.2  --   --    AST 62*   < > 59*   < > 82*   ALT 15   < > 18   < > 19   ALKPHOS 149*   < > 161*   < > 130*   PROT 6.7   < > 6.9   < > 4.8*   LABALBU 3.2*   < > 3.6   < > 2.4*    < > = values in this interval not displayed. No results for input(s): LACTATE in the last 72 hours. Recent Labs     02/24/22  1415   INR 1.0       Imaging:    CT ABDOMEN PELVIS W IV CONTRAST Additional Contrast? None    Result Date: 2/25/2022  EXAMINATION: CT OF THE ABDOMEN AND PELVIS WITH CONTRAST 2/25/2022 4:38 pm TECHNIQUE: CT of the abdomen and pelvis was performed with the administration of intravenous contrast. Multiplanar reformatted images are provided for review. Dose modulation, iterative reconstruction, and/or weight based adjustment of the mA/kV was utilized to reduce the radiation dose to as low as reasonably achievable. COMPARISON: CT abdomen and pelvis 11/11/2021.  HISTORY: ORDERING SYSTEM PROVIDED HISTORY: abdominal pain, elevated lactic acid TECHNOLOGIST PROVIDED HISTORY: Reason for exam:->abdominal pain, elevated lactic acid Additional Contrast?->None Decision Support Exception - unselect if not a suspected or confirmed emergency medical condition->Emergency Medical Condition (MA) FINDINGS: There is hypoattenuation of the liver parenchyma which may be partially due to early phase of contrast enhancement. However, this could also represent an element of fatty change. There is no evidence of focal hepatic lesion. The spleen, pancreas, and adrenal glands are unremarkable. Evaluation of the kidneys is somewhat limited secondary to cortical phase of contrast enhancement rather than corticomedullary phase. However, no gross abnormality is identified. The gallbladder is intact with suggestion of mild wall thickening and enhancement. No evidence of biliary ductal dilatation. No evidence of bowel obstruction, pneumoperitoneum, or ascites. The uterus and adnexa are unremarkable. There is suggestion of wall thickening throughout the colon. The appendix is unremarkable in appearance. There is mild arteriosclerosis without abdominal aortic aneurysm. The lung bases are clear. There are degenerative changes within the spine. 1. There is rather diffuse wall thickening throughout the colon which may represent infectious or inflammatory colitis. 2. Possible fatty liver. 3. There is suggestion of nonspecific gallbladder wall thickening. No evidence of biliary ductal dilatation. ASSESSMENT & PLAN:    I have examined the patient and performed key aspects of physical exam, reviewed the record (including all pertinent and new radiology images and laboratory findings), and discussed the case with the surgical team.  I agree with the assessment and plan with the following additions, corrections, and changes. This is a 43 y.o. female admitted 2/25/2022 with abnormal: Imaging on CT. Her abdominal discomfort is baseline per her. There is no leukocytosis. There is no significant ascites. There is likely bowel wall thickening related to hypoalbuminemic state more so than colitis. Is likely the gallbladder appears this way for similar reason. Would be okay to discharge her on a short course of antibiotics in any event though to cover possible colitis. Did recommend she discuss with her GI regarding scheduling a colonoscopy also. We will start her on a PPI as well for some epigastric discomfort. Judah Salazar MD   2/27/2022   10:29 AM    NOTE: This report, in part or full, may have been transcribed using voice recognition software. Every effort was made to ensure accuracy; however, inadvertent computerized transcription errors may be present. Please excuse any transcriptional grammatical or spelling errors that may have escaped my editorial review. details… detailed exam

## 2022-02-28 ENCOUNTER — OFFICE VISIT (OUTPATIENT)
Dept: PRIMARY CARE CLINIC | Age: 37
End: 2022-02-28
Payer: MEDICAID

## 2022-02-28 VITALS
SYSTOLIC BLOOD PRESSURE: 98 MMHG | BODY MASS INDEX: 15.41 KG/M2 | HEIGHT: 63 IN | TEMPERATURE: 97.2 F | DIASTOLIC BLOOD PRESSURE: 60 MMHG | WEIGHT: 87 LBS

## 2022-02-28 DIAGNOSIS — E87.6 HYPOKALEMIA: ICD-10-CM

## 2022-02-28 DIAGNOSIS — K70.30 ALCOHOLIC CIRRHOSIS, UNSPECIFIED WHETHER ASCITES PRESENT (HCC): Primary | ICD-10-CM

## 2022-02-28 DIAGNOSIS — G47.01 INSOMNIA DUE TO MEDICAL CONDITION: ICD-10-CM

## 2022-02-28 DIAGNOSIS — Z12.31 BREAST CANCER SCREENING BY MAMMOGRAM: ICD-10-CM

## 2022-02-28 DIAGNOSIS — G89.4 CHRONIC PAIN SYNDROME: ICD-10-CM

## 2022-02-28 DIAGNOSIS — E43 SEVERE PROTEIN-CALORIE MALNUTRITION (HCC): Chronic | ICD-10-CM

## 2022-02-28 DIAGNOSIS — F31.4 BIPOLAR DISORDER WITH SEVERE DEPRESSION (HCC): ICD-10-CM

## 2022-02-28 DIAGNOSIS — G62.9 POLYNEUROPATHY: ICD-10-CM

## 2022-02-28 DIAGNOSIS — M79.7 FIBROMYALGIA: ICD-10-CM

## 2022-02-28 PROCEDURE — 99214 OFFICE O/P EST MOD 30 MIN: CPT | Performed by: FAMILY MEDICINE

## 2022-02-28 PROCEDURE — 1111F DSCHRG MED/CURRENT MED MERGE: CPT | Performed by: FAMILY MEDICINE

## 2022-02-28 RX ORDER — LACTULOSE 10 G/15ML
20 SOLUTION ORAL 2 TIMES DAILY
COMMUNITY

## 2022-02-28 RX ORDER — MIRTAZAPINE 7.5 MG/1
7.5 TABLET, FILM COATED ORAL NIGHTLY
Qty: 30 TABLET | Refills: 5 | Status: SHIPPED | OUTPATIENT
Start: 2022-02-28

## 2022-02-28 RX ORDER — CYANOCOBALAMIN 1000 UG/ML
1000 INJECTION INTRAMUSCULAR; SUBCUTANEOUS ONCE
Status: COMPLETED | OUTPATIENT
Start: 2022-02-28 | End: 2022-02-28

## 2022-02-28 RX ADMIN — CYANOCOBALAMIN 1000 MCG: 1000 INJECTION INTRAMUSCULAR; SUBCUTANEOUS at 15:39

## 2022-02-28 ASSESSMENT — ENCOUNTER SYMPTOMS
NAUSEA: 1
BACK PAIN: 1
DIARRHEA: 1
CONSTIPATION: 1
VOMITING: 0

## 2022-02-28 NOTE — DISCHARGE SUMMARY
Physician Discharge Summary     Patient ID:  Cony Fernandez  [id number]  43 y.o.  1985    Admit date: 2/25/2022    Discharge date and time: 2/27/2022  2:52 PM     Admission Diagnoses: Hypokalemia [E87.6]    Discharge Diagnoses:   Severe hypokalemia  Alcoholic painful neuropathy  Patient Active Problem List   Diagnosis    Tetrahydrocannabinol (THC) use disorder, mild, abuse    Severe protein-calorie malnutrition ([address]    Bipolar affective disorder, manic ([address]    Bipolar disorder with severe depression ([address]    Cirrhosis ([address]    Generalized abdominal pain    Alcoholic hepatitis    Chronic pain syndrome    Fibromyalgia    GERD (gastroesophageal reflux disease)    Hypokalemia     Colitis ruled out  Consults: General surgery dr Butler [place]    Procedures:     Hospital Course:   24-year-old  woman with a known history of cirrhosis of the liver  Admitted through emergency room due to abnormal labs i.e. low potassium of 2.3  In addition CT scan of the abdomen showed possibility of colitis  This was ruled out by general surgery  Empiric Zosyn was given but was discontinued  Potassium replacement performed  On the day of discharge potassium was actually high  But the repeat was normal  She was discharged home in stable condition    Discharge Exam:  Patient was seen on the floor on the day of discharge  Oral intake adequate  Vital signs were stable  Oral mucosa moist  Neck supple  Abdomen soft somewhat distended  Extremities showed no edema  Generalized muscle atrophy noted    Disposition: Home  Stable at the time of discharge  Patient Instructions:   Discharge Medication List as of 2/27/2022  2:17 PM      START taking these medications    Details   nicotine (NICODERM CQ) 14 MG/24HR Place 1 patch onto the skin daily, Disp-30 patch, R-3Normal      thiamine 100 MG tablet Take 1 tablet by mouth daily, Disp-30 tablet, R-3Normal      oxyCODONE-acetaminophen (PERCOCET) 7.5-325 MG per tablet Take 1 tablet by mouth 2 times daily for 10 days. Intended supply: 30 days, Disp-20 tablet, R-0Normal         CONTINUE these medications which have CHANGED    Details   pantoprazole (PROTONIX) 40 MG tablet Take 1 tablet by mouth every morning (before breakfast), Disp-30 tablet, R-3Normal         CONTINUE these medications which have NOT CHANGED    Details   pregabalin (LYRICA) 50 MG capsule Take 1 capsule by mouth 2 times daily for 30 days. , Disp-60 capsule, R-0Normal      dronabinol (MARINOL) 5 MG capsule take 1 capsule by mouth twice a dayHistorical Med      folic acid (FOLVITE) 1 MG tablet Take 1 tablet by mouth daily, Disp-30 tablet, R-0OTC      vitamin B-1 (THIAMINE) 100 MG tablet Take 1 tablet by mouth daily, Disp-30 tablet, R-0OTC         STOP taking these medications       hydrOXYzine (ATARAX) 10 MG tablet Comments:   Reason for Stopping:         hydrOXYzine (ATARAX) 25 MG tablet Comments:   Reason for Stopping:         ondansetron (ZOFRAN) 4 MG tablet Comments:   Reason for Stopping:         melatonin 3 MG TABS tablet Comments:   Reason for Stopping:         dicyclomine (BENTYL) 10 MG capsule Comments:   Reason for Stopping:         furosemide (LASIX) 40 MG tablet Comments:   Reason for Stopping:         spironolactone (ALDACTONE) 100 MG tablet Comments:   Reason for Stopping:             Activity: As tolerated  Diet: General    Follow-up with PCP    Note that over 40 minutes was spent in preparing discharge papers, discussing discharge with patient, medication review, etc.    Signed:  Omari Aguirre MD  2/28/2022  11:24 AM

## 2022-02-28 NOTE — PROGRESS NOTES
Post-Discharge Transitional Care Follow Up      Abhishek Khalil   YOB: 1985    Date of Office Visit:  2/28/2022  Date of Hospital Admission: 2/25/22  Date of Hospital Discharge: 2/27/22  Readmission Risk Score (high >=14%. Medium >=10%):Readmission Risk Score: 15 ( )      Care management risk score Rising risk (score 2-5) and Complex Care (Scores >=6): 8     Non face to face  following discharge, date last encounter closed (first attempt may have been earlier): *No documented post hospital discharge outreach found in the last 14 days     Call initiated 2 business days of discharge: *No response recorded in the last 14 days     Alcoholic cirrhosis, unspecified whether ascites present ([facility]  -     rifaximin (XIFAXAN) 550 MG tablet;  Take 1 tablet by mouth 2 times daily, Disp-60 tablet, R-5Normal  -     cyanocobalamin injection 1,000 mcg; 1,000 mcg, IntraMUSCular, ONCE, On Mon 2/28/22 at 1545, For 1 dose  Hypokalemia  -     MD DISCHARGE MEDS RECONCILED W/ CURRENT OUTPATIENT MED LIST  -     cyanocobalamin injection 1,000 mcg; 1,000 mcg, IntraMUSCular, ONCE, On Mon 2/28/22 at 1545, For 1 dose  Severe protein-calorie malnutrition (HCC)  -     Amb Referral to Nutrition Services  -     cyanocobalamin injection 1,000 mcg; 1,000 mcg, IntraMUSCular, ONCE, On Mon 2/28/22 at 1545, For 1 dose  Bipolar disorder with severe depression (HCC)  -     cyanocobalamin injection 1,000 mcg; 1,000 mcg, IntraMUSCular, ONCE, On Mon 2/28/22 at 1545, For 1 dose  Chronic pain syndrome  -     cyanocobalamin injection 1,000 mcg; 1,000 mcg, IntraMUSCular, ONCE, On Mon 2/28/22 at 1545, For 1 dose  Fibromyalgia  -     cyanocobalamin injection 1,000 mcg; 1,000 mcg, IntraMUSCular, ONCE, On Mon 2/28/22 at 1545, For 1 dose  Polyneuropathy  -     EMG; Future  -     cyanocobalamin injection 1,000 mcg; 1,000 mcg, IntraMUSCular, ONCE, On Mon 2/28/22 at 1545, For 1 dose  Insomnia due to medical condition  -     mirtazapine (REMERON) 7.5 MG tablet; Take 1 tablet by mouth nightly, Disp-30 tablet, R-5Normal  -     cyanocobalamin injection 1,000 mcg; 1,000 mcg, IntraMUSCular, ONCE, On Mon 2/28/22 at 1545, For 1 dose  Breast cancer screening by mammogram  -     [facility] MANDY DIGITAL SCREEN BILATERAL PER PROTOCOL; Future  -     cyanocobalamin injection 1,000 mcg; 1,000 mcg, IntraMUSCular, ONCE, On Mon 2/28/22 at 1545, For 1 dose      Medical Decision Making: moderate complexity  Return in about 4 weeks (around 3/28/2022). On this date 2/28/2022 I have spent 30 minutes reviewing previous notes, test results and face to face with the patient discussing the diagnosis and importance of compliance with the treatment plan as well as documenting on the day of the visit. At this time we will refer to dietitian in addition to ordering EMG for further evaluation. Started on low-dose Remeron for insomnia and to help with her mood. Further follow-up will be based on specialist recommendations. Mammogram also ordered today. We will try to get rifaximin covered today to help with her side effects from lactulose in order to better control her ammonia and potassium level. Due to noncompliance secondary to side effects this becomes a safety issue. Subjective:   HPI    Inpatient course: Discharge summary reviewed- see chart. Interval history/Current status: States that she is still having significant issues post discharge. Doing well with the change in medication regimen. Does have follow-ups with vascular surgery and gastroenterology this week. Is concerned about continued insomnia. Patient states that she has only been sleeping 2 to 3 hours/day for several months now. She would also like a referral to a dietitian due to significant protein calorie malnutrition. She would also like to be evaluated for polyneuropathy which may or may not be related to her longstanding liver issue.   Patient states that part of the reason that resulted in her most recent hospital course was inability to take lactulose as directed. Patient states that she could not take the continued diarrhea and abdominal pain which resulted in elevated ammonia level and hypokalemia secondary to multiple medications. Patient also states that she was having altered mental status secondary to these issues. Doing slightly better post discharge. Patient Active Problem List   Diagnosis    Tetrahydrocannabinol (THC) use disorder, mild, abuse    Severe protein-calorie malnutrition ([address]    Bipolar affective disorder, manic ([address]    Bipolar disorder with severe depression ([address]    Cirrhosis ([address]    Generalized abdominal pain    Alcoholic hepatitis    Chronic pain syndrome    Fibromyalgia    GERD (gastroesophageal reflux disease)    Hypokalemia       Medications listed as ordered at the time of discharge from hospital  [unfilled]     Medications marked \"taking\" at this time  Outpatient Medications Marked as Taking for the 2/28/22 encounter (Office Visit) with Mike Garsia, DO   Medication Sig Dispense Refill    lactulose (CHRONULAC) 10 GM/15ML solution Take 20 g by mouth 2 times daily      mirtazapine (REMERON) 7.5 MG tablet Take 1 tablet by mouth nightly 30 tablet 5    rifaximin (XIFAXAN) 550 MG tablet Take 1 tablet by mouth 2 times daily 60 tablet 5    nicotine (NICODERM CQ) 14 MG/24HR Place 1 patch onto the skin daily 30 patch 3    pantoprazole (PROTONIX) 40 MG tablet Take 1 tablet by mouth every morning (before breakfast) 30 tablet 3    thiamine 100 MG tablet Take 1 tablet by mouth daily 30 tablet 3    oxyCODONE-acetaminophen (PERCOCET) 7.5-325 MG per tablet Take 1 tablet by mouth 2 times daily for 10 days. Intended supply: 30 days 20 tablet 0    pregabalin (LYRICA) 50 MG capsule Take 1 capsule by mouth 2 times daily for 30 days.  60 capsule 0    dronabinol (MARINOL) 5 MG capsule take 1 capsule by mouth twice a day      folic acid (FOLVITE) 1 MG tablet Take 1 tablet by mouth daily 30 tablet 0    vitamin B-1 (THIAMINE) 100 MG tablet Take 1 tablet by mouth daily 30 tablet 0        Medications patient taking as of now reconciled against medications ordered at time of hospital discharge: Yes    Review of Systems   Constitutional: Positive for fatigue. Negative for diaphoresis and fever. Denies malaise   Cardiovascular: Negative for chest pain. Gastrointestinal: Positive for constipation, diarrhea and nausea. Negative for vomiting. HAS abdominal tightness   Endocrine: Positive for cold intolerance. Genitourinary:        HAS decreased urine output   Musculoskeletal: Positive for arthralgias, back pain, joint swelling and myalgias. HAS edema, HAS leg pain   Neurological: Positive for weakness, light-headedness, numbness and headaches. Negative for syncope. Psychiatric/Behavioral: Positive for agitation, dysphoric mood and sleep disturbance. Negative for confusion. The patient is nervous/anxious. HAS anxiety   All other systems reviewed and are negative. Objective:    BP 98/60   Temp 97.2 °F (36.2 °C)   Ht 5' 3\" (1.6 m)   Wt 87 lb (39.5 kg)   BMI 15.41 kg/m²   Physical Exam  Vitals reviewed. Constitutional:       Appearance: She is cachectic. She is ill-appearing. HENT:      Head: Normocephalic and atraumatic. Eyes:      General: No scleral icterus. Conjunctiva/sclera: Conjunctivae normal.      Pupils: Pupils are equal, round, and reactive to light. Neck:      Thyroid: No thyromegaly. Cardiovascular:      Rate and Rhythm: Normal rate and regular rhythm. Heart sounds: Normal heart sounds. No murmur heard. Pulmonary:      Effort: Pulmonary effort is normal.      Breath sounds: Decreased air movement present. Decreased breath sounds present. No rales. Abdominal:      General: Bowel sounds are normal. There is distension. Palpations: Abdomen is soft. Tenderness: There is abdominal tenderness. Musculoskeletal:         General: Normal range of motion. Cervical back: Neck supple. Lymphadenopathy:      Cervical: No cervical adenopathy. Skin:     General: Skin is warm and dry. Coloration: Skin is jaundiced. Findings: No erythema or rash. Comments: Scattered xerosis noted to the extremities. Neurological:      Mental Status: She is alert and oriented to person, place, and time. Cranial Nerves: No cranial nerve deficit. Psychiatric:         Attention and Perception: Attention normal.         Mood and Affect: Mood is anxious. Speech: Speech is rapid and pressured. Judgment: Judgment normal.         An electronic signature was used to authenticate this note.   --Floyd Garsia DO

## 2022-02-28 NOTE — LETTER
[address]  Ligia MCMAHON [address] [place]  Phone: [phone]  Fax: [id number], DO        February 28, 2022     Patient: Parish Tinoco   YOB: 1985   Date of Visit: 2/28/2022       To Whom It May Concern: It is my medical opinion that Mari  he unable to work secondary to multiple medical issues that are currently being addressed. She has not been able to work since October 2021. Patient should remain off of work until released by me. Conservatively this will be at least for the next 1 to 2 months pending evaluation. If you have any questions or concerns, please don't hesitate to call.     Sincerely,        Ravinder Garsia, DO

## 2022-04-14 ENCOUNTER — HOSPITAL ENCOUNTER (OUTPATIENT)
Dept: NEUROLOGY | Age: 37
Discharge: HOME | End: 2022-04-14
Payer: MEDICAID

## 2022-04-14 VITALS — WEIGHT: 84 LBS | BODY MASS INDEX: 14.88 KG/M2 | HEIGHT: 63 IN

## 2022-04-14 DIAGNOSIS — G62.9 POLYNEUROPATHY: ICD-10-CM

## 2022-04-14 PROCEDURE — 95886 MUSC TEST DONE W/N TEST COMP: CPT

## 2022-04-14 PROCEDURE — 95912 NRV CNDJ TEST 11-12 STUDIES: CPT | Performed by: PSYCHIATRY & NEUROLOGY

## 2022-04-14 PROCEDURE — 95886 MUSC TEST DONE W/N TEST COMP: CPT | Performed by: PSYCHIATRY & NEUROLOGY

## 2022-04-14 PROCEDURE — 95913 NRV CNDJ TEST 13/> STUDIES: CPT

## 2022-04-14 NOTE — PROCEDURES
[address]  [address].   Electrodiagnostic Laboratory  [facility]        Full Name: Jr Blandon Gender: Female  MRN: [id number] YOB: 1985  Location[de-identified] Outpt. Visit Date: 4/14/2022 14:29  Age: 43 Years 10 Months Old  Examining Physician: Dr. Diane Tan  Referring Physician: Dr. Jj Martinez  Technician: Jhon Pang   Height: 5 feet 3 inch  Weight: 84 lbs  BMI: 14.9  Notes: Polyneuropathy G62.9        Motor NCS      Nerve / Sites Lat. Amplitude Distance Lat Diff Velocity Temp. Amp. 1-2    ms mV cm ms m/s °C %   R Median - APB      Wrist 3.18 10.1 8   34 100      Elbow 6.72 9.7 19 3.54 54 33.9 95.7   R Ulnar - ADM      Wrist 2.66 7.9 8   32.4 100      B. Elbow 5.68 7.4 16 3.02 53 32.4 94.2      A. Elbow 7.45 7.3 10 1.77 56 32.4 92.2   R Peroneal - EDB      Ankle 5.89 4.0 8   31.3 100      Pop fossa 13.33 3.6 32 7.45 43 31.5 89.6   R Tibial - AH      Ankle 4.64 14.8 8   31.9 100      Pop fossa 13.59 12.7 37 8.96 41 32 85.7       Sensory NCS      Nerve / Sites Onset Lat Peak Lat PP Amp Distance Velocity Temp.    ms ms µV cm m/s °C   R Median - Digit II (Antidromic)      Mid Palm 1.35 2.08 80.1 7 52 32.1      Wrist 2.76 3.59 70.2 14 51 32.1   R Ulnar - Digit V (Antidromic)      Wrist 2.71 3.28 53.3 14 52 32   R Radial - Anatomical snuff box (Forearm)      Forearm 1.82 2.40 35.6 10 55 32.1   R Superficial peroneal - Ankle      Lat leg 2.81 3.49 11.8 10 36 31.7   R Sural - Ankle (Calf)      Calf 3.33 4.22 14.0 14 42 32       Combined Sensory Index      Nerve / Sites Rec. Site Peak Lat NP Amp PP Amp Segments Peak Diff Temp.      ms µV µV  ms °C   R Median - CSI      Median Thumb 2.71 15.3 50.0 Median - Radial 0.05 32      Radial Thumb 2.66 1.8 20.2 Median - Ulnar 0.10 32.1      Median Ring 3.33 9.8 35.4 Median palm - Ulnar palm 0.05 31.9      Ulnar Ring 3.23 29.3 31.8         Median palm Wrist 1.93 55.9 59.3         Ulnar palm Wrist 1.88 18.4 18.8         CSI     CSI 0.21          F  Wave      Nerve F Lat M Lat F-M Lat    ms ms ms   R Median - APB 26.3 2.9 23.3   R Ulnar - ADM 25.1 2.0 23.1   R Peroneal - EDB 50.3 6.6 43.6   R Tibial - AH 48.1 5.6 42.5       H Reflex      Nerve Lat Hmax    ms   R Tibial - Soleus 29.6   L Tibial - Soleus 30.0       EMG         EMG Summary Table     Spontaneous MUAP Recruitment   Muscle IA Fib PSW Fasc H.F. Amp Dur. PPP Pattern   R. Tibialis anterior N None None None None N N N N   R. Gastrocnemius (Medial head) N None None None None N N N N   R. Flexor digitorum longus N None None None None N N N N   R. Flexor hallucis longus N None None None Nascent N N N Sl Decr   R. Dorsal interossei (pedis) Incr Few Few None Myotonic  Disch. N N N N   R. Extensor digitorum brevis Incr None None None Myotonic  Disch. N N N N   R. Vastus lateralis N None None None None N N N N   R. Biceps femoris (short head) Incr None None None Myotonic  Disch. N 1+ Few N   R. Gluteus medius N None None None None N N N N   R. Iliopsoas N None None None None N N N N   R. Sacral paraspinals N None None None None N N N N   R. Lumbar paraspinals (low) Inc/DNT None None None None N N N N   R. Lumbar paraspinals (mid) Incr None 1+ None None N N N N         Nerve conduction studies in the right arm and right leg were unremarkable. These findings were compared to the referential values in this laboratory, available upon request.    Monopolar needle examination of the right leg found acute and chronic denervation changes in the muscles supplied primarily by the S1 motor roots. Myotonic discharges were seen in the biceps femoris short head, first dorsal osseous and extensor digitorum brevis muscles. Needle testing of the paraspinals produced acute denervation changes. Electrodiagnostic examination of the right arm and right leg disclosed evidence diagnostic of a right S1 motor radiculopathy or intracanalicular lesion, proven by the abnormal needle testing of the paraspinals.   Myotonic discharges may be seen in chronic denervation. There were no other definitive motor radiculopathies or intracanalicular lesions. There were no peripheral neuropathies. Sensory radiculopathies cannot be evaluated bilateral diagnostic needs. Clinically, the patient presented with alcoholic cirrhosis and malnutrition. She complained of burning sensations in both feet. These difficulties may be related to her radicular disease. A small fiber sensory neuropathy, which cannot be detected by routine electrodiagnostic studies, may also producing these discomforts. MRIs of the lumbosacral spine were recommended. Clinical correlation was highly advised.

## 2022-04-18 ENCOUNTER — OFFICE VISIT (OUTPATIENT)
Dept: PRIMARY CARE CLINIC | Age: 37
End: 2022-04-18
Payer: MEDICAID

## 2022-04-18 VITALS
OXYGEN SATURATION: 98 % | DIASTOLIC BLOOD PRESSURE: 60 MMHG | TEMPERATURE: 97.1 F | SYSTOLIC BLOOD PRESSURE: 112 MMHG | HEART RATE: 107 BPM | WEIGHT: 90 LBS | BODY MASS INDEX: 15.94 KG/M2

## 2022-04-18 DIAGNOSIS — M54.16 LUMBAR RADICULOPATHY: Primary | ICD-10-CM

## 2022-04-18 DIAGNOSIS — K70.30 ALCOHOLIC CIRRHOSIS, UNSPECIFIED WHETHER ASCITES PRESENT (HCC): ICD-10-CM

## 2022-04-18 DIAGNOSIS — M79.7 FIBROMYALGIA: ICD-10-CM

## 2022-04-18 DIAGNOSIS — F31.4 BIPOLAR DISORDER WITH SEVERE DEPRESSION (HCC): ICD-10-CM

## 2022-04-18 DIAGNOSIS — G89.4 CHRONIC PAIN SYNDROME: ICD-10-CM

## 2022-04-18 DIAGNOSIS — E43 SEVERE PROTEIN-CALORIE MALNUTRITION (HCC): Chronic | ICD-10-CM

## 2022-04-18 PROCEDURE — 99213 OFFICE O/P EST LOW 20 MIN: CPT | Performed by: FAMILY MEDICINE

## 2022-04-18 PROCEDURE — 4004F PT TOBACCO SCREEN RCVD TLK: CPT | Performed by: FAMILY MEDICINE

## 2022-04-18 PROCEDURE — G8418 CALC BMI BLW LOW PARAM F/U: HCPCS | Performed by: FAMILY MEDICINE

## 2022-04-18 PROCEDURE — G8427 DOCREV CUR MEDS BY ELIG CLIN: HCPCS | Performed by: FAMILY MEDICINE

## 2022-04-18 RX ORDER — FUROSEMIDE 40 MG/1
TABLET ORAL
Qty: 60 TABLET | Refills: 5 | Status: SHIPPED | OUTPATIENT
Start: 2022-04-18

## 2022-04-18 RX ORDER — THIAMINE MONONITRATE (VIT B1) 100 MG
100 TABLET ORAL DAILY
Qty: 30 TABLET | Refills: 0 | Status: SHIPPED | OUTPATIENT
Start: 2022-04-18

## 2022-04-18 RX ORDER — ONDANSETRON 4 MG/1
4 TABLET, ORALLY DISINTEGRATING ORAL EVERY 8 HOURS PRN
Qty: 90 TABLET | Refills: 5 | Status: SHIPPED | OUTPATIENT
Start: 2022-04-18

## 2022-04-18 RX ORDER — PREGABALIN 50 MG/1
50 CAPSULE ORAL 2 TIMES DAILY
Qty: 60 CAPSULE | Refills: 5 | Status: SHIPPED
Start: 2022-04-18 | End: 2022-05-13

## 2022-04-18 RX ORDER — CYANOCOBALAMIN 1000 UG/ML
1000 INJECTION INTRAMUSCULAR; SUBCUTANEOUS ONCE
Status: COMPLETED | OUTPATIENT
Start: 2022-04-18 | End: 2022-04-18

## 2022-04-18 RX ORDER — ONDANSETRON 4 MG/1
4 TABLET, ORALLY DISINTEGRATING ORAL EVERY 8 HOURS PRN
COMMUNITY
End: 2022-04-18 | Stop reason: SDUPTHER

## 2022-04-18 RX ORDER — FAMOTIDINE 40 MG/1
40 TABLET, FILM COATED ORAL DAILY
Qty: 30 TABLET | Refills: 5 | Status: SHIPPED | OUTPATIENT
Start: 2022-04-18

## 2022-04-18 RX ORDER — DRONABINOL 5 MG/1
CAPSULE ORAL
Qty: 60 CAPSULE | Refills: 5 | Status: SHIPPED | OUTPATIENT
Start: 2022-04-18 | End: 2022-10-15

## 2022-04-18 RX ORDER — FUROSEMIDE 40 MG/1
TABLET ORAL
COMMUNITY
Start: 2022-04-11 | End: 2022-04-18 | Stop reason: SDUPTHER

## 2022-04-18 RX ORDER — SPIRONOLACTONE 25 MG/1
25 TABLET ORAL DAILY
Qty: 30 TABLET | Refills: 5 | Status: SHIPPED | OUTPATIENT
Start: 2022-04-18

## 2022-04-18 RX ORDER — SPIRONOLACTONE 25 MG/1
25 TABLET ORAL DAILY
COMMUNITY
End: 2022-04-18 | Stop reason: SDUPTHER

## 2022-04-18 RX ORDER — FAMOTIDINE 40 MG/1
40 TABLET, FILM COATED ORAL DAILY
COMMUNITY
End: 2022-04-18 | Stop reason: SDUPTHER

## 2022-04-18 RX ORDER — FOLIC ACID 1 MG/1
1 TABLET ORAL DAILY
Qty: 30 TABLET | Refills: 11 | Status: SHIPPED | OUTPATIENT
Start: 2022-04-18

## 2022-04-18 RX ADMIN — CYANOCOBALAMIN 1000 MCG: 1000 INJECTION INTRAMUSCULAR; SUBCUTANEOUS at 12:19

## 2022-04-18 ASSESSMENT — ENCOUNTER SYMPTOMS
NAUSEA: 1
VOMITING: 0
DIARRHEA: 1
BACK PAIN: 1
CONSTIPATION: 1

## 2022-04-18 NOTE — PROGRESS NOTES
Megan Guzman (:  1985) is a 43 y.o. female,Established patient, here for evaluation of the following chief complaint(s):  Follow-up (had nerve study, wants to discuss)         ASSESSMENT/PLAN:  1. Lumbar radiculopathy  -     ondansetron (ZOFRAN-ODT) 4 MG disintegrating tablet; Take 1 tablet by mouth every 8 hours as needed for Nausea or Vomiting, Disp-90 tablet, R-5Normal  -     vitamin B-1 (THIAMINE) 100 MG tablet; Take 1 tablet by mouth daily, Disp-30 tablet, R-0Normal  -     pregabalin (LYRICA) 50 MG capsule; Take 1 capsule by mouth 2 times daily for 180 days. , Disp-60 capsule, R-5Normal  -     furosemide (LASIX) 40 MG tablet; take 1 tablet by mouth once daily, Disp-60 tablet, R-5Normal  -     famotidine (PEPCID) 40 MG tablet; Take 1 tablet by mouth daily, Disp-30 tablet, R-5Normal  -     dronabinol (MARINOL) 5 MG capsule; 18take 1 capsule by mouth twice a day, Disp-60 capsule, R-5Normal  -     spironolactone (ALDACTONE) 25 MG tablet; Take 1 tablet by mouth daily, Disp-30 tablet, [id number]  -     folic acid (FOLVITE) 1 MG tablet; Take 1 tablet by mouth daily, Disp-30 tablet, R-11Normal  -     cyanocobalamin injection 1,000 mcg; 1,000 mcg, IntraMUSCular, ONCE, 1 dose, On 22 at 1230  -     MRI LUMBAR SPINE WO CONTRAST; Future  2. Chronic pain syndrome  -     ondansetron (ZOFRAN-ODT) 4 MG disintegrating tablet; Take 1 tablet by mouth every 8 hours as needed for Nausea or Vomiting, Disp-90 tablet, R-5Normal  -     vitamin B-1 (THIAMINE) 100 MG tablet; Take 1 tablet by mouth daily, Disp-30 tablet, R-0Normal  -     pregabalin (LYRICA) 50 MG capsule; Take 1 capsule by mouth 2 times daily for 180 days. , Disp-60 capsule, R-5Normal  -     furosemide (LASIX) 40 MG tablet; take 1 tablet by mouth once daily, Disp-60 tablet, R-5Normal  -     famotidine (PEPCID) 40 MG tablet;  Take 1 tablet by mouth daily, Disp-30 tablet, R-5Normal  -     dronabinol (MARINOL) 5 MG capsule; 18take 1 capsule by mouth twice a day, Disp-60 capsule, R-5Normal  -     spironolactone (ALDACTONE) 25 MG tablet; Take 1 tablet by mouth daily, Disp-30 tablet, [id number]  -     folic acid (FOLVITE) 1 MG tablet; Take 1 tablet by mouth daily, Disp-30 tablet, R-11Normal  -     cyanocobalamin injection 1,000 mcg; 1,000 mcg, IntraMUSCular, ONCE, 1 dose, On Mon 4/18/22 at 1230  3. Fibromyalgia  -     ondansetron (ZOFRAN-ODT) 4 MG disintegrating tablet; Take 1 tablet by mouth every 8 hours as needed for Nausea or Vomiting, Disp-90 tablet, R-5Normal  -     vitamin B-1 (THIAMINE) 100 MG tablet; Take 1 tablet by mouth daily, Disp-30 tablet, R-0Normal  -     pregabalin (LYRICA) 50 MG capsule; Take 1 capsule by mouth 2 times daily for 180 days. , Disp-60 capsule, R-5Normal  -     furosemide (LASIX) 40 MG tablet; take 1 tablet by mouth once daily, Disp-60 tablet, R-5Normal  -     famotidine (PEPCID) 40 MG tablet; Take 1 tablet by mouth daily, Disp-30 tablet, R-5Normal  -     dronabinol (MARINOL) 5 MG capsule; 18take 1 capsule by mouth twice a day, Disp-60 capsule, R-5Normal  -     spironolactone (ALDACTONE) 25 MG tablet; Take 1 tablet by mouth daily, Disp-30 tablet, [id number]  -     folic acid (FOLVITE) 1 MG tablet; Take 1 tablet by mouth daily, Disp-30 tablet, R-11Normal  -     cyanocobalamin injection 1,000 mcg; 1,000 mcg, IntraMUSCular, ONCE, 1 dose, On Mon 4/18/22 at 1230  4. Severe protein-calorie malnutrition (HCC)  -     ondansetron (ZOFRAN-ODT) 4 MG disintegrating tablet; Take 1 tablet by mouth every 8 hours as needed for Nausea or Vomiting, Disp-90 tablet, R-5Normal  -     vitamin B-1 (THIAMINE) 100 MG tablet; Take 1 tablet by mouth daily, Disp-30 tablet, R-0Normal  -     pregabalin (LYRICA) 50 MG capsule; Take 1 capsule by mouth 2 times daily for 180 days. , Disp-60 capsule, R-5Normal  -     furosemide (LASIX) 40 MG tablet; take 1 tablet by mouth once daily, Disp-60 tablet, R-5Normal  -     famotidine (PEPCID) 40 MG tablet;  Take 1 tablet by mouth daily, Disp-30 tablet, R-5Normal  -     dronabinol (MARINOL) 5 MG capsule; 18take 1 capsule by mouth twice a day, Disp-60 capsule, R-5Normal  -     spironolactone (ALDACTONE) 25 MG tablet; Take 1 tablet by mouth daily, Disp-30 tablet, [id number]  -     folic acid (FOLVITE) 1 MG tablet; Take 1 tablet by mouth daily, Disp-30 tablet, R-11Normal  -     cyanocobalamin injection 1,000 mcg; 1,000 mcg, IntraMUSCular, ONCE, 1 dose, On Mon 4/18/22 at 1230  5. Bipolar disorder with severe depression (HCC)  -     ondansetron (ZOFRAN-ODT) 4 MG disintegrating tablet; Take 1 tablet by mouth every 8 hours as needed for Nausea or Vomiting, Disp-90 tablet, R-5Normal  -     vitamin B-1 (THIAMINE) 100 MG tablet; Take 1 tablet by mouth daily, Disp-30 tablet, R-0Normal  -     pregabalin (LYRICA) 50 MG capsule; Take 1 capsule by mouth 2 times daily for 180 days. , Disp-60 capsule, R-5Normal  -     furosemide (LASIX) 40 MG tablet; take 1 tablet by mouth once daily, Disp-60 tablet, R-5Normal  -     famotidine (PEPCID) 40 MG tablet; Take 1 tablet by mouth daily, Disp-30 tablet, R-5Normal  -     dronabinol (MARINOL) 5 MG capsule; 18take 1 capsule by mouth twice a day, Disp-60 capsule, R-5Normal  -     spironolactone (ALDACTONE) 25 MG tablet; Take 1 tablet by mouth daily, Disp-30 tablet, [id number]  -     folic acid (FOLVITE) 1 MG tablet; Take 1 tablet by mouth daily, Disp-30 tablet, R-11Normal  -     cyanocobalamin injection 1,000 mcg; 1,000 mcg, IntraMUSCular, ONCE, 1 dose, On Mon 4/18/22 at 1230  6. Alcoholic cirrhosis, unspecified whether ascites present (HCC)  -     ondansetron (ZOFRAN-ODT) 4 MG disintegrating tablet; Take 1 tablet by mouth every 8 hours as needed for Nausea or Vomiting, Disp-90 tablet, R-5Normal  -     vitamin B-1 (THIAMINE) 100 MG tablet; Take 1 tablet by mouth daily, Disp-30 tablet, R-0Normal  -     pregabalin (LYRICA) 50 MG capsule; Take 1 capsule by mouth 2 times daily for 180 days. , Disp-60 capsule, R-5Normal  - furosemide (LASIX) 40 MG tablet; take 1 tablet by mouth once daily, Disp-60 tablet, R-5Normal  -     famotidine (PEPCID) 40 MG tablet; Take 1 tablet by mouth daily, Disp-30 tablet, R-5Normal  -     dronabinol (MARINOL) 5 MG capsule; 18take 1 capsule by mouth twice a day, Disp-60 capsule, R-5Normal  -     spironolactone (ALDACTONE) 25 MG tablet; Take 1 tablet by mouth daily, Disp-30 tablet, [id number]  -     folic acid (FOLVITE) 1 MG tablet; Take 1 tablet by mouth daily, Disp-30 tablet, R-11Normal  -     cyanocobalamin injection 1,000 mcg; 1,000 mcg, IntraMUSCular, ONCE, 1 dose, On Mon 4/18/22 at 1230  MRI ordered of the lumbar spine for further evaluation and treatment given the patient's abnormal EMG and worsening neurological symptoms. Further evaluation will be based on results. Medications refilled. See her back in 1 month or sooner if needed. Patient will need to be off of work until evaluated by MRI. No follow-ups on file. Subjective   SUBJECTIVE/OBJECTIVE:  HPI  Patient is here for follow-up of chronic issues. She also would like to discuss EMG results. EMG showed right S1 motor radiculopathy which correlates with the patient's current symptoms of weakness and loss of coordination worse in the right lower extremity. Patient is still having issues with fibromyalgia type symptoms and neuropathic issues. This may be secondary to vitamin deficiency and previous nerve damage from previous alcohol abuse. Patient states that she continues to follow with gastroenterology. Has actually gained weight since last visit. Review of Systems   Constitutional: Positive for fatigue. Negative for diaphoresis and fever. Denies malaise   Cardiovascular: Negative for chest pain. Gastrointestinal: Positive for constipation, diarrhea and nausea. Negative for vomiting. HAS abdominal tightness   Endocrine: Positive for cold intolerance.    Genitourinary:        HAS decreased urine output Musculoskeletal: Positive for arthralgias, back pain, joint swelling and myalgias. HAS edema, HAS leg pain   Neurological: Positive for weakness, light-headedness, numbness and headaches. Negative for syncope. Psychiatric/Behavioral: Positive for agitation, dysphoric mood and sleep disturbance. Negative for confusion. The patient is nervous/anxious. HAS anxiety   All other systems reviewed and are negative. Current Outpatient Medications:     ondansetron (ZOFRAN-ODT) 4 MG disintegrating tablet, Take 1 tablet by mouth every 8 hours as needed for Nausea or Vomiting, Disp: 90 tablet, Rfl: 5    vitamin B-1 (THIAMINE) 100 MG tablet, Take 1 tablet by mouth daily, Disp: 30 tablet, Rfl: 0    pregabalin (LYRICA) 50 MG capsule, Take 1 capsule by mouth 2 times daily for 180 days. , Disp: 60 capsule, Rfl: 5    furosemide (LASIX) 40 MG tablet, take 1 tablet by mouth once daily, Disp: 60 tablet, Rfl: 5    famotidine (PEPCID) 40 MG tablet, Take 1 tablet by mouth daily, Disp: 30 tablet, Rfl: 5    dronabinol (MARINOL) 5 MG capsule, 18take 1 capsule by mouth twice a day, Disp: 60 capsule, Rfl: 5    spironolactone (ALDACTONE) 25 MG tablet, Take 1 tablet by mouth daily, Disp: 30 tablet, Rfl: 5    folic acid (FOLVITE) 1 MG tablet, Take 1 tablet by mouth daily, Disp: 30 tablet, Rfl: 11    lactulose (CHRONULAC) 10 GM/15ML solution, Take 20 g by mouth 2 times daily, Disp: , Rfl:     mirtazapine (REMERON) 7.5 MG tablet, Take 1 tablet by mouth nightly, Disp: 30 tablet, Rfl: 5    nicotine (NICODERM CQ) 14 MG/24HR, Place 1 patch onto the skin daily, Disp: 30 patch, Rfl: 3    pantoprazole (PROTONIX) 40 MG tablet, Take 1 tablet by mouth every morning (before breakfast), Disp: 30 tablet, Rfl: 3    thiamine 100 MG tablet, Take 1 tablet by mouth daily, Disp: 30 tablet, Rfl: 3    rifaximin (XIFAXAN) 550 MG tablet, Take 1 tablet by mouth 2 times daily, Disp: 60 tablet, Rfl: 5   Patient Active Problem List Diagnosis    Tetrahydrocannabinol (THC) use disorder, mild, abuse    Severe protein-calorie malnutrition ([address]    Bipolar affective disorder, manic ([address]    Bipolar disorder with severe depression ([address]    Cirrhosis ([address]    Generalized abdominal pain    Alcoholic hepatitis    Chronic pain syndrome    Fibromyalgia    GERD (gastroesophageal reflux disease)    Hypokalemia    Lumbar radiculopathy     Past Medical History:   Diagnosis Date    Acute alcoholic intoxication with complication ([address] 8/3/0534    Alcohol abuse 8/24/2018    Anemia     Bipolar disorder current episode depressed ([address]     Therapy    Closed fracture of left proximal humerus     Depression     Depression with suicidal ideation 10/24/2018    ETOH abuse     Humerus fracture 04/2021    left- For OR 5-12-21    Liver disease     Major depression, recurrent (HCC)     Therapy -stable per pat    Major depressive disorder, recurrent episode, severe ([address] 8/24/2018    Mild tetrahydrocannabinol (THC) abuse     chronic     Protein calorie malnutrition ([address]     severe    SBP (spontaneous bacterial peritonitis) ([address] 11/8/2021     Past Surgical History:   Procedure Laterality Date    HUMERUS FRACTURE SURGERY Left 5/12/2021    LEFT PROXIMAL HUMERUS OPEN REDUCTION INTERNAL FIXATION performed by Larry Alan MD at [address] [place] [address] Bilateral 2000    ROTATOR CUFF REPAIR  08/2021    TONSILLECTOMY       Social History     Socioeconomic History    Marital status: Single     Spouse name: Not on file    Number of children: 0    Years of education: 15    Highest education level: Not on file   Occupational History    Not on file   Tobacco Use    Smoking status: Current Every Day Smoker     Packs/day: 1.00     Years: 15.00     Pack years: 15.00     Types: Cigarettes     Start date: 10/3/2003    Smokeless tobacco: Former User   Vaping Use    Vaping Use: Former   Substance and Sexual Activity    Alcohol use: Not Currently     Alcohol/week: 42.0 standard drinks     Types: 42 Cans of beer per week     Comment: 6 packs    Drug use: Yes     Frequency: 1.0 times per week     Types: Marijuana Vipin Divine)     Comment: last used 12/31/2021    Sexual activity: Not on file   Other Topics Concern    Not on file   Social History Narrative    Not on file     Social Determinants of Health     Financial Resource Strain:     Difficulty of Paying Living Expenses: Not on file   Food Insecurity:     Worried About Running Out of Food in the Last Year: Not on file    Ann of Food in the Last Year: Not on file   Transportation Needs:     Lack of Transportation (Medical): Not on file    Lack of Transportation (Non-Medical): Not on file   Physical Activity:     Days of Exercise per Week: Not on file    Minutes of Exercise per Session: Not on file   Stress:     Feeling of Stress : Not on file   Social Connections:     Frequency of Communication with Friends and Family: Not on file    Frequency of Social Gatherings with Friends and Family: Not on file    Attends Yarsanism Services: Not on file    Active Member of [address] [place] [address] or Organizations: Not on file    Attends Club or Organization Meetings: Not on file    Marital Status: Not on file   Intimate Partner Violence:     Fear of Current or Ex-Partner: Not on file    Emotionally Abused: Not on file    Physically Abused: Not on file    Sexually Abused: Not on file   Housing Stability:     Unable to Pay for Housing in the Last Year: Not on file    Number of [place] in the Last Year: Not on file    Unstable Housing in the Last Year: Not on file     Family History   Problem Relation Age of Onset    No Known Problems Mother     No Known Problems Father     Breast Cancer Paternal Aunt     Breast Cancer Paternal Aunt     Breast Cancer Paternal Aunt       There are no preventive care reminders to display for this patient.   There are no preventive care reminders to display for this patient. There are no preventive care reminders to display for this patient. Health Maintenance Due   Topic    DTaP/Tdap/Td vaccine (1 - Tdap)      Health Maintenance   Topic Date Due    Hepatitis A vaccine (1 of 2 - Risk 2-dose series) Never done    Varicella vaccine (1 of 2 - 2-dose childhood series) Never done    Pneumococcal 0-64 years Vaccine (1 - PCV) Never done    Hepatitis B vaccine (1 of 3 - Risk 3-dose series) Never done    DTaP/Tdap/Td vaccine (1 - Tdap) Never done    Cervical cancer screen  Never done    Flu vaccine (Season Ended) 09/01/2022    Depression Monitoring  02/21/2023    COVID-19 Vaccine  Completed    Hepatitis C screen  Completed    HIV screen  Completed    Hib vaccine  Aged Out    Meningococcal (ACWY) vaccine  Aged Out      There are no preventive care reminders to display for this patient. There are no preventive care reminders to display for this patient. /60   Pulse 107   Temp 97.1 °F (36.2 °C)   Wt 90 lb (40.8 kg)   SpO2 98%   BMI 15.94 kg/m²     Objective   Physical Exam  Vitals reviewed. Constitutional:       Appearance: She is cachectic. She is ill-appearing. HENT:      Head: Normocephalic and atraumatic. Eyes:      General: No scleral icterus. Conjunctiva/sclera: Conjunctivae normal.      Pupils: Pupils are equal, round, and reactive to light. Neck:      Thyroid: No thyromegaly. Cardiovascular:      Rate and Rhythm: Normal rate and regular rhythm. Heart sounds: Normal heart sounds. No murmur heard. Pulmonary:      Effort: Pulmonary effort is normal.      Breath sounds: Decreased air movement present. Decreased breath sounds present. No rales. Abdominal:      General: Bowel sounds are normal. There is distension. Palpations: Abdomen is soft. Tenderness: There is abdominal tenderness. Musculoskeletal:         General: Normal range of motion. Cervical back: Neck supple.    Lymphadenopathy:      Cervical: No cervical adenopathy. Skin:     General: Skin is warm and dry. Coloration: Skin is jaundiced. Findings: No erythema or rash. Comments: Scattered xerosis noted to the extremities. Neurological:      Mental Status: She is alert and oriented to person, place, and time. Cranial Nerves: No cranial nerve deficit. Sensory: Sensory deficit present. Motor: Weakness present. Gait: Gait abnormal.      Comments: 4+/5 strength testing to the right lower extremity when compared to the left. Also slight decreased sensation to bilateral lower extremities in all planes. Psychiatric:         Attention and Perception: Attention normal.         Mood and Affect: Mood is anxious. Speech: Speech is rapid and pressured. Judgment: Judgment normal.                  An electronic signature was used to authenticate this note.     --Akila Garsia, DO

## 2022-04-29 ENCOUNTER — TELEPHONE (OUTPATIENT)
Dept: FAMILY MEDICINE CLINIC | Age: 37
End: 2022-04-29

## 2022-04-29 NOTE — LETTER
[address] [place] [address] Drive  [address] [place], [place] [id number]  Phone: [phone]  Fax: [address],           May 3, 2022     Patient: Jaiden Diaz   YOB: 1985   Date of Visit: 4/29/2022         To Whom It May Concern: It is my medical opinion that Monique Hawkins should remain out of work until further notice. If you have any questions or concerns, please don't hesitate to call.       Sincerely,        Josué Gasria DO

## 2022-04-29 NOTE — TELEPHONE ENCOUNTER
----- Message from Trina Blanchard sent at 4/29/2022 10:22 AM EDT -----  Subject: Message to Provider    QUESTIONS  Information for Provider? Pt still waiting on paper work to be received,   for work and social security for [employer] paper work.   ---------------------------------------------------------------------------  --------------  Fan CHOI  What is the best way for the office to contact you? OK to leave message on   voicemail  Preferred Call Back Phone Number? [phone]  ---------------------------------------------------------------------------  --------------  SCRIPT ANSWERS  Relationship to Patient?  Self

## 2022-05-02 NOTE — TELEPHONE ENCOUNTER
Dusty Anshul states all she needs is a letter from you mailed to her. She needs a letter stating she is unable to return to work for full duty at this time. She states you told her to wait and see whta mri of her back shows, she is getting that 05/06/22.

## 2022-05-06 ENCOUNTER — HOSPITAL ENCOUNTER (OUTPATIENT)
Dept: MRI IMAGING | Age: 37
Discharge: HOME | End: 2022-05-08
Payer: MEDICAID

## 2022-05-06 DIAGNOSIS — M54.16 LUMBAR RADICULOPATHY: ICD-10-CM

## 2022-05-06 PROCEDURE — 72148 MRI LUMBAR SPINE W/O DYE: CPT

## 2022-05-10 DIAGNOSIS — M48.062 SPINAL STENOSIS OF LUMBAR REGION WITH NEUROGENIC CLAUDICATION: Primary | ICD-10-CM

## 2022-05-13 ENCOUNTER — OFFICE VISIT (OUTPATIENT)
Dept: PRIMARY CARE CLINIC | Age: 37
End: 2022-05-13
Payer: MEDICAID

## 2022-05-13 VITALS
SYSTOLIC BLOOD PRESSURE: 102 MMHG | HEART RATE: 93 BPM | BODY MASS INDEX: 16.37 KG/M2 | HEIGHT: 63 IN | OXYGEN SATURATION: 99 % | WEIGHT: 92.4 LBS | TEMPERATURE: 97.3 F | DIASTOLIC BLOOD PRESSURE: 72 MMHG

## 2022-05-13 DIAGNOSIS — M79.7 FIBROMYALGIA: ICD-10-CM

## 2022-05-13 DIAGNOSIS — G89.4 CHRONIC PAIN SYNDROME: Primary | ICD-10-CM

## 2022-05-13 DIAGNOSIS — E43 SEVERE PROTEIN-CALORIE MALNUTRITION (HCC): ICD-10-CM

## 2022-05-13 DIAGNOSIS — M54.16 LUMBAR RADICULOPATHY: ICD-10-CM

## 2022-05-13 PROBLEM — T14.8XXA CONTUSION: Status: ACTIVE | Noted: 2022-05-13

## 2022-05-13 PROBLEM — R79.89 ABNORMAL LIVER FUNCTION TESTS: Status: ACTIVE | Noted: 2022-05-13

## 2022-05-13 PROBLEM — R00.0 TACHYCARDIA: Status: ACTIVE | Noted: 2022-05-13

## 2022-05-13 PROBLEM — R53.1 WEAKNESS: Status: ACTIVE | Noted: 2022-05-13

## 2022-05-13 PROBLEM — S09.90XA INJURY OF HEAD: Status: ACTIVE | Noted: 2022-05-13

## 2022-05-13 PROBLEM — K52.9 GASTROENTERITIS: Status: ACTIVE | Noted: 2022-05-13
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
## 2022-05-13 PROBLEM — E86.0 DEHYDRATION: Status: ACTIVE | Noted: 2022-05-13

## 2022-05-13 PROCEDURE — 4004F PT TOBACCO SCREEN RCVD TLK: CPT | Performed by: FAMILY MEDICINE

## 2022-05-13 PROCEDURE — G8427 DOCREV CUR MEDS BY ELIG CLIN: HCPCS | Performed by: FAMILY MEDICINE

## 2022-05-13 PROCEDURE — G8418 CALC BMI BLW LOW PARAM F/U: HCPCS | Performed by: FAMILY MEDICINE

## 2022-05-13 PROCEDURE — 99213 OFFICE O/P EST LOW 20 MIN: CPT | Performed by: FAMILY MEDICINE

## 2022-05-13 RX ORDER — PREGABALIN 75 MG/1
75 CAPSULE ORAL 3 TIMES DAILY
Qty: 90 CAPSULE | Refills: 3 | Status: SHIPPED | OUTPATIENT
Start: 2022-05-13 | End: 2022-09-10

## 2022-05-13 RX ORDER — HYDROCODONE BITARTRATE AND ACETAMINOPHEN 5; 325 MG/1; MG/1
1 TABLET ORAL EVERY 6 HOURS PRN
Qty: 90 TABLET | Refills: 0 | Status: SHIPPED | OUTPATIENT
Start: 2022-05-13 | End: 2022-06-12

## 2022-05-13 ASSESSMENT — ENCOUNTER SYMPTOMS
DIARRHEA: 1
BACK PAIN: 1
VOMITING: 0
NAUSEA: 1
CONSTIPATION: 1

## 2022-05-13 NOTE — PROGRESS NOTES
Angel Roberts (:  1985) is a 43 y.o. female,Established patient, here for evaluation of the following chief complaint(s):  Back Pain (pt has had increased nerve pain in her back, legs and hands since her EMG 1 week ago. she has been falling more. she has an appt Thursday to discuss surgery.)         ASSESSMENT/PLAN:  1. Chronic pain syndrome  -     HYDROcodone-acetaminophen (NORCO) 5-325 MG per tablet; Take 1 tablet by mouth every 6 hours as needed for Pain for up to 30 days. Intended supply: 30 days, Disp-90 tablet, R-0Normal  -     pregabalin (LYRICA) 75 MG capsule; Take 1 capsule by mouth 3 times daily for 120 days. , Disp-90 capsule, R-3Normal  2. Fibromyalgia  -     HYDROcodone-acetaminophen (NORCO) 5-325 MG per tablet; Take 1 tablet by mouth every 6 hours as needed for Pain for up to 30 days. Intended supply: 30 days, Disp-90 tablet, R-0Normal  -     pregabalin (LYRICA) 75 MG capsule; Take 1 capsule by mouth 3 times daily for 120 days. , Disp-90 capsule, R-3Normal  3. Lumbar radiculopathy  -     HYDROcodone-acetaminophen (NORCO) 5-325 MG per tablet; Take 1 tablet by mouth every 6 hours as needed for Pain for up to 30 days. Intended supply: 30 days, Disp-90 tablet, R-0Normal  -     pregabalin (LYRICA) 75 MG capsule; Take 1 capsule by mouth 3 times daily for 120 days. , Disp-90 capsule, R-3Normal  4. Severe protein-calorie malnutrition (HCC)  Medications adjusted. She will follow-up with neurosurgery on the  of this month for further evaluation and treatment. Most likely will need intervention but will defer to their expertise. See her back after. No follow-ups on file. Subjective   SUBJECTIVE/OBJECTIVE:  HPI  Patient is here for follow-up of chronic issues. She also would like to discuss EMG results. EMG showed right S1 motor radiculopathy which correlates with the patient's current symptoms of weakness and loss of coordination worse in the right lower extremity.   Patient is still having issues with fibromyalgia type symptoms and neuropathic issues. This may be secondary to vitamin deficiency and previous nerve damage from previous alcohol abuse. Patient states that she continues to follow with gastroenterology. Has actually gained weight since last visit. Update 5/13/2022  Patient presents today for evaluation of worsening back, leg, arm, and neck pain. Patient states that since the EMG she has had a significant increase in her symptoms. She does see the neurosurgeon next week. She has been taking more Tylenol despite having liver issues in the interim. Review of Systems   Constitutional: Positive for fatigue. Negative for diaphoresis and fever. Denies malaise   Cardiovascular: Negative for chest pain. Gastrointestinal: Positive for constipation, diarrhea and nausea. Negative for vomiting. HAS abdominal tightness   Endocrine: Positive for cold intolerance. Genitourinary:        HAS decreased urine output   Musculoskeletal: Positive for arthralgias, back pain, joint swelling and myalgias. HAS edema, HAS leg pain   Neurological: Positive for weakness, light-headedness, numbness and headaches. Negative for syncope. Psychiatric/Behavioral: Positive for agitation, dysphoric mood and sleep disturbance. Negative for confusion. The patient is nervous/anxious. HAS anxiety   All other systems reviewed and are negative. Current Outpatient Medications:     HYDROcodone-acetaminophen (NORCO) 5-325 MG per tablet, Take 1 tablet by mouth every 6 hours as needed for Pain for up to 30 days. Intended supply: 30 days, Disp: 90 tablet, Rfl: 0    pregabalin (LYRICA) 75 MG capsule, Take 1 capsule by mouth 3 times daily for 120 days. , Disp: 90 capsule, Rfl: 3    ondansetron (ZOFRAN-ODT) 4 MG disintegrating tablet, Take 1 tablet by mouth every 8 hours as needed for Nausea or Vomiting, Disp: 90 tablet, Rfl: 5    vitamin B-1 (THIAMINE) 100 MG tablet, Take 1 tablet by mouth daily, Disp: 30 tablet, Rfl: 0    furosemide (LASIX) 40 MG tablet, take 1 tablet by mouth once daily, Disp: 60 tablet, Rfl: 5    famotidine (PEPCID) 40 MG tablet, Take 1 tablet by mouth daily, Disp: 30 tablet, Rfl: 5    dronabinol (MARINOL) 5 MG capsule, 18take 1 capsule by mouth twice a day, Disp: 60 capsule, Rfl: 5    spironolactone (ALDACTONE) 25 MG tablet, Take 1 tablet by mouth daily, Disp: 30 tablet, Rfl: 5    folic acid (FOLVITE) 1 MG tablet, Take 1 tablet by mouth daily, Disp: 30 tablet, Rfl: 11    lactulose (CHRONULAC) 10 GM/15ML solution, Take 20 g by mouth 2 times daily, Disp: , Rfl:     mirtazapine (REMERON) 7.5 MG tablet, Take 1 tablet by mouth nightly, Disp: 30 tablet, Rfl: 5    rifaximin (XIFAXAN) 550 MG tablet, Take 1 tablet by mouth 2 times daily, Disp: 60 tablet, Rfl: 5    nicotine (NICODERM CQ) 14 MG/24HR, Place 1 patch onto the skin daily, Disp: 30 patch, Rfl: 3    pantoprazole (PROTONIX) 40 MG tablet, Take 1 tablet by mouth every morning (before breakfast), Disp: 30 tablet, Rfl: 3    thiamine 100 MG tablet, Take 1 tablet by mouth daily, Disp: 30 tablet, Rfl: 3   Patient Active Problem List   Diagnosis    Tetrahydrocannabinol (THC) use disorder, mild, abuse    Severe protein-calorie malnutrition (HCC)    Bipolar affective disorder, manic (HCC)    Bipolar disorder with severe depression (HCC)    Cirrhosis (HCC)    Generalized abdominal pain    Alcoholic hepatitis    Chronic pain syndrome    Fibromyalgia    GERD (gastroesophageal reflux disease)    Hypokalemia    Lumbar radiculopathy    Abnormal liver function tests    Contusion    Dehydration    Fall    Gastroenteritis    Weakness    Injury of head    Tachycardia     Past Medical History:   Diagnosis Date    Acute alcoholic intoxication with complication ([facility] [address] 6/5/0756    Alcohol abuse 8/24/2018    Anemia     Bipolar disorder current episode depressed (HCC)     Therapy    Closed fracture of left proximal humerus     Depression     Depression with suicidal ideation 10/24/2018    ETOH abuse     Humerus fracture 04/2021    left- For OR 5-12-21    Liver disease     Major depression, recurrent (HCC)     Therapy -stable per pat    Major depressive disorder, recurrent episode, severe ([facility] [address] 8/24/2018    Mild tetrahydrocannabinol (THC) abuse     chronic     Protein calorie malnutrition ([facility] [address]     severe    SBP (spontaneous bacterial peritonitis) ([facility] [address] 11/8/2021     Past Surgical History:   Procedure Laterality Date    HUMERUS FRACTURE SURGERY Left 5/12/2021    LEFT PROXIMAL HUMERUS OPEN REDUCTION INTERNAL FIXATION performed by Guy Washington MD at [address] [facility] Bilateral 2000    ROTATOR CUFF REPAIR  08/2021    TONSILLECTOMY       Social History     Socioeconomic History    Marital status: Single     Spouse name: Not on file    Number of children: 0    Years of education: 15    Highest education level: Not on file   Occupational History    Not on file   Tobacco Use    Smoking status: Current Every Day Smoker     Packs/day: 1.00     Years: 15.00     Pack years: 15.00     Types: Cigarettes     Start date: 10/3/2003    Smokeless tobacco: Former User   Vaping Use    Vaping Use: Former   Substance and Sexual Activity    Alcohol use: Not Currently     Alcohol/week: 42.0 standard drinks     Types: 42 Cans of beer per week     Comment: 6 packs    Drug use: Yes     Frequency: 1.0 times per week     Types: Marijuana Elfrieda Homer)     Comment: last used 12/31/2021    Sexual activity: Not on file   Other Topics Concern    Not on file   Social History Narrative    Not on file     Social Determinants of Health     Financial Resource Strain:     Difficulty of Paying Living Expenses: Not on file   Food Insecurity:     Worried About Running Out of Food in the Last Year: Not on file    Ann of Food in the Last Year: Not on file   Transportation Needs:     Lack of Transportation (Medical): Not on file    Lack of Transportation (Non-Medical): Not on file   Physical Activity:     Days of Exercise per Week: Not on file    Minutes of Exercise per Session: Not on file   Stress:     Feeling of Stress : Not on file   Social Connections:     Frequency of Communication with Friends and Family: Not on file    Frequency of Social Gatherings with Friends and Family: Not on file    Attends Mandaen Services: Not on file    Active Member of [address] [place] [address] [employer] or Organizations: Not on file    Attends Club or Organization Meetings: Not on file    Marital Status: Not on file   Intimate Partner Violence:     Fear of Current or Ex-Partner: Not on file    Emotionally Abused: Not on file    Physically Abused: Not on file    Sexually Abused: Not on file   Housing Stability:     Unable to Pay for Housing in the Last Year: Not on file    Number of [place] in the Last Year: Not on file    Unstable Housing in the Last Year: Not on file     Family History   Problem Relation Age of Onset    No Known Problems Mother     No Known Problems Father     Breast Cancer Paternal Aunt     Breast Cancer Paternal Aunt     Breast Cancer Paternal Aunt       There are no preventive care reminders to display for this patient. There are no preventive care reminders to display for this patient. There are no preventive care reminders to display for this patient.    Health Maintenance Due   Topic    DTaP/Tdap/Td vaccine (1 - Tdap)      Health Maintenance   Topic Date Due    Hepatitis A vaccine (1 of 2 - Risk 2-dose series) Never done    Varicella vaccine (1 of 2 - 2-dose childhood series) Never done    Pneumococcal 0-64 years Vaccine (1 - PCV) Never done    Hepatitis B vaccine (1 of 3 - Risk 3-dose series) Never done    DTaP/Tdap/Td vaccine (1 - Tdap) Never done    Cervical cancer screen  Never done    Flu vaccine (Season Ended) 09/01/2022    Depression Monitoring  02/21/2023    COVID-19 Vaccine  Completed    Hepatitis C screen  Completed    HIV screen  Completed    Hib vaccine  Aged Out    Meningococcal (ACWY) vaccine  Aged Out      There are no preventive care reminders to display for this patient. There are no preventive care reminders to display for this patient. /72   Pulse 93   Temp 97.3 °F (36.3 °C)   Ht 5' 3\" (1.6 m)   Wt 92 lb 6.4 oz (41.9 kg)   SpO2 99%   BMI 16.37 kg/m²     Objective   Physical Exam  Vitals reviewed. Constitutional:       Appearance: She is cachectic. She is ill-appearing. HENT:      Head: Normocephalic and atraumatic. Eyes:      General: No scleral icterus. Conjunctiva/sclera: Conjunctivae normal.      Pupils: Pupils are equal, round, and reactive to light. Neck:      Thyroid: No thyromegaly. Cardiovascular:      Rate and Rhythm: Normal rate and regular rhythm. Heart sounds: Normal heart sounds. No murmur heard. Pulmonary:      Effort: Pulmonary effort is normal.      Breath sounds: Decreased air movement present. Decreased breath sounds present. No rales. Abdominal:      General: Bowel sounds are normal. There is distension. Palpations: Abdomen is soft. Tenderness: There is abdominal tenderness. Musculoskeletal:         General: Normal range of motion. Cervical back: Neck supple. Lymphadenopathy:      Cervical: No cervical adenopathy. Skin:     General: Skin is warm and dry. Coloration: Skin is jaundiced. Findings: No erythema or rash. Comments: Scattered xerosis noted to the extremities. Neurological:      Mental Status: She is alert and oriented to person, place, and time. Cranial Nerves: No cranial nerve deficit. Sensory: Sensory deficit present. Motor: Weakness and tremor present. Gait: Gait abnormal.      Comments: 4+/5 strength testing to the right lower extremity when compared to the left. Also slight decreased sensation to bilateral lower extremities in all planes.    Psychiatric: Attention and Perception: Attention normal.         Mood and Affect: Mood is anxious. Speech: Speech is rapid and pressured. Judgment: Judgment normal.                  An electronic signature was used to authenticate this note.     --Nehemiah Garsia, DO

## 2022-05-19 ENCOUNTER — TELEPHONE (OUTPATIENT)
Dept: PRIMARY CARE CLINIC | Age: 37
End: 2022-05-19

## 2022-05-19 ENCOUNTER — OFFICE VISIT (OUTPATIENT)
Dept: NEUROSURGERY | Age: 37
End: 2022-05-19
Payer: MEDICAID

## 2022-05-19 VITALS
SYSTOLIC BLOOD PRESSURE: 135 MMHG | HEART RATE: 120 BPM | OXYGEN SATURATION: 100 % | TEMPERATURE: 98.2 F | RESPIRATION RATE: 20 BRPM | HEIGHT: 63 IN | WEIGHT: 92 LBS | DIASTOLIC BLOOD PRESSURE: 103 MMHG | BODY MASS INDEX: 16.3 KG/M2

## 2022-05-19 DIAGNOSIS — R20.8 DYSESTHESIA: ICD-10-CM

## 2022-05-19 DIAGNOSIS — M54.40 LOW BACK PAIN WITH SCIATICA, SCIATICA LATERALITY UNSPECIFIED, UNSPECIFIED BACK PAIN LATERALITY, UNSPECIFIED CHRONICITY: Primary | ICD-10-CM

## 2022-05-19 PROCEDURE — 99203 OFFICE O/P NEW LOW 30 MIN: CPT | Performed by: NEUROLOGICAL SURGERY

## 2022-05-19 PROCEDURE — 99202 OFFICE O/P NEW SF 15 MIN: CPT

## 2022-05-19 PROCEDURE — G8427 DOCREV CUR MEDS BY ELIG CLIN: HCPCS | Performed by: NEUROLOGICAL SURGERY

## 2022-05-19 PROCEDURE — 4004F PT TOBACCO SCREEN RCVD TLK: CPT | Performed by: NEUROLOGICAL SURGERY

## 2022-05-19 PROCEDURE — G8418 CALC BMI BLW LOW PARAM F/U: HCPCS | Performed by: NEUROLOGICAL SURGERY

## 2022-05-19 NOTE — LETTER
[facility]  [address]  [address]  Phone: [phone]  Fax: Teresa,         May 19, 2022    [address]  Phone: [phone]  Fax: [address]  1985  Plan ID: [id number]  PA# [id number]      To whom it may concern: It is my medical opinion that Jina Thompson would benefit from the use of HYDROcodone-acetaminophen ([employer]) 5-325 MG per tablet for the treatment of chronic pain syndrome (G89.4), fibromyalgia (M79.7), and lumbar radiculopathy (M54.16). I have discussed the benefits and risks of this medication with Christine Rendon. I have reviewed OARRS before prescribing this medication. If you have any questions or concerns, please don't hesitate to call.       Sincerely,        Yakelin Garsia DO

## 2022-05-19 NOTE — PROGRESS NOTES
FIXATION performed by Mary Ann Conner MD at [address] [facility] Bilateral 2000   [address]  08/2021    TONSILLECTOMY        Family History   Problem Relation Age of Onset    No Known Problems Mother     No Known Problems Father     Breast Cancer Paternal Aunt     Breast Cancer Paternal Aunt     Breast Cancer Paternal Aunt       Social History     Socioeconomic History    Marital status: Single     Spouse name: Not on file    Number of children: 0    Years of education: 15    Highest education level: Not on file   Occupational History    Not on file   Tobacco Use    Smoking status: Current Every Day Smoker     Packs/day: 1.00     Years: 15.00     Pack years: 15.00     Types: Cigarettes     Start date: 10/3/2003    Smokeless tobacco: Former User   Vaping Use    Vaping Use: Former   Substance and Sexual Activity    Alcohol use: Not Currently     Alcohol/week: 42.0 standard drinks     Types: 42 Cans of beer per week     Comment: 6 packs    Drug use: Yes     Frequency: 1.0 times per week     Types: Marijuana Veldon Breath)    Sexual activity: Not on file   Other Topics Concern    Not on file   Social History Narrative    Not on file     Social Determinants of Health     Financial Resource Strain:     Difficulty of Paying Living Expenses: Not on file   Food Insecurity:     Worried About [facility] in the Last Year: Not on file    Ann of Food in the Last Year: Not on file   Transportation Needs:     Lack of Transportation (Medical): Not on file    Lack of Transportation (Non-Medical):  Not on file   Physical Activity:     Days of Exercise per Week: Not on file    Minutes of Exercise per Session: Not on file   Stress:     Feeling of Stress : Not on file   Social Connections:     Frequency of Communication with Friends and Family: Not on file    Frequency of Social Gatherings with Friends and Family: Not on file    Attends Cong[employer] Services: Not on file   Karla Active Member of Clubs or Organizations: Not on file    Attends Club or Organization Meetings: Not on file    Marital Status: Not on file   Intimate Partner Violence:     Fear of Current or Ex-Partner: Not on file    Emotionally Abused: Not on file    Physically Abused: Not on file    Sexually Abused: Not on file   Housing Stability:     Unable to Pay for Housing in the Last Year: Not on file    Number of [place] in the Last Year: Not on file    Unstable Housing in the Last Year: Not on file       Medications:   Current Outpatient Medications   Medication Sig Dispense Refill    HYDROcodone-acetaminophen (NORCO) 5-325 MG per tablet Take 1 tablet by mouth every 6 hours as needed for Pain for up to 30 days. Intended supply: 30 days 90 tablet 0    pregabalin (LYRICA) 75 MG capsule Take 1 capsule by mouth 3 times daily for 120 days.  90 capsule 3    ondansetron (ZOFRAN-ODT) 4 MG disintegrating tablet Take 1 tablet by mouth every 8 hours as needed for Nausea or Vomiting 90 tablet 5    vitamin B-1 (THIAMINE) 100 MG tablet Take 1 tablet by mouth daily 30 tablet 0    furosemide (LASIX) 40 MG tablet take 1 tablet by mouth once daily 60 tablet 5    famotidine (PEPCID) 40 MG tablet Take 1 tablet by mouth daily 30 tablet 5    dronabinol (MARINOL) 5 MG capsule 18take 1 capsule by mouth twice a day 60 capsule 5    spironolactone (ALDACTONE) 25 MG tablet Take 1 tablet by mouth daily 30 tablet 5    folic acid (FOLVITE) 1 MG tablet Take 1 tablet by mouth daily 30 tablet 11    lactulose (CHRONULAC) 10 GM/15ML solution Take 20 g by mouth 2 times daily      mirtazapine (REMERON) 7.5 MG tablet Take 1 tablet by mouth nightly 30 tablet 5    rifaximin (XIFAXAN) 550 MG tablet Take 1 tablet by mouth 2 times daily 60 tablet 5    nicotine (NICODERM CQ) 14 MG/24HR Place 1 patch onto the skin daily 30 patch 3    pantoprazole (PROTONIX) 40 MG tablet Take 1 tablet by mouth every morning (before breakfast) 30 tablet 3    thiamine 100 MG tablet Take 1 tablet by mouth daily 30 tablet 3     No current facility-administered medications for this visit. Allergies:    Patient has no known allergies. Review of Systems:    Recent weight gain but remains severely underweight. Denies any chest pain, headache, dyspnea, fevers, chills or night sweats. Physical Examination:    BP (!) 135/103   Pulse 120   Temp 98.2 °F (36.8 °C)   Resp 20   Ht 5' 3\" (1.6 m)   Wt 92 lb (41.7 kg)   SpO2 100%   BMI 16.30 kg/m²      On focused neurological examination, she  is awake alert oriented and conversant . Speech is clear and rapidly fluent. Pupils are equal and reactive to light bilaterally, extraocular movements are intact, visual fields are full to confrontation. Her  face is symmetric and grossly intact to fine touch. Uvula and tongue are both midline. Shoulder shrug is symmetric and strong. Motor examination reveals preserved power in the upper and lower extremities at 5 out of 5 throughout. Reflexes are symmetric and brisk. Plantar responses are downgoing. There is no clonus. There is no Zina's. Patient endorses painful numbness throughout her lower extremities and upper extremities in a nondermatomal distribution. Straight leg raise is negative. Palpation of the spine reveals no kyphotic or scoliotic gross deformities. She  can forward flex to well below the knee. There is no pain to deep percussion nor palpation. ASSESSMENT:    I personally reviewed Michael Reyes's radiographic images, particularly her MRI of the lumbar spine dated 6 May 2022 which demonstrates left L4-5 disc herniation. EMG dated 14 April 2022 was consistent with a right S1 motor radiculopathy. 1. Low back pain with sciatica, sciatica laterality unspecified, unspecified back pain laterality, unspecified chronicity  -     XR LUMBAR SPINE FLEXION AND EXTENSION ONLY;  Future  -     [facility] - Physical Therapy, [place]  - Ruba Brennan MD, Pain Medicine, [facility]  2. DANIELLE Coates, Neurology, [facility] DECISION MAKING & PLAN:    I showed Ms. Patricia Kaba her images and explained the findings. Her current complaints cannot be attributed to a left L4-5 foraminal disc. I suspect she has neuropathy secondary to her liver problems or malnutrition. Toward that end I have referred her on to neurology for formal work-up. In the interim she is interested in pain management for her low back pain as well as physical therapy for strengthening. We will obtain lumbar flexion-extension x-rays to ensure that no mobile listhesis is contributing to this woman's low back pain. Should she worsen or fail to improve we be happy to see her sooner in clinic but otherwise we will see her in 6 weeks time for follow-up. Thank you so much for allowing us to participate in the care of this patient. Return in about 6 weeks (around 6/30/2022), or if symptoms worsen or fail to improve, for needs tests completed prior to appt, discuss results. Electronically signed by Deepa Waters MD on 5/19/2022 at 3:20 PM       NOTE: This report was transcribed using voice recognition software.  Every effort was made to ensure accuracy; however, inadvertent computerized transcription errors may be present

## 2022-05-19 NOTE — TELEPHONE ENCOUNTER
Prior authorization was submitted for hydrocodone-acetaminophen 5-325 to [facility] One[facility]. Fax received stating additional information is needed. Letter faxed to ins with information needed.

## 2022-06-06 ENCOUNTER — TELEPHONE (OUTPATIENT)
Dept: PHYSICAL THERAPY | Age: 37
End: 2022-06-06
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
## 2022-06-12 PROBLEM — E86.0 DEHYDRATION: Status: RESOLVED | Noted: 2022-05-13 | Resolved: 2022-06-12

## 2022-06-15 ENCOUNTER — TELEPHONE (OUTPATIENT)
Dept: ORTHOPEDIC SURGERY | Age: 37
End: 2022-06-15

## 2022-06-15 DIAGNOSIS — G89.29 CHRONIC LEFT SHOULDER PAIN: Primary | ICD-10-CM

## 2022-06-15 DIAGNOSIS — M25.512 CHRONIC LEFT SHOULDER PAIN: Primary | ICD-10-CM

## 2022-08-11 ENCOUNTER — TELEPHONE (OUTPATIENT)
Dept: PRIMARY CARE CLINIC | Age: 37
End: 2022-08-11

## 2022-08-11 DIAGNOSIS — K70.30 ALCOHOLIC CIRRHOSIS, UNSPECIFIED WHETHER ASCITES PRESENT (HCC): ICD-10-CM

## 2022-08-11 DIAGNOSIS — E87.6 HYPOKALEMIA: Primary | ICD-10-CM

## 2022-08-11 NOTE — TELEPHONE ENCOUNTER
----- Message from Owen Cnuha sent at 8/10/2022  3:55 PM EDT -----  Subject: Referral Request    Reason for referral request? Pt requesting labs-states she thinks her   potassium is low. Provider patient wants to be referred to(if known):     Provider Phone Number(if known): Additional Information for Provider?  Please contact pt.  ---------------------------------------------------------------------------  --------------  Estelita Gudino INFO    [phone]; OK to leave message on voicemail  ---------------------------------------------------------------------------  --------------

## 2022-09-09 ENCOUNTER — TELEPHONE (OUTPATIENT)
Dept: ORTHOPEDIC SURGERY | Age: 37
End: 2022-09-09

## 2022-09-09 DIAGNOSIS — M25.512 CHRONIC LEFT SHOULDER PAIN: Primary | ICD-10-CM

## 2022-09-09 DIAGNOSIS — G89.29 CHRONIC LEFT SHOULDER PAIN: Primary | ICD-10-CM

## 2023-01-19 ENCOUNTER — OFFICE VISIT (OUTPATIENT)
Dept: PRIMARY CARE CLINIC | Age: 38
End: 2023-01-19

## 2023-01-19 VITALS
WEIGHT: 104 LBS | SYSTOLIC BLOOD PRESSURE: 116 MMHG | BODY MASS INDEX: 18.43 KG/M2 | HEIGHT: 63 IN | DIASTOLIC BLOOD PRESSURE: 60 MMHG | OXYGEN SATURATION: 98 % | TEMPERATURE: 97.5 F | HEART RATE: 84 BPM

## 2023-01-19 DIAGNOSIS — Z97.5 PRESENCE OF SUBCUTANEOUS CONTRACEPTIVE IMPLANT: ICD-10-CM

## 2023-01-19 DIAGNOSIS — K70.30 ALCOHOLIC CIRRHOSIS, UNSPECIFIED WHETHER ASCITES PRESENT (HCC): ICD-10-CM

## 2023-01-19 DIAGNOSIS — E53.8 B12 DEFICIENCY: ICD-10-CM

## 2023-01-19 DIAGNOSIS — K70.11 ALCOHOLIC HEPATITIS WITH ASCITES: ICD-10-CM

## 2023-01-19 DIAGNOSIS — E43 SEVERE PROTEIN-CALORIE MALNUTRITION (HCC): Primary | Chronic | ICD-10-CM

## 2023-01-19 DIAGNOSIS — G47.00 INSOMNIA, UNSPECIFIED TYPE: ICD-10-CM

## 2023-01-19 DIAGNOSIS — F31.4 BIPOLAR DISORDER WITH SEVERE DEPRESSION (HCC): ICD-10-CM

## 2023-01-19 RX ORDER — MULTIVITAMIN
TABLET ORAL
Qty: 30 TABLET | Refills: 3 | Status: SHIPPED | OUTPATIENT
Start: 2023-01-19

## 2023-01-19 RX ORDER — ACAMPROSATE CALCIUM 333 MG/1
666 TABLET, DELAYED RELEASE ORAL 3 TIMES DAILY
Qty: 180 TABLET | Refills: 5 | Status: SHIPPED | OUTPATIENT
Start: 2023-01-19 | End: 2023-07-18

## 2023-01-19 RX ORDER — CYANOCOBALAMIN 1000 UG/ML
1000 INJECTION, SOLUTION INTRAMUSCULAR; SUBCUTANEOUS ONCE
Status: COMPLETED | OUTPATIENT
Start: 2023-01-19 | End: 2023-01-19

## 2023-01-19 RX ORDER — PANCRELIPASE 36000; 180000; 114000 [USP'U]/1; [USP'U]/1; [USP'U]/1
CAPSULE, DELAYED RELEASE PELLETS ORAL
COMMUNITY
Start: 2022-12-30

## 2023-01-19 RX ORDER — TRAZODONE HYDROCHLORIDE 50 MG/1
50 TABLET ORAL NIGHTLY
Qty: 30 TABLET | Refills: 5 | Status: SHIPPED | OUTPATIENT
Start: 2023-01-19

## 2023-01-19 RX ADMIN — CYANOCOBALAMIN 1000 MCG: 1000 INJECTION, SOLUTION INTRAMUSCULAR; SUBCUTANEOUS at 13:55

## 2023-01-19 ASSESSMENT — PATIENT HEALTH QUESTIONNAIRE - PHQ9
SUM OF ALL RESPONSES TO PHQ QUESTIONS 1-9: 0
SUM OF ALL RESPONSES TO PHQ QUESTIONS 1-9: 0
10. IF YOU CHECKED OFF ANY PROBLEMS, HOW DIFFICULT HAVE THESE PROBLEMS MADE IT FOR YOU TO DO YOUR WORK, TAKE CARE OF THINGS AT HOME, OR GET ALONG WITH OTHER PEOPLE: 0
9. THOUGHTS THAT YOU WOULD BE BETTER OFF DEAD, OR OF HURTING YOURSELF: 0
7. TROUBLE CONCENTRATING ON THINGS, SUCH AS READING THE NEWSPAPER OR WATCHING TELEVISION: 0
SUM OF ALL RESPONSES TO PHQ QUESTIONS 1-9: 0
SUM OF ALL RESPONSES TO PHQ9 QUESTIONS 1 & 2: 0
4. FEELING TIRED OR HAVING LITTLE ENERGY: 0
5. POOR APPETITE OR OVEREATING: 0
3. TROUBLE FALLING OR STAYING ASLEEP: 0
6. FEELING BAD ABOUT YOURSELF - OR THAT YOU ARE A FAILURE OR HAVE LET YOURSELF OR YOUR FAMILY DOWN: 0
SUM OF ALL RESPONSES TO PHQ QUESTIONS 1-9: 0
1. LITTLE INTEREST OR PLEASURE IN DOING THINGS: 0
2. FEELING DOWN, DEPRESSED OR HOPELESS: 0
8. MOVING OR SPEAKING SO SLOWLY THAT OTHER PEOPLE COULD HAVE NOTICED. OR THE OPPOSITE, BEING SO FIGETY OR RESTLESS THAT YOU HAVE BEEN MOVING AROUND A LOT MORE THAN USUAL: 0

## 2023-01-19 ASSESSMENT — ENCOUNTER SYMPTOMS
COUGH: 0
DIARRHEA: 0
SHORTNESS OF BREATH: 0
BACK PAIN: 0
CONSTIPATION: 0
NAUSEA: 0
VOMITING: 0
BLOOD IN STOOL: 0
SORE THROAT: 0
PHOTOPHOBIA: 0
ABDOMINAL PAIN: 0

## 2023-01-19 NOTE — PROGRESS NOTES
Christal Yarbrough (:  1985) is a 42 y.o. female,Established patient, here for evaluation of the following chief complaint(s):  6 Month Follow-Up (Has many things would like to discuss with pcp. States relapsed on alcohol x3 months ago after for medication to ), Other (Having tics x1-2 months), and Discuss Medications (Stopped all medication when was in intermediate, wants to review meds and see what should be taking )         ASSESSMENT/PLAN:  1. Severe protein-calorie malnutrition ([facility]  -     CBC with Auto Differential; Future  -     Comprehensive Metabolic Panel; Future  -     TSH; Future  -     Ammonia; Future  -     acamprosate (CAMPRAL) 333 MG tablet; Take 2 tablets by mouth 3 times daily, Disp-180 tablet, R-5Normal  -     Multiple Vitamin (MULTI-DAY VITAMINS) TABS; 1 daily, Disp-30 tablet, R-3Normal  -     Lipase; Future  2. Alcoholic cirrhosis, unspecified whether ascites present ([facility]  -     CBC with Auto Differential; Future  -     Comprehensive Metabolic Panel; Future  -     TSH; Future  -     Ammonia; Future  -     acamprosate (CAMPRAL) 333 MG tablet; Take 2 tablets by mouth 3 times daily, Disp-180 tablet, R-5Normal  -     Multiple Vitamin (MULTI-DAY VITAMINS) TABS; 1 daily, Disp-30 tablet, R-3Normal  -     Lipase; Future  3. Bipolar disorder with severe depression ([facility]  -     CBC with Auto Differential; Future  -     Comprehensive Metabolic Panel; Future  -     TSH; Future  -     Ammonia; Future  -     acamprosate (CAMPRAL) 333 MG tablet; Take 2 tablets by mouth 3 times daily, Disp-180 tablet, R-5Normal  -     Multiple Vitamin (MULTI-DAY VITAMINS) TABS; 1 daily, Disp-30 tablet, R-3Normal  -     Lipase; Future  4. Alcoholic hepatitis with ascites  -     CBC with Auto Differential; Future  -     Comprehensive Metabolic Panel; Future  -     TSH; Future  -     Ammonia; Future  -     acamprosate (CAMPRAL) 333 MG tablet;  Take 2 tablets by mouth 3 times daily, Disp-180 tablet, R-5Normal  -     Multiple Vitamin (MULTI-DAY VITAMINS) TABS; 1 daily, Disp-30 tablet, R-3Normal  -     Lipase; Future  5. B12 deficiency  -     cyanocobalamin injection 1,000 mcg; 1,000 mcg, IntraMUSCular, ONCE, 1 dose, On Thu 1/19/23 at 1415  -     Lipase; Future  6. Presence of subcutaneous contraceptive implant  -     [address] 1942 Mathew Ramos DO, OB/GYN, [facility]  7. Insomnia, unspecified type  -     traZODone (DESYREL) 50 MG tablet; Take 1 tablet by mouth nightly, Disp-30 tablet, R-5Normal    At this time we will send over acamprosate based on her liver issues. Naltrexone would be preferred however with the liver dysfunction acamprosate is considered safe. May add topiramate if still having alcohol cravings. Trazodone sent over to help with her insomnia. B12 shot given today as well. Baseline labs ordered. She is to continue to follow with the gastroenterologist and have upcoming ultrasound for screening purposes. Continue with Marimar to help with her chronic pancreatitis issues. Not currently taking any of her bipolar medications. See her back in 3 months or sooner. Return in about 3 months (around 4/19/2023). Subjective   SUBJECTIVE/OBJECTIVE:  HPI  Patient presents today for reevaluation post release after incarceration. Patient states that she went back to drinking and got a DUI which resulted in incarceration for 6 days. Currently on house arrest.  Has recently seen gastroenterology who is continue to follow-up on her liver dysfunction and cirrhosis. Patient states that she has not drank since discharge from incarceration and would like help to prevent alcohol cravings. Has not been on any of her medications for bipolar disorder since discharge. Denies any other current issues outside of insomnia which is a chronic condition for her. She has been eating much better since starting on medical marijuana and I do believe that this is a good therapeutic issue to proceed with for her.   We will continue to adjust her therapy as tolerated. Denies any recent suicidal or homicidal ideation or plan. Review of Systems   Constitutional:  Positive for appetite change. Negative for chills and fever. HENT:  Negative for congestion, hearing loss, nosebleeds and sore throat. Eyes:  Negative for photophobia. Respiratory:  Negative for cough and shortness of breath. Cardiovascular:  Negative for chest pain, palpitations and leg swelling. Gastrointestinal:  Negative for abdominal pain, blood in stool, constipation, diarrhea, nausea and vomiting. Endocrine: Negative for polydipsia. Genitourinary:  Negative for dysuria, frequency, hematuria and urgency. Musculoskeletal:  Negative for back pain and myalgias. Skin: Negative. Neurological:  Negative for dizziness, tremors, weakness and headaches. Hematological:  Does not bruise/bleed easily. Psychiatric/Behavioral:  Positive for decreased concentration, dysphoric mood and sleep disturbance. Negative for hallucinations, self-injury and suicidal ideas. The patient is nervous/anxious and is hyperactive. All other systems reviewed and are negative.        Current Outpatient Medications:     CREON 49010-258703 units CPEP delayed release capsule, take 2 capsules by mouth with meals and 1 capsule by mouth with SNACKS, Disp: , Rfl:     acamprosate (CAMPRAL) 333 MG tablet, Take 2 tablets by mouth 3 times daily, Disp: 180 tablet, Rfl: 5    Multiple Vitamin (MULTI-DAY VITAMINS) TABS, 1 daily, Disp: 30 tablet, Rfl: 3    traZODone (DESYREL) 50 MG tablet, Take 1 tablet by mouth nightly, Disp: 30 tablet, Rfl: 5    ondansetron (ZOFRAN-ODT) 4 MG disintegrating tablet, Take 1 tablet by mouth every 8 hours as needed for Nausea or Vomiting, Disp: 90 tablet, Rfl: 5    vitamin B-1 (THIAMINE) 100 MG tablet, Take 1 tablet by mouth daily, Disp: 30 tablet, Rfl: 0    furosemide (LASIX) 40 MG tablet, take 1 tablet by mouth once daily, Disp: 60 tablet, Rfl: 5    famotidine (PEPCID) 40 MG tablet, Take 1 tablet by mouth daily, Disp: 30 tablet, Rfl: 5    spironolactone (ALDACTONE) 25 MG tablet, Take 1 tablet by mouth daily, Disp: 30 tablet, Rfl: 5    folic acid (FOLVITE) 1 MG tablet, Take 1 tablet by mouth daily, Disp: 30 tablet, Rfl: 11    lactulose (CHRONULAC) 10 GM/15ML solution, Take 20 g by mouth 2 times daily, Disp: , Rfl:     rifaximin (XIFAXAN) 550 MG tablet, Take 1 tablet by mouth 2 times daily, Disp: 60 tablet, Rfl: 5    nicotine (NICODERM CQ) 14 MG/24HR, Place 1 patch onto the skin daily, Disp: 30 patch, Rfl: 3    pantoprazole (PROTONIX) 40 MG tablet, Take 1 tablet by mouth every morning (before breakfast), Disp: 30 tablet, Rfl: 3    thiamine 100 MG tablet, Take 1 tablet by mouth daily, Disp: 30 tablet, Rfl: 3   Patient Active Problem List   Diagnosis    Tetrahydrocannabinol (THC) use disorder, mild, abuse    Severe protein-calorie malnutrition (HCC)    Bipolar affective disorder, manic (HCC)    Bipolar disorder with severe depression (HCC)    Cirrhosis (HCC)    Generalized abdominal pain    Alcoholic hepatitis    Chronic pain syndrome    Fibromyalgia    GERD (gastroesophageal reflux disease)    Hypokalemia    Lumbar radiculopathy    Abnormal liver function tests    Contusion    Gastroenteritis    Weakness    Injury of head    Tachycardia     Past Medical History:   Diagnosis Date    Acute alcoholic intoxication with complication ([facility] 0/4/8891    Alcohol abuse 8/24/2018    Anemia     Bipolar disorder current episode depressed (HCC)     Therapy    Closed fracture of left proximal humerus     Depression     Depression with suicidal ideation 10/24/2018    ETOH abuse     Humerus fracture 04/2021    left- For OR 5-12-21    Liver disease     Major depression, recurrent (HCC)     Therapy -stable per pat    Major depressive disorder, recurrent episode, severe ([facility] 8/24/2018    Mild tetrahydrocannabinol (THC) abuse     chronic     Protein calorie malnutrition (HCC)     severe    SBP (spontaneous bacterial peritonitis) ([facility] 11/8/2021     Past Surgical History:   Procedure Laterality Date    HUMERUS FRACTURE SURGERY Left 5/12/2021    LEFT PROXIMAL HUMERUS OPEN REDUCTION INTERNAL FIXATION performed by Abdoulaye De León MD at [facility] Bilateral 2000    [address]  08/2021    TONSILLECTOMY       Social History     Socioeconomic History    Marital status: Single     Spouse name: Not on file    Number of children: 0    Years of education: 14    Highest education level: Not on file   Occupational History    Not on file   Tobacco Use    Smoking status: Every Day     Packs/day: 1.00     Years: 15.00     Pack years: 15.00     Types: Cigarettes     Start date: 10/3/2003    Smokeless tobacco: Former   Vaping Use    Vaping Use: Former   Substance and Sexual Activity    Alcohol use: Not Currently     Alcohol/week: 42.0 standard drinks     Types: 42 Cans of beer per week     Comment: 6 packs    Drug use: Yes     Frequency: 1.0 times per week     Types: Marijuana Pancho Bilberry)    Sexual activity: Not on file   Other Topics Concern    Not on file   Social History Narrative    Not on file     Social Determinants of Health     Financial Resource Strain: Not on file   Food Insecurity: Not on file   Transportation Needs: Not on file   Physical Activity: Not on file   Stress: Not on file   Social Connections: Not on file   Intimate Partner Violence: Not on file   Housing Stability: Not on file     Family History   Problem Relation Age of Onset    No Known Problems Mother     No Known Problems Father     Breast Cancer Paternal Aunt     Breast Cancer Paternal Aunt     Breast Cancer Paternal Aunt       There are no preventive care reminders to display for this patient. There are no preventive care reminders to display for this patient. There are no preventive care reminders to display for this patient.    Health Maintenance Due   Topic    DTaP/Tdap/Td vaccine (1 - Tdap)      Health Maintenance   Topic Date Due    Hepatitis A vaccine (1 of 2 - Risk 2-dose series) Never done    Varicella vaccine (1 of 2 - 2-dose childhood series) Never done    Pneumococcal 0-64 years Vaccine (1 - PCV) Never done    Hepatitis B vaccine (1 of 3 - Risk 3-dose series) Never done    DTaP/Tdap/Td vaccine (1 - Tdap) Never done    Cervical cancer screen  Never done    COVID-19 Vaccine (4 - Booster) 03/22/2022    Flu vaccine (1) Never done    Depression Monitoring  02/21/2023    Hepatitis C screen  Completed    HIV screen  Completed    Hib vaccine  Aged Out    Meningococcal (ACWY) vaccine  Aged Out      There are no preventive care reminders to display for this patient. There are no preventive care reminders to display for this patient. /60   Pulse 84   Temp 97.5 °F (36.4 °C)   Ht 5' 3\" (1.6 m)   Wt 104 lb (47.2 kg)   SpO2 98%   BMI 18.42 kg/m²     Objective   Physical Exam  Vitals reviewed. Constitutional:       Appearance: She is underweight. HENT:      Head: Normocephalic and atraumatic. Eyes:      General: No scleral icterus. Extraocular Movements: Extraocular movements intact. Conjunctiva/sclera: Conjunctivae normal.      Pupils: Pupils are equal, round, and reactive to light. Neck:      Thyroid: No thyromegaly. Cardiovascular:      Rate and Rhythm: Normal rate and regular rhythm. Heart sounds: Normal heart sounds. No murmur heard. Pulmonary:      Effort: Pulmonary effort is normal.      Breath sounds: Normal breath sounds. No rales. Abdominal:      General: Bowel sounds are normal. There is no distension. Palpations: Abdomen is soft. Tenderness: There is no abdominal tenderness. Musculoskeletal:         General: Normal range of motion. Cervical back: Neck supple. Right lower leg: No edema. Left lower leg: No edema. Lymphadenopathy:      Cervical: No cervical adenopathy. Skin:     General: Skin is warm and dry.       Findings: No erythema or rash.   Neurological:      Mental Status: She is alert and oriented to person, place, and time. Cranial Nerves: No cranial nerve deficit. Psychiatric:         Attention and Perception: Attention normal.         Mood and Affect: Mood is anxious. Speech: Speech is rapid and pressured. Behavior: Behavior is hyperactive. Thought Content: Thought content normal.         Cognition and Memory: Cognition normal.         Judgment: Judgment normal.                An electronic signature was used to authenticate this note.     --Sheyla Garsia, DO

## 2023-01-19 NOTE — LETTER
[address] [place] MCMAHON [address]  Phone: [phone]  Fax: [id number], DO        January 19, 2023     Patient: Brandon Priest   YOB: 1985   Date of Visit: 1/19/2023       To Whom It May Concern: It is my medical opinion that Rachael Lr is currently under my care for multiple medical comorbidities and it is acceptable that she is also enrolled in medical marijuana to help with her symptoms. If you have any questions or concerns, please don't hesitate to call.     Sincerely,        Apolinar Garsia DO

## 2023-01-21 ENCOUNTER — HOSPITAL ENCOUNTER (OUTPATIENT)
Dept: ULTRASOUND IMAGING | Age: 38
End: 2023-01-21
Payer: MEDICAID

## 2023-01-21 DIAGNOSIS — K74.60 CIRRHOSIS OF LIVER WITHOUT ASCITES, UNSPECIFIED HEPATIC CIRRHOSIS TYPE (HCC): ICD-10-CM

## 2023-01-21 PROCEDURE — 76705 ECHO EXAM OF ABDOMEN: CPT

## 2023-01-26 ENCOUNTER — TELEPHONE (OUTPATIENT)
Dept: PRIMARY CARE CLINIC | Age: 38
End: 2023-01-26

## 2023-01-26 NOTE — TELEPHONE ENCOUNTER
ReSound appears normal however would follow-up with the gastroenterologist.  Also Chantix was still on backorder

## 2023-01-26 NOTE — TELEPHONE ENCOUNTER
Patient asking if you can review results of abdominal u/s from 1/21. Also asking if you would order Chantix she is wanting to stop smoking.

## 2023-01-31 DIAGNOSIS — Z72.0 TOBACCO ABUSE: Primary | ICD-10-CM

## 2023-01-31 NOTE — TELEPHONE ENCOUNTER
Patient advised. She stated a family member was just able to  script for chantix. She is asking if you will send to pharmacy.

## 2023-03-02 ENCOUNTER — TELEPHONE (OUTPATIENT)
Dept: PRIMARY CARE CLINIC | Age: 38
End: 2023-03-02

## 2023-03-02 DIAGNOSIS — M54.16 LUMBAR RADICULOPATHY: Primary | ICD-10-CM

## 2023-03-02 NOTE — TELEPHONE ENCOUNTER
Requesting referral to [facility] [place] for physical therapy.    Fax: [phone]

## 2023-03-03 DIAGNOSIS — M54.12 CERVICAL RADICULOPATHY: Primary | ICD-10-CM

## 2023-03-03 NOTE — TELEPHONE ENCOUNTER
Referral was sent yesterday for PT for lumbar spine, but patient is wanting therapy on her neck.      Asking for updated referral for cervical spine to be sent for pinched nerve in her neck

## 2023-03-06 ENCOUNTER — TELEPHONE (OUTPATIENT)
Dept: PRIMARY CARE CLINIC | Age: 38
End: 2023-03-06

## 2023-03-07 DIAGNOSIS — M54.50 ACUTE BILATERAL LOW BACK PAIN WITHOUT SCIATICA: Primary | ICD-10-CM

## 2023-03-13 ENCOUNTER — TELEPHONE (OUTPATIENT)
Dept: PRIMARY CARE CLINIC | Age: 38
End: 2023-03-13

## 2023-03-13 DIAGNOSIS — Z12.31 BREAST CANCER SCREENING BY MAMMOGRAM: Primary | ICD-10-CM

## 2023-03-13 NOTE — TELEPHONE ENCOUNTER
Patient is asking for an order for a mammogram. Would like to have one done at [facility] [place]

## 2023-03-28 ENCOUNTER — TELEPHONE (OUTPATIENT)
Dept: FAMILY MEDICINE CLINIC | Age: 38
End: 2023-03-28

## 2023-03-28 NOTE — TELEPHONE ENCOUNTER
Noreen Gregg has a family hx of breast cancer. She is asking about getting imaging other than mammography.

## 2023-03-29 DIAGNOSIS — K70.11 ALCOHOLIC HEPATITIS WITH ASCITES: ICD-10-CM

## 2023-03-29 DIAGNOSIS — Z87.81 HISTORY OF HUMERUS FRACTURE: ICD-10-CM

## 2023-03-29 DIAGNOSIS — E43 SEVERE PROTEIN-CALORIE MALNUTRITION (HCC): Primary | Chronic | ICD-10-CM

## 2023-04-03 NOTE — TELEPHONE ENCOUNTER
Left a detailed message for patient. Told her if we do not hear back from her in the next few days we will close this message.

## 2023-04-06 ENCOUNTER — TELEPHONE (OUTPATIENT)
Dept: PRIMARY CARE CLINIC | Age: 38
End: 2023-04-06

## 2023-04-06 NOTE — TELEPHONE ENCOUNTER
The pt is calling because she is on varenicline tartrate to help her to stop smoking, it is effecting her sleep so she is asking if she can get a script for the patches instead because she really wants to stop smoking

## 2023-04-07 RX ORDER — NICOTINE 21 MG/24HR
1 PATCH, TRANSDERMAL 24 HOURS TRANSDERMAL EVERY 24 HOURS
Qty: 30 PATCH | Refills: 3 | Status: SHIPPED | OUTPATIENT
Start: 2023-04-07

## 2023-07-18 ENCOUNTER — OFFICE VISIT (OUTPATIENT)
Dept: PRIMARY CARE CLINIC | Age: 38
End: 2023-07-18
Payer: MEDICAID

## 2023-07-18 VITALS
DIASTOLIC BLOOD PRESSURE: 66 MMHG | WEIGHT: 114 LBS | OXYGEN SATURATION: 97 % | TEMPERATURE: 98 F | HEART RATE: 70 BPM | SYSTOLIC BLOOD PRESSURE: 122 MMHG | HEIGHT: 63 IN | BODY MASS INDEX: 20.2 KG/M2

## 2023-07-18 DIAGNOSIS — K52.9 GASTROENTERITIS: ICD-10-CM

## 2023-07-18 DIAGNOSIS — R10.9 FLANK PAIN: Primary | ICD-10-CM

## 2023-07-18 DIAGNOSIS — G47.00 INSOMNIA, UNSPECIFIED TYPE: ICD-10-CM

## 2023-07-18 DIAGNOSIS — G89.29 CHRONIC LEFT SHOULDER PAIN: ICD-10-CM

## 2023-07-18 DIAGNOSIS — M85.80 OSTEOPENIA, UNSPECIFIED LOCATION: ICD-10-CM

## 2023-07-18 DIAGNOSIS — R79.89 ABNORMAL LIVER FUNCTION TESTS: ICD-10-CM

## 2023-07-18 DIAGNOSIS — K70.30 ALCOHOLIC CIRRHOSIS, UNSPECIFIED WHETHER ASCITES PRESENT (HCC): ICD-10-CM

## 2023-07-18 DIAGNOSIS — M25.512 CHRONIC LEFT SHOULDER PAIN: ICD-10-CM

## 2023-07-18 PROBLEM — F31.4 BIPOLAR DISORDER WITH SEVERE DEPRESSION (HCC): Status: RESOLVED | Noted: 2018-10-24 | Resolved: 2023-07-18

## 2023-07-18 PROBLEM — E43 SEVERE PROTEIN-CALORIE MALNUTRITION (HCC): Chronic | Status: RESOLVED | Noted: 2021-11-03 | Resolved: 2023-07-18

## 2023-07-18 PROBLEM — F31.10 BIPOLAR AFFECTIVE DISORDER, MANIC (HCC): Status: RESOLVED | Noted: 2018-10-04 | Resolved: 2023-07-18

## 2023-07-18 LAB
BILIRUBIN, POC: NORMAL
BLOOD URINE, POC: NORMAL
CLARITY, POC: CLEAR
COLOR, POC: NORMAL
GLUCOSE URINE, POC: NORMAL
KETONES, POC: NORMAL
LEUKOCYTE EST, POC: NORMAL
NITRITE, POC: NORMAL
PH, POC: 7
PROTEIN, POC: NORMAL
SPECIFIC GRAVITY, POC: 1.02
UROBILINOGEN, POC: 0.2

## 2023-07-18 PROCEDURE — G8427 DOCREV CUR MEDS BY ELIG CLIN: HCPCS | Performed by: FAMILY MEDICINE

## 2023-07-18 PROCEDURE — G8420 CALC BMI NORM PARAMETERS: HCPCS | Performed by: FAMILY MEDICINE

## 2023-07-18 PROCEDURE — 81002 URINALYSIS NONAUTO W/O SCOPE: CPT | Performed by: FAMILY MEDICINE

## 2023-07-18 PROCEDURE — 4004F PT TOBACCO SCREEN RCVD TLK: CPT | Performed by: FAMILY MEDICINE

## 2023-07-18 PROCEDURE — 99214 OFFICE O/P EST MOD 30 MIN: CPT | Performed by: FAMILY MEDICINE

## 2023-07-18 RX ORDER — OMEPRAZOLE 40 MG/1
40 CAPSULE, DELAYED RELEASE ORAL DAILY
COMMUNITY

## 2023-07-18 RX ORDER — ONDANSETRON 4 MG/1
4 TABLET, FILM COATED ORAL 3 TIMES DAILY PRN
Qty: 30 TABLET | Refills: 0 | Status: SHIPPED | OUTPATIENT
Start: 2023-07-18

## 2023-07-18 RX ORDER — TRAZODONE HYDROCHLORIDE 100 MG/1
100 TABLET ORAL NIGHTLY
Qty: 30 TABLET | Refills: 5 | Status: SHIPPED | OUTPATIENT
Start: 2023-07-18

## 2023-07-18 SDOH — ECONOMIC STABILITY: FOOD INSECURITY: WITHIN THE PAST 12 MONTHS, THE FOOD YOU BOUGHT JUST DIDN'T LAST AND YOU DIDN'T HAVE MONEY TO GET MORE.: NEVER TRUE

## 2023-07-18 SDOH — ECONOMIC STABILITY: INCOME INSECURITY: HOW HARD IS IT FOR YOU TO PAY FOR THE VERY BASICS LIKE FOOD, HOUSING, MEDICAL CARE, AND HEATING?: NOT HARD AT ALL

## 2023-07-18 SDOH — ECONOMIC STABILITY: HOUSING INSECURITY
IN THE LAST 12 MONTHS, WAS THERE A TIME WHEN YOU DID NOT HAVE A STEADY PLACE TO SLEEP OR SLEPT IN A SHELTER (INCLUDING NOW)?: NO

## 2023-07-18 SDOH — ECONOMIC STABILITY: FOOD INSECURITY: WITHIN THE PAST 12 MONTHS, YOU WORRIED THAT YOUR FOOD WOULD RUN OUT BEFORE YOU GOT MONEY TO BUY MORE.: NEVER TRUE

## 2023-07-18 ASSESSMENT — ENCOUNTER SYMPTOMS
PHOTOPHOBIA: 0
DIARRHEA: 0
VOMITING: 0
BACK PAIN: 0
ABDOMINAL PAIN: 1
CONSTIPATION: 0
BLOOD IN STOOL: 0
SORE THROAT: 0
NAUSEA: 0
COUGH: 0
SHORTNESS OF BREATH: 0

## 2023-07-18 NOTE — PROGRESS NOTES
on file   Occupational History    Not on file   Tobacco Use    Smoking status: Every Day     Packs/day: 1.00     Years: 15.00     Pack years: 15.00     Types: Cigarettes     Start date: 10/3/2003    Smokeless tobacco: Former   Vaping Use    Vaping Use: Former   Substance and Sexual Activity    Alcohol use: Not Currently     Alcohol/week: 42.0 standard drinks     Types: 42 Cans of beer per week     Comment: 6 packs    Drug use: Yes     Frequency: 1.0 times per week     Types: Marijuana Last Kt)    Sexual activity: Not on file   Other Topics Concern    Not on file   Social History Narrative    Not on file     Social Determinants of Health     Financial Resource Strain: Low Risk     Difficulty of Paying Living Expenses: Not hard at all   Food Insecurity: No Food Insecurity    Worried About [place] in the Last Year: Never true    [address] in the Last Year: Never true   Transportation Needs: Unknown    Lack of Transportation (Medical): Not on file    Lack of Transportation (Non-Medical): No   Physical Activity: Not on file   Stress: Not on file   Social Connections: Not on file   Intimate Partner Violence: Not on file   Housing Stability: Unknown    Unable to Pay for Housing in the Last Year: Not on file    Number of Places Lived in the Last Year: Not on file    Unstable Housing in the Last Year: No     Family History   Problem Relation Age of Onset    No Known Problems Mother     No Known Problems Father     Breast Cancer Paternal Aunt     Breast Cancer Paternal Aunt     Breast Cancer Paternal Aunt     Breast Cancer Maternal Aunt       There are no preventive care reminders to display for this patient. There are no preventive care reminders to display for this patient. There are no preventive care reminders to display for this patient.    Health Maintenance Due   Topic    DTaP/Tdap/Td vaccine (1 - Tdap)      Health Maintenance   Topic Date Due    Hepatitis A vaccine (1 of 2 - Risk 2-dose series) Never

## 2023-07-20 DIAGNOSIS — K52.9 GASTROENTERITIS: ICD-10-CM

## 2023-07-20 DIAGNOSIS — K70.30 ALCOHOLIC CIRRHOSIS, UNSPECIFIED WHETHER ASCITES PRESENT (HCC): ICD-10-CM

## 2023-07-20 DIAGNOSIS — M85.80 OSTEOPENIA, UNSPECIFIED LOCATION: ICD-10-CM

## 2023-07-20 DIAGNOSIS — R10.9 FLANK PAIN: ICD-10-CM

## 2023-07-20 DIAGNOSIS — M25.512 CHRONIC LEFT SHOULDER PAIN: ICD-10-CM

## 2023-07-20 DIAGNOSIS — R79.89 ABNORMAL LIVER FUNCTION TESTS: ICD-10-CM

## 2023-07-20 DIAGNOSIS — G47.00 INSOMNIA, UNSPECIFIED TYPE: ICD-10-CM

## 2023-07-20 DIAGNOSIS — G89.29 CHRONIC LEFT SHOULDER PAIN: ICD-10-CM

## 2023-07-20 LAB
ABSOLUTE IMMATURE GRANULOCYTE: <0.03 K/UL (ref 0–0.58)
ALBUMIN SERPL-MCNC: 4.6 G/DL (ref 3.5–5.2)
ALP BLD-CCNC: 139 U/L (ref 35–104)
ALT SERPL-CCNC: 12 U/L (ref 0–32)
ANION GAP SERPL CALCULATED.3IONS-SCNC: 17 MMOL/L (ref 7–16)
AST SERPL-CCNC: 20 U/L (ref 0–31)
BASOPHILS ABSOLUTE: 0.05 K/UL (ref 0–0.2)
BASOPHILS RELATIVE PERCENT: 1 % (ref 0–2)
BILIRUB SERPL-MCNC: 0.3 MG/DL (ref 0–1.2)
BILIRUBIN DIRECT: <0.2 MG/DL (ref 0–0.3)
BILIRUBIN, INDIRECT: ABNORMAL MG/DL (ref 0–1)
BUN BLDV-MCNC: 9 MG/DL (ref 6–20)
CALCIUM SERPL-MCNC: 9.2 MG/DL (ref 8.6–10.2)
CHLORIDE BLD-SCNC: 105 MMOL/L (ref 98–107)
CO2: 20 MMOL/L (ref 22–29)
CREAT SERPL-MCNC: 1 MG/DL (ref 0.5–1)
EOSINOPHILS ABSOLUTE: 0.1 K/UL (ref 0.05–0.5)
EOSINOPHILS RELATIVE PERCENT: 1 % (ref 0–6)
GFR SERPL CREATININE-BSD FRML MDRD: >60 ML/MIN/1.73M2
GGT, 20027: 42 U/L (ref 6–42)
GLUCOSE BLD-MCNC: 87 MG/DL (ref 74–99)
HCT VFR BLD CALC: 40.9 % (ref 34–48)
HEMOGLOBIN: 12.6 G/DL (ref 11.5–15.5)
IMMATURE GRANULOCYTES: 0 % (ref 0–5)
LIPASE: 28 U/L (ref 13–60)
LYMPHOCYTES ABSOLUTE: 3.1 K/UL (ref 1.5–4)
LYMPHOCYTES RELATIVE PERCENT: 35 % (ref 20–42)
MCH RBC QN AUTO: 30.9 PG (ref 26–35)
MCHC RBC AUTO-ENTMCNC: 30.8 G/DL (ref 32–34.5)
MCV RBC AUTO: 100.2 FL (ref 80–99.9)
MONOCYTES ABSOLUTE: 0.5 K/UL (ref 0.1–0.95)
MONOCYTES RELATIVE PERCENT: 6 % (ref 2–12)
NEUTROPHILS ABSOLUTE: 5.07 K/UL (ref 1.8–7.3)
NEUTROPHILS RELATIVE PERCENT: 57 % (ref 43–80)
PDW BLD-RTO: 12.8 % (ref 11.5–15)
PLATELET # BLD: 328 K/UL (ref 130–450)
PMV BLD AUTO: 9.8 FL (ref 7–12)
POTASSIUM SERPL-SCNC: 3.7 MMOL/L (ref 3.5–5)
RBC # BLD: 4.08 M/UL (ref 3.5–5.5)
SODIUM BLD-SCNC: 142 MMOL/L (ref 132–146)
TOTAL PROTEIN: 7.2 G/DL (ref 6.4–8.3)
TSH SERPL DL<=0.05 MIU/L-ACNC: 3.3 UIU/ML (ref 0.27–4.2)
WBC # BLD: 8.8 K/UL (ref 4.5–11.5)

## 2023-07-24 RX ORDER — OMEPRAZOLE 40 MG/1
40 CAPSULE, DELAYED RELEASE ORAL DAILY
Qty: 30 CAPSULE | Refills: 5 | Status: SHIPPED | OUTPATIENT
Start: 2023-07-24

## 2023-08-09 LAB — TEST NAME: NORMAL

## 2023-09-05 ENCOUNTER — OFFICE VISIT (OUTPATIENT)
Dept: FAMILY MEDICINE CLINIC | Age: 38
End: 2023-09-05
Payer: MEDICAID

## 2023-09-05 VITALS
OXYGEN SATURATION: 98 % | HEIGHT: 63 IN | SYSTOLIC BLOOD PRESSURE: 124 MMHG | WEIGHT: 109 LBS | RESPIRATION RATE: 16 BRPM | TEMPERATURE: 97.8 F | HEART RATE: 84 BPM | DIASTOLIC BLOOD PRESSURE: 80 MMHG | BODY MASS INDEX: 19.31 KG/M2

## 2023-09-05 DIAGNOSIS — B00.2 PRIMARY HSV INFECTION OF MOUTH: Primary | ICD-10-CM

## 2023-09-05 DIAGNOSIS — J02.9 PHARYNGITIS, UNSPECIFIED ETIOLOGY: ICD-10-CM

## 2023-09-05 LAB — S PYO AG THROAT QL: NORMAL

## 2023-09-05 PROCEDURE — 99213 OFFICE O/P EST LOW 20 MIN: CPT | Performed by: PHYSICIAN ASSISTANT

## 2023-09-05 PROCEDURE — G8427 DOCREV CUR MEDS BY ELIG CLIN: HCPCS | Performed by: PHYSICIAN ASSISTANT

## 2023-09-05 PROCEDURE — 4004F PT TOBACCO SCREEN RCVD TLK: CPT | Performed by: PHYSICIAN ASSISTANT

## 2023-09-05 PROCEDURE — G8420 CALC BMI NORM PARAMETERS: HCPCS | Performed by: PHYSICIAN ASSISTANT

## 2023-09-05 PROCEDURE — 87880 STREP A ASSAY W/OPTIC: CPT | Performed by: PHYSICIAN ASSISTANT

## 2023-09-05 RX ORDER — ACYCLOVIR 400 MG/1
400 TABLET ORAL 3 TIMES DAILY
Qty: 21 TABLET | Refills: 0 | Status: SHIPPED | OUTPATIENT
Start: 2023-09-05 | End: 2023-09-12

## 2023-10-06 ENCOUNTER — OFFICE VISIT (OUTPATIENT)
Dept: FAMILY MEDICINE CLINIC | Age: 38
End: 2023-10-06
Payer: MEDICAID

## 2023-10-06 VITALS
HEART RATE: 63 BPM | OXYGEN SATURATION: 100 % | BODY MASS INDEX: 19.49 KG/M2 | DIASTOLIC BLOOD PRESSURE: 75 MMHG | SYSTOLIC BLOOD PRESSURE: 122 MMHG | TEMPERATURE: 97.7 F | HEIGHT: 63 IN | WEIGHT: 110 LBS

## 2023-10-06 DIAGNOSIS — R22.2 MASS OF SKIN OF BACK: Primary | ICD-10-CM

## 2023-10-06 PROCEDURE — G8484 FLU IMMUNIZE NO ADMIN: HCPCS | Performed by: FAMILY MEDICINE

## 2023-10-06 PROCEDURE — G8420 CALC BMI NORM PARAMETERS: HCPCS | Performed by: FAMILY MEDICINE

## 2023-10-06 PROCEDURE — 99213 OFFICE O/P EST LOW 20 MIN: CPT | Performed by: FAMILY MEDICINE

## 2023-10-06 PROCEDURE — G8427 DOCREV CUR MEDS BY ELIG CLIN: HCPCS | Performed by: FAMILY MEDICINE

## 2023-10-06 PROCEDURE — 4004F PT TOBACCO SCREEN RCVD TLK: CPT | Performed by: FAMILY MEDICINE

## 2023-10-06 RX ORDER — CEPHALEXIN 500 MG/1
500 CAPSULE ORAL 3 TIMES DAILY
Qty: 21 CAPSULE | Refills: 0 | Status: SHIPPED | OUTPATIENT
Start: 2023-10-06 | End: 2023-10-13

## 2023-10-06 ASSESSMENT — ENCOUNTER SYMPTOMS
BACK PAIN: 0
TROUBLE SWALLOWING: 0
DIARRHEA: 0
ABDOMINAL PAIN: 0
CHEST TIGHTNESS: 0
SHORTNESS OF BREATH: 0
PHOTOPHOBIA: 0
VOMITING: 0
EYE DISCHARGE: 0
BLOOD IN STOOL: 0
SORE THROAT: 0
COUGH: 0
EYE REDNESS: 0
NAUSEA: 0
ALLERGIC/IMMUNOLOGIC NEGATIVE: 1
EYE PAIN: 0
SINUS PAIN: 0

## 2023-10-06 NOTE — PROGRESS NOTES
10/6/23  Madelaine Hale : 1985 Sex: female  Age: 34 y.o. Assessment and Plan:  Laya Marshall was seen today for mass. Diagnoses and all orders for this visit:    Mass of skin of back  -     cephALEXin (KEFLEX) 500 MG capsule; Take 1 capsule by mouth 3 times daily for 7 days    Considerations include lipoma versus a subcu continuous cyst.  Go ahead and treat her with 7 days of Keflex to prevent any infection if cyst.  Follow-up appointment with Dr. Yola Fried to recheck next week    No follow-ups on file. Chief Complaint   Patient presents with    Mass     Patient complains of lump on lower back for about two weeks. Patient presents with painful subcu lump of her upper back. Onset 2 weeks ago. It is tender when she is lifting especially when when on her back. She denies fever, chills, redness, drainage, bleeding that area. Review of Systems   Constitutional:  Negative for appetite change, fatigue and unexpected weight change. HENT:  Negative for congestion, ear pain, hearing loss, sinus pain, sore throat and trouble swallowing. Eyes:  Negative for photophobia, pain, discharge and redness. Respiratory:  Negative for cough, chest tightness and shortness of breath. Cardiovascular:  Negative for chest pain, palpitations and leg swelling. Gastrointestinal:  Negative for abdominal pain, blood in stool, diarrhea, nausea and vomiting. Endocrine: Negative. Genitourinary:  Negative for dysuria, flank pain, frequency and hematuria. Musculoskeletal:  Negative for arthralgias, back pain, joint swelling and myalgias. Skin: Negative. Percutaneous mass 3 to 4 cm left upper back   Allergic/Immunologic: Negative. Neurological:  Negative for dizziness, seizures, syncope, weakness, light-headedness, numbness and headaches. Hematological:  Negative for adenopathy. Does not bruise/bleed easily. Psychiatric/Behavioral: Negative.            Current Outpatient Medications:

## 2023-10-09 ENCOUNTER — OFFICE VISIT (OUTPATIENT)
Dept: PRIMARY CARE CLINIC | Age: 38
End: 2023-10-09
Payer: MEDICAID

## 2023-10-09 VITALS
HEIGHT: 63 IN | SYSTOLIC BLOOD PRESSURE: 116 MMHG | HEART RATE: 84 BPM | BODY MASS INDEX: 20.02 KG/M2 | DIASTOLIC BLOOD PRESSURE: 72 MMHG | OXYGEN SATURATION: 98 % | WEIGHT: 113 LBS

## 2023-10-09 DIAGNOSIS — R22.2 MASS OF SKIN OF BACK: Primary | ICD-10-CM

## 2023-10-09 PROCEDURE — 99213 OFFICE O/P EST LOW 20 MIN: CPT | Performed by: FAMILY MEDICINE

## 2023-10-09 PROCEDURE — G8484 FLU IMMUNIZE NO ADMIN: HCPCS | Performed by: FAMILY MEDICINE

## 2023-10-09 PROCEDURE — G8427 DOCREV CUR MEDS BY ELIG CLIN: HCPCS | Performed by: FAMILY MEDICINE

## 2023-10-09 PROCEDURE — G8420 CALC BMI NORM PARAMETERS: HCPCS | Performed by: FAMILY MEDICINE

## 2023-10-09 PROCEDURE — 4004F PT TOBACCO SCREEN RCVD TLK: CPT | Performed by: FAMILY MEDICINE

## 2023-10-09 RX ORDER — METHYLPREDNISOLONE 4 MG/1
TABLET ORAL
Qty: 1 KIT | Refills: 0 | Status: SHIPPED | OUTPATIENT
Start: 2023-10-09

## 2023-10-09 RX ORDER — BACLOFEN 5 MG/1
5 TABLET ORAL 3 TIMES DAILY
Qty: 90 TABLET | Refills: 5 | Status: SHIPPED | OUTPATIENT
Start: 2023-10-09

## 2023-10-09 ASSESSMENT — ENCOUNTER SYMPTOMS
COUGH: 0
ALLERGIC/IMMUNOLOGIC NEGATIVE: 1
CHEST TIGHTNESS: 0
EYE PAIN: 0
EYE DISCHARGE: 0
VOMITING: 0
ABDOMINAL PAIN: 0
TROUBLE SWALLOWING: 0
EYE REDNESS: 0
SORE THROAT: 0
PHOTOPHOBIA: 0
DIARRHEA: 0
SHORTNESS OF BREATH: 0
BLOOD IN STOOL: 0
SINUS PAIN: 0
BACK PAIN: 1
NAUSEA: 0

## 2023-11-06 ENCOUNTER — TELEPHONE (OUTPATIENT)
Dept: PRIMARY CARE CLINIC | Age: 38
End: 2023-11-06

## 2023-11-06 DIAGNOSIS — B00.2 PRIMARY HSV INFECTION OF MOUTH: ICD-10-CM

## 2023-11-06 RX ORDER — ACYCLOVIR 400 MG/1
400 TABLET ORAL 3 TIMES DAILY
Qty: 21 TABLET | Refills: 0 | Status: SHIPPED | OUTPATIENT
Start: 2023-11-06 | End: 2023-11-13

## 2023-11-06 NOTE — TELEPHONE ENCOUNTER
The pt is calling because she has a cold sore, she is asking if something can be sent over to the pharmacy for her

## 2023-12-29 DIAGNOSIS — G47.00 INSOMNIA, UNSPECIFIED TYPE: ICD-10-CM

## 2023-12-29 RX ORDER — TRAZODONE HYDROCHLORIDE 100 MG/1
100 TABLET ORAL NIGHTLY
Qty: 30 TABLET | Refills: 5 | Status: SHIPPED | OUTPATIENT
Start: 2023-12-29

## 2024-01-15 ENCOUNTER — TELEPHONE (OUTPATIENT)
Dept: PRIMARY CARE CLINIC | Age: 39
End: 2024-01-15

## 2024-01-15 RX ORDER — BUPROPION HYDROCHLORIDE 150 MG/1
150 TABLET, EXTENDED RELEASE ORAL 2 TIMES DAILY
Qty: 60 TABLET | Refills: 3 | Status: SHIPPED | OUTPATIENT
Start: 2024-01-15

## 2024-01-15 RX ORDER — HYDROXYZINE PAMOATE 25 MG/1
25 CAPSULE ORAL 3 TIMES DAILY PRN
Qty: 90 CAPSULE | Refills: 5 | Status: SHIPPED | OUTPATIENT
Start: 2024-01-15

## 2024-01-15 NOTE — TELEPHONE ENCOUNTER
The pt is calling because her father was diagnosed with cancer and her anxiety is really bothering her, she is losing weight because she isn't eating, and she isn't sleeping she is asking if she can be prescribed something for her nerves

## 2024-03-07 LAB
SEND OUT REPORT: NORMAL
TEST NAME: NORMAL

## 2024-05-07 DIAGNOSIS — L98.9 SKIN LESION: ICD-10-CM

## 2024-05-07 DIAGNOSIS — R92.30 DENSE BREASTS: Primary | ICD-10-CM

## 2024-06-05 NOTE — CONSULTS
PSYCHIATRIC EVALUATION  (Consult)    CHIEF COMPLAINT:   [x] Mood Problems [x] Anxiety Problems [] Psychosis                    [x] Suicidal/Homicidal   [] Aggression  [] Other    HISTORY OF PRESENT ILLNESS: Mark Soto  is a 27 y.o. female who has a previous psychiatric history of depression and anxiety and presents for admission with depression with suicidal ideations . Symptoms onset was 1 month ago and is becoming severe for the last 2 days. This presentation associates with sadness, hopelessness, helplessness, overwhelmed feeling and suicidal ideation with a plan to stand on train tracks and usually is worsened by alcohol abuse. Precipitating factors: Wanda Magallanes reports the death of 7 of her close friends in a short period of time due to heroin use, she also lost her grandmother to illness. She stated she \"just wants it all to end. \" She told her boyfriend she was going to stand on train tracks to end it all and he called 911 she was brought to the emergency room for further evaluation.        Precipitating Factors:     [] Family Stress   [x] Recent loss/grief Stress   [] Health Stress   [] Relationship Stress    [x] Legal Stress   [x] Environmental Stress    [x] Occupational Stress   [x] Financial Stress   [x] Substance Abuse [] Other      PAST PSYCHIATRIC HISTORY:   History of psychiatric Hospitalization:    [x] Denies    [] yes  [] Days ago     []  Weeks Ago    [] Months ago  [] Years ago              [] [facility]  [] [facility]  [] Other:        [] Once  [] More than once    Outpatient treatment:  [] [facility]  [] [facility]  [] [facility]              [] [facility]  [] [facility]      [] [address] [place] [address] [] [facility]      [] [facility] [] [facility]  [] [facility] [x] [facility]             [] currently  [x] in the past  [x] Non-Compliant    [] Denies    Previous suicide attempt: [x]Denies            [] yes  [] OD  [] Cutting  [] Hanging  [] Gun  [] Other    Previous psych medications:  [x] Was prescribed [] Guarded  [] Defensive         [x] Cooperative  []  Uncooperative      Behavior:  [] Normal Gait  [] Walks with Assistance  [] Meryl Chair    [] Walks with  Desha  [x] In Hospital Bed  [] Sitting in Chair    Muscle-Skeletal:  [x] Normal Muscle Tone [] Muscle Atrophy       [] Abnormal Muscle Movement     Eye Contact:  [] Good eye contact  [x] Intermittent Eye Contact  [] Poor Eye Contact     Mood: [x] Depressed  [x] Anxious  [] Irritated  [] Euthymic   [] Angry [] Restless    Affect:  [] Congruent  [] Incongruent  [] Labile  [x] Constricted  [] Flat  [] Bizarre     Thought Process and Association:  [x] Logical [] Illogical       [x] Linear and Goal Directed  [] Tangential  [] Circumstantial     Thought Content:  [] Denies [x] Endorses [x] Suicidal [] Homicidal  [] Delusional      [] Paranoid  [] Somatic  [] Grandiose    Perception: [x]  None  [] Auditory   [] Visual  [] tactile   [] olfactory  [] Illusions         Insight: [] Intact  [] Fair  [x] Limited    Judgement:  [] Intact  [] Fair  [x] Limited        ASSESSMENT  Patient Active Problem List   Diagnosis    Tetrahydrocannabinol (THC) use disorder, mild, abuse    Major depressive disorder, recurrent episode, severe (HCC)    Alcohol abuse    Protein-calorie malnutrition, severe (HCC)   MDD  Recommendations and plan of treatment:    Recommendations and plan of treatment: Patient is actively presenting with sever psychiatric symptoms. Will benefit from inpatient hospitalization. Please transfer to psych when medically clear. patient

## 2024-06-18 DIAGNOSIS — G47.00 INSOMNIA, UNSPECIFIED TYPE: ICD-10-CM

## 2024-06-18 RX ORDER — TRAZODONE HYDROCHLORIDE 100 MG/1
100 TABLET ORAL NIGHTLY
Qty: 30 TABLET | Refills: 5 | Status: SHIPPED | OUTPATIENT
Start: 2024-06-18

## 2024-07-06 DIAGNOSIS — E43 SEVERE PROTEIN-CALORIE MALNUTRITION (HCC): Chronic | ICD-10-CM

## 2024-07-06 DIAGNOSIS — M54.16 LUMBAR RADICULOPATHY: ICD-10-CM

## 2024-07-06 DIAGNOSIS — M79.7 FIBROMYALGIA: ICD-10-CM

## 2024-07-06 DIAGNOSIS — K70.30 ALCOHOLIC CIRRHOSIS, UNSPECIFIED WHETHER ASCITES PRESENT (HCC): ICD-10-CM

## 2024-07-06 DIAGNOSIS — F31.4 BIPOLAR DISORDER WITH SEVERE DEPRESSION (HCC): ICD-10-CM

## 2024-07-06 DIAGNOSIS — G89.4 CHRONIC PAIN SYNDROME: ICD-10-CM

## 2024-07-08 RX ORDER — ONDANSETRON 4 MG/1
TABLET, ORALLY DISINTEGRATING ORAL
Qty: 90 TABLET | Refills: 5 | Status: SHIPPED | OUTPATIENT
Start: 2024-07-08

## 2024-07-16 ENCOUNTER — OFFICE VISIT (OUTPATIENT)
Dept: PRIMARY CARE CLINIC | Age: 39
End: 2024-07-16
Payer: MEDICAID

## 2024-07-16 VITALS
WEIGHT: 109 LBS | TEMPERATURE: 97.8 F | SYSTOLIC BLOOD PRESSURE: 110 MMHG | HEART RATE: 85 BPM | HEIGHT: 63 IN | BODY MASS INDEX: 19.31 KG/M2 | DIASTOLIC BLOOD PRESSURE: 70 MMHG | OXYGEN SATURATION: 99 %

## 2024-07-16 DIAGNOSIS — R79.89 ABNORMAL LIVER FUNCTION TESTS: ICD-10-CM

## 2024-07-16 DIAGNOSIS — E53.8 B12 DEFICIENCY: ICD-10-CM

## 2024-07-16 DIAGNOSIS — R10.9 FLANK PAIN: Primary | ICD-10-CM

## 2024-07-16 DIAGNOSIS — R10.13 EPIGASTRIC PAIN: ICD-10-CM

## 2024-07-16 DIAGNOSIS — R10.9 FLANK PAIN: ICD-10-CM

## 2024-07-16 PROBLEM — K74.60 CIRRHOSIS (HCC): Status: RESOLVED | Noted: 2021-11-02 | Resolved: 2024-07-16

## 2024-07-16 LAB
BASOPHILS ABSOLUTE: 0.07 K/UL (ref 0–0.2)
BASOPHILS RELATIVE PERCENT: 1 % (ref 0–2)
BILIRUBIN, POC: NORMAL
BLOOD URINE, POC: NORMAL
C-REACTIVE PROTEIN: <3 MG/L (ref 0–5)
CLARITY, POC: CLEAR
COLOR, POC: NORMAL
EOSINOPHILS ABSOLUTE: 0.13 K/UL (ref 0.05–0.5)
EOSINOPHILS RELATIVE PERCENT: 2 % (ref 0–6)
GLUCOSE URINE, POC: NORMAL
HBA1C MFR BLD: 5.4 % (ref 4–5.6)
HCT VFR BLD CALC: 44.6 % (ref 34–48)
HEMOGLOBIN: 14.3 G/DL (ref 11.5–15.5)
IMMATURE GRANULOCYTES %: 0 % (ref 0–5)
IMMATURE GRANULOCYTES ABSOLUTE: 0.03 K/UL (ref 0–0.58)
KETONES, POC: NORMAL
LEUKOCYTE EST, POC: NORMAL
LYMPHOCYTES ABSOLUTE: 3.6 K/UL (ref 1.5–4)
LYMPHOCYTES RELATIVE PERCENT: 40 % (ref 20–42)
MCH RBC QN AUTO: 31.8 PG (ref 26–35)
MCHC RBC AUTO-ENTMCNC: 32.1 G/DL (ref 32–34.5)
MCV RBC AUTO: 99.3 FL (ref 80–99.9)
MONOCYTES ABSOLUTE: 0.62 K/UL (ref 0.1–0.95)
MONOCYTES RELATIVE PERCENT: 7 % (ref 2–12)
NEUTROPHILS ABSOLUTE: 4.48 K/UL (ref 1.8–7.3)
NEUTROPHILS RELATIVE PERCENT: 50 % (ref 43–80)
NITRITE, POC: NORMAL
PDW BLD-RTO: 13.3 % (ref 11.5–15)
PH, POC: 7
PLATELET # BLD: 367 K/UL (ref 130–450)
PMV BLD AUTO: 10.4 FL (ref 7–12)
PROTEIN, POC: NORMAL
RBC # BLD: 4.49 M/UL (ref 3.5–5.5)
RHEUMATOID FACTOR: <10 IU/ML (ref 0–13)
SPECIFIC GRAVITY, POC: 1.01
UROBILINOGEN, POC: 7
WBC # BLD: 8.9 K/UL (ref 4.5–11.5)

## 2024-07-16 PROCEDURE — 1036F TOBACCO NON-USER: CPT | Performed by: FAMILY MEDICINE

## 2024-07-16 PROCEDURE — G8427 DOCREV CUR MEDS BY ELIG CLIN: HCPCS | Performed by: FAMILY MEDICINE

## 2024-07-16 PROCEDURE — 99214 OFFICE O/P EST MOD 30 MIN: CPT | Performed by: FAMILY MEDICINE

## 2024-07-16 PROCEDURE — G8420 CALC BMI NORM PARAMETERS: HCPCS | Performed by: FAMILY MEDICINE

## 2024-07-16 PROCEDURE — 81002 URINALYSIS NONAUTO W/O SCOPE: CPT | Performed by: FAMILY MEDICINE

## 2024-07-16 RX ORDER — DICYCLOMINE HYDROCHLORIDE 10 MG/1
10 CAPSULE ORAL
Qty: 120 CAPSULE | Refills: 0 | Status: SHIPPED | OUTPATIENT
Start: 2024-07-16

## 2024-07-16 RX ORDER — ONDANSETRON 4 MG/1
4 TABLET, FILM COATED ORAL 3 TIMES DAILY PRN
Qty: 90 TABLET | Refills: 1 | Status: SHIPPED | OUTPATIENT
Start: 2024-07-16

## 2024-07-16 ASSESSMENT — PATIENT HEALTH QUESTIONNAIRE - PHQ9
SUM OF ALL RESPONSES TO PHQ QUESTIONS 1-9: 0
SUM OF ALL RESPONSES TO PHQ QUESTIONS 1-9: 0
10. IF YOU CHECKED OFF ANY PROBLEMS, HOW DIFFICULT HAVE THESE PROBLEMS MADE IT FOR YOU TO DO YOUR WORK, TAKE CARE OF THINGS AT HOME, OR GET ALONG WITH OTHER PEOPLE: NOT DIFFICULT AT ALL
SUM OF ALL RESPONSES TO PHQ QUESTIONS 1-9: 0
1. LITTLE INTEREST OR PLEASURE IN DOING THINGS: NOT AT ALL
2. FEELING DOWN, DEPRESSED OR HOPELESS: NOT AT ALL
8. MOVING OR SPEAKING SO SLOWLY THAT OTHER PEOPLE COULD HAVE NOTICED. OR THE OPPOSITE, BEING SO FIGETY OR RESTLESS THAT YOU HAVE BEEN MOVING AROUND A LOT MORE THAN USUAL: NOT AT ALL
9. THOUGHTS THAT YOU WOULD BE BETTER OFF DEAD, OR OF HURTING YOURSELF: NOT AT ALL
5. POOR APPETITE OR OVEREATING: NOT AT ALL
4. FEELING TIRED OR HAVING LITTLE ENERGY: NOT AT ALL
6. FEELING BAD ABOUT YOURSELF - OR THAT YOU ARE A FAILURE OR HAVE LET YOURSELF OR YOUR FAMILY DOWN: NOT AT ALL
SUM OF ALL RESPONSES TO PHQ QUESTIONS 1-9: 0
SUM OF ALL RESPONSES TO PHQ9 QUESTIONS 1 & 2: 0
7. TROUBLE CONCENTRATING ON THINGS, SUCH AS READING THE NEWSPAPER OR WATCHING TELEVISION: NOT AT ALL
3. TROUBLE FALLING OR STAYING ASLEEP: NOT AT ALL

## 2024-07-16 ASSESSMENT — ENCOUNTER SYMPTOMS
CONSTIPATION: 0
SHORTNESS OF BREATH: 0
SORE THROAT: 0
VOMITING: 0
DIARRHEA: 0
BACK PAIN: 1
ABDOMINAL PAIN: 1
BLOOD IN STOOL: 0
PHOTOPHOBIA: 0
NAUSEA: 1
COUGH: 0

## 2024-07-16 NOTE — PROGRESS NOTES
Danyelle Reyes (:  1985) is a 38 y.o. female,Established patient, here for evaluation of the following chief complaint(s):  Fatigue (Having excessive fatigue in the morning ), Abdominal Pain (Epigastric pain), and Foot Pain (Bottoms of feet, radiates into legs )      Assessment & Plan   1. Flank pain  -     CBC with Auto Differential; Future  -     T4, Free; Future  -     Uric Acid; Future  -     TSH; Future  -     Hepatic Function Panel; Future  -     Basic Metabolic Panel; Future  -     Lipid Panel; Future  -     Hemoglobin A1C; Future  -     Lipase; Future  -     Gamma GT; Future  -     C-Reactive Protein; Future  -     DENIS; Future  -     Rheumatoid Factor; Future  -     Vitamin B12 & Folate; Future  -     C.trachomatis N.gonorrhoeae DNA, Urine; Future  -     Culture, Urine; Future  -     POCT Urinalysis no Micro  -     CT ABDOMEN PELVIS W IV CONTRAST Additional Contrast? None; Future  2. Abnormal liver function tests  -     CBC with Auto Differential; Future  -     T4, Free; Future  -     Uric Acid; Future  -     TSH; Future  -     Hepatic Function Panel; Future  -     Basic Metabolic Panel; Future  -     Lipid Panel; Future  -     Hemoglobin A1C; Future  -     Lipase; Future  -     Gamma GT; Future  -     C-Reactive Protein; Future  -     DENIS; Future  -     Rheumatoid Factor; Future  -     Vitamin B12 & Folate; Future  -     CT ABDOMEN PELVIS W IV CONTRAST Additional Contrast? None; Future  3. B12 deficiency  -     Vitamin B12 & Folate; Future  4. Epigastric pain  -     CT ABDOMEN PELVIS W IV CONTRAST Additional Contrast? None; Future  At this time we will order CT of the abdomen and pelvis for further evaluation and treatment.  Baseline labs were reviewed as well.  Will treat symptomatically with Zofran/Bentyl for symptomatic relief while we are pursuing further diagnosis.  May need referral back to GI specialist that she has not seen 1 in over a year.    No follow-ups on file.       Subjective

## 2024-07-17 LAB
ALBUMIN: 5.1 G/DL (ref 3.5–5.2)
ALP BLD-CCNC: 70 U/L (ref 35–104)
ALT SERPL-CCNC: 14 U/L (ref 0–32)
ANION GAP SERPL CALCULATED.3IONS-SCNC: 15 MMOL/L (ref 7–16)
ANTI-NUCLEAR ANTIBODY (ANA): NEGATIVE
AST SERPL-CCNC: 24 U/L (ref 0–31)
BILIRUB SERPL-MCNC: <0.2 MG/DL (ref 0–1.2)
BILIRUBIN DIRECT: <0.2 MG/DL (ref 0–0.3)
BILIRUBIN, INDIRECT: ABNORMAL MG/DL (ref 0–1)
BUN BLDV-MCNC: 7 MG/DL (ref 6–20)
CALCIUM SERPL-MCNC: 9.8 MG/DL (ref 8.6–10.2)
CHLORIDE BLD-SCNC: 104 MMOL/L (ref 98–107)
CHOLESTEROL, TOTAL: 176 MG/DL
CO2: 22 MMOL/L (ref 22–29)
CREAT SERPL-MCNC: 0.7 MG/DL (ref 0.5–1)
FOLATE: 17.6 NG/ML (ref 4.8–24.2)
GFR, ESTIMATED: >90 ML/MIN/1.73M2
GGT, 20027: 34 U/L (ref 6–42)
GLUCOSE BLD-MCNC: 60 MG/DL (ref 74–99)
HDLC SERPL-MCNC: 51 MG/DL
LDL CHOLESTEROL: 90 MG/DL
LIPASE: 47 U/L (ref 13–60)
POTASSIUM SERPL-SCNC: 4.6 MMOL/L (ref 3.5–5)
SODIUM BLD-SCNC: 141 MMOL/L (ref 132–146)
T4 FREE: 1.2 NG/DL (ref 0.9–1.7)
TOTAL PROTEIN: 8.5 G/DL (ref 6.4–8.3)
TRIGL SERPL-MCNC: 177 MG/DL
TSH SERPL DL<=0.05 MIU/L-ACNC: 2.88 UIU/ML (ref 0.27–4.2)
URIC ACID: 2.7 MG/DL (ref 2.4–5.7)
VITAMIN B-12: 761 PG/ML (ref 211–946)
VLDLC SERPL CALC-MCNC: 35 MG/DL

## 2024-07-18 LAB
C. TRACHOMATIS DNA ,URINE: NEGATIVE
CULTURE: NORMAL
N. GONORRHOEAE DNA, URINE: NEGATIVE
SPECIMEN DESCRIPTION: NORMAL

## 2024-07-31 ENCOUNTER — OFFICE VISIT (OUTPATIENT)
Dept: PRIMARY CARE CLINIC | Age: 39
End: 2024-07-31
Payer: MEDICAID

## 2024-07-31 VITALS
BODY MASS INDEX: 19.31 KG/M2 | WEIGHT: 109 LBS | HEIGHT: 63 IN | HEART RATE: 81 BPM | OXYGEN SATURATION: 99 % | SYSTOLIC BLOOD PRESSURE: 112 MMHG | DIASTOLIC BLOOD PRESSURE: 64 MMHG

## 2024-07-31 DIAGNOSIS — K52.9 COLITIS: ICD-10-CM

## 2024-07-31 DIAGNOSIS — M54.16 LUMBAR RADICULOPATHY: Primary | ICD-10-CM

## 2024-07-31 PROBLEM — K70.10 ALCOHOLIC HEPATITIS: Status: RESOLVED | Noted: 2021-11-08 | Resolved: 2024-07-31

## 2024-07-31 PROCEDURE — G8420 CALC BMI NORM PARAMETERS: HCPCS | Performed by: FAMILY MEDICINE

## 2024-07-31 PROCEDURE — 99214 OFFICE O/P EST MOD 30 MIN: CPT | Performed by: FAMILY MEDICINE

## 2024-07-31 PROCEDURE — G8427 DOCREV CUR MEDS BY ELIG CLIN: HCPCS | Performed by: FAMILY MEDICINE

## 2024-07-31 PROCEDURE — 1036F TOBACCO NON-USER: CPT | Performed by: FAMILY MEDICINE

## 2024-07-31 RX ORDER — TRAZODONE HYDROCHLORIDE 100 MG/1
100 TABLET ORAL NIGHTLY
COMMUNITY
Start: 2024-07-16

## 2024-07-31 RX ORDER — LEVOTHYROXINE SODIUM 0.05 MG/1
TABLET ORAL
COMMUNITY
Start: 2024-07-24

## 2024-07-31 RX ORDER — PREGABALIN 25 MG/1
25 CAPSULE ORAL 3 TIMES DAILY
Qty: 90 CAPSULE | Refills: 3 | Status: SHIPPED | OUTPATIENT
Start: 2024-07-31 | End: 2024-11-28

## 2024-07-31 RX ORDER — FAMOTIDINE 40 MG/1
TABLET, FILM COATED ORAL
COMMUNITY
Start: 2024-07-25

## 2024-07-31 ASSESSMENT — ENCOUNTER SYMPTOMS
COUGH: 0
SHORTNESS OF BREATH: 0
PHOTOPHOBIA: 0
BLOOD IN STOOL: 0
NAUSEA: 1
ABDOMINAL PAIN: 1
BACK PAIN: 1
CONSTIPATION: 0
VOMITING: 0
DIARRHEA: 0
SORE THROAT: 0

## 2024-07-31 NOTE — PROGRESS NOTES
disorder, mild, abuse    Generalized abdominal pain    Chronic pain syndrome    Fibromyalgia    GERD (gastroesophageal reflux disease)    Hypokalemia    Lumbar radiculopathy    Abnormal liver function tests    Contusion    Gastroenteritis    Weakness    Injury of head    Tachycardia    History of humerus fracture    Osteopenia    Insomnia    Flank pain    Chronic left shoulder pain    B12 deficiency    Epigastric pain     Past Medical History:   Diagnosis Date    Acute alcoholic intoxication with complication (HCC) 5/2/2012    Alcohol abuse 8/24/2018    Anemia     Bipolar affective disorder, manic (HCC) 10/4/2018    Bipolar disorder current episode depressed (HCC)     Therapy    Bipolar disorder with severe depression (HCC) 10/24/2018    Closed fracture of left proximal humerus     Depression     Depression with suicidal ideation 10/24/2018    ETOH abuse     Humerus fracture 04/2021    left- For OR 5-12-21    Liver disease     Major depression, recurrent (HCC)     Therapy -stable per pat    Major depressive disorder, recurrent episode, severe (HCC) 8/24/2018    Mild tetrahydrocannabinol (THC) abuse     chronic     Protein calorie malnutrition (HCC)     severe    SBP (spontaneous bacterial peritonitis) (HCC) 11/8/2021    Severe protein-calorie malnutrition (HCC) 11/3/2021     Past Surgical History:   Procedure Laterality Date    HUMERUS FRACTURE SURGERY Left 5/12/2021    LEFT PROXIMAL HUMERUS OPEN REDUCTION INTERNAL FIXATION performed by Pascual Arnett MD at [facility] OR    PELVIC FRACTURE SURGERY Bilateral 2000    ROTATOR CUFF REPAIR  08/2021    TONSILLECTOMY       Social History     Socioeconomic History    Marital status: Single     Spouse name: Not on file    Number of children: 0    Years of education: 14    Highest education level: Not on file   Occupational History    Not on file   Tobacco Use    Smoking status: Former     Current packs/day: 1.00     Average packs/day: 1 pack/day for 20.8 years (20.8 ttl pk-yrs)

## 2024-08-21 ENCOUNTER — TELEPHONE (OUTPATIENT)
Dept: PRIMARY CARE CLINIC | Age: 39
End: 2024-08-21

## 2024-08-21 NOTE — TELEPHONE ENCOUNTER
Patient is struggling with anxiety hydroxyzine 25mg does not seem to be helping. Asking for your recommendations.     She did get scheduled with gastro in September.

## 2024-08-22 DIAGNOSIS — F41.9 ANXIETY: Primary | ICD-10-CM

## 2024-08-22 RX ORDER — BUSPIRONE HYDROCHLORIDE 5 MG/1
5 TABLET ORAL 3 TIMES DAILY
Qty: 90 TABLET | Refills: 5 | Status: SHIPPED | OUTPATIENT
Start: 2024-08-22

## 2024-09-11 ENCOUNTER — CLINICAL DOCUMENTATION (OUTPATIENT)
Dept: NUTRITION | Age: 39
End: 2024-09-11

## 2024-09-25 ENCOUNTER — TELEPHONE (OUTPATIENT)
Dept: PRIMARY CARE CLINIC | Age: 39
End: 2024-09-25

## 2024-09-25 DIAGNOSIS — R10.13 EPIGASTRIC PAIN: ICD-10-CM

## 2024-09-25 DIAGNOSIS — R79.89 ABNORMAL LIVER FUNCTION TESTS: ICD-10-CM

## 2024-09-25 DIAGNOSIS — R79.89 ABNORMAL LIVER FUNCTION TESTS: Primary | ICD-10-CM

## 2024-09-25 RX ORDER — ONDANSETRON 4 MG/1
4 TABLET, FILM COATED ORAL 3 TIMES DAILY PRN
Qty: 90 TABLET | Refills: 1 | Status: SHIPPED | OUTPATIENT
Start: 2024-09-25

## 2024-09-30 DIAGNOSIS — R79.89 ABNORMAL LIVER FUNCTION TESTS: ICD-10-CM

## 2024-09-30 LAB
ALBUMIN: 4.9 G/DL (ref 3.5–5.2)
ALP BLD-CCNC: 64 U/L (ref 35–104)
ALT SERPL-CCNC: 14 U/L (ref 0–32)
ANION GAP SERPL CALCULATED.3IONS-SCNC: 14 MMOL/L (ref 7–16)
AST SERPL-CCNC: 19 U/L (ref 0–31)
BILIRUB SERPL-MCNC: 0.4 MG/DL (ref 0–1.2)
BILIRUBIN DIRECT: <0.2 MG/DL (ref 0–0.3)
BILIRUBIN, INDIRECT: ABNORMAL MG/DL (ref 0–1)
BUN BLDV-MCNC: 5 MG/DL (ref 6–20)
CALCIUM SERPL-MCNC: 9.7 MG/DL (ref 8.6–10.2)
CHLORIDE BLD-SCNC: 102 MMOL/L (ref 98–107)
CO2: 24 MMOL/L (ref 22–29)
CREAT SERPL-MCNC: 0.7 MG/DL (ref 0.5–1)
GFR, ESTIMATED: >90 ML/MIN/1.73M2
GLUCOSE BLD-MCNC: 83 MG/DL (ref 74–99)
POTASSIUM SERPL-SCNC: 4.1 MMOL/L (ref 3.5–5)
SODIUM BLD-SCNC: 140 MMOL/L (ref 132–146)
TOTAL PROTEIN: 8 G/DL (ref 6.4–8.3)

## 2024-10-03 LAB
GLIADIN ANTIBODIES IGA: <0.72 FLU (ref 0–4.99)
GLIADIN ANTIBODIES IGG: <0.56 FLU (ref 0–4.99)
TRANSGLUTAMINASE IGA: <1.02 FLU (ref 0–4.99)
TRANSGLUTAMINASE IGG: <0.82 FLU (ref 0–4.99)

## 2024-12-16 RX ORDER — TRAZODONE HYDROCHLORIDE 100 MG/1
TABLET ORAL
Qty: 30 TABLET | Refills: 3 | Status: SHIPPED | OUTPATIENT
Start: 2024-12-16

## 2024-12-18 ENCOUNTER — OFFICE VISIT (OUTPATIENT)
Dept: PRIMARY CARE CLINIC | Age: 39
End: 2024-12-18

## 2024-12-18 VITALS
HEART RATE: 88 BPM | SYSTOLIC BLOOD PRESSURE: 110 MMHG | HEIGHT: 63 IN | BODY MASS INDEX: 19.84 KG/M2 | WEIGHT: 112 LBS | OXYGEN SATURATION: 98 % | TEMPERATURE: 98.3 F | DIASTOLIC BLOOD PRESSURE: 70 MMHG

## 2024-12-18 DIAGNOSIS — G89.29 CHRONIC LEFT SHOULDER PAIN: ICD-10-CM

## 2024-12-18 DIAGNOSIS — M54.16 LUMBAR RADICULOPATHY: ICD-10-CM

## 2024-12-18 DIAGNOSIS — E53.8 B12 DEFICIENCY: Primary | ICD-10-CM

## 2024-12-18 DIAGNOSIS — R22.2 MASS OF SKIN OF BACK: ICD-10-CM

## 2024-12-18 DIAGNOSIS — M85.80 OSTEOPENIA, UNSPECIFIED LOCATION: ICD-10-CM

## 2024-12-18 DIAGNOSIS — S42.202S CLOSED FRACTURE OF PROXIMAL END OF LEFT HUMERUS, UNSPECIFIED FRACTURE MORPHOLOGY, SEQUELA: ICD-10-CM

## 2024-12-18 DIAGNOSIS — L98.9 SKIN LESION: ICD-10-CM

## 2024-12-18 DIAGNOSIS — M25.512 CHRONIC LEFT SHOULDER PAIN: ICD-10-CM

## 2024-12-18 PROBLEM — E03.9 HYPOTHYROIDISM: Status: ACTIVE | Noted: 2024-12-18

## 2024-12-18 PROBLEM — S42.209A FRACTURE OF PROXIMAL END OF HUMERUS: Status: ACTIVE | Noted: 2024-12-18

## 2024-12-18 PROBLEM — K81.0 ACUTE CHOLECYSTITIS: Status: ACTIVE | Noted: 2024-12-18

## 2024-12-18 PROBLEM — E83.42 HYPOMAGNESEMIA: Status: ACTIVE | Noted: 2024-12-18

## 2024-12-18 RX ORDER — IBUPROFEN 800 MG/1
800 TABLET, FILM COATED ORAL
Qty: 90 TABLET | Refills: 5 | Status: SHIPPED | OUTPATIENT
Start: 2024-12-18

## 2024-12-18 RX ORDER — CYANOCOBALAMIN 1000 UG/ML
1000 INJECTION, SOLUTION INTRAMUSCULAR; SUBCUTANEOUS ONCE
Status: COMPLETED | OUTPATIENT
Start: 2024-12-18 | End: 2024-12-18

## 2024-12-18 RX ADMIN — CYANOCOBALAMIN 1000 MCG: 1000 INJECTION, SOLUTION INTRAMUSCULAR; SUBCUTANEOUS at 10:40

## 2024-12-18 ASSESSMENT — ENCOUNTER SYMPTOMS
CHEST TIGHTNESS: 0
BACK PAIN: 1
ALLERGIC/IMMUNOLOGIC NEGATIVE: 1
SORE THROAT: 0
EYE REDNESS: 0
TROUBLE SWALLOWING: 0
NAUSEA: 0
EYE DISCHARGE: 0
SHORTNESS OF BREATH: 0
COUGH: 0
ABDOMINAL PAIN: 0
DIARRHEA: 0
PHOTOPHOBIA: 0
VOMITING: 0
BLOOD IN STOOL: 0
EYE PAIN: 0
SINUS PAIN: 0

## 2024-12-18 NOTE — PROGRESS NOTES
Danyelle Reyes (:  1985) is a 39 y.o. female,Established patient, here for evaluation of the following chief complaint(s):  Follow-up, Arm Injury (Left arm, states fell and felt crunching ), and Fall         Assessment & Plan  B12 deficiency  Shot given today.  Doing better since seeing dietitian.         Mass of skin of back  Referred back to general surgery.  Currently causing her pain    Orders:    Nga Gallardo MD, General Surgery, [place]    Chronic left shoulder pain  Initial review of x-ray shows no periprosthetic fracture.  Final read per radiologist.  May need referral back to orthopedics         Osteopenia, unspecified location  DEXA scan given previous fractures and history of nutritional deficiency    Orders:    XR SHOULDER LEFT (MIN 2 VIEWS); Future    XR HUMERUS LEFT (MIN 2 VIEWS); Future    DEXA BONE DENSITY AXIAL SKELETON; Future    Closed fracture of proximal end of left humerus, unspecified fracture morphology, sequela  As above    Orders:    XR SHOULDER LEFT (MIN 2 VIEWS); Future    XR HUMERUS LEFT (MIN 2 VIEWS); Future    DEXA BONE DENSITY AXIAL SKELETON; Future    Skin lesion  Referral to dermatology for further evaluation and treatment    Orders:    Adán Tejada DO, [facility] ([facility])      Return in about 3 months (around 3/18/2025).       Subjective   HPI  Patient presents today for follow-up on chronic issues.  Patient states that overall she has been doing very well.  Following with dietitian and states that her abdominal issues are currently stable.  Did have a fall in a [employer] parking lot onto her left shoulder which is the shoulder/arm that had previous fracture repair with multiple hardware.  Has been having pain and a crunching sensation since that time.  No other injury.  Patient is concerned about the state of her bones because of the fracture and her history of nutritional deficiencies.  She would like to be evaluated for osteoporosis at

## 2024-12-18 NOTE — ASSESSMENT & PLAN NOTE
DEXA scan given previous fractures and history of nutritional deficiency    Orders:    XR SHOULDER LEFT (MIN 2 VIEWS); Future    XR HUMERUS LEFT (MIN 2 VIEWS); Future    DEXA BONE DENSITY AXIAL SKELETON; Future

## 2024-12-18 NOTE — ASSESSMENT & PLAN NOTE
Initial review of x-ray shows no periprosthetic fracture.  Final read per radiologist.  May need referral back to orthopedics

## 2024-12-18 NOTE — ASSESSMENT & PLAN NOTE
As above    Orders:    XR SHOULDER LEFT (MIN 2 VIEWS); Future    XR HUMERUS LEFT (MIN 2 VIEWS); Future    DEXA BONE DENSITY AXIAL SKELETON; Future

## 2024-12-19 ENCOUNTER — TELEPHONE (OUTPATIENT)
Dept: PHYSICAL THERAPY | Age: 39
End: 2024-12-19

## 2024-12-20 ENCOUNTER — TELEPHONE (OUTPATIENT)
Dept: PRIMARY CARE CLINIC | Age: 39
End: 2024-12-20

## 2024-12-20 DIAGNOSIS — M25.512 CHRONIC LEFT SHOULDER PAIN: Primary | ICD-10-CM

## 2024-12-20 DIAGNOSIS — G89.29 CHRONIC LEFT SHOULDER PAIN: Primary | ICD-10-CM

## 2024-12-20 DIAGNOSIS — M54.16 LUMBAR RADICULOPATHY: ICD-10-CM

## 2025-01-14 ENCOUNTER — HOSPITAL ENCOUNTER (OUTPATIENT)
Dept: MAMMOGRAPHY | Age: 40
Discharge: HOME | End: 2025-01-16
Attending: FAMILY MEDICINE
Payer: MEDICAID

## 2025-01-14 DIAGNOSIS — S42.202S CLOSED FRACTURE OF PROXIMAL END OF LEFT HUMERUS, UNSPECIFIED FRACTURE MORPHOLOGY, SEQUELA: ICD-10-CM

## 2025-01-14 DIAGNOSIS — M85.80 OSTEOPENIA, UNSPECIFIED LOCATION: ICD-10-CM

## 2025-01-14 PROCEDURE — 77080 DXA BONE DENSITY AXIAL: CPT

## 2025-01-22 ENCOUNTER — TELEPHONE (OUTPATIENT)
Dept: PRIMARY CARE CLINIC | Age: 40
End: 2025-01-22

## 2025-01-22 DIAGNOSIS — R10.13 EPIGASTRIC PAIN: ICD-10-CM

## 2025-01-22 DIAGNOSIS — R79.89 ABNORMAL LIVER FUNCTION TESTS: ICD-10-CM

## 2025-01-22 RX ORDER — ONDANSETRON 4 MG/1
4 TABLET, FILM COATED ORAL 3 TIMES DAILY PRN
Qty: 90 TABLET | Refills: 1 | Status: SHIPPED | OUTPATIENT
Start: 2025-01-22

## 2025-02-05 ENCOUNTER — OFFICE VISIT (OUTPATIENT)
Age: 40
End: 2025-02-05
Payer: MEDICAID

## 2025-02-05 VITALS
HEART RATE: 63 BPM | DIASTOLIC BLOOD PRESSURE: 59 MMHG | HEIGHT: 63 IN | WEIGHT: 115 LBS | TEMPERATURE: 98.1 F | BODY MASS INDEX: 20.38 KG/M2 | OXYGEN SATURATION: 97 % | RESPIRATION RATE: 18 BRPM | SYSTOLIC BLOOD PRESSURE: 117 MMHG

## 2025-02-05 DIAGNOSIS — D49.2 SOFT TISSUE TUMOR: Primary | ICD-10-CM

## 2025-02-05 PROCEDURE — G8420 CALC BMI NORM PARAMETERS: HCPCS | Performed by: SURGERY

## 2025-02-05 PROCEDURE — G8427 DOCREV CUR MEDS BY ELIG CLIN: HCPCS | Performed by: SURGERY

## 2025-02-05 PROCEDURE — 99202 OFFICE O/P NEW SF 15 MIN: CPT | Performed by: SURGERY

## 2025-02-05 PROCEDURE — 1036F TOBACCO NON-USER: CPT | Performed by: SURGERY

## 2025-02-05 RX ORDER — SODIUM CHLORIDE 0.9 % (FLUSH) 0.9 %
5-40 SYRINGE (ML) INJECTION PRN
OUTPATIENT
Start: 2025-02-05

## 2025-02-05 RX ORDER — SODIUM CHLORIDE 0.9 % (FLUSH) 0.9 %
5-40 SYRINGE (ML) INJECTION EVERY 12 HOURS SCHEDULED
OUTPATIENT
Start: 2025-02-05

## 2025-02-05 RX ORDER — SODIUM CHLORIDE 9 MG/ML
INJECTION, SOLUTION INTRAVENOUS PRN
OUTPATIENT
Start: 2025-02-05

## 2025-02-05 NOTE — PROGRESS NOTES
History and Physical - General Surgery    Patient's Name/Date of Birth: Danyelle Reyes / 1985    Date: 2/5/2025    PCP: Dao Garsia DO    Referring Physician:   Dao Garsia DO  [phone]      CHIEF COMPLAINT:    Chief Complaint   Patient presents with    New Patient     Mass of back   Stated to recently give more discomfort. Lower left side. Has had it for a year.         HISTORY OF PRESENT ILLNESS:    Danyelle Reyes is an 39 y.o. female who presents with a mass on her lower back towards the left side. She said it has been there for a long time but recently she has noticed it is getting larger and bothers her. She would like it removed. She has never had anything like this before.        Past Medical History:   Past Medical History:   Diagnosis Date    Acute alcoholic intoxication with complication (HCC) 5/2/2012    Alcohol abuse 8/24/2018    Anemia     Bipolar affective disorder, manic (HCC) 10/4/2018    Bipolar disorder current episode depressed ([facility])     Therapy    Bipolar disorder with severe depression (HCC) 10/24/2018    Closed fracture of left proximal humerus     Depression     Depression with suicidal ideation 10/24/2018    ETOH abuse     Humerus fracture 04/2021    left- For OR 5-12-21    Liver disease     Major depression, recurrent (HCC)     Therapy -stable per pat    Major depressive disorder, recurrent episode, severe (HCC) 8/24/2018    Mild tetrahydrocannabinol (THC) abuse     chronic     Protein calorie malnutrition (HCC)     severe    SBP (spontaneous bacterial peritonitis) (HCC) 11/8/2021    Severe protein-calorie malnutrition (HCC) 11/3/2021        Past Surgical History:   Past Surgical History:   Procedure Laterality Date    HUMERUS FRACTURE SURGERY Left 5/12/2021    LEFT PROXIMAL HUMERUS OPEN REDUCTION INTERNAL FIXATION performed by Pascual Arnett MD at [facility] OR    PELVIC FRACTURE SURGERY Bilateral 2000    ROTATOR CUFF REPAIR  08/2021    TONSILLECTOMY          Allergies:

## 2025-02-05 NOTE — PATIENT INSTRUCTIONS
General Surgery     Preoperative Instructions    Please read the following information very carefully. It contains information that is necessary to best prepare you for your upcoming procedure.    Make arrangements for a  to take you to and from your procedure.  Nothing to eat or drink after midnight the night before your procedure.  Follow your bowel prep instructions if you have them for this procedure.    3 days prior to your procedure: Stop taking blood thinners like Coumadin or Plavix or Xarelto.  5 days prior to your procedure: Stop taking Aspirin or Aspirin containing products.   If you cannot stop any of these medications prior to your procedure, please contact our office.    Medications morning of procedure:  Only heart, breathing, blood pressure, and seizure medications are permitted on the morning of your procedure. These medications can be taken with a sip of water.    IF YOU ARE UNABLE TO KEEP THE ABOVE SCHEDULED PROCEDURE, YOU MUST NOTIFY DR. TAYLOR'S OFFICE [phone]. NOT THE FACILITY.    NO CHEWING GUM OR CHEWING TOBACCO AFTER MIDNIGHT ON DAY OF PROCEDURE.    YOU MUST HAVE TRANSPORTATION TO AND FROM THE FACILITY.

## 2025-02-06 DIAGNOSIS — R22.2 MASS ON BACK: ICD-10-CM

## 2025-02-11 PROBLEM — Z30.432 ENCOUNTER FOR IUD REMOVAL: Status: ACTIVE | Noted: 2025-02-06

## 2025-02-19 RX ORDER — ASCORBIC ACID 500 MG
500 TABLET ORAL DAILY
COMMUNITY

## 2025-02-19 RX ORDER — ACETAMINOPHEN 160 MG
4000 TABLET,DISINTEGRATING ORAL DAILY
COMMUNITY

## 2025-02-19 RX ORDER — CHLORAL HYDRATE 500 MG
CAPSULE ORAL DAILY
COMMUNITY

## 2025-02-19 NOTE — PROGRESS NOTES
[facility] PRE-ADMISSION TESTING INSTRUCTIONS    The Preadmission Testing patient is instructed accordingly using the following criteria (check applicable):    ARRIVAL INSTRUCTIONS:   Arrival Time: 0930    [x] Parking the day of Surgery is located in the Main Entrance lot.  Upon entering through the main entrance (Entrance A) make an immediate right to the surgery reception desk    [x] Bring photo ID and insurance card    [] Bring in a copy of Living will or Durable Power of  papers.    [x] Please be sure to arrange for a responsible adult to provide transportation to and from the hospital    [x] Please arrange for a responsible adult to be with you for the 24 hour period post procedure, due to having anesthesia    [x] Please notify surgeon if you develop any illness between now and time of surgery (cold, cough, sore throat, fever, nausea, vomiting) or any signs of infections  including skin, wounds, and dental.    [x] If you awake am of surgery not feeling well or have temperature >100 please call [phone].    GENERAL INSTRUCTIONS:    [x] No solid foods after midnight, may have up to 8oz of water until 4 hours prior to surgery. No gum, no candy, no mints.  NPO time: 0730       [x] You may brush your teeth    [x] Stop herbal supplements and vitamins 5 days prior to procedure    [] Follow preop dosing of blood thinners per physician instructions    [] Take 1/2 dose of evening insulin, but no insulin after midnight    [] No oral diabetic medications after midnight    [] If diabetic and have low blood sugar or feel symptomatic, take 1-2oz apple juice only    [x] Bring urine specimen day of surgery    [x] Shower or bath with soap, lather and rinse well, AM of Surgery, no lotion, powders or creams to surgical site    [x] Please do not wear any nail polish, make up, hair products, body spray, aftershave, cologne or perfume on the day of surgery    [x] Jewelry, body piercings,  eyeglasses, contact lenses and dentures are not permitted into surgery (bring cases)    [] Follow bowel prep as instructed per surgeon    [x] No tobacco products within 24 hours of surgery     [x] No alcohol or illegal drug use, marijuana included, within 24 hours of surgery.    [x] You can expect a call the business day prior to procedure to notify you if your arrival time changes    [x] If you receive a survey after surgery we would greatly appreciate your comments    [] Parent/guardian of a minor must accompany their child and remain on the premises  the entire time they are under our care     [] Pediatric patients may bring favorite toy, blanket or comfort item with them    [] A caregiver or family member must remain with the patient during their stay if they are mentally handicapped, have dementia, disoriented or unable to use a call light or would be a safety concern if left unattended    [x]  The Outpatient Pharmacy is available to fill your prescription here on your day of surgery, ask your preop nurse for details    [] Other instructions

## 2025-02-24 ENCOUNTER — ANESTHESIA EVENT (OUTPATIENT)
Dept: OPERATING ROOM | Age: 40
End: 2025-02-24
Payer: MEDICAID

## 2025-02-24 NOTE — PROGRESS NOTES
Left arm foreign body removal not on procedure consent.  Joana at Dr Jimenez-Albert office aware. Consent will be fixed.    Unknown

## 2025-02-25 ENCOUNTER — ANESTHESIA (OUTPATIENT)
Dept: OPERATING ROOM | Age: 40
End: 2025-02-25
Payer: MEDICAID

## 2025-02-25 ENCOUNTER — HOSPITAL ENCOUNTER (OUTPATIENT)
Dept: GENERAL RADIOLOGY | Age: 40
Setting detail: OUTPATIENT SURGERY
Discharge: HOME | End: 2025-02-27
Attending: SURGERY
Payer: MEDICAID

## 2025-02-25 ENCOUNTER — HOSPITAL ENCOUNTER (OUTPATIENT)
Age: 40
Setting detail: OUTPATIENT SURGERY
Discharge: HOME | End: 2025-02-25
Attending: SURGERY | Admitting: SURGERY
Payer: MEDICAID

## 2025-02-25 VITALS
HEIGHT: 63 IN | SYSTOLIC BLOOD PRESSURE: 125 MMHG | BODY MASS INDEX: 20.2 KG/M2 | OXYGEN SATURATION: 99 % | TEMPERATURE: 97.6 F | WEIGHT: 114 LBS | RESPIRATION RATE: 14 BRPM | HEART RATE: 75 BPM | DIASTOLIC BLOOD PRESSURE: 79 MMHG

## 2025-02-25 DIAGNOSIS — G89.18 POSTOPERATIVE PAIN: Primary | ICD-10-CM

## 2025-02-25 DIAGNOSIS — R22.2 MASS ON BACK: ICD-10-CM

## 2025-02-25 DIAGNOSIS — Z30.432 ENCOUNTER FOR IUD REMOVAL: ICD-10-CM

## 2025-02-25 DIAGNOSIS — R52 PAIN: ICD-10-CM

## 2025-02-25 LAB
HCG, URINE, POC: NEGATIVE
Lab: NORMAL
NEGATIVE QC PASS/FAIL: NORMAL
POSITIVE QC PASS/FAIL: NORMAL

## 2025-02-25 PROCEDURE — 2500000003 HC RX 250 WO HCPCS

## 2025-02-25 PROCEDURE — 3700000001 HC ADD 15 MINUTES (ANESTHESIA): Performed by: SURGERY

## 2025-02-25 PROCEDURE — 2580000003 HC RX 258

## 2025-02-25 PROCEDURE — 6360000002 HC RX W HCPCS: Performed by: SURGERY

## 2025-02-25 PROCEDURE — 2709999900 HC NON-CHARGEABLE SUPPLY: Performed by: SURGERY

## 2025-02-25 PROCEDURE — 7100000010 HC PHASE II RECOVERY - FIRST 15 MIN: Performed by: SURGERY

## 2025-02-25 PROCEDURE — 6360000002 HC RX W HCPCS

## 2025-02-25 PROCEDURE — 88300 SURGICAL PATH GROSS: CPT

## 2025-02-25 PROCEDURE — 88304 TISSUE EXAM BY PATHOLOGIST: CPT

## 2025-02-25 PROCEDURE — 3600000002 HC SURGERY LEVEL 2 BASE: Performed by: SURGERY

## 2025-02-25 PROCEDURE — 3600000012 HC SURGERY LEVEL 2 ADDTL 15MIN: Performed by: SURGERY

## 2025-02-25 PROCEDURE — 24200 RMVL FB UPPER ARM/ELBW SUBQ: CPT | Performed by: SURGERY

## 2025-02-25 PROCEDURE — 7100000011 HC PHASE II RECOVERY - ADDTL 15 MIN: Performed by: SURGERY

## 2025-02-25 PROCEDURE — 6370000000 HC RX 637 (ALT 250 FOR IP): Performed by: ANESTHESIOLOGY

## 2025-02-25 PROCEDURE — 21933 EXC BACK TUM DEEP 5 CM/>: CPT | Performed by: SURGERY

## 2025-02-25 PROCEDURE — 3700000000 HC ANESTHESIA ATTENDED CARE: Performed by: SURGERY

## 2025-02-25 PROCEDURE — 2500000003 HC RX 250 WO HCPCS: Performed by: SURGERY

## 2025-02-25 RX ORDER — HYDROCODONE BITARTRATE AND ACETAMINOPHEN 5; 325 MG/1; MG/1
1 TABLET ORAL EVERY 4 HOURS PRN
Qty: 10 TABLET | Refills: 0 | Status: SHIPPED | OUTPATIENT
Start: 2025-02-25 | End: 2025-02-28

## 2025-02-25 RX ORDER — SODIUM CHLORIDE 9 MG/ML
INJECTION, SOLUTION INTRAVENOUS
Status: DISCONTINUED | OUTPATIENT
Start: 2025-02-25 | End: 2025-02-25 | Stop reason: SDUPTHER

## 2025-02-25 RX ORDER — HYDROCODONE BITARTRATE AND ACETAMINOPHEN 5; 325 MG/1; MG/1
1 TABLET ORAL
Status: COMPLETED | OUTPATIENT
Start: 2025-02-25 | End: 2025-02-25

## 2025-02-25 RX ORDER — PROPOFOL 10 MG/ML
INJECTION, EMULSION INTRAVENOUS
Status: DISCONTINUED | OUTPATIENT
Start: 2025-02-25 | End: 2025-02-25 | Stop reason: SDUPTHER

## 2025-02-25 RX ORDER — DEXMEDETOMIDINE HYDROCHLORIDE 100 UG/ML
INJECTION, SOLUTION INTRAVENOUS
Status: DISCONTINUED | OUTPATIENT
Start: 2025-02-25 | End: 2025-02-25 | Stop reason: SDUPTHER

## 2025-02-25 RX ORDER — MIDAZOLAM HYDROCHLORIDE 1 MG/ML
INJECTION, SOLUTION INTRAMUSCULAR; INTRAVENOUS
Status: DISCONTINUED | OUTPATIENT
Start: 2025-02-25 | End: 2025-02-25 | Stop reason: SDUPTHER

## 2025-02-25 RX ORDER — SODIUM CHLORIDE 9 MG/ML
INJECTION, SOLUTION INTRAVENOUS PRN
Status: DISCONTINUED | OUTPATIENT
Start: 2025-02-25 | End: 2025-02-25 | Stop reason: HOSPADM

## 2025-02-25 RX ORDER — SODIUM CHLORIDE 0.9 % (FLUSH) 0.9 %
5-40 SYRINGE (ML) INJECTION EVERY 12 HOURS SCHEDULED
Status: DISCONTINUED | OUTPATIENT
Start: 2025-02-25 | End: 2025-02-25 | Stop reason: HOSPADM

## 2025-02-25 RX ORDER — SODIUM CHLORIDE 0.9 % (FLUSH) 0.9 %
5-40 SYRINGE (ML) INJECTION PRN
Status: DISCONTINUED | OUTPATIENT
Start: 2025-02-25 | End: 2025-02-25 | Stop reason: HOSPADM

## 2025-02-25 RX ORDER — ONDANSETRON 2 MG/ML
INJECTION INTRAMUSCULAR; INTRAVENOUS
Status: DISCONTINUED | OUTPATIENT
Start: 2025-02-25 | End: 2025-02-25 | Stop reason: SDUPTHER

## 2025-02-25 RX ORDER — PHENYLEPHRINE HCL IN 0.9% NACL 1 MG/10 ML
SYRINGE (ML) INTRAVENOUS
Status: DISCONTINUED | OUTPATIENT
Start: 2025-02-25 | End: 2025-02-25 | Stop reason: SDUPTHER

## 2025-02-25 RX ORDER — LIDOCAINE HYDROCHLORIDE AND EPINEPHRINE 10; 10 MG/ML; UG/ML
INJECTION, SOLUTION INFILTRATION; PERINEURAL PRN
Status: DISCONTINUED | OUTPATIENT
Start: 2025-02-25 | End: 2025-02-25 | Stop reason: ALTCHOICE

## 2025-02-25 RX ORDER — FENTANYL CITRATE 50 UG/ML
INJECTION, SOLUTION INTRAMUSCULAR; INTRAVENOUS
Status: DISCONTINUED | OUTPATIENT
Start: 2025-02-25 | End: 2025-02-25 | Stop reason: SDUPTHER

## 2025-02-25 RX ADMIN — FENTANYL CITRATE 25 MCG: 50 INJECTION, SOLUTION INTRAMUSCULAR; INTRAVENOUS at 11:55

## 2025-02-25 RX ADMIN — WATER 2000 MG: 1 INJECTION INTRAMUSCULAR; INTRAVENOUS; SUBCUTANEOUS at 12:03

## 2025-02-25 RX ADMIN — Medication 25 MG: at 12:03

## 2025-02-25 RX ADMIN — MIDAZOLAM 2 MG: 1 INJECTION INTRAMUSCULAR; INTRAVENOUS at 11:48

## 2025-02-25 RX ADMIN — Medication 100 MCG: at 12:45

## 2025-02-25 RX ADMIN — Medication 200 MCG: at 12:49

## 2025-02-25 RX ADMIN — Medication 100 MCG: at 12:25

## 2025-02-25 RX ADMIN — ONDANSETRON 4 MG: 2 INJECTION, SOLUTION INTRAMUSCULAR; INTRAVENOUS at 12:26

## 2025-02-25 RX ADMIN — PROPOFOL 50 MG: 10 INJECTION, EMULSION INTRAVENOUS at 12:18

## 2025-02-25 RX ADMIN — FENTANYL CITRATE 25 MCG: 50 INJECTION, SOLUTION INTRAMUSCULAR; INTRAVENOUS at 12:02

## 2025-02-25 RX ADMIN — PROPOFOL 100 MCG/KG/MIN: 10 INJECTION, EMULSION INTRAVENOUS at 11:55

## 2025-02-25 RX ADMIN — SODIUM CHLORIDE: 9 INJECTION, SOLUTION INTRAVENOUS at 11:49

## 2025-02-25 RX ADMIN — Medication 25 MG: at 12:16

## 2025-02-25 RX ADMIN — DEXMEDETOMIDINE 12 MCG: 100 INJECTION, SOLUTION INTRAVENOUS at 12:00

## 2025-02-25 RX ADMIN — DEXMEDETOMIDINE 8 MCG: 100 INJECTION, SOLUTION INTRAVENOUS at 11:57

## 2025-02-25 RX ADMIN — HYDROCODONE BITARTRATE AND ACETAMINOPHEN 1 TABLET: 5; 325 TABLET ORAL at 13:34

## 2025-02-25 RX ADMIN — Medication 100 MCG: at 12:40

## 2025-02-25 ASSESSMENT — PAIN DESCRIPTION - ORIENTATION: ORIENTATION: LOWER

## 2025-02-25 ASSESSMENT — PAIN - FUNCTIONAL ASSESSMENT: PAIN_FUNCTIONAL_ASSESSMENT: 0-10

## 2025-02-25 ASSESSMENT — LIFESTYLE VARIABLES: SMOKING_STATUS: 0

## 2025-02-25 ASSESSMENT — PAIN DESCRIPTION - FREQUENCY: FREQUENCY: CONTINUOUS

## 2025-02-25 ASSESSMENT — PAIN DESCRIPTION - DESCRIPTORS: DESCRIPTORS: DISCOMFORT

## 2025-02-25 ASSESSMENT — PAIN DESCRIPTION - LOCATION: LOCATION: BACK

## 2025-02-25 ASSESSMENT — PAIN DESCRIPTION - PAIN TYPE: TYPE: SURGICAL PAIN

## 2025-02-25 ASSESSMENT — PAIN SCALES - GENERAL: PAINLEVEL_OUTOF10: 6

## 2025-02-25 NOTE — H&P
Patient's office history and physical was reviewed.    Patient examined.    There has been no change in the patient's history and physical.    The patient has a Nexplanon inplant in her LUE that was not able to be removed by her gynecologist. I will remove this as well in the OR as long as I can easily locate it:    Removal of foreign body, left upper extremity.  I explained the risks, benefits, alternatives, and potential complications associated with the above procedure to be performed and transfusions when applicable with the patient/responsible person prior to the procedure. All of the patient's questions were answered. The patient understands and agrees to surgery.     Physician Signature: Electronically signed by Dr. Nga Bradley    History and Physical - General Surgery    Patient's Name/Date of Birth: Danyelle Reyes / 1985    Date: 2/6/2025    PCP: Dao Garsia DO    Referring Physician:   No ref. provider found  N/A      CHIEF COMPLAINT:    No chief complaint on file.        HISTORY OF PRESENT ILLNESS:    Danyelle Reyes is an 39 y.o. female who presents with a mass on her lower back towards the left side. She said it has been there for a long time but recently she has noticed it is getting larger and bothers her. She would like it removed. She has never had anything like this before.        Past Medical History:   Past Medical History:   Diagnosis Date    Acute alcoholic intoxication with complication 05/02/2012    Alcohol abuse 08/24/2018    Anemia     Bipolar affective disorder, manic (HCC) 10/04/2018    Bipolar disorder current episode depressed (HCC)     Therapy    Bipolar disorder with severe depression (HCC) 10/24/2018    Closed fracture of left proximal humerus     Depression     Depression with suicidal ideation 10/24/2018    ETOH abuse     Foreign body (FB) in soft tissue     left arm    Humerus fracture 04/2021    left- For OR 5-12-21    Liver disease     Major depression,  Paternal Aunt     Breast Cancer Paternal Cousin        REVIEW OF SYSTEMS:    Constitutional: negative  Eyes: negative  Ears, nose, mouth, throat, and face: negative  Respiratory: negative  Cardiovascular: negative  Gastrointestinal: negative  Genitourinary:negative  Integument/breast: as in hpi  Hematologic/lymphatic: negative  Musculoskeletal:negative  Neurological: negative  Allergic/Immunologic: negative    PHYSICAL EXAM   /72   Pulse 84   Temp 97.6 °F (36.4 °C) (Temporal)   Resp 16   Ht 1.6 m (5' 3\")   Wt 51.7 kg (114 lb)   LMP  (LMP Unknown)   SpO2 98%   BMI 20.19 kg/m²     General appearance: alert, cooperative and in no acute distress.  Eyes: Grossly normal   Lungs: Normal work of breathing  Heart: regular rate  Abdomen: soft, non-tender, non distended  Skin: No skin abnormalities  Neurologic: Alert and oriented x 3. Grossly normal  Musculoskeletal: left back mass, soft, mobile 4-5 cm    ASSESSMENT AND PLAN:     Danyelle Reyes is an 39 y.o. female who presents with a back mass    All available labs and pathology reviewed.  All imaging independently reviewed and interpreted.   All provider notes reviewed.  The above was discussed with the patient at length.    Excision soft tissue tumor of the back  I explained the risks, benefits, alternatives, and potential complications associated with the above procedure to be performed and transfusions when applicable with the patient/responsible person prior to the procedure. All of the patient's questions were answered. The patient understands and agrees to surgery.       Physician Signature: Electronically signed by Nga Bradley MD, General Surgery    Send copy of H&P to PCP, Dao Garsia DO and referring physician, No ref. provider found

## 2025-02-25 NOTE — OP NOTE
Operative Note      Patient: Danyelle Reyes  YOB: 1985  MRN: [id number]    Date of Procedure: 2/25/2025    Preop Diagnosis:  1) Soft tissue tumor of the back  2) Foreign body, left upper extremity    Post-Op Diagnosis: Same       Procedure:  1) Excision soft tissue tumor of back, subfascial  2) Removal of foreign body, left upper extremity    Surgeon(s):  Nga Bradley MD    Assistant:   Resident: Odalis Gayle DO    Anesthesia: Monitor Anesthesia Care    Estimated Blood Loss (mL): Minimal    Complications: None    Specimens:   ID Type Source Tests Collected by Time Destination   1 :  Tissue Tissue  Nga Bradley MD 2/25/2025 1228    A : SOFT TISSUE MASS LEFT LOWER BACK Tissue Tissue SURGICAL PATHOLOGY Nga Bradley MD 2/25/2025 1227        BRIEF HISTORY: Danyelle Reyes is a 39 y.o.  female presenting with a left lower back mass and a Nexplanon implant that could not be retrieved in the office in her left upper extremity. I discussed the surgery with the patient including the procedure, benefits, risks and alternatives, and she agreed.     PROCEDURE IN DETAIL: The patient was taken to the operative suite and was placed on the table in the lateral, left side up position. MAC anesthesia was administered. The skin around the mass was prepped with Chloraprep and draped in a sterile fashion.     After marking an incision along Michael's lines, the skin was opened with a 15-blade scalpel, and the incision was carried down through the dermis and subcutaneous tissue using the electrocautery. Skin flaps were raised superiorly, inferiorly, medially and laterally, and then the  was grasped. The electrocautery and scalpel were used to excise the lesion. The lesion was completely excised from the surrounding tissue. It was subfascial and intramuscular and had to be carefully removed from the surrounding fascia and muscle. It measured 6 cm x 5 cm.    Attention was then  directed to the left upper extremity foreigh body. It was palpated in the medial upper arm in the area of the small incision that was made when it was placed. The skin was opened with a 15 blade scalpel. The incision was carried down through the subcutaneous tissue until the implant was encountered. It was carefully dissected free from the surrounding tissue using blunt dissection with a sharp hemostat. The implant was removed in its entirely.     The wounds were then copiously irrigated with saline and suctioned dry. Any bleeding points were cauterized. The skin was closed with interrupted 3-0 deep Vicryl suture and a running 4-0 Monocryl subcuticular stitch, which was tied inside the incision and the knot was inverted. A sterile dressing of skin glue was placed. The patient tolerated the procedure well. Sponge, needle and instrument counts were correct. The patient was awakened and sent to the post-anesthesia care unit in stable condition.    OZZY TAYLOR MD,MD, FACS 2/25/2025 12:30 PM     Send copy of H&P to PCP, Dao Garsia DO and referring physician

## 2025-02-25 NOTE — ANESTHESIA PRE PROCEDURE
Department of Anesthesiology  Preprocedure Note       Name:  Danyelle Reyes   Age:  39 y.o.  :  1985                                          MRN:  [id number]         Date:  2025      Surgeon: Surgeon(s):  Nga Bradley MD    Procedure: Procedure(s):  EXCISION SOFT TISSUE TUMOR ON BACK  EXCISION FOREIGN BODY LEFT ARM WITH FLUOROSCOPY    Medications prior to admission:   Prior to Admission medications    Medication Sig Start Date End Date Taking? Authorizing Provider   Collagen-Vitamin C-Biotin (COLLAGEN 1500/C PO) Take by mouth   Yes Rahel Retana MD   vitamin D (VITAMIN D3) 50 MCG ( UT) CAPS capsule Take 2 capsules by mouth daily   Yes Rahel Retana MD   vitamin C (ASCORBIC ACID) 500 MG tablet Take 1 tablet by mouth daily   Yes Rahel Retana MD   Omega-3 Fatty Acids (FISH OIL) 1000 MG capsule Take by mouth daily   Yes Rahel Retana MD   ondansetron (ZOFRAN) 4 MG tablet Take 1 tablet by mouth 3 times daily as needed for Nausea or Vomiting 25  Yes Dao Garsia DO   Probiotic Product (PROBIOTIC ADVANCED PO) Take by mouth Daily   Yes Rahel Retana MD   traZODone (DESYREL) 100 MG tablet TAKE ONE TABLET BY MOUTH EVERY EVENING AT BEDTIME 24   Dao Garsia DO       Current medications:    Current Facility-Administered Medications   Medication Dose Route Frequency Provider Last Rate Last Admin    sodium chloride flush 0.9 % injection 5-40 mL  5-40 mL IntraVENous 2 times per day Nga Bradley MD        sodium chloride flush 0.9 % injection 5-40 mL  5-40 mL IntraVENous PRN Nga Bradley MD        0.9 % sodium chloride infusion   IntraVENous PRN Nga Bradley MD        ceFAZolin (ANCEF) 2,000 mg in sterile water 20 mL IV syringe  2,000 mg IntraVENous On Call to OR Nga Bradley MD           Allergies:  No Known Allergies    Problem List:    Patient Active Problem List   Diagnosis Code

## 2025-02-25 NOTE — ANESTHESIA POSTPROCEDURE EVALUATION
Department of Anesthesiology  Postprocedure Note    Patient: Danyelle Reyes  MRN: [id number]  YOB: 1985  Date of evaluation: 2/25/2025    Procedure Summary       Date: 02/25/25 Room / Location: [address]    Anesthesia Start: 1149 Anesthesia Stop: 1306    Procedures:       EXCISION SOFT TISSUE TUMOR ON BACK (Back)      EXCISION FOREIGN BODY LEFT ARM WITH FLUOROSCOPY (Left: Arm Upper) Diagnosis:       Mass on back      Encounter for IUD removal      (Mass on back [R22.2])      (Encounter for IUD removal [Z30.432])    Surgeons: Nga Bradley MD Responsible Provider: Connie Sandoval MD    Anesthesia Type: MAC ASA Status: 3            Anesthesia Type: No value filed.    Briana Phase I: Briana Score: 10    Briana Phase II:      Anesthesia Post Evaluation    Patient location during evaluation: PACU  Patient participation: complete - patient participated  Level of consciousness: awake and alert  Pain score: 0  Airway patency: patent  Nausea & Vomiting: no nausea and no vomiting  Cardiovascular status: blood pressure returned to baseline  Respiratory status: acceptable, room air, spontaneous ventilation and nonlabored ventilation  Hydration status: stable  Multimodal analgesia pain management approach  Pain management: adequate and satisfactory to patient        No notable events documented.

## 2025-02-25 NOTE — DISCHARGE INSTRUCTIONS
[facility] AMBULATORY PROCEDURE DISCHARGE INSTRUCTIONS  You may be drowsy or lightheaded after receiving sedation or anesthesia.    A responsible person should be with you for the next 24 hours.    Please follow the instructions checked below:    DIET INSTRUCTIONS:  [x]Start with light diet and progress to your normal diet as you feel like eating. If you experience nausea or repeated episodes of vomiting which persist beyond 12-24 hours, notify your doctor.  []Other     ACTIVITY INSTRUCTIONS:  [x]Rest today. Increase activity as tolerated    [x]No heavy lifting or strenuous activity until you are seen in the office for follow up    [x]No driving for 1 day or while on narcotics  []Other     WOUND/DRESSING INSTRUCTIONS:  Always ensure you and your care giver clean hands before and after caring for the wound.  [x]May shower      []May bathe      [x]Derma bond dressing-Do not apply lotion, gel, or liquid to wound while the derma bond is in place.   []Other         MEDICATION INSTRUCTIONS:    [x]Prescriptions sent with you.  Use as directed.  When taking pain medications, you may experience dizziness or drowsiness.  Do not drink alcohol or drive when taking these medications.  [x]You may take a non-prescription “headache remedy”, preferably one that does not contain aspirin.    Other Instructions:      FOLLOW-UP CARE:  [x]Call the office at [phone] for follow-up appointment as needed    Call physician if they or any other problems occur:  Fever over 101°    Redness, swelling, hardness or warmth at the operative site  Unrelieved nausea    Foul smelling or cloudy drainage at the operative site   Unrelieved pain    Blood soaked dressing. (Some oozing may be normal)

## 2025-02-28 LAB — SURGICAL PATHOLOGY REPORT: NORMAL

## 2025-04-07 RX ORDER — TRAZODONE HYDROCHLORIDE 100 MG/1
TABLET ORAL
Qty: 30 TABLET | Refills: 3 | Status: SHIPPED | OUTPATIENT
Start: 2025-04-07

## 2025-04-24 ENCOUNTER — OFFICE VISIT (OUTPATIENT)
Dept: PRIMARY CARE CLINIC | Age: 40
End: 2025-04-24
Payer: MEDICAID

## 2025-04-24 VITALS
HEIGHT: 63 IN | DIASTOLIC BLOOD PRESSURE: 64 MMHG | TEMPERATURE: 97.4 F | OXYGEN SATURATION: 98 % | WEIGHT: 113 LBS | SYSTOLIC BLOOD PRESSURE: 102 MMHG | HEART RATE: 78 BPM | BODY MASS INDEX: 20.02 KG/M2

## 2025-04-24 DIAGNOSIS — Z72.0 TOBACCO ABUSE: ICD-10-CM

## 2025-04-24 DIAGNOSIS — R19.8 ABNORMAL BOWEL MOVEMENT: ICD-10-CM

## 2025-04-24 DIAGNOSIS — R23.2 HOT FLASHES: ICD-10-CM

## 2025-04-24 DIAGNOSIS — R13.10 DYSPHAGIA, UNSPECIFIED TYPE: Primary | ICD-10-CM

## 2025-04-24 DIAGNOSIS — R13.10 DYSPHAGIA, UNSPECIFIED TYPE: ICD-10-CM

## 2025-04-24 DIAGNOSIS — R79.89 ABNORMAL LIVER FUNCTION TESTS: ICD-10-CM

## 2025-04-24 LAB
ALBUMIN: 4.8 G/DL (ref 3.5–5.2)
ALP BLD-CCNC: 60 U/L (ref 35–104)
ALT SERPL-CCNC: 10 U/L (ref 0–35)
ANION GAP SERPL CALCULATED.3IONS-SCNC: 12 MMOL/L (ref 7–16)
AST SERPL-CCNC: 23 U/L (ref 0–35)
BASOPHILS ABSOLUTE: 0.04 K/UL (ref 0–0.2)
BASOPHILS RELATIVE PERCENT: 0 % (ref 0–2)
BILIRUB SERPL-MCNC: <0.2 MG/DL (ref 0–1.2)
BILIRUBIN DIRECT: <0.1 MG/DL (ref 0–0.2)
BILIRUBIN, INDIRECT: ABNORMAL MG/DL (ref 0–1)
BUN BLDV-MCNC: 8 MG/DL (ref 6–20)
C-REACTIVE PROTEIN: <3 MG/L (ref 0–5)
CALCIUM SERPL-MCNC: 9.9 MG/DL (ref 8.6–10)
CHLORIDE BLD-SCNC: 105 MMOL/L (ref 98–107)
CO2: 22 MMOL/L (ref 22–29)
CREAT SERPL-MCNC: 0.7 MG/DL (ref 0.5–1)
EOSINOPHILS ABSOLUTE: 0.12 K/UL (ref 0.05–0.5)
EOSINOPHILS RELATIVE PERCENT: 1 % (ref 0–6)
FOLLICLE STIMULATING HORMONE: 48.3 MIU/ML
GFR, ESTIMATED: >90 ML/MIN/1.73M2
GGT, 20027: 33 U/L (ref 5–36)
GLUCOSE BLD-MCNC: 113 MG/DL (ref 74–99)
HAV IGM SER IA-ACNC: NONREACTIVE
HBA1C MFR BLD: 5.7 % (ref 4–5.6)
HCT VFR BLD CALC: 43.2 % (ref 34–48)
HEMOGLOBIN: 14.6 G/DL (ref 11.5–15.5)
HEP B S AGB SURF AG: NONREACTIVE
HEPATITIS B CORE IGM ANTIBODY: NONREACTIVE
HEPATITIS C ANTIBODY: NONREACTIVE
HIV AG/AB: NONREACTIVE
IMMATURE GRANULOCYTES %: 0 % (ref 0–5)
IMMATURE GRANULOCYTES ABSOLUTE: 0.04 K/UL (ref 0–0.58)
LH: 19.2 MIU/ML
LIPASE: 43 U/L (ref 13–60)
LYMPHOCYTES ABSOLUTE: 2.93 K/UL (ref 1.5–4)
LYMPHOCYTES RELATIVE PERCENT: 26 % (ref 20–42)
MCH RBC QN AUTO: 31.6 PG (ref 26–35)
MCHC RBC AUTO-ENTMCNC: 33.8 G/DL (ref 32–34.5)
MCV RBC AUTO: 93.5 FL (ref 80–99.9)
MONOCYTES ABSOLUTE: 0.53 K/UL (ref 0.1–0.95)
MONOCYTES RELATIVE PERCENT: 5 % (ref 2–12)
NEUTROPHILS ABSOLUTE: 7.67 K/UL (ref 1.8–7.3)
NEUTROPHILS RELATIVE PERCENT: 68 % (ref 43–80)
PDW BLD-RTO: 12.7 % (ref 11.5–15)
PLATELET # BLD: 369 K/UL (ref 130–450)
PMV BLD AUTO: 10.2 FL (ref 7–12)
POTASSIUM SERPL-SCNC: 4.1 MMOL/L (ref 3.5–5.1)
RBC # BLD: 4.62 M/UL (ref 3.5–5.5)
SODIUM BLD-SCNC: 139 MMOL/L (ref 136–145)
TOTAL PROTEIN: 8.4 G/DL (ref 6.4–8.3)
WBC # BLD: 11.3 K/UL (ref 4.5–11.5)

## 2025-04-24 PROCEDURE — G8420 CALC BMI NORM PARAMETERS: HCPCS | Performed by: FAMILY MEDICINE

## 2025-04-24 PROCEDURE — 1036F TOBACCO NON-USER: CPT | Performed by: FAMILY MEDICINE

## 2025-04-24 PROCEDURE — G8427 DOCREV CUR MEDS BY ELIG CLIN: HCPCS | Performed by: FAMILY MEDICINE

## 2025-04-24 PROCEDURE — 99214 OFFICE O/P EST MOD 30 MIN: CPT | Performed by: FAMILY MEDICINE

## 2025-04-24 RX ORDER — PROMETHAZINE HYDROCHLORIDE 12.5 MG/1
12.5 TABLET ORAL 4 TIMES DAILY PRN
Qty: 120 TABLET | Refills: 0 | Status: SHIPPED | OUTPATIENT
Start: 2025-04-24

## 2025-04-24 RX ORDER — NICOTINE 21 MG/24HR
1 PATCH, TRANSDERMAL 24 HOURS TRANSDERMAL EVERY 24 HOURS
Qty: 30 PATCH | Refills: 3 | Status: SHIPPED | OUTPATIENT
Start: 2025-04-24 | End: 2026-04-24

## 2025-04-24 RX ORDER — DICYCLOMINE HYDROCHLORIDE 10 MG/1
10 CAPSULE ORAL
Qty: 120 CAPSULE | Refills: 0 | Status: SHIPPED | OUTPATIENT
Start: 2025-04-24

## 2025-04-24 SDOH — ECONOMIC STABILITY: FOOD INSECURITY: WITHIN THE PAST 12 MONTHS, YOU WORRIED THAT YOUR FOOD WOULD RUN OUT BEFORE YOU GOT MONEY TO BUY MORE.: NEVER TRUE

## 2025-04-24 SDOH — ECONOMIC STABILITY: FOOD INSECURITY: WITHIN THE PAST 12 MONTHS, THE FOOD YOU BOUGHT JUST DIDN'T LAST AND YOU DIDN'T HAVE MONEY TO GET MORE.: NEVER TRUE

## 2025-04-24 ASSESSMENT — ENCOUNTER SYMPTOMS
VOMITING: 0
PHOTOPHOBIA: 0
COUGH: 0
BACK PAIN: 0
DIARRHEA: 0
SORE THROAT: 0
BLOOD IN STOOL: 0
ABDOMINAL DISTENTION: 1
NAUSEA: 0
SHORTNESS OF BREATH: 0
ABDOMINAL PAIN: 1
CONSTIPATION: 0

## 2025-04-24 ASSESSMENT — PATIENT HEALTH QUESTIONNAIRE - PHQ9
SUM OF ALL RESPONSES TO PHQ QUESTIONS 1-9: 0
1. LITTLE INTEREST OR PLEASURE IN DOING THINGS: NOT AT ALL
SUM OF ALL RESPONSES TO PHQ QUESTIONS 1-9: 0
2. FEELING DOWN, DEPRESSED OR HOPELESS: NOT AT ALL

## 2025-04-24 NOTE — PROGRESS NOTES
Danyelle Reyes (:  1985) is a 39 y.o. female,Established patient, here for evaluation of the following chief complaint(s):  Abdominal Pain (Week )         Assessment & Plan  Dysphagia, unspecified type  At this time we will refer to general surgery for panendoscopy given the patient's current and worsening GI complaints.  Baseline labs ordered as well.  Further evaluation based on results.  Will treat symptomatically with Phenergan and Bentyl.    Orders:    Nga Gallardo MD, General Surgery, [place]    dicyclomine (BENTYL) 10 MG capsule; Take 1 capsule by mouth 4 times daily (before meals and nightly)    promethazine (PHENERGAN) 12.5 MG tablet; Take 1 tablet by mouth 4 times daily as needed for Nausea    Hepatitis Panel, Acute; Future    Gamma GT; Future    Culture, Stool; Future    Blood Occult Stool Diagnostic; Future    Calprotectin Stool; Future    Comprehensive Metabolic Panel with Bilirubin; Future    CBC with Auto Differential; Future    Lipase; Future    Follicle Stimulating Hormone; Future    Luteinizing Hormone; Future    Hemoglobin A1C; Future    C-Reactive Protein; Future    HIV Screen; Future    Abnormal bowel movement  As above    Orders:    Nga Gallardo MD, [facility], [place]    dicyclomine (BENTYL) 10 MG capsule; Take 1 capsule by mouth 4 times daily (before meals and nightly)    promethazine (PHENERGAN) 12.5 MG tablet; Take 1 tablet by mouth 4 times daily as needed for Nausea    Hepatitis Panel, Acute; Future    Gamma GT; Future    Culture, Stool; Future    Blood Occult Stool Diagnostic; Future    Calprotectin Stool; Future    Comprehensive Metabolic Panel with Bilirubin; Future    CBC with Auto Differential; Future    Lipase; Future    Follicle Stimulating Hormone; Future    Luteinizing Hormone; Future    Hemoglobin A1C; Future    C-Reactive Protein; Future    HIV Screen; Future    Hot flashes  Order FSH/LH to rule out early

## 2025-04-24 NOTE — ASSESSMENT & PLAN NOTE
Repeat labs    Orders:    Hepatitis Panel, Acute; Future    Gamma GT; Future    Culture, Stool; Future    Blood Occult Stool Diagnostic; Future    Calprotectin Stool; Future    Comprehensive Metabolic Panel with Bilirubin; Future    CBC with Auto Differential; Future    Lipase; Future    Follicle Stimulating Hormone; Future    Luteinizing Hormone; Future    Hemoglobin A1C; Future    C-Reactive Protein; Future    HIV Screen; Future

## 2025-04-30 ENCOUNTER — RESULTS FOLLOW-UP (OUTPATIENT)
Dept: FAMILY MEDICINE CLINIC | Age: 40
End: 2025-04-30

## 2025-05-02 DIAGNOSIS — R73.03 PREDIABETES: Primary | ICD-10-CM

## 2025-05-02 DIAGNOSIS — N95.1 PERIMENOPAUSE: ICD-10-CM

## 2025-05-15 ENCOUNTER — OFFICE VISIT (OUTPATIENT)
Dept: SURGERY | Age: 40
End: 2025-05-15
Payer: MEDICAID

## 2025-05-15 VITALS
TEMPERATURE: 97.9 F | HEART RATE: 99 BPM | BODY MASS INDEX: 19.67 KG/M2 | HEIGHT: 63 IN | OXYGEN SATURATION: 98 % | RESPIRATION RATE: 18 BRPM | SYSTOLIC BLOOD PRESSURE: 133 MMHG | DIASTOLIC BLOOD PRESSURE: 85 MMHG | WEIGHT: 111 LBS

## 2025-05-15 DIAGNOSIS — R11.0 NAUSEA: ICD-10-CM

## 2025-05-15 DIAGNOSIS — R10.11 RUQ PAIN: Primary | ICD-10-CM

## 2025-05-15 PROCEDURE — 1036F TOBACCO NON-USER: CPT | Performed by: SURGERY

## 2025-05-15 PROCEDURE — G8420 CALC BMI NORM PARAMETERS: HCPCS | Performed by: SURGERY

## 2025-05-15 PROCEDURE — 99214 OFFICE O/P EST MOD 30 MIN: CPT | Performed by: SURGERY

## 2025-05-15 PROCEDURE — G8427 DOCREV CUR MEDS BY ELIG CLIN: HCPCS | Performed by: SURGERY

## 2025-05-15 RX ORDER — IBUPROFEN 800 MG/1
TABLET, FILM COATED ORAL
COMMUNITY
Start: 2025-03-10

## 2025-05-15 NOTE — PROGRESS NOTES
Progress Note - Follow up    Patient's Name/Date of Birth: Danyelle Reyes / 1985    Date: 5/15/2025    PCP: Dao Garsia DO    Referring Physician:   Dao Garsia DO  [phone]    Chief Complaint   Patient presents with    Other     Dysphagia, - Abnormal bowel movement/  Pt states her upper stomach has been hurting for 2 years, she's only been given anti nausea pills which aren't working. Trouble swallowing she feels like her brain is telling her not to swallow because her stomach will hurt        HPI:    She said she has had upper GI issues for years. It was worse when she was an alcoholic but she has been sober for a while. She said she has had an EGD with dilation in the past. She said she has epigastric pain that shoots in to her chest. She is also now have BUQ abdominal pain. This shoots to her back. She has nausea and vomiting as well. She has been on several antiemetics without improvement. Fatty foods make her symptoms worse. She is two years sober. She said in the past she was in the ER and told her gallbladder is a problem.    Patient's medications, allergies, past medical, surgical, social and family histories were reviewed and updated as appropriate.    Review of Systems  Constitutional: negative  Respiratory: negative  Cardiovascular: negative  Gastrointestinal: as in hpi  Genitourinary:negative  Integument/breast: negative    Physical Exam:  /85   Pulse 99   Temp 97.9 °F (36.6 °C)   Resp 18   Ht 1.6 m (5' 2.99\")   Wt 50.3 kg (111 lb)   SpO2 98%   BMI 19.67 kg/m²   General appearance: alert, cooperative and in no acute distress.  Lungs: normal work of breathing  Heart: regular rate  Abdomen: epigastrium moderately tender, soft, nondistended  Musculoskeletal: symmetrical without edema.  Skin: normal    Impression/Plan:  39 y.o. year old female with nausea, vomiting, RUQ pain    All available labs and pathology reviewed.  All imaging independently reviewed and interpreted.

## 2025-05-16 ENCOUNTER — TELEPHONE (OUTPATIENT)
Dept: SURGERY | Age: 40
End: 2025-05-16

## 2025-05-16 DIAGNOSIS — R10.11 RUQ PAIN: Primary | ICD-10-CM

## 2025-05-16 DIAGNOSIS — R11.0 NAUSEA: ICD-10-CM

## 2025-05-16 NOTE — TELEPHONE ENCOUNTER
Patient called back to let us know she would need to get U/S done on Monday due to eating. Thanks!

## 2025-05-19 ENCOUNTER — HOSPITAL ENCOUNTER (OUTPATIENT)
Dept: ULTRASOUND IMAGING | Age: 40
Discharge: HOME | End: 2025-05-21
Attending: SURGERY
Payer: MEDICAID

## 2025-05-19 ENCOUNTER — RESULTS FOLLOW-UP (OUTPATIENT)
Dept: SURGERY | Age: 40
End: 2025-05-19

## 2025-05-19 DIAGNOSIS — R10.11 RUQ PAIN: ICD-10-CM

## 2025-05-19 DIAGNOSIS — R11.0 NAUSEA: ICD-10-CM

## 2025-05-19 PROCEDURE — 76705 ECHO EXAM OF ABDOMEN: CPT

## 2025-05-20 DIAGNOSIS — R10.11 RUQ ABDOMINAL PAIN: Primary | ICD-10-CM

## 2025-05-30 DIAGNOSIS — R79.89 ABNORMAL LIVER FUNCTION TESTS: ICD-10-CM

## 2025-05-30 DIAGNOSIS — R10.13 EPIGASTRIC PAIN: ICD-10-CM

## 2025-05-30 RX ORDER — ONDANSETRON 4 MG/1
TABLET, FILM COATED ORAL
Qty: 90 TABLET | Refills: 1 | Status: SHIPPED | OUTPATIENT
Start: 2025-05-30

## 2025-06-09 NOTE — TELEPHONE ENCOUNTER
The pt is going to get treatment at [address] [place] [address] for the trouble she is having with her neck and lumbar, they would like her to get updated xrays, so she is calling to see if you can order her those General Sunscreen Counseling: I recommended a broad spectrum sunscreen with a SPF of 30 or higher.  I explained that SPF 30 sunscreens block approximately 97 percent of the sun's harmful rays.  Sunscreens should be applied at least 15 minutes prior to expected sun exposure and then every 2 hours after that as long as sun exposure continues. If swimming or exercising sunscreen should be reapplied every 45 minutes to an hour after getting wet or sweating.  One ounce, or the equivalent of a shot glass full of sunscreen, is adequate to protect the skin not covered by a bathing suit. I also recommended a lip balm with a sunscreen as well. Sun protective clothing can be used in lieu of sunscreen but must be worn the entire time you are exposed to the sun's rays. Detail Level: Generalized

## 2025-06-12 ENCOUNTER — OFFICE VISIT (OUTPATIENT)
Dept: PRIMARY CARE CLINIC | Age: 40
End: 2025-06-12
Payer: MEDICAID

## 2025-06-12 VITALS
OXYGEN SATURATION: 98 % | SYSTOLIC BLOOD PRESSURE: 128 MMHG | HEART RATE: 106 BPM | WEIGHT: 108 LBS | BODY MASS INDEX: 19.88 KG/M2 | DIASTOLIC BLOOD PRESSURE: 80 MMHG | TEMPERATURE: 97 F | HEIGHT: 62 IN

## 2025-06-12 DIAGNOSIS — F43.22 ADJUSTMENT DISORDER WITH ANXIETY: Primary | ICD-10-CM

## 2025-06-12 PROCEDURE — G8420 CALC BMI NORM PARAMETERS: HCPCS | Performed by: FAMILY MEDICINE

## 2025-06-12 PROCEDURE — 99213 OFFICE O/P EST LOW 20 MIN: CPT | Performed by: FAMILY MEDICINE

## 2025-06-12 PROCEDURE — G8427 DOCREV CUR MEDS BY ELIG CLIN: HCPCS | Performed by: FAMILY MEDICINE

## 2025-06-12 PROCEDURE — 1036F TOBACCO NON-USER: CPT | Performed by: FAMILY MEDICINE

## 2025-06-12 RX ORDER — ALPRAZOLAM 0.5 MG
.25-.5 TABLET ORAL 3 TIMES DAILY PRN
Qty: 60 TABLET | Refills: 0 | Status: SHIPPED | OUTPATIENT
Start: 2025-06-12 | End: 2025-07-12

## 2025-06-12 RX ORDER — TRAZODONE HYDROCHLORIDE 100 MG/1
100-200 TABLET ORAL NIGHTLY
Qty: 60 TABLET | Refills: 5 | Status: SHIPPED | OUTPATIENT
Start: 2025-06-12

## 2025-06-12 ASSESSMENT — ENCOUNTER SYMPTOMS
CONSTIPATION: 0
DIARRHEA: 0
ABDOMINAL PAIN: 0
VOMITING: 0
SHORTNESS OF BREATH: 0
PHOTOPHOBIA: 0
COUGH: 0
NAUSEA: 0
BLOOD IN STOOL: 0
BACK PAIN: 0
ABDOMINAL DISTENTION: 0
SORE THROAT: 0

## 2025-06-12 NOTE — PROGRESS NOTES
Danyelle Reyes (:  1985) is a 39 y.o. female,Established patient, here for evaluation of the following chief complaint(s):  Stress, Panic Attack, and Anxiety         Assessment & Plan  Adjustment disorder with anxiety  At this time we will reorder the trazodone and increase slightly.  Short prescription of Xanax as needed.  She will be starting back with counseling when she returns from [place].    Orders:    ALPRAZolam (XANAX) 0.5 MG tablet; Take 0.5-1 tablets by mouth 3 times daily as needed for Sleep or Anxiety for up to 30 days. Max Daily Amount: 1.5 mg    traZODone (DESYREL) 100 MG tablet; Take 1-2 tablets by mouth nightly      No follow-ups on file.       Subjective   HPI  Patient presents today for evaluation of worsening anxiety.  Recently had the radha cut for her job and her whole team was fired.  Has been having more agitation and anxiety since.  Taking the trazodone which has been helping somewhat however she is concerned about panic attack as she is going to a wedding in [place] in the next several weeks.  Did ask for a short prescription of benzodiazepine.  Had been on this in the past.  We did discuss the concerns that she is recovering alcoholic/addict and she wishes to proceed.      Review of Systems   Constitutional:  Negative for chills and fever.   HENT:  Negative for congestion, hearing loss, nosebleeds and sore throat.    Eyes:  Negative for photophobia.   Respiratory:  Negative for cough and shortness of breath.    Cardiovascular:  Negative for chest pain, palpitations and leg swelling.   Gastrointestinal:  Negative for abdominal distention, abdominal pain, blood in stool, constipation, diarrhea, nausea and vomiting.   Endocrine: Negative for polydipsia.   Genitourinary:  Negative for dysuria, frequency, hematuria and urgency.   Musculoskeletal:  Negative for back pain and myalgias.   Skin: Negative.    Neurological:  Negative for dizziness, tremors, weakness and headaches.

## 2025-06-30 ENCOUNTER — TELEPHONE (OUTPATIENT)
Dept: SURGERY | Age: 40
End: 2025-06-30

## 2025-06-30 NOTE — TELEPHONE ENCOUNTER
Unable to reach Danyelle by phone x 3 to schedule a Hida Scan - radiology also left 3 messages - letter mailed on Joelle 10, 2025 with no response

## 2025-07-11 NOTE — PROGRESS NOTES
Anesthesia Post Evaluation    Patient: Ashutosh Man    Procedure(s) Performed: Procedure(s) (LRB):  MYRINGOTOMY, WITH TYMPANOSTOMY TUBE INSERTION (Bilateral)    Final Anesthesia Type: general      Patient location during evaluation: PACU  Patient participation: Yes- Able to Participate  Level of consciousness: awake and alert  Post-procedure vital signs: reviewed and stable  Pain management: adequate  Airway patency: patent    PONV status at discharge: No PONV  Anesthetic complications: no      Cardiovascular status: blood pressure returned to baseline and hemodynamically stable  Respiratory status: spontaneous ventilation  Hydration status: euvolemic  Follow-up not needed.              Vitals Value Taken Time   BP 94/69 07/11/25 07:50   Temp 36.6 °C (97.9 °F) 07/11/25 08:04   Pulse 135 07/11/25 08:04   Resp 28 07/11/25 08:04   SpO2 99 % 07/11/25 08:04         No case tracking events are documented in the log.      Pain/Zarina Score: Presence of Pain: non-verbal indicators absent (7/11/2025  7:50 AM)  Zarina Score: 10 (7/11/2025  8:02 AM)           [facility]) Hospitalist Progress Note    Admission Date  11/8/2021  3:08 PM  Chief Complaint Abd pain    Subjective  History of Present Illness  Seen at bedside  Still in pain, clinically not much changed today  EBV and HSV IgGs both markedly positive   ? Poss underlying autoimmune issue - pt denies known autoimmune dz in self or known family  Says urinary symptoms not improved despite abx, will repeat UA  No family present  Discussed w pt's nurse and charge nurse    Review of Systems - 12-point review of systems has been reviewed and is otherwise negative except as listed in the HPI    Objective  Physical Exam  Vitals: /80   Pulse 98   Temp 98.2 °F (36.8 °C)   Resp 16   Ht 5' 3\" (1.6 m)   Wt 130 lb (59 kg)   SpO2 99%   BMI 23.03 kg/m²   General: well-developed, well-nourished, no acute distress, cooperative  Skin: warm, dry, intact, normal color without cyanosis  HEENT: normocephalic, atraumatic, mucous membranes normal  Respiratory: clear to auscultation bilaterally without respiratory distress  Cardiovascular: regular rate and rhythm without murmur / rub / gallop  Abdominal: distended, firm, nontender, nondistended, normoactive bowel sounds  Extremities: no mottling, pulses intact, no edema, bl ankle TTP  Neurologic: awake, alert, no focal deficits  Psychiatric: normal affect, cooperative    Assessment / Plan  Abd pain, etiology unclear  · Recent admission w normal HIDA and MRCP; not gallbladder  · Lipase normal / CT showed poss some enhancement of pancreatic head but could be d/t the ascites (see below)  · CT did show hepatic steatosis which could cause pain through capsular involvement  · Pt denies EtOH use in past month  · Initially concerned for SBP but cx neg; Rocephin switched per ID to cefuroxime.     · CTA neg for VTE  · S/p paracentesis x2 on 11/10 and 11/11  · Getting Dilaudid q4h  · Reports continued urinary sx - check UA    Elevated LFTs / ?cirrhosis  · Liver enlarged w fatty infiltration on CTs  · AST > ALT indicates more slow progression of some dz  · Predominantly direct hyperbilirubinemia is improving, alk phos up as well  · Ratio consistent w EtOH liver dz though pt denies recently  · Hepatitis panel negative  · Ferritin was elevated; HFE genotype pending per GI note  · AFP normal  · Not much utility in checking elastography currently; would want to wait until acute issues have resolved  · Perhaps would ultimately benefit from biopsy  · Checking HSV and EBV - IgGs both positive  · Checking DENIS, anti-smooth muscle, antimitochondrial Abs    Pt's PMH otherwise pertinent for anemia, depression. Continue outpt med regimen; if any particular issues, will address and move to active problem list above. Please see orders for further plan of care.      Code status  Full   DVT prophylaxis Lovenox   Disposition  Home when ready    Electronically signed by Kaila Schneider DO on 11/14/2021 at 11:40 AM

## 2025-07-16 ENCOUNTER — HOSPITAL ENCOUNTER (EMERGENCY)
Age: 40
Discharge: HOME | End: 2025-07-17
Attending: STUDENT IN AN ORGANIZED HEALTH CARE EDUCATION/TRAINING PROGRAM
Payer: MEDICAID

## 2025-07-16 ENCOUNTER — APPOINTMENT (OUTPATIENT)
Dept: GENERAL RADIOLOGY | Age: 40
End: 2025-07-16
Payer: MEDICAID

## 2025-07-16 VITALS
OXYGEN SATURATION: 100 % | RESPIRATION RATE: 17 BRPM | DIASTOLIC BLOOD PRESSURE: 82 MMHG | WEIGHT: 108 LBS | BODY MASS INDEX: 19.14 KG/M2 | HEIGHT: 63 IN | SYSTOLIC BLOOD PRESSURE: 115 MMHG | HEART RATE: 68 BPM | TEMPERATURE: 98.6 F

## 2025-07-16 DIAGNOSIS — R53.83 FATIGUE, UNSPECIFIED TYPE: Primary | ICD-10-CM

## 2025-07-16 LAB
ANION GAP SERPL CALCULATED.3IONS-SCNC: 16 MMOL/L (ref 7–16)
BASOPHILS # BLD: 0.05 K/UL (ref 0–0.2)
BASOPHILS NFR BLD: 1 % (ref 0–2)
BNP SERPL-MCNC: 118 PG/ML (ref 0–125)
BUN SERPL-MCNC: 3 MG/DL (ref 6–20)
CALCIUM SERPL-MCNC: 9.3 MG/DL (ref 8.6–10)
CHLORIDE SERPL-SCNC: 101 MMOL/L (ref 98–107)
CO2 SERPL-SCNC: 23 MMOL/L (ref 22–29)
CREAT SERPL-MCNC: 0.7 MG/DL (ref 0.5–1)
EOSINOPHIL # BLD: 0.09 K/UL (ref 0.05–0.5)
EOSINOPHILS RELATIVE PERCENT: 1 % (ref 0–6)
ERYTHROCYTE [DISTWIDTH] IN BLOOD BY AUTOMATED COUNT: 13.9 % (ref 11.5–15)
GFR, ESTIMATED: >90 ML/MIN/1.73M2
GLUCOSE SERPL-MCNC: 97 MG/DL (ref 74–99)
HCT VFR BLD AUTO: 39.2 % (ref 34–48)
HGB BLD-MCNC: 13.4 G/DL (ref 11.5–15.5)
IMM GRANULOCYTES # BLD AUTO: <0.03 K/UL (ref 0–0.58)
IMM GRANULOCYTES NFR BLD: 0 % (ref 0–5)
LYMPHOCYTES NFR BLD: 2.85 K/UL (ref 1.5–4)
LYMPHOCYTES RELATIVE PERCENT: 35 % (ref 20–42)
MAGNESIUM SERPL-MCNC: 2 MG/DL (ref 1.6–2.6)
MCH RBC QN AUTO: 32.8 PG (ref 26–35)
MCHC RBC AUTO-ENTMCNC: 34.2 G/DL (ref 32–34.5)
MCV RBC AUTO: 95.8 FL (ref 80–99.9)
MONOCYTES NFR BLD: 0.52 K/UL (ref 0.1–0.95)
MONOCYTES NFR BLD: 6 % (ref 2–12)
NEUTROPHILS NFR BLD: 57 % (ref 43–80)
NEUTS SEG NFR BLD: 4.59 K/UL (ref 1.8–7.3)
PLATELET # BLD AUTO: 202 K/UL (ref 130–450)
PMV BLD AUTO: 9.4 FL (ref 7–12)
POTASSIUM SERPL-SCNC: 3.2 MMOL/L (ref 3.5–5.1)
POTASSIUM SERPL-SCNC: 3.3 MMOL/L (ref 3.5–5.1)
POTASSIUM SERPL-SCNC: 3.6 MMOL/L (ref 3.5–5.1)
RBC # BLD AUTO: 4.09 M/UL (ref 3.5–5.5)
SODIUM SERPL-SCNC: 140 MMOL/L (ref 136–145)
TROPONIN I SERPL HS-MCNC: <6 NG/L (ref 0–14)
WBC OTHER # BLD: 8.1 K/UL (ref 4.5–11.5)

## 2025-07-16 PROCEDURE — 85025 COMPLETE CBC W/AUTO DIFF WBC: CPT

## 2025-07-16 PROCEDURE — 2580000003 HC RX 258: Performed by: STUDENT IN AN ORGANIZED HEALTH CARE EDUCATION/TRAINING PROGRAM

## 2025-07-16 PROCEDURE — 93005 ELECTROCARDIOGRAM TRACING: CPT | Performed by: NURSE PRACTITIONER

## 2025-07-16 PROCEDURE — 80048 BASIC METABOLIC PNL TOTAL CA: CPT

## 2025-07-16 PROCEDURE — 84132 ASSAY OF SERUM POTASSIUM: CPT

## 2025-07-16 PROCEDURE — 99285 EMERGENCY DEPT VISIT HI MDM: CPT

## 2025-07-16 PROCEDURE — 71046 X-RAY EXAM CHEST 2 VIEWS: CPT

## 2025-07-16 PROCEDURE — 83735 ASSAY OF MAGNESIUM: CPT

## 2025-07-16 PROCEDURE — 84484 ASSAY OF TROPONIN QUANT: CPT

## 2025-07-16 PROCEDURE — 83880 ASSAY OF NATRIURETIC PEPTIDE: CPT

## 2025-07-16 PROCEDURE — 6370000000 HC RX 637 (ALT 250 FOR IP): Performed by: STUDENT IN AN ORGANIZED HEALTH CARE EDUCATION/TRAINING PROGRAM

## 2025-07-16 RX ORDER — POTASSIUM CHLORIDE 1500 MG/1
40 TABLET, EXTENDED RELEASE ORAL ONCE
Status: COMPLETED | OUTPATIENT
Start: 2025-07-16 | End: 2025-07-16

## 2025-07-16 RX ORDER — 0.9 % SODIUM CHLORIDE 0.9 %
1000 INTRAVENOUS SOLUTION INTRAVENOUS ONCE
Status: COMPLETED | OUTPATIENT
Start: 2025-07-16 | End: 2025-07-16

## 2025-07-16 RX ADMIN — SODIUM CHLORIDE 1000 ML: 0.9 INJECTION, SOLUTION INTRAVENOUS at 20:40

## 2025-07-16 RX ADMIN — POTASSIUM CHLORIDE 40 MEQ: 1500 TABLET, EXTENDED RELEASE ORAL at 20:40

## 2025-07-16 ASSESSMENT — LIFESTYLE VARIABLES
HOW OFTEN DO YOU HAVE A DRINK CONTAINING ALCOHOL: NEVER
HOW MANY STANDARD DRINKS CONTAINING ALCOHOL DO YOU HAVE ON A TYPICAL DAY: PATIENT DOES NOT DRINK

## 2025-07-16 ASSESSMENT — PAIN - FUNCTIONAL ASSESSMENT: PAIN_FUNCTIONAL_ASSESSMENT: 0-10

## 2025-07-16 ASSESSMENT — PAIN SCALES - GENERAL: PAINLEVEL_OUTOF10: 7

## 2025-07-16 NOTE — ED NOTES
Department of Emergency Medicine    FIRST PROVIDER TRIAGE NOTE             Independent MLP           7/16/25  4:50 PM EDT    Date of Encounter: 7/16/25   MRN: [id number]    Vitals:    07/16/25 1647   BP: (!) 120/93   Pulse: 92   Resp: 18   Temp: 98.6 °F (37 °C)   TempSrc: Oral   SpO2: 98%   Weight: 49 kg (108 lb)   Height: 1.6 m (5' 3\")        HPI: Danyelle Reyes is a 39 y.o. female who presents to the ED for No chief complaint on file.  + palpitations, possible low potassium, recently started on a different birth control.       ROS: Negative for sob.    Physical Exam:   Gen Appearance/Constitutional: alert  CV: regular rate  Pulm: Respirations easy and unlabored      Initial Plan of Care: All treatment areas with department are currently occupied.     Plan to order/Initiate the following while awaiting opening in ED: Triage evaluation  labs, EKG, and imaging studies.    EKG completed by nursing staff and interpreted by emergency department physician.     Provider-Patient relationship only established for Provider In Triage (PIT).  Full assessment, HPI, and examination not performed, therefore, it is not yet possible to state whether or not an emergency medical condition exists.  This provider not responsible to follow or interpret any labs or testing ordered in triage.  Supervisor request for PALOMA to initiate contact and input an assessment note in triage during high volume surges.     Initial Plan of Care: Initiate Treatment-Testing, Proceed toTreatment Area When Bed Available for ED Attending/MLP to Continue Care  Secondary to high volume, low staffing, and/or boarding- patient to await bed availability.    This ends my PIT-Patient relationship.  Care of patient relinquished after triage    Electronically signed by BASIL Polanco CNP   DD: 7/16/25       Ruba Gallardo APRN - CNP  07/16/25 [id number]

## 2025-07-17 ENCOUNTER — OFFICE VISIT (OUTPATIENT)
Dept: PRIMARY CARE CLINIC | Age: 40
End: 2025-07-17
Payer: MEDICAID

## 2025-07-17 VITALS
HEIGHT: 63 IN | HEART RATE: 62 BPM | OXYGEN SATURATION: 98 % | DIASTOLIC BLOOD PRESSURE: 80 MMHG | TEMPERATURE: 97 F | SYSTOLIC BLOOD PRESSURE: 122 MMHG | BODY MASS INDEX: 19.49 KG/M2 | WEIGHT: 110 LBS

## 2025-07-17 DIAGNOSIS — R10.11 RUQ PAIN: ICD-10-CM

## 2025-07-17 DIAGNOSIS — R10.11 RUQ PAIN: Primary | ICD-10-CM

## 2025-07-17 LAB
ALBUMIN: 4.1 G/DL (ref 3.5–5.2)
ALP BLD-CCNC: 61 U/L (ref 35–104)
ALT SERPL-CCNC: 17 U/L (ref 0–35)
ANION GAP SERPL CALCULATED.3IONS-SCNC: 15 MMOL/L (ref 7–16)
AST SERPL-CCNC: 30 U/L (ref 0–35)
BACTERIA: ABNORMAL
BILIRUB SERPL-MCNC: 0.5 MG/DL (ref 0–1.2)
BILIRUBIN DIRECT: 0.2 MG/DL (ref 0–0.2)
BILIRUBIN, INDIRECT: 0.3 MG/DL (ref 0–1)
BILIRUBIN, URINE: NEGATIVE
BUN BLDV-MCNC: 4 MG/DL (ref 6–20)
C-REACTIVE PROTEIN: <3 MG/L (ref 0–5)
CALCIUM SERPL-MCNC: 9.2 MG/DL (ref 8.6–10)
CHLORIDE BLD-SCNC: 104 MMOL/L (ref 98–107)
CO2: 22 MMOL/L (ref 22–29)
COLOR, UA: YELLOW
CREAT SERPL-MCNC: 0.7 MG/DL (ref 0.5–1)
EPITHELIAL CELLS, UA: ABNORMAL /HPF
GFR, ESTIMATED: >90 ML/MIN/1.73M2
GLUCOSE BLD-MCNC: 100 MG/DL (ref 74–99)
GLUCOSE URINE: NEGATIVE MG/DL
IRON % SATURATION: 20 % (ref 15–50)
IRON: 59 UG/DL (ref 37–145)
KETONES, URINE: NEGATIVE MG/DL
LEUKOCYTE ESTERASE, URINE: ABNORMAL
LIPASE: 47 U/L (ref 13–60)
NITRITE, URINE: NEGATIVE
PH, URINE: 7.5 (ref 5–8)
POTASSIUM SERPL-SCNC: 3.8 MMOL/L (ref 3.5–5.1)
PROTEIN UA: NEGATIVE MG/DL
RBC UA: ABNORMAL /HPF
SODIUM BLD-SCNC: 141 MMOL/L (ref 136–145)
SPECIFIC GRAVITY UA: 1.01 (ref 1–1.03)
TOTAL IRON BINDING CAPACITY: 295 UG/DL (ref 250–450)
TOTAL PROTEIN: 6.9 G/DL (ref 6.4–8.3)
TROPONIN, HIGH SENSITIVITY: 7 NG/L (ref 0–14)
TURBIDITY: ABNORMAL
URINE HGB: NEGATIVE
UROBILINOGEN, URINE: 0.2 EU/DL (ref 0–1)
WBC UA: ABNORMAL /HPF

## 2025-07-17 PROCEDURE — 1036F TOBACCO NON-USER: CPT | Performed by: FAMILY MEDICINE

## 2025-07-17 PROCEDURE — G8427 DOCREV CUR MEDS BY ELIG CLIN: HCPCS | Performed by: FAMILY MEDICINE

## 2025-07-17 PROCEDURE — 99213 OFFICE O/P EST LOW 20 MIN: CPT | Performed by: FAMILY MEDICINE

## 2025-07-17 PROCEDURE — G8420 CALC BMI NORM PARAMETERS: HCPCS | Performed by: FAMILY MEDICINE

## 2025-07-17 RX ORDER — POTASSIUM CHLORIDE 750 MG/1
10 TABLET, EXTENDED RELEASE ORAL DAILY
Qty: 90 TABLET | Refills: 1 | Status: SHIPPED | OUTPATIENT
Start: 2025-07-17

## 2025-07-17 ASSESSMENT — ENCOUNTER SYMPTOMS
ABDOMINAL DISTENTION: 0
SHORTNESS OF BREATH: 0
CONSTIPATION: 0
BACK PAIN: 0
SORE THROAT: 0
VOMITING: 0
PHOTOPHOBIA: 0
NAUSEA: 1
BLOOD IN STOOL: 0
COUGH: 0
ABDOMINAL PAIN: 0
DIARRHEA: 0

## 2025-07-17 NOTE — DISCHARGE INSTRUCTIONS
Follow up with your family physician  If symptoms worsen or concern arises return for re-evaluation.

## 2025-07-17 NOTE — PROGRESS NOTES
Danyelle Reyes (:  1985) is a 39 y.o. female,Established patient, here for evaluation of the following chief complaint(s):  Follow-Up from Hospital         Assessment & Plan  RUQ pain  Initial review of x-ray shows slightly abnormal right kidney without signs of obstruction and potential signs of colitis.  Final read per radiologist.  Baseline labs ordered today.  Replete potassium.  Further evaluation based on results.    Orders:    Comprehensive Metabolic Panel with Bilirubin; Future    C-Reactive Protein; Future    Iron and TIBC; Future    PAIN MANAGEMENT PROFILE 1 W/ CONFIRMATION, URINE; Future    Urinalysis with Microscopic; Future    Lipase; Future    XR ACUTE ABD SERIES CHEST 1 VW; Future    Troponin; Future      No follow-ups on file.       Subjective   HPI  Patient presents today for hospital follow-up.  Went to the emergency department yesterday for worsening abdominal pain, cramping, and polyneuropathy.  Was with concerned about her potassium level however repeat draws showed hemolyzed specimen.  No imaging was performed and she was discharged.  Told to follow-up here for further evaluation.  No recent changes.  No recent systemic illness.  No vomiting or diarrhea but has had intermittent nausea.  Patient states that the Xanax did not help her previous symptoms.    Review of Systems   Constitutional:  Negative for chills and fever.   HENT:  Negative for congestion, hearing loss, nosebleeds and sore throat.    Eyes:  Negative for photophobia.   Respiratory:  Negative for cough and shortness of breath.    Cardiovascular:  Negative for chest pain, palpitations and leg swelling.   Gastrointestinal:  Positive for nausea. Negative for abdominal distention, abdominal pain, blood in stool, constipation, diarrhea and vomiting.   Endocrine: Negative for polydipsia.   Genitourinary:  Negative for dysuria, frequency, hematuria and urgency.   Musculoskeletal:  Positive for myalgias. Negative for back

## 2025-07-18 LAB
6-MONOACETYLMORPHINE, URINE: NEGATIVE
ABNORMAL SPECIMEN VALIDITY TEST: ABNORMAL
ALCOHOL URINE: NOT DETECTED MG/DL
AMPHETAMINE SCREEN URINE: NEGATIVE
BARBITURATE SCREEN URINE: NEGATIVE
BENZODIAZEPINE SCREEN, URINE: NEGATIVE
BUPRENORPHINE URINE: NEGATIVE
CANNABINOID SCREEN URINE: POSITIVE
COCAINE METABOLITE, URINE: NEGATIVE
COMPLIANCE DRUG ANALYSIS, URINE: NORMAL
EKG ATRIAL RATE: 72 BPM
EKG P AXIS: 73 DEGREES
EKG P-R INTERVAL: 154 MS
EKG Q-T INTERVAL: 398 MS
EKG QRS DURATION: 62 MS
EKG QTC CALCULATION (BAZETT): 435 MS
EKG R AXIS: 89 DEGREES
EKG T AXIS: 83 DEGREES
EKG VENTRICULAR RATE: 72 BPM
FENTANYL URINE: NEGATIVE
INTEGRITY CHECK, CREATININE, URINE: 32 MG/DL (ref 22–250)
INTEGRITY CHECK, OXIDANT, URINE: 10 MG/L
INTEGRITY CHECK, PH, URINE: 7.5 (ref 4.5–8)
INTEGRITY CHECK, SPECIFIC GRAVITY, URINE: 1.01 (ref 1–1.03)
METHADONE SCREEN, URINE: NEGATIVE
OPIATES, URINE: NEGATIVE
OXYCODONE SCREEN URINE: NEGATIVE
PCP,URINE: NEGATIVE
TEST INFORMATION: ABNORMAL
THC NORMALIZED, QUANTITIATIVE, URINE: 2928.8 NG/ML
THC-COOH, QUANTITATIVE, URINE: 937.2 NG/ML
TRAMADOL, URINE: NEGATIVE

## 2025-07-18 PROCEDURE — 93010 ELECTROCARDIOGRAM REPORT: CPT | Performed by: INTERNAL MEDICINE

## 2025-07-18 ASSESSMENT — ENCOUNTER SYMPTOMS
EYE PAIN: 0
EYE DISCHARGE: 0
COUGH: 0
ABDOMINAL DISTENTION: 0
WHEEZING: 0
EYE REDNESS: 0
NAUSEA: 0
DIARRHEA: 0
BACK PAIN: 0
SHORTNESS OF BREATH: 0
VOMITING: 0
SINUS PRESSURE: 0
SORE THROAT: 0

## 2025-08-19 ENCOUNTER — OFFICE VISIT (OUTPATIENT)
Dept: PRIMARY CARE CLINIC | Age: 40
End: 2025-08-19
Payer: MEDICAID

## 2025-08-19 VITALS
HEIGHT: 63 IN | BODY MASS INDEX: 20.2 KG/M2 | DIASTOLIC BLOOD PRESSURE: 84 MMHG | SYSTOLIC BLOOD PRESSURE: 112 MMHG | OXYGEN SATURATION: 100 % | HEART RATE: 63 BPM | TEMPERATURE: 97 F | WEIGHT: 114 LBS

## 2025-08-19 DIAGNOSIS — R23.2 HOT FLASHES: ICD-10-CM

## 2025-08-19 DIAGNOSIS — R10.11 RUQ PAIN: ICD-10-CM

## 2025-08-19 DIAGNOSIS — R10.13 EPIGASTRIC PAIN: ICD-10-CM

## 2025-08-19 DIAGNOSIS — F43.22 ADJUSTMENT DISORDER WITH ANXIETY: ICD-10-CM

## 2025-08-19 DIAGNOSIS — E53.8 B12 DEFICIENCY: Primary | ICD-10-CM

## 2025-08-19 PROCEDURE — 1036F TOBACCO NON-USER: CPT | Performed by: FAMILY MEDICINE

## 2025-08-19 PROCEDURE — 99212 OFFICE O/P EST SF 10 MIN: CPT | Performed by: FAMILY MEDICINE

## 2025-08-19 PROCEDURE — G8427 DOCREV CUR MEDS BY ELIG CLIN: HCPCS | Performed by: FAMILY MEDICINE

## 2025-08-19 PROCEDURE — G8420 CALC BMI NORM PARAMETERS: HCPCS | Performed by: FAMILY MEDICINE

## 2025-08-19 RX ORDER — CYANOCOBALAMIN 1000 UG/ML
1000 INJECTION, SOLUTION INTRAMUSCULAR; SUBCUTANEOUS ONCE
Qty: 1 ML | Refills: 0 | Status: SHIPPED | OUTPATIENT
Start: 2025-08-19 | End: 2025-08-19

## 2025-08-19 RX ORDER — CYANOCOBALAMIN 1000 UG/ML
1000 INJECTION, SOLUTION INTRAMUSCULAR; SUBCUTANEOUS ONCE
Status: COMPLETED | OUTPATIENT
Start: 2025-08-19 | End: 2025-08-19

## 2025-08-19 RX ADMIN — CYANOCOBALAMIN 1000 MCG: 1000 INJECTION, SOLUTION INTRAMUSCULAR; SUBCUTANEOUS at 14:18

## 2025-08-19 ASSESSMENT — ENCOUNTER SYMPTOMS
COUGH: 0
BLOOD IN STOOL: 0
BACK PAIN: 0
SHORTNESS OF BREATH: 0
CONSTIPATION: 0
PHOTOPHOBIA: 0
SORE THROAT: 0
DIARRHEA: 0
ABDOMINAL PAIN: 1
NAUSEA: 1
VOMITING: 0
ABDOMINAL DISTENTION: 0

## (undated) DEVICE — BIT DRL L110MM DIA2.5MM ST G QUIK CPL NONRADIOPAQUE W/O STP

## (undated) DEVICE — T-MAX DISPOSABLE FACE MASK 8 PER BOX

## (undated) DEVICE — 3M™ STERI-DRAPE™ U-DRAPE 1015: Brand: STERI-DRAPE™

## (undated) DEVICE — DOUBLE BASIN SET: Brand: MEDLINE INDUSTRIES, INC.

## (undated) DEVICE — HANDPIECE SET WITH BONE CLEANING TIP AND SUCTION TUBE: Brand: INTERPULSE

## (undated) DEVICE — ELECTRODE PT RET AD L9FT HI MOIST COND ADH HYDRGEL CORDED

## (undated) DEVICE — TAPE ADH CLTH SILK H2O REPELLENT CURAD

## (undated) DEVICE — TRAY SYSTEM 7 DRILL REUSABLE

## (undated) DEVICE — GAUZE,SPONGE,4"X4",8PLY,STRL,LF,10/TRAY: Brand: MEDLINE

## (undated) DEVICE — 4-PORT MANIFOLD: Brand: NEPTUNE 2

## (undated) DEVICE — INTENDED FOR TISSUE SEPARATION, AND OTHER PROCEDURES THAT REQUIRE A SHARP SURGICAL BLADE TO PUNCTURE OR CUT.: Brand: BARD-PARKER ® STAINLESS STEEL BLADES

## (undated) DEVICE — SYSTEM TPS ORTHO

## (undated) DEVICE — GLOVE SURG SZ 85 L12IN FNGR THK13MIL WHT ISOLEX POLYISOPRENE

## (undated) DEVICE — CONVERTORS STOCKINETTE: Brand: CONVERTORS

## (undated) DEVICE — GLOVE SURG SZ 6 L12IN THK75MIL DK GRN LTX FREE

## (undated) DEVICE — 3M™ COBAN™ NL STERILE NON-LATEX SELF-ADHERENT WRAP, 2084S, 4 IN X 5 YD (10 CM X 4,5 M), 18 ROLLS/CASE: Brand: 3M™ COBAN™

## (undated) DEVICE — DRAPE,LAPAROTOMY,PCH,STERILE: Brand: MEDLINE

## (undated) DEVICE — 1000 S-DRAPE TOWEL DRAPE 10/BX: Brand: STERI-DRAPE™

## (undated) DEVICE — TOWEL,OR,DSP,ST,BLUE,STD,6/PK,12PK/CS: Brand: MEDLINE

## (undated) DEVICE — GOWN,SIRUS,FABRNF,XL,20/CS: Brand: MEDLINE

## (undated) DEVICE — IMMOBILIZER SHLDR L10.5-17IN D7IN SLNG W/ 15DEG ABD PLLW

## (undated) DEVICE — DRESSING,GAUZE,XEROFORM,CURAD,1"X8",ST: Brand: CURAD

## (undated) DEVICE — TAPE ADH W3INXL10YD WHT COT WVN BK POWERFUL RUB BASE HIGHLY

## (undated) DEVICE — INSTRUMENT CURRETTES REUSABLE

## (undated) DEVICE — NEEDLE HYPO 25GA L1.5IN BLU POLYPR HUB S STL REG BVL STR

## (undated) DEVICE — Z DISCONTINUED PER MEDLINE USE 2741942 DRESSING AQUACEL 6 IN ALG W9XL15CM SIL CVR WTRPRF VIR BACT BARR ANTIMIC

## (undated) DEVICE — TRAY LAMBOTS REUSABLE

## (undated) DEVICE — K WIRE FIX L150MM DIA2MM S STL W/ TRCR PNT
Type: IMPLANTABLE DEVICE | Site: HUMERUS | Status: NON-FUNCTIONAL
Removed: 2021-05-12

## (undated) DEVICE — PACK PROCEDURE SURG GEN CUST

## (undated) DEVICE — BIT DRL L200MM DIA2.8MM CALIB L100MM FOR 3.5MM VA LCP PROX

## (undated) DEVICE — PAD,ABDOMINAL,5"X9",ST,LF,25/BX: Brand: MEDLINE INDUSTRIES, INC.

## (undated) DEVICE — GOWN,SIRUS,FABRNF,L,20/CS: Brand: MEDLINE

## (undated) DEVICE — SOLUTION IRRIG 1000ML 0.9% SOD CHL USP POUR PLAS BTL

## (undated) DEVICE — ADHESIVE SKIN CLSR 0.7ML TOP DERMBND ADV

## (undated) DEVICE — Device

## (undated) DEVICE — SPONGE LAP W18XL18IN WHT COT 4 PLY FLD STRUNG RADPQ DISP ST

## (undated) DEVICE — GLOVE SURG SZ 6 THK91MIL LTX FREE SYN POLYISOPRENE ANTI

## (undated) DEVICE — SET ORTHO STD STORTSTD1

## (undated) DEVICE — PACK,SHOULDER SPLIT: Brand: MEDLINE

## (undated) DEVICE — INSTRUMENT SYSTEM 7 BATTERY REUSABLE

## (undated) DEVICE — CONTROL SYRINGE LUER-LOCK TIP: Brand: MONOJECT

## (undated) DEVICE — DRAPE C ARM W41XL74IN UNIV MOB W RUBBERBAND CLP

## (undated) DEVICE — GLOVE SURG SZ 6 CRM LTX FREE POLYISOPRENE POLYMER BEAD ANTI

## (undated) DEVICE — GLOVE SURG SZ 9 THK91MIL LTX FREE SYN POLYISOPRENE ANTI

## (undated) DEVICE — Z INACTIVE USE 2660664 SOLUTION IRRIG 3000ML 0.9% SOD CHL USP UROMATIC PLAS CONT

## (undated) DEVICE — BLADE,STAINLESS-STEEL,15,STRL,DISPOSABLE: Brand: MEDLINE

## (undated) DEVICE — Z DISCONTINUED PER MEDLINE USE 2741943 DRESSING AQUACEL 10 IN ALG W9XL25CM SIL CVR WTRPRF VIR BACT BARR ANTIMIC

## (undated) DEVICE — GLOVE SURG SZ 65 THK91MIL LTX FREE SYN POLYISOPRENE

## (undated) DEVICE — CHLORAPREP 26ML ORANGE

## (undated) DEVICE — ANTISEPTIC 16OZ H PEROX 1ST AID ORAL DEBRIDING AGNT